# Patient Record
Sex: FEMALE | Race: WHITE | Employment: OTHER | URBAN - NONMETROPOLITAN AREA
[De-identification: names, ages, dates, MRNs, and addresses within clinical notes are randomized per-mention and may not be internally consistent; named-entity substitution may affect disease eponyms.]

---

## 2017-02-27 ENCOUNTER — HOSPITAL ENCOUNTER (OUTPATIENT)
Dept: MRI IMAGING | Age: 72
Discharge: HOME | End: 2017-02-27
Payer: COMMERCIAL

## 2017-02-27 ENCOUNTER — HOSPITAL ENCOUNTER (OUTPATIENT)
Dept: CT IMAGING | Age: 72
Discharge: HOME | End: 2017-02-27
Payer: COMMERCIAL

## 2017-02-27 DIAGNOSIS — Z87.891 FORMER SMOKER: ICD-10-CM

## 2017-02-27 DIAGNOSIS — M54.32 LEFT SIDED SCIATICA: ICD-10-CM

## 2017-02-27 PROCEDURE — 72148 MRI LUMBAR SPINE W/O DYE: CPT

## 2017-02-27 PROCEDURE — G0297 LDCT FOR LUNG CA SCREEN: HCPCS

## 2017-03-14 ENCOUNTER — HOSPITAL ENCOUNTER (OUTPATIENT)
Dept: ULTRASOUND IMAGING | Age: 72
Discharge: HOME | End: 2017-03-14
Payer: MEDICARE

## 2017-03-14 DIAGNOSIS — R59.0 ENLARGED LYMPH NODE IN NECK: ICD-10-CM

## 2017-03-14 DIAGNOSIS — N28.1 RENAL CYST: ICD-10-CM

## 2017-03-14 PROCEDURE — 76536 US EXAM OF HEAD AND NECK: CPT

## 2017-03-14 PROCEDURE — 76770 US EXAM ABDO BACK WALL COMP: CPT

## 2017-03-27 ENCOUNTER — HOSPITAL ENCOUNTER (OUTPATIENT)
Dept: NUCLEAR MEDICINE | Age: 72
Discharge: HOME | End: 2017-03-27
Payer: MEDICARE

## 2017-03-27 DIAGNOSIS — R91.1 SOLITARY PULMONARY NODULE: ICD-10-CM

## 2017-03-27 LAB
GLUCOSE BLD-MCNC: 113 MG/DL (ref 70–99)
PERFORMED ON: ABNORMAL

## 2017-03-27 PROCEDURE — 78815 PET IMAGE W/CT SKULL-THIGH: CPT

## 2017-03-27 PROCEDURE — A9552 F18 FDG: HCPCS | Performed by: INTERNAL MEDICINE

## 2017-03-27 PROCEDURE — 82948 REAGENT STRIP/BLOOD GLUCOSE: CPT

## 2017-03-27 PROCEDURE — 3430000000 HC RX DIAGNOSTIC RADIOPHARMACEUTICAL: Performed by: INTERNAL MEDICINE

## 2017-03-27 RX ORDER — FLUDEOXYGLUCOSE F 18 200 MCI/ML
10 INJECTION, SOLUTION INTRAVENOUS
Status: COMPLETED | OUTPATIENT
Start: 2017-03-27 | End: 2017-03-27

## 2017-03-27 RX ADMIN — FLUDEOXYGLUCOSE F 18 10 MILLICURIE: 200 INJECTION, SOLUTION INTRAVENOUS at 13:08

## 2017-04-12 ENCOUNTER — HOSPITAL ENCOUNTER (OUTPATIENT)
Dept: ULTRASOUND IMAGING | Age: 72
Discharge: HOME | End: 2017-04-12
Payer: MEDICARE

## 2017-04-12 DIAGNOSIS — N83.202 CYST OF LEFT OVARY: ICD-10-CM

## 2017-04-12 PROCEDURE — 76830 TRANSVAGINAL US NON-OB: CPT

## 2017-04-24 ENCOUNTER — PROCEDURE VISIT (OUTPATIENT)
Dept: OBSTETRICS AND GYNECOLOGY | Facility: CLINIC | Age: 72
End: 2017-04-24

## 2017-04-24 ENCOUNTER — OFFICE VISIT (OUTPATIENT)
Dept: OBSTETRICS AND GYNECOLOGY | Facility: CLINIC | Age: 72
End: 2017-04-24

## 2017-04-24 VITALS
HEIGHT: 67 IN | DIASTOLIC BLOOD PRESSURE: 84 MMHG | BODY MASS INDEX: 35.94 KG/M2 | SYSTOLIC BLOOD PRESSURE: 150 MMHG | WEIGHT: 229 LBS

## 2017-04-24 DIAGNOSIS — R93.89 ENDOMETRIAL THICKENING ON ULTRA SOUND: Primary | ICD-10-CM

## 2017-04-24 DIAGNOSIS — N85.00 ENDOMETRIAL HYPERPLASIA: Primary | ICD-10-CM

## 2017-04-24 PROCEDURE — 99203 OFFICE O/P NEW LOW 30 MIN: CPT | Performed by: OBSTETRICS & GYNECOLOGY

## 2017-04-24 PROCEDURE — 76830 TRANSVAGINAL US NON-OB: CPT | Performed by: OBSTETRICS & GYNECOLOGY

## 2017-04-24 RX ORDER — GLUCOSAMINE SULFATE 500 MG
1 CAPSULE ORAL DAILY
COMMUNITY
End: 2020-04-28

## 2017-04-24 RX ORDER — BUMETANIDE 1 MG/1
TABLET ORAL
COMMUNITY
Start: 2017-03-22 | End: 2017-10-30 | Stop reason: SDUPTHER

## 2017-04-24 RX ORDER — MELOXICAM 15 MG/1
15 TABLET ORAL DAILY PRN
COMMUNITY
Start: 2017-04-17 | End: 2018-08-09 | Stop reason: HOSPADM

## 2017-04-24 RX ORDER — CLONIDINE HYDROCHLORIDE 0.1 MG/1
TABLET ORAL
COMMUNITY
Start: 2017-03-22 | End: 2017-10-30 | Stop reason: SDUPTHER

## 2017-04-24 RX ORDER — FAMCICLOVIR 500 MG/1
TABLET ORAL
COMMUNITY
Start: 2015-01-29 | End: 2017-10-30 | Stop reason: SDUPTHER

## 2017-04-24 RX ORDER — POTASSIUM CHLORIDE 750 MG/1
10 TABLET, EXTENDED RELEASE ORAL 2 TIMES DAILY
COMMUNITY
Start: 2017-03-22

## 2017-04-24 RX ORDER — SIMVASTATIN 20 MG
TABLET ORAL
COMMUNITY
Start: 2014-10-14 | End: 2017-04-24 | Stop reason: SDUPTHER

## 2017-04-24 RX ORDER — FLUTICASONE PROPIONATE 50 MCG
SPRAY, SUSPENSION (ML) NASAL
COMMUNITY
Start: 2017-03-22 | End: 2017-10-30 | Stop reason: SDUPTHER

## 2017-04-24 RX ORDER — LABETALOL 300 MG/1
TABLET, FILM COATED ORAL
COMMUNITY
Start: 2017-03-22 | End: 2017-10-30 | Stop reason: SDUPTHER

## 2017-04-24 RX ORDER — SIMVASTATIN 20 MG
TABLET ORAL
COMMUNITY
Start: 2017-03-22 | End: 2017-10-30 | Stop reason: SDUPTHER

## 2017-04-24 RX ORDER — AMLODIPINE AND VALSARTAN 10; 320 MG/1; MG/1
TABLET ORAL
COMMUNITY
Start: 2017-03-22 | End: 2017-10-30 | Stop reason: SDUPTHER

## 2017-04-24 NOTE — PROGRESS NOTES
Farida Hayes is a 71 y.o. female is being seen for consultation today at the request of Ania Culver MD  Menopausal for 15 years now.  Took HRT initially for symptom control but stopped at that time due to DUB.  No more VB since.  Had workup that revealed thickened endometrial lining on U/S.  Today, u/s normal but lining measures 9 mm.    History of Present Illness    The following portions of the patient's history were reviewed and updated as appropriate: allergies, current medications, past family history, past medical history, past social history, past surgical history and problem list.    Review of Systems   Constitutional: Negative for fever and unexpected weight change.   HENT: Negative for nosebleeds, postnasal drip and rhinorrhea.    Eyes: Negative for photophobia and visual disturbance.   Respiratory: Negative for cough, choking and chest tightness.    Cardiovascular: Negative for chest pain and leg swelling.   Gastrointestinal: Negative for abdominal pain, diarrhea, nausea and vomiting.   Endocrine: Negative for cold intolerance and heat intolerance.   Genitourinary: Negative for dyspareunia, menstrual problem, pelvic pain, vaginal bleeding and vaginal discharge.   Musculoskeletal: Negative for back pain, gait problem and joint swelling.   Neurological: Negative for light-headedness and headaches.   Psychiatric/Behavioral: Negative for confusion, decreased concentration and dysphoric mood.       Objective   Physical Exam   Constitutional: She is oriented to person, place, and time. She appears well-developed and well-nourished.   HENT:   Head: Normocephalic and atraumatic.   Neck: Normal range of motion. Neck supple. No JVD present. No tracheal deviation present. No thyromegaly present.   Cardiovascular: Normal rate and regular rhythm.    Pulmonary/Chest: Effort normal and breath sounds normal. No stridor.   Abdominal: Soft. Bowel sounds are normal. She exhibits no distension. There is  no tenderness.   Musculoskeletal: Normal range of motion.   Lymphadenopathy:     She has no cervical adenopathy.   Neurological: She is alert and oriented to person, place, and time.   Skin: Skin is warm and dry.   Psychiatric: She has a normal mood and affect. Her behavior is normal. Judgment and thought content normal.   Nursing note and vitals reviewed.        Assessment/Plan   Anne-Marie was seen today for establish care.    Diagnoses and all orders for this visit:    Endometrial thickening on ultra sound  Comments:  Incidental finding without clinical significance due to absence of menopausal bleeding.    Repeat U/S in 6 months      Kenan Santos MD

## 2017-08-02 RX ORDER — MELOXICAM 15 MG/1
15 TABLET ORAL DAILY
COMMUNITY
End: 2018-07-27 | Stop reason: SDUPTHER

## 2017-08-02 RX ORDER — MELATONIN
2 DAILY
COMMUNITY
End: 2021-05-13 | Stop reason: ALTCHOICE

## 2017-08-03 LAB
ALBUMIN SERPL-MCNC: 4.5 G/DL (ref 3.5–5.2)
ALP BLD-CCNC: 100 U/L (ref 35–104)
ALT SERPL-CCNC: 45 U/L (ref 5–33)
ANION GAP SERPL CALCULATED.3IONS-SCNC: 15 MMOL/L (ref 7–19)
AST SERPL-CCNC: 27 U/L (ref 5–32)
BASOPHILS ABSOLUTE: 0.1 K/UL (ref 0–0.2)
BASOPHILS RELATIVE PERCENT: 0.8 % (ref 0–1)
BILIRUB SERPL-MCNC: 0.8 MG/DL (ref 0.2–1.2)
BUN BLDV-MCNC: 13 MG/DL (ref 8–23)
CALCIUM SERPL-MCNC: 10.9 MG/DL (ref 8.8–10.2)
CHLORIDE BLD-SCNC: 101 MMOL/L (ref 98–111)
CHOLESTEROL, TOTAL: 174 MG/DL (ref 160–199)
CO2: 26 MMOL/L (ref 22–29)
CREAT SERPL-MCNC: 0.7 MG/DL (ref 0.5–0.9)
EOSINOPHILS ABSOLUTE: 0.4 K/UL (ref 0–0.6)
EOSINOPHILS RELATIVE PERCENT: 5.7 % (ref 0–5)
GFR NON-AFRICAN AMERICAN: >60
GLUCOSE BLD-MCNC: 129 MG/DL (ref 74–109)
HBA1C MFR BLD: 5.8 %
HCT VFR BLD CALC: 42.9 % (ref 37–47)
HDLC SERPL-MCNC: 63 MG/DL (ref 65–121)
HEMOGLOBIN: 14 G/DL (ref 12–16)
LDL CHOLESTEROL CALCULATED: 76 MG/DL
LYMPHOCYTES ABSOLUTE: 1.5 K/UL (ref 1.1–4.5)
LYMPHOCYTES RELATIVE PERCENT: 21.9 % (ref 20–40)
MCH RBC QN AUTO: 31.5 PG (ref 27–31)
MCHC RBC AUTO-ENTMCNC: 32.6 G/DL (ref 33–37)
MCV RBC AUTO: 96.4 FL (ref 81–99)
MONOCYTES ABSOLUTE: 0.6 K/UL (ref 0–0.9)
MONOCYTES RELATIVE PERCENT: 8.8 % (ref 0–10)
NEUTROPHILS ABSOLUTE: 4.1 K/UL (ref 1.5–7.5)
NEUTROPHILS RELATIVE PERCENT: 62.3 % (ref 50–65)
PDW BLD-RTO: 12.2 % (ref 11.5–14.5)
PLATELET # BLD: 181 K/UL (ref 130–400)
PMV BLD AUTO: 10.9 FL (ref 9.4–12.3)
POTASSIUM SERPL-SCNC: 5 MMOL/L (ref 3.5–5)
RBC # BLD: 4.45 M/UL (ref 4.2–5.4)
SODIUM BLD-SCNC: 142 MMOL/L (ref 136–145)
TOTAL PROTEIN: 6.8 G/DL (ref 6.6–8.7)
TRIGL SERPL-MCNC: 174 MG/DL (ref 150–199)
TSH SERPL DL<=0.05 MIU/L-ACNC: 1.54 UIU/ML (ref 0.27–4.2)
WBC # BLD: 6.6 K/UL (ref 4.8–10.8)

## 2017-08-07 ENCOUNTER — OFFICE VISIT (OUTPATIENT)
Dept: INTERNAL MEDICINE | Age: 72
End: 2017-08-07
Payer: MEDICARE

## 2017-08-07 VITALS
BODY MASS INDEX: 36.1 KG/M2 | WEIGHT: 230 LBS | DIASTOLIC BLOOD PRESSURE: 72 MMHG | HEIGHT: 67 IN | SYSTOLIC BLOOD PRESSURE: 128 MMHG

## 2017-08-07 DIAGNOSIS — E83.52 HYPERCALCEMIA: ICD-10-CM

## 2017-08-07 DIAGNOSIS — M15.9 PRIMARY OSTEOARTHRITIS INVOLVING MULTIPLE JOINTS: ICD-10-CM

## 2017-08-07 DIAGNOSIS — Z12.31 SCREENING MAMMOGRAM, ENCOUNTER FOR: ICD-10-CM

## 2017-08-07 DIAGNOSIS — R91.1 LUNG NODULE, SOLITARY: ICD-10-CM

## 2017-08-07 DIAGNOSIS — E04.2 MULTINODULAR GOITER: ICD-10-CM

## 2017-08-07 DIAGNOSIS — E78.00 PURE HYPERCHOLESTEROLEMIA: ICD-10-CM

## 2017-08-07 DIAGNOSIS — R73.01 IMPAIRED FASTING GLUCOSE: ICD-10-CM

## 2017-08-07 DIAGNOSIS — I10 ESSENTIAL HYPERTENSION: Primary | ICD-10-CM

## 2017-08-07 PROBLEM — M15.0 PRIMARY OSTEOARTHRITIS INVOLVING MULTIPLE JOINTS: Status: ACTIVE | Noted: 2017-08-07

## 2017-08-07 PROCEDURE — 99214 OFFICE O/P EST MOD 30 MIN: CPT | Performed by: INTERNAL MEDICINE

## 2017-08-07 ASSESSMENT — PATIENT HEALTH QUESTIONNAIRE - PHQ9
SUM OF ALL RESPONSES TO PHQ9 QUESTIONS 1 & 2: 0
SUM OF ALL RESPONSES TO PHQ QUESTIONS 1-9: 0
2. FEELING DOWN, DEPRESSED OR HOPELESS: 0

## 2017-08-07 ASSESSMENT — ENCOUNTER SYMPTOMS
CONSTIPATION: 0
SHORTNESS OF BREATH: 0
SINUS PRESSURE: 0
VOMITING: 0
COUGH: 0
ABDOMINAL PAIN: 0
DIARRHEA: 0
EYE REDNESS: 0
NAUSEA: 0

## 2017-09-19 ENCOUNTER — HOSPITAL ENCOUNTER (OUTPATIENT)
Dept: CT IMAGING | Age: 72
Discharge: HOME | End: 2017-09-19
Payer: MEDICARE

## 2017-09-19 DIAGNOSIS — R91.1 SOLITARY PULMONARY NODULE: ICD-10-CM

## 2017-09-19 PROCEDURE — 71250 CT THORAX DX C-: CPT

## 2017-10-25 ENCOUNTER — TELEPHONE (OUTPATIENT)
Dept: SURGERY | Age: 72
End: 2017-10-25

## 2017-10-30 ENCOUNTER — OFFICE VISIT (OUTPATIENT)
Dept: OBSTETRICS AND GYNECOLOGY | Facility: CLINIC | Age: 72
End: 2017-10-30

## 2017-10-30 ENCOUNTER — PROCEDURE VISIT (OUTPATIENT)
Dept: OBSTETRICS AND GYNECOLOGY | Facility: CLINIC | Age: 72
End: 2017-10-30

## 2017-10-30 VITALS
WEIGHT: 236 LBS | BODY MASS INDEX: 37.04 KG/M2 | DIASTOLIC BLOOD PRESSURE: 76 MMHG | HEIGHT: 67 IN | SYSTOLIC BLOOD PRESSURE: 140 MMHG

## 2017-10-30 DIAGNOSIS — R93.89 ENDOMETRIAL THICKENING ON ULTRA SOUND: Primary | ICD-10-CM

## 2017-10-30 DIAGNOSIS — Z78.9 NON-SMOKER: ICD-10-CM

## 2017-10-30 PROCEDURE — 99213 OFFICE O/P EST LOW 20 MIN: CPT | Performed by: OBSTETRICS & GYNECOLOGY

## 2017-10-30 PROCEDURE — 76830 TRANSVAGINAL US NON-OB: CPT | Performed by: OBSTETRICS & GYNECOLOGY

## 2017-10-30 RX ORDER — LABETALOL 300 MG/1
TABLET, FILM COATED ORAL
Status: ON HOLD | COMMUNITY
Start: 2017-10-20 | End: 2018-08-07

## 2017-10-30 RX ORDER — CHLORAL HYDRATE 500 MG
1000 CAPSULE ORAL
COMMUNITY

## 2017-10-30 RX ORDER — SIMVASTATIN 20 MG
TABLET ORAL
Status: ON HOLD | COMMUNITY
Start: 2017-10-20 | End: 2018-08-07

## 2017-10-30 RX ORDER — FLUTICASONE PROPIONATE 50 MCG
SPRAY, SUSPENSION (ML) NASAL
Status: ON HOLD | COMMUNITY
Start: 2017-10-20 | End: 2018-08-07

## 2017-10-30 RX ORDER — ASPIRIN 81 MG/1
81 TABLET, CHEWABLE ORAL NIGHTLY
COMMUNITY
End: 2018-08-09 | Stop reason: HOSPADM

## 2017-10-30 RX ORDER — AMLODIPINE AND VALSARTAN 10; 320 MG/1; MG/1
TABLET ORAL
Status: ON HOLD | COMMUNITY
Start: 2017-10-20 | End: 2018-08-07 | Stop reason: ALTCHOICE

## 2017-10-30 RX ORDER — LOTEPREDNOL ETABONATE 5 MG/G
GEL OPHTHALMIC
COMMUNITY
Start: 2017-08-18 | End: 2017-10-30

## 2017-10-30 RX ORDER — BUMETANIDE 1 MG/1
TABLET ORAL
Status: ON HOLD | COMMUNITY
Start: 2017-10-20 | End: 2018-08-07

## 2017-10-30 RX ORDER — MELATONIN
1000 2 TIMES DAILY
COMMUNITY
End: 2020-05-18

## 2017-10-30 RX ORDER — POTASSIUM CHLORIDE 750 MG/1
TABLET, EXTENDED RELEASE ORAL
COMMUNITY
Start: 2017-10-20 | End: 2018-05-04 | Stop reason: SDUPTHER

## 2017-10-30 RX ORDER — CLONIDINE HYDROCHLORIDE 0.1 MG/1
TABLET ORAL
Status: ON HOLD | COMMUNITY
Start: 2017-10-20 | End: 2018-08-07

## 2017-10-30 RX ORDER — BROMFENAC SODIUM 0.7 MG/ML
SOLUTION/ DROPS OPHTHALMIC
COMMUNITY
Start: 2017-08-18 | End: 2017-10-30

## 2017-10-30 RX ORDER — FAMCICLOVIR 500 MG/1
TABLET ORAL
COMMUNITY
Start: 2017-10-20 | End: 2018-08-06

## 2017-10-30 NOTE — PROGRESS NOTES
Farida Hayes is a 72 y.o. female is here today for follow-up.  No VB noted.  No pelvic symptoms.  U/S reviewed and compared to one 6 months ago.  Essentially unchanged.    Other   This is a chronic problem. The current episode started more than 1 month ago. The problem occurs constantly. The problem has been unchanged. Pertinent negatives include no abdominal pain, anorexia, arthralgias, change in bowel habit, chest pain, chills, congestion, coughing, diaphoresis, fatigue, fever, headaches, joint swelling, myalgias, nausea, neck pain, numbness, rash, sore throat, swollen glands, urinary symptoms, vertigo, visual change, vomiting or weakness. Nothing aggravates the symptoms. She has tried nothing for the symptoms.       The following portions of the patient's history were reviewed and updated as appropriate: allergies, current medications, past family history, past medical history, past social history, past surgical history and problem list.    Review of Systems   Constitutional: Negative for chills, diaphoresis, fatigue and fever.   HENT: Negative for congestion and sore throat.    Respiratory: Negative for cough.    Cardiovascular: Negative for chest pain.   Gastrointestinal: Negative for abdominal pain, anorexia, change in bowel habit, nausea and vomiting.   Musculoskeletal: Negative for arthralgias, joint swelling, myalgias and neck pain.   Skin: Negative for rash.   Neurological: Negative for vertigo, weakness, numbness and headaches.   All other systems reviewed and are negative.      Objective   Physical Exam   Constitutional: She is oriented to person, place, and time. She appears well-developed and well-nourished.   HENT:   Head: Normocephalic and atraumatic.   Neurological: She is alert and oriented to person, place, and time.   Skin: Skin is warm and dry.   Psychiatric: She has a normal mood and affect. Her behavior is normal. Judgment and thought content normal.   Nursing note and  vitals reviewed.        Assessment/Plan   Anne-Marie was seen today for thickened endometrium.    Diagnoses and all orders for this visit:    Endometrial thickening on ultra sound  Comments:  Stable compared to 6 months ago.  No VB.    Non-smoker    Given lack of vaginal bleeding, measurement not clinically significant.  Encouraged patient to call with bleeding.    Kenan Santos MD

## 2017-11-14 ENCOUNTER — NURSE ONLY (OUTPATIENT)
Dept: INTERNAL MEDICINE | Age: 72
End: 2017-11-14
Payer: MEDICARE

## 2017-11-14 DIAGNOSIS — Z23 INFLUENZA VACCINE NEEDED: Primary | ICD-10-CM

## 2017-11-14 DIAGNOSIS — Z23 NEED FOR STREPTOCOCCUS PNEUMONIAE VACCINATION: ICD-10-CM

## 2017-11-14 PROCEDURE — G0008 ADMIN INFLUENZA VIRUS VAC: HCPCS | Performed by: INTERNAL MEDICINE

## 2017-11-14 PROCEDURE — 90662 IIV NO PRSV INCREASED AG IM: CPT | Performed by: INTERNAL MEDICINE

## 2017-11-14 PROCEDURE — 90670 PCV13 VACCINE IM: CPT | Performed by: INTERNAL MEDICINE

## 2017-11-14 PROCEDURE — G0009 ADMIN PNEUMOCOCCAL VACCINE: HCPCS | Performed by: INTERNAL MEDICINE

## 2017-11-27 ENCOUNTER — HOSPITAL ENCOUNTER (OUTPATIENT)
Dept: WOMENS IMAGING | Age: 72
Discharge: HOME | End: 2017-11-27
Payer: MEDICARE

## 2017-11-27 ENCOUNTER — OFFICE VISIT (OUTPATIENT)
Dept: SURGERY | Age: 72
End: 2017-11-27
Payer: MEDICARE

## 2017-11-27 VITALS — HEART RATE: 72 BPM | SYSTOLIC BLOOD PRESSURE: 138 MMHG | DIASTOLIC BLOOD PRESSURE: 80 MMHG

## 2017-11-27 DIAGNOSIS — Z12.31 VISIT FOR SCREENING MAMMOGRAM: ICD-10-CM

## 2017-11-27 DIAGNOSIS — Z12.39 SCREENING BREAST EXAMINATION: Primary | ICD-10-CM

## 2017-11-27 PROCEDURE — 3014F SCREEN MAMMO DOC REV: CPT | Performed by: PHYSICIAN ASSISTANT

## 2017-11-27 PROCEDURE — 99212 OFFICE O/P EST SF 10 MIN: CPT | Performed by: PHYSICIAN ASSISTANT

## 2017-11-27 PROCEDURE — 1090F PRES/ABSN URINE INCON ASSESS: CPT | Performed by: PHYSICIAN ASSISTANT

## 2017-11-27 PROCEDURE — G8417 CALC BMI ABV UP PARAM F/U: HCPCS | Performed by: PHYSICIAN ASSISTANT

## 2017-11-27 PROCEDURE — G0202 SCR MAMMO BI INCL CAD: HCPCS

## 2017-11-27 PROCEDURE — G8484 FLU IMMUNIZE NO ADMIN: HCPCS | Performed by: PHYSICIAN ASSISTANT

## 2017-11-27 PROCEDURE — G8400 PT W/DXA NO RESULTS DOC: HCPCS | Performed by: PHYSICIAN ASSISTANT

## 2017-11-27 PROCEDURE — 1036F TOBACCO NON-USER: CPT | Performed by: PHYSICIAN ASSISTANT

## 2017-11-27 PROCEDURE — 3017F COLORECTAL CA SCREEN DOC REV: CPT | Performed by: PHYSICIAN ASSISTANT

## 2017-11-27 PROCEDURE — 4040F PNEUMOC VAC/ADMIN/RCVD: CPT | Performed by: PHYSICIAN ASSISTANT

## 2017-11-27 PROCEDURE — 1123F ACP DISCUSS/DSCN MKR DOCD: CPT | Performed by: PHYSICIAN ASSISTANT

## 2017-11-27 PROCEDURE — G8427 DOCREV CUR MEDS BY ELIG CLIN: HCPCS | Performed by: PHYSICIAN ASSISTANT

## 2017-11-27 NOTE — PROGRESS NOTES
HPI:  Carlos Tucker is in for yearly follow-up breast check. She has not noticed any changes in her breasts. DIGITAL SCREEN BILATERAL    11/27/2017 4:36 PM   History: Asymptomatic 70-year-old woman for screening digital   mammography. Routine protocol full field digital screening mammography utilizing   two-dimensional, and three-dimensional, and tomographic imaging   sequences. Family history of breast carcinoma: None. Comparison: 11/22/2016 and 11/16/2015. The composition of the breast tissue is a category B - which means the   breasts are made up of scattered areas of fibroglandular tissue. The parenchymal tissue has an asymmetric though stable pattern of   distribution. .   Scattered benign vascular and parenchymal calcifications are again   seen. There are no suspicious microcalcifications. Evaluation of the tomographic sequences shows no areas of   architectural distortion.     The mammograms were evaluated using a computer aided detection   software program (CAD). The patient's information has been added to a reminder system with a   target due date for the next mammogram.       Impression   1. Benign mammograms. 2. BI-RADS category 2.   3. One year bilateral screening mammography follow-up is recommended. BREAST EXAM:  On examination, she has fibrocystic changes throughout both breasts, no dominant masses, no skin or nipple changes and no axillary adenopathy. I see nothing suspicious for breast cancer. ASSESSMENT:  Normal breast exam and mammogram    PLAN:  I will plan to see her back in 1 year for physical exam and bilateral mammograms. She will contact me if anything significant changes.

## 2017-12-20 ENCOUNTER — OFFICE VISIT (OUTPATIENT)
Dept: INTERNAL MEDICINE | Age: 72
End: 2017-12-20
Payer: MEDICARE

## 2017-12-20 VITALS
OXYGEN SATURATION: 95 % | SYSTOLIC BLOOD PRESSURE: 138 MMHG | BODY MASS INDEX: 37.2 KG/M2 | RESPIRATION RATE: 18 BRPM | WEIGHT: 237 LBS | HEART RATE: 78 BPM | DIASTOLIC BLOOD PRESSURE: 84 MMHG | HEIGHT: 67 IN

## 2017-12-20 DIAGNOSIS — L02.92 CARBUNCLE AND FURUNCLE: ICD-10-CM

## 2017-12-20 DIAGNOSIS — L02.92 CARBUNCLE AND FURUNCLE: Primary | ICD-10-CM

## 2017-12-20 DIAGNOSIS — L02.93 CARBUNCLE AND FURUNCLE: ICD-10-CM

## 2017-12-20 DIAGNOSIS — L02.93 CARBUNCLE AND FURUNCLE: Primary | ICD-10-CM

## 2017-12-20 PROCEDURE — 1123F ACP DISCUSS/DSCN MKR DOCD: CPT | Performed by: NURSE PRACTITIONER

## 2017-12-20 PROCEDURE — G8400 PT W/DXA NO RESULTS DOC: HCPCS | Performed by: NURSE PRACTITIONER

## 2017-12-20 PROCEDURE — 1036F TOBACCO NON-USER: CPT | Performed by: NURSE PRACTITIONER

## 2017-12-20 PROCEDURE — 3014F SCREEN MAMMO DOC REV: CPT | Performed by: NURSE PRACTITIONER

## 2017-12-20 PROCEDURE — G8417 CALC BMI ABV UP PARAM F/U: HCPCS | Performed by: NURSE PRACTITIONER

## 2017-12-20 PROCEDURE — 3017F COLORECTAL CA SCREEN DOC REV: CPT | Performed by: NURSE PRACTITIONER

## 2017-12-20 PROCEDURE — 1090F PRES/ABSN URINE INCON ASSESS: CPT | Performed by: NURSE PRACTITIONER

## 2017-12-20 PROCEDURE — 4040F PNEUMOC VAC/ADMIN/RCVD: CPT | Performed by: NURSE PRACTITIONER

## 2017-12-20 PROCEDURE — 10060 I&D ABSCESS SIMPLE/SINGLE: CPT | Performed by: NURSE PRACTITIONER

## 2017-12-20 PROCEDURE — 99213 OFFICE O/P EST LOW 20 MIN: CPT | Performed by: NURSE PRACTITIONER

## 2017-12-20 PROCEDURE — G8427 DOCREV CUR MEDS BY ELIG CLIN: HCPCS | Performed by: NURSE PRACTITIONER

## 2017-12-20 PROCEDURE — G8484 FLU IMMUNIZE NO ADMIN: HCPCS | Performed by: NURSE PRACTITIONER

## 2017-12-20 ASSESSMENT — ENCOUNTER SYMPTOMS
SHORTNESS OF BREATH: 0
VOMITING: 0
DIARRHEA: 0
TROUBLE SWALLOWING: 0
STRIDOR: 0
CONSTIPATION: 0
EYE DISCHARGE: 0
ABDOMINAL PAIN: 0
EYE ITCHING: 0
ABDOMINAL DISTENTION: 0
NAUSEA: 0
WHEEZING: 0
BLOOD IN STOOL: 0
CHOKING: 0
COLOR CHANGE: 0
COUGH: 0
SORE THROAT: 0

## 2017-12-20 NOTE — PROGRESS NOTES
tablet TAKE 1 TABLET EVERY 12 HOURS 180 tablet 3    fluticasone (FLONASE) 50 MCG/ACT nasal spray USE 2 SPRAYS IN EACH NOSTRIL ONCE DAILY 48 g 3    amLODIPine-valsartan (EXFORGE)  MG per tablet TAKE 1 TABLET DAILY 90 tablet 3    simvastatin (ZOCOR) 20 MG tablet TAKE 1 TABLET DAILY 90 tablet 3    potassium chloride (KLOR-CON M) 10 MEQ extended release tablet TAKE 1 TABLET TWICE A  tablet 3    famciclovir (FAMVIR) 500 MG tablet TAKE 3 TABLETS ONCE AS NEEDED FOR COLD SORE AS DIRECTED 270 tablet 0    cloNIDine (CATAPRES) 0.1 MG tablet TAKE 1 TABLET TWICE A  tablet 3    Cholecalciferol (VITAMIN D3) 2000 units CAPS Take by mouth Indications: daily      meloxicam (MOBIC) 15 MG tablet Take 15 mg by mouth daily       aspirin 81 MG tablet Take 81 mg by mouth daily.  Multiple Vitamins-Minerals (SENIOR MULTIVITAMIN PLUS PO) Take  by mouth daily.  potassium chloride (K-DUR) 10 MEQ tablet Take 10 mEq by mouth 2 times daily.  FISH OIL 1 capsule by Does not apply route daily.  Glucosamine 500 MG CAPS Take 1 capsule by mouth 2 times daily. No current facility-administered medications for this visit. No Known Allergies    Health Maintenance   Topic Date Due    Hepatitis C screen  1945    DTaP/Tdap/Td vaccine (1 - Tdap) 08/07/1964    Colon cancer screen colonoscopy  08/07/1995    Zostavax vaccine  08/07/2005    DEXA (modify frequency per FRAX score)  08/07/2010    Breast cancer screen  11/27/2019    Lipid screen  08/03/2022    Flu vaccine  Completed    Pneumococcal low/med risk  Completed       Subjective:      Review of Systems   Constitutional: Negative for fatigue, fever and unexpected weight change. HENT: Negative for ear discharge, ear pain, mouth sores, sore throat and trouble swallowing. Eyes: Negative for discharge, itching and visual disturbance. Respiratory: Negative for cough, choking, shortness of breath, wheezing and stridor. Cardiovascular: Negative for chest pain, palpitations and leg swelling. Gastrointestinal: Negative for abdominal distention, abdominal pain, blood in stool, constipation, diarrhea, nausea and vomiting. Endocrine: Negative for cold intolerance, polydipsia and polyuria. Genitourinary: Negative for difficulty urinating, dysuria, frequency and urgency. Musculoskeletal: Negative for arthralgias and gait problem. Skin: Negative for color change and rash. Cyst to right back    Allergic/Immunologic: Negative for food allergies and immunocompromised state. Neurological: Negative for dizziness, tremors, syncope, speech difficulty, weakness and headaches. Hematological: Negative for adenopathy. Does not bruise/bleed easily. Psychiatric/Behavioral: Negative for confusion and hallucinations. Objective:     Physical Exam   Constitutional: She is oriented to person, place, and time. She appears well-developed and well-nourished. No distress. HENT:   Head: Normocephalic and atraumatic. Eyes: Pupils are equal, round, and reactive to light. Right eye exhibits no discharge. Left eye exhibits no discharge. No scleral icterus. Neck: Normal range of motion. Neck supple. No JVD present. No thyromegaly present. Cardiovascular: Normal rate, regular rhythm and normal heart sounds. No murmur heard. Pulmonary/Chest: Effort normal and breath sounds normal. No respiratory distress. She has no wheezes. She has no rales. Abdominal: Soft. Bowel sounds are normal. She exhibits no distension and no mass. There is no tenderness. There is no rebound and no guarding. Musculoskeletal: Normal range of motion. She exhibits no edema or tenderness. Neurological: She is alert and oriented to person, place, and time. She has normal reflexes. No cranial nerve deficit. Coordination normal.   Skin: Skin is warm and dry. No rash noted. No erythema.    Carbuncle right back;  Swollen little red    Psychiatric: Her

## 2017-12-20 NOTE — PATIENT INSTRUCTIONS
.. Carbuncle;  S/p I& D with packing; Warm moist compresses several times a day   Ibuprofen 800 mg every 8 hours for the next 48 hours, then as needed; The packing will fall out on its own;    If there is anything in the culture that needs abx we will let you know on Friday     Keep fu with Dr Dalila Jane

## 2017-12-24 LAB
GRAM STAIN RESULT: ABNORMAL
ORGANISM: ABNORMAL
ORGANISM: ABNORMAL
WOUND/ABSCESS: ABNORMAL

## 2018-02-01 DIAGNOSIS — R73.01 IMPAIRED FASTING GLUCOSE: ICD-10-CM

## 2018-02-01 DIAGNOSIS — E04.2 MULTINODULAR GOITER: ICD-10-CM

## 2018-02-01 DIAGNOSIS — E83.52 HYPERCALCEMIA: ICD-10-CM

## 2018-02-01 DIAGNOSIS — E78.00 PURE HYPERCHOLESTEROLEMIA: ICD-10-CM

## 2018-02-01 LAB
ALBUMIN SERPL-MCNC: 4.4 G/DL (ref 3.5–5.2)
ALP BLD-CCNC: 102 U/L (ref 35–104)
ALT SERPL-CCNC: 57 U/L (ref 5–33)
ANION GAP SERPL CALCULATED.3IONS-SCNC: 16 MMOL/L (ref 7–19)
AST SERPL-CCNC: 36 U/L (ref 5–32)
BILIRUB SERPL-MCNC: 0.9 MG/DL (ref 0.2–1.2)
BUN BLDV-MCNC: 12 MG/DL (ref 8–23)
CALCIUM SERPL-MCNC: 10.6 MG/DL (ref 8.8–10.2)
CHLORIDE BLD-SCNC: 101 MMOL/L (ref 98–111)
CHOLESTEROL, TOTAL: 170 MG/DL (ref 160–199)
CO2: 24 MMOL/L (ref 22–29)
CREAT SERPL-MCNC: 0.7 MG/DL (ref 0.5–0.9)
GFR NON-AFRICAN AMERICAN: >60
GLUCOSE BLD-MCNC: 124 MG/DL (ref 74–109)
HBA1C MFR BLD: 5.6 %
HCT VFR BLD CALC: 44.1 % (ref 37–47)
HDLC SERPL-MCNC: 74 MG/DL (ref 65–121)
HEMOGLOBIN: 14.4 G/DL (ref 12–16)
LDL CHOLESTEROL CALCULATED: 73 MG/DL
MCH RBC QN AUTO: 30.8 PG (ref 27–31)
MCHC RBC AUTO-ENTMCNC: 32.7 G/DL (ref 33–37)
MCV RBC AUTO: 94.2 FL (ref 81–99)
PARATHYROID HORMONE INTACT: 164.3 PG/ML (ref 15–65)
PDW BLD-RTO: 12.2 % (ref 11.5–14.5)
PLATELET # BLD: 199 K/UL (ref 130–400)
PMV BLD AUTO: 11.1 FL (ref 9.4–12.3)
POTASSIUM SERPL-SCNC: 4.3 MMOL/L (ref 3.5–5)
RBC # BLD: 4.68 M/UL (ref 4.2–5.4)
SODIUM BLD-SCNC: 141 MMOL/L (ref 136–145)
TOTAL PROTEIN: 6.7 G/DL (ref 6.6–8.7)
TRIGL SERPL-MCNC: 114 MG/DL (ref 0–149)
TSH SERPL DL<=0.05 MIU/L-ACNC: 1.55 UIU/ML (ref 0.27–4.2)
WBC # BLD: 6.2 K/UL (ref 4.8–10.8)

## 2018-02-08 ENCOUNTER — OFFICE VISIT (OUTPATIENT)
Dept: INTERNAL MEDICINE | Age: 73
End: 2018-02-08
Payer: MEDICARE

## 2018-02-08 VITALS
BODY MASS INDEX: 36.88 KG/M2 | SYSTOLIC BLOOD PRESSURE: 128 MMHG | DIASTOLIC BLOOD PRESSURE: 80 MMHG | HEART RATE: 70 BPM | OXYGEN SATURATION: 98 % | HEIGHT: 67 IN | RESPIRATION RATE: 18 BRPM | WEIGHT: 235 LBS

## 2018-02-08 DIAGNOSIS — E04.2 MULTINODULAR GOITER: ICD-10-CM

## 2018-02-08 DIAGNOSIS — I10 ESSENTIAL HYPERTENSION: ICD-10-CM

## 2018-02-08 DIAGNOSIS — Z00.00 ENCOUNTER FOR MEDICARE ANNUAL WELLNESS EXAM: Primary | ICD-10-CM

## 2018-02-08 DIAGNOSIS — R73.01 IMPAIRED FASTING GLUCOSE: ICD-10-CM

## 2018-02-08 DIAGNOSIS — E21.3 HYPERPARATHYROIDISM (HCC): ICD-10-CM

## 2018-02-08 DIAGNOSIS — M48.061 SPINAL STENOSIS OF LUMBAR REGION, UNSPECIFIED WHETHER NEUROGENIC CLAUDICATION PRESENT: ICD-10-CM

## 2018-02-08 DIAGNOSIS — Z00.00 ROUTINE GENERAL MEDICAL EXAMINATION AT A HEALTH CARE FACILITY: ICD-10-CM

## 2018-02-08 DIAGNOSIS — Z78.0 POSTMENOPAUSAL: ICD-10-CM

## 2018-02-08 DIAGNOSIS — J30.9 ALLERGIC RHINITIS, UNSPECIFIED CHRONICITY, UNSPECIFIED SEASONALITY, UNSPECIFIED TRIGGER: ICD-10-CM

## 2018-02-08 DIAGNOSIS — Z86.19 H/O COLD SORES: ICD-10-CM

## 2018-02-08 DIAGNOSIS — E78.00 PURE HYPERCHOLESTEROLEMIA: ICD-10-CM

## 2018-02-08 PROCEDURE — 1123F ACP DISCUSS/DSCN MKR DOCD: CPT | Performed by: INTERNAL MEDICINE

## 2018-02-08 PROCEDURE — G8417 CALC BMI ABV UP PARAM F/U: HCPCS | Performed by: INTERNAL MEDICINE

## 2018-02-08 PROCEDURE — G0439 PPPS, SUBSEQ VISIT: HCPCS | Performed by: INTERNAL MEDICINE

## 2018-02-08 PROCEDURE — 3017F COLORECTAL CA SCREEN DOC REV: CPT | Performed by: INTERNAL MEDICINE

## 2018-02-08 PROCEDURE — 3014F SCREEN MAMMO DOC REV: CPT | Performed by: INTERNAL MEDICINE

## 2018-02-08 PROCEDURE — 1036F TOBACCO NON-USER: CPT | Performed by: INTERNAL MEDICINE

## 2018-02-08 PROCEDURE — 4040F PNEUMOC VAC/ADMIN/RCVD: CPT | Performed by: INTERNAL MEDICINE

## 2018-02-08 PROCEDURE — 1090F PRES/ABSN URINE INCON ASSESS: CPT | Performed by: INTERNAL MEDICINE

## 2018-02-08 PROCEDURE — G8484 FLU IMMUNIZE NO ADMIN: HCPCS | Performed by: INTERNAL MEDICINE

## 2018-02-08 PROCEDURE — G8400 PT W/DXA NO RESULTS DOC: HCPCS | Performed by: INTERNAL MEDICINE

## 2018-02-08 PROCEDURE — 99213 OFFICE O/P EST LOW 20 MIN: CPT | Performed by: INTERNAL MEDICINE

## 2018-02-08 PROCEDURE — G8427 DOCREV CUR MEDS BY ELIG CLIN: HCPCS | Performed by: INTERNAL MEDICINE

## 2018-02-08 RX ORDER — AMLODIPINE BESYLATE 10 MG/1
10 TABLET ORAL DAILY
Qty: 90 TABLET | Refills: 3 | Status: SHIPPED | OUTPATIENT
Start: 2018-02-08 | End: 2018-08-17 | Stop reason: CLARIF

## 2018-02-08 RX ORDER — FAMCICLOVIR 500 MG/1
TABLET, FILM COATED ORAL
Qty: 270 TABLET | Refills: 3 | Status: SHIPPED | OUTPATIENT
Start: 2018-02-08 | End: 2018-08-17 | Stop reason: CLARIF

## 2018-02-08 RX ORDER — FLUTICASONE PROPIONATE 50 MCG
SPRAY, SUSPENSION (ML) NASAL
Qty: 3 BOTTLE | Refills: 3 | Status: SHIPPED
Start: 2018-02-08 | End: 2019-07-15

## 2018-02-08 RX ORDER — VALSARTAN 320 MG/1
320 TABLET ORAL DAILY
Qty: 90 TABLET | Refills: 3 | Status: SHIPPED | OUTPATIENT
Start: 2018-02-08 | End: 2018-07-27

## 2018-02-08 ASSESSMENT — LIFESTYLE VARIABLES: HOW OFTEN DO YOU HAVE A DRINK CONTAINING ALCOHOL: 0

## 2018-02-08 ASSESSMENT — PATIENT HEALTH QUESTIONNAIRE - PHQ9: SUM OF ALL RESPONSES TO PHQ QUESTIONS 1-9: 0

## 2018-02-08 NOTE — PROGRESS NOTES
Chief Complaint   Patient presents with   Sherwin Sullivan     sees Dr. Brandi Arnett for mammograms and breast exams    Hyperlipidemia    Hypertension       HPI: Patient is here today for annual Medicare wellness visit and to follow-up hypertension hyperlipidemia multiple medical problems including impaired fasting glucose lumbar spinal stenosis arthritis of multiple joints. Multinodular goiter. Multiple other medical issues. She denies recent chest pain dyspnea abdominal pain back and arthritis are relatively stable. Energy levels okay. Mood is improved. Past Medical History:   Diagnosis Date    Arthritis     Heart palpitations     Hypercalcemia 8/7/2017    Hyperlipidemia     Hypertension     Impaired fasting glucose 8/7/2017    Lumbar spinal stenosis     Lung nodule, solitary 8/7/2017    Right upper lobe    Multinodular goiter 8/7/2017    Primary osteoarthritis involving multiple joints 8/7/2017    Wears glasses        Past Surgical History:   Procedure Laterality Date    BREAST BIOPSY  12/3/2007    stereotactic left, fibrocystic changes    BREAST BIOPSY  12/17/2007    stereotactic left, fibrocystic changes    CARDIAC CATHETERIZATION      negative    CATARACT REMOVAL Bilateral 2017    OTHER SURGICAL HISTORY  2013    skin cancer spot on her nose removed    TUBAL LIGATION         Family History   Problem Relation Age of Onset    Hypertension Father        Social History     Social History    Marital status: Single     Spouse name: N/A    Number of children: N/A    Years of education: N/A     Occupational History    Not on file. Social History Main Topics    Smoking status: Former Smoker    Smokeless tobacco: Never Used      Comment: 2.5 ppd for 17 years. Stopped 1983.     Alcohol use Yes    Drug use: No    Sexual activity: Not on file     Other Topics Concern    Not on file     Social History Narrative    No narrative on file       No Known Allergies    Current Outpatient Prescriptions - 16.0 g/dL    Hematocrit 44.1 37.0 - 47.0 %    MCV 94.2 81.0 - 99.0 fL    MCH 30.8 27.0 - 31.0 pg    MCHC 32.7 (L) 33.0 - 37.0 g/dL    RDW 12.2 11.5 - 14.5 %    Platelets 251 242 - 411 K/uL    MPV 11.1 9.4 - 12.3 fL   Comprehensive Metabolic Panel   Result Value Ref Range    Sodium 141 136 - 145 mmol/L    Potassium 4.3 3.5 - 5.0 mmol/L    Chloride 101 98 - 111 mmol/L    CO2 24 22 - 29 mmol/L    Anion Gap 16 7 - 19 mmol/L    Glucose 124 (H) 74 - 109 mg/dL    BUN 12 8 - 23 mg/dL    CREATININE 0.7 0.5 - 0.9 mg/dL    GFR Non-African American >60 >60    Calcium 10.6 (H) 8.8 - 10.2 mg/dL    Total Protein 6.7 6.6 - 8.7 g/dL    Alb 4.4 3.5 - 5.2 g/dL    Total Bilirubin 0.9 0.2 - 1.2 mg/dL    Alkaline Phosphatase 102 35 - 104 U/L    ALT 57 (H) 5 - 33 U/L    AST 36 (H) 5 - 32 U/L   Lipid Panel   Result Value Ref Range    Cholesterol, Total 170 160 - 199 mg/dL    Triglycerides 114 0 - 149 mg/dL    HDL 74 65 - 121 mg/dL    LDL Calculated 73 <100 mg/dL   Hemoglobin A1C   Result Value Ref Range    Hemoglobin A1C 5.6 See comment %   TSH without Reflex   Result Value Ref Range    TSH 1.550 0.270 - 4.200 uIU/mL   PTH, Intact   Result Value Ref Range    .3 (H) 15.0 - 65.0 pg/mL       ASSESSMENT/ PLAN:  1. Encounter for Medicare annual wellness exam  Chart medications and labs reviewed routine care and vaccines reviewed keep up-to-date with routine medical care and follow-up. 2. Spinal stenosis of lumbar region, unspecified whether neurogenic claudication present  External Referral To Physical Therapy    [facility] Katie Zavala MD   3. Postmenopausal  DEXA BONE DENSITY 2 SITES   4. Essential hypertension  amLODIPine (NORVASC) 10 MG tablet    valsartan (DIOVAN) 320 MG tablet    CBC    Comprehensive Metabolic Panel   5. Allergic rhinitis, unspecified chronicity, unspecified seasonality, unspecified trigger  fluticasone (FLONASE) 50 MCG/ACT nasal spray   6. H/O cold sores  famciclovir (FAMVIR) 500 MG tablet   7. Impaired fasting glucose  Hemoglobin A1C   8. Hyperparathyroidism ([address]  PTH, Intact   9. Pure hypercholesterolemia  Lipid Panel   10. Multinodular goiter  TSH without Reflex                     Medicare Annual Wellness Visit  Name: Skye Wilson Date: 2018   MRN: [id number] Sex: Female   Age: 67 y.o. Ethnicity: Non-/Non    : 1945 Race: Ambrose Jean-Baptiste is here for Medicare AWV (sees Dr. Doug Hicks for mammograms and breast exams); Hyperlipidemia; and Hypertension    Screenings for behavioral, psychosocial and functional/safety risks, and cognitive dysfunction are all negative except as indicated below. These results, as well as other patient data from the [address] form, are documented in Flowsheets linked to this Encounter. No Known Allergies  Prior to Visit Medications    Medication Sig Taking?  Authorizing Provider   famciclovir (FAMVIR) 500 MG tablet TAKE 3 TABLETS ONCE AS NEEDED FOR COLD SORE AS DIRECTED Yes Gerri Coleman MD   fluticasone (FLONASE) 50 MCG/ACT nasal spray USE 2 SPRAYS IN EACH NOSTRIL ONCE DAILY Yes Gerri Coleman MD   amLODIPine (NORVASC) 10 MG tablet Take 1 tablet by mouth daily Yes Gerri Coleman MD   valsartan (DIOVAN) 320 MG tablet Take 1 tablet by mouth daily Yes Gerri Coleman MD   bumetanide (BUMEX) 1 MG tablet TAKE 1 TABLET DAILY  Gerri Coleman MD   labetalol (NORMODYNE) 300 MG tablet TAKE 1 TABLET EVERY 12 HOURS  Gerri Coleman MD   amLODIPine-valsartan (EXFORGE)  MG per tablet TAKE 1 Gil Rios MD   simvastatin (ZOCOR) 20 MG tablet TAKE 1 TABLET DAILY  Gerri Coleman MD   potassium chloride (KLOR-CON M) 10 MEQ extended release tablet TAKE 1 Alysha Murry MD   cloNIDine (CATAPRES) 0.1 MG tablet TAKE 1 TABLET TWICE A DAY  Gerri Coleman MD   Cholecalciferol (VITAMIN D3) 2000 units CAPS Take by mouth Indications: daily  Historical Provider, MD   meloxicam (MOBIC) 15 MG tablet Take 15 mg by

## 2018-02-18 PROBLEM — Z00.00 ENCOUNTER FOR MEDICARE ANNUAL WELLNESS EXAM: Status: ACTIVE | Noted: 2018-02-18

## 2018-02-18 ASSESSMENT — ENCOUNTER SYMPTOMS
EYE REDNESS: 0
BACK PAIN: 1
ABDOMINAL DISTENTION: 0
COUGH: 0
SINUS PRESSURE: 0
ABDOMINAL PAIN: 0
EYE DISCHARGE: 0
SHORTNESS OF BREATH: 0

## 2018-02-18 NOTE — PATIENT INSTRUCTIONS
Personalized Preventive Plan for Edrie Boast - 2/8/2018  Medicare offers a range of preventive health benefits. Some of the tests and screenings are paid in full while other may be subject to a deductible, co-insurance, and/or copay. Some of these benefits include a comprehensive review of your medical history including lifestyle, illnesses that may run in your family, and various assessments and screenings as appropriate. After reviewing your medical record and screening and assessments performed today your provider may have ordered immunizations, labs, imaging, and/or referrals for you. A list of these orders (if applicable) as well as your Preventive Care list are included within your After Visit Summary for your review. Other Preventive Recommendations:    · A preventive eye exam performed by an eye specialist is recommended every 1-2 years to screen for glaucoma; cataracts, macular degeneration, and other eye disorders. · A preventive dental visit is recommended every 6 months. · Try to get at least 150 minutes of exercise per week or 10,000 steps per day on a pedometer . · Order or download the FREE \"Exercise & Physical Activity: Your Everyday Guide\" from [employer]. Call 8-460.958.2725 or search [employer] online. · You need 7451-1184 mg of calcium and 3125-8462 IU of vitamin D per day. It is possible to meet your calcium requirement with diet alone, but a vitamin D supplement is usually necessary to meet this goal.  · When exposed to the sun, use a sunscreen that protects against both UVA and UVB radiation with an SPF of 30 or greater. Reapply every 2 to 3 hours or after sweating, drying off with a towel, or swimming. · Always wear a seat belt when traveling in a car. Always wear a helmet when riding a bicycle or motorcycle.

## 2018-03-05 ENCOUNTER — HOSPITAL ENCOUNTER (OUTPATIENT)
Dept: WOMENS IMAGING | Age: 73
Discharge: HOME | End: 2018-03-05
Payer: MEDICARE

## 2018-03-05 DIAGNOSIS — Z78.0 POSTMENOPAUSAL: ICD-10-CM

## 2018-03-05 PROCEDURE — 77080 DXA BONE DENSITY AXIAL: CPT

## 2018-04-11 PROBLEM — Z00.00 ENCOUNTER FOR MEDICARE ANNUAL WELLNESS EXAM: Status: RESOLVED | Noted: 2018-02-18 | Resolved: 2018-04-11

## 2018-05-04 ENCOUNTER — PROCEDURE VISIT (OUTPATIENT)
Dept: OBSTETRICS AND GYNECOLOGY | Facility: CLINIC | Age: 73
End: 2018-05-04

## 2018-05-04 ENCOUNTER — OFFICE VISIT (OUTPATIENT)
Dept: OBSTETRICS AND GYNECOLOGY | Facility: CLINIC | Age: 73
End: 2018-05-04

## 2018-05-04 VITALS
DIASTOLIC BLOOD PRESSURE: 80 MMHG | HEIGHT: 67 IN | WEIGHT: 228 LBS | SYSTOLIC BLOOD PRESSURE: 120 MMHG | BODY MASS INDEX: 35.79 KG/M2

## 2018-05-04 DIAGNOSIS — R93.89 ENDOMETRIAL THICKENING ON ULTRA SOUND: Primary | ICD-10-CM

## 2018-05-04 DIAGNOSIS — R93.89 THICKENED ENDOMETRIUM: Primary | ICD-10-CM

## 2018-05-04 DIAGNOSIS — Z78.9 NON-SMOKER: ICD-10-CM

## 2018-05-04 PROCEDURE — 99213 OFFICE O/P EST LOW 20 MIN: CPT | Performed by: OBSTETRICS & GYNECOLOGY

## 2018-05-04 PROCEDURE — 76830 TRANSVAGINAL US NON-OB: CPT | Performed by: OBSTETRICS & GYNECOLOGY

## 2018-05-04 NOTE — PROGRESS NOTES
Subjective   Anne-Marie Hayes is a 72 y.o. female is here today for follow-up.    Patient presents today for follow-up and repeat ultrasound.  1 year ago she had an ultrasound showing a thickened endometrial lining at 9 mm.  She was not having any vaginal bleeding at that time.  Repeat ultrasound 6 months later showed a stable appearance.  She continued to have no vaginal bleeding.  Today, she continues to have no vaginal bleeding and her endometrial lining measures 7 mm.  The remainder of her ultrasound is normal as well.    Thickened endometrial lining on ultrasound      Other   This is a chronic problem. The current episode started more than 1 year ago. The problem occurs constantly. The problem has been gradually improving. Nothing aggravates the symptoms. She has tried nothing for the symptoms. The treatment provided mild relief.       The following portions of the patient's history were reviewed and updated as appropriate: allergies, current medications, past family history, past medical history, past social history, past surgical history and problem list.    Review of Systems   All other systems reviewed and are negative.      Objective   Physical Exam   Constitutional: She is oriented to person, place, and time. She appears well-developed and well-nourished.   HENT:   Head: Normocephalic and atraumatic.   Neurological: She is alert and oriented to person, place, and time.   Skin: Skin is warm and dry.   Psychiatric: She has a normal mood and affect. Her behavior is normal. Judgment and thought content normal.   Nursing note and vitals reviewed.        Assessment/Plan   Anne-Marie was seen today for menopause.    Diagnoses and all orders for this visit:    Thickened endometrium    Non-smoker      Given stability of the lining as well as the absence of vaginal bleeding, no further workup is needed.  Breast exams are followed by Dr. Eng.  All primary care and routine health maintenance followed by   [facility].  Given her age and lack of significant abnormal Pap smears, Pap smears are no longer indicated.  For the reasons above, no need for follow-up with gynecology unless she has symptoms of pelvic pain, vaginal discharge, vaginal bleeding or other complaints.    Kenan Santos MD

## 2018-07-27 ENCOUNTER — TELEPHONE (OUTPATIENT)
Dept: INTERNAL MEDICINE | Age: 73
End: 2018-07-27

## 2018-07-27 DIAGNOSIS — I10 ESSENTIAL HYPERTENSION: Primary | ICD-10-CM

## 2018-07-27 DIAGNOSIS — M15.9 PRIMARY OSTEOARTHRITIS INVOLVING MULTIPLE JOINTS: ICD-10-CM

## 2018-07-27 RX ORDER — MELOXICAM 15 MG/1
15 TABLET ORAL DAILY
Qty: 90 TABLET | Refills: 3 | Status: SHIPPED | OUTPATIENT
Start: 2018-07-27 | End: 2018-08-17 | Stop reason: CLARIF

## 2018-07-27 RX ORDER — IRBESARTAN 300 MG/1
300 TABLET ORAL NIGHTLY
Qty: 90 TABLET | Refills: 3 | Status: SHIPPED | OUTPATIENT
Start: 2018-07-27 | End: 2018-08-17 | Stop reason: CLARIF

## 2018-07-27 NOTE — TELEPHONE ENCOUNTER
Valsartan no longer being made,  Needs something to replace it sent to [facility] and also needs refill on Mobic sent in also to [facility]

## 2018-08-02 DIAGNOSIS — E04.2 MULTINODULAR GOITER: ICD-10-CM

## 2018-08-02 DIAGNOSIS — R73.01 IMPAIRED FASTING GLUCOSE: ICD-10-CM

## 2018-08-02 DIAGNOSIS — E78.00 PURE HYPERCHOLESTEROLEMIA: ICD-10-CM

## 2018-08-02 DIAGNOSIS — I10 ESSENTIAL HYPERTENSION: ICD-10-CM

## 2018-08-02 DIAGNOSIS — E21.3 HYPERPARATHYROIDISM (HCC): ICD-10-CM

## 2018-08-02 LAB
ALBUMIN SERPL-MCNC: 4.6 G/DL (ref 3.5–5.2)
ALP BLD-CCNC: 97 U/L (ref 35–104)
ALT SERPL-CCNC: 32 U/L (ref 5–33)
ANION GAP SERPL CALCULATED.3IONS-SCNC: 20 MMOL/L (ref 7–19)
AST SERPL-CCNC: 24 U/L (ref 5–32)
BILIRUB SERPL-MCNC: 0.8 MG/DL (ref 0.2–1.2)
BUN BLDV-MCNC: 11 MG/DL (ref 8–23)
CALCIUM SERPL-MCNC: 10.3 MG/DL (ref 8.8–10.2)
CHLORIDE BLD-SCNC: 104 MMOL/L (ref 98–111)
CHOLESTEROL, TOTAL: 173 MG/DL (ref 160–199)
CO2: 22 MMOL/L (ref 22–29)
CREAT SERPL-MCNC: 0.6 MG/DL (ref 0.5–0.9)
GFR NON-AFRICAN AMERICAN: >60
GLUCOSE BLD-MCNC: 102 MG/DL (ref 74–109)
HBA1C MFR BLD: 5.3 % (ref 4–6)
HCT VFR BLD CALC: 43.7 % (ref 37–47)
HDLC SERPL-MCNC: 66 MG/DL (ref 65–121)
HEMOGLOBIN: 14 G/DL (ref 12–16)
LDL CHOLESTEROL CALCULATED: 82 MG/DL
MCH RBC QN AUTO: 30.2 PG (ref 27–31)
MCHC RBC AUTO-ENTMCNC: 32 G/DL (ref 33–37)
MCV RBC AUTO: 94.4 FL (ref 81–99)
PARATHYROID HORMONE INTACT: 205.9 PG/ML (ref 15–65)
PDW BLD-RTO: 12.3 % (ref 11.5–14.5)
PLATELET # BLD: 197 K/UL (ref 130–400)
PMV BLD AUTO: 11.4 FL (ref 9.4–12.3)
POTASSIUM SERPL-SCNC: 4.3 MMOL/L (ref 3.5–5)
RBC # BLD: 4.63 M/UL (ref 4.2–5.4)
SODIUM BLD-SCNC: 146 MMOL/L (ref 136–145)
TOTAL PROTEIN: 7.1 G/DL (ref 6.6–8.7)
TRIGL SERPL-MCNC: 127 MG/DL (ref 0–149)
TSH SERPL DL<=0.05 MIU/L-ACNC: 1.07 UIU/ML (ref 0.27–4.2)
WBC # BLD: 7.1 K/UL (ref 4.8–10.8)

## 2018-08-06 ENCOUNTER — APPOINTMENT (OUTPATIENT)
Dept: GENERAL RADIOLOGY | Facility: HOSPITAL | Age: 73
End: 2018-08-06

## 2018-08-06 ENCOUNTER — HOSPITAL ENCOUNTER (INPATIENT)
Facility: HOSPITAL | Age: 73
LOS: 3 days | Discharge: HOME | End: 2018-08-09
Attending: EMERGENCY MEDICINE | Admitting: INTERNAL MEDICINE

## 2018-08-06 DIAGNOSIS — R53.83 FATIGUE, UNSPECIFIED TYPE: ICD-10-CM

## 2018-08-06 DIAGNOSIS — Z72.0 TOBACCO ABUSE: ICD-10-CM

## 2018-08-06 DIAGNOSIS — I48.91 ATRIAL FIBRILLATION, NEW ONSET (HCC): Primary | ICD-10-CM

## 2018-08-06 DIAGNOSIS — R91.1 PULMONARY NODULE: ICD-10-CM

## 2018-08-06 DIAGNOSIS — R06.00 DYSPNEA, UNSPECIFIED TYPE: ICD-10-CM

## 2018-08-06 DIAGNOSIS — G47.19 OTHER HYPERSOMNIA: ICD-10-CM

## 2018-08-06 LAB
ALBUMIN SERPL-MCNC: 4.6 G/DL (ref 3.5–5)
ALBUMIN/GLOB SERPL: 1.8 G/DL (ref 1.1–2.5)
ALP SERPL-CCNC: 102 U/L (ref 24–120)
ALT SERPL W P-5'-P-CCNC: 40 U/L (ref 0–54)
ANION GAP SERPL CALCULATED.3IONS-SCNC: 11 MMOL/L (ref 4–13)
APTT PPP: 26.9 SECONDS (ref 24.1–34.8)
AST SERPL-CCNC: 29 U/L (ref 7–45)
BACTERIA UR QL AUTO: ABNORMAL /HPF
BASOPHILS # BLD AUTO: 0.06 10*3/MM3 (ref 0–0.2)
BASOPHILS NFR BLD AUTO: 0.7 % (ref 0–2)
BILIRUB SERPL-MCNC: 0.7 MG/DL (ref 0.1–1)
BILIRUB UR QL STRIP: NEGATIVE
BUN BLD-MCNC: 14 MG/DL (ref 5–21)
BUN/CREAT SERPL: 14.7 (ref 7–25)
CALCIUM SPEC-SCNC: 10.8 MG/DL (ref 8.4–10.4)
CHLORIDE SERPL-SCNC: 103 MMOL/L (ref 98–110)
CLARITY UR: CLEAR
CO2 SERPL-SCNC: 25 MMOL/L (ref 24–31)
COLOR UR: YELLOW
CREAT BLD-MCNC: 0.95 MG/DL (ref 0.5–1.4)
DEPRECATED RDW RBC AUTO: 40.4 FL (ref 40–54)
EOSINOPHIL # BLD AUTO: 0.37 10*3/MM3 (ref 0–0.7)
EOSINOPHIL NFR BLD AUTO: 4.1 % (ref 0–4)
ERYTHROCYTE [DISTWIDTH] IN BLOOD BY AUTOMATED COUNT: 12.3 % (ref 12–15)
GFR SERPL CREATININE-BSD FRML MDRD: 58 ML/MIN/1.73
GLOBULIN UR ELPH-MCNC: 2.5 GM/DL
GLUCOSE BLD-MCNC: 113 MG/DL (ref 70–100)
GLUCOSE UR STRIP-MCNC: NEGATIVE MG/DL
HCT VFR BLD AUTO: 43.7 % (ref 37–47)
HGB BLD-MCNC: 14.6 G/DL (ref 12–16)
HGB UR QL STRIP.AUTO: NEGATIVE
HOLD SPECIMEN: NORMAL
HOLD SPECIMEN: NORMAL
HYALINE CASTS UR QL AUTO: ABNORMAL /LPF
IMM GRANULOCYTES # BLD: 0.05 10*3/MM3 (ref 0–0.03)
IMM GRANULOCYTES NFR BLD: 0.6 % (ref 0–5)
INR PPP: 0.89 (ref 0.91–1.09)
KETONES UR QL STRIP: NEGATIVE
LEUKOCYTE ESTERASE UR QL STRIP.AUTO: ABNORMAL
LYMPHOCYTES # BLD AUTO: 2.09 10*3/MM3 (ref 0.72–4.86)
LYMPHOCYTES NFR BLD AUTO: 23.2 % (ref 15–45)
MAGNESIUM SERPL-MCNC: 2.2 MG/DL (ref 1.4–2.2)
MCH RBC QN AUTO: 30 PG (ref 28–32)
MCHC RBC AUTO-ENTMCNC: 33.4 G/DL (ref 33–36)
MCV RBC AUTO: 89.9 FL (ref 82–98)
MONOCYTES # BLD AUTO: 0.94 10*3/MM3 (ref 0.19–1.3)
MONOCYTES NFR BLD AUTO: 10.4 % (ref 4–12)
NEUTROPHILS # BLD AUTO: 5.49 10*3/MM3 (ref 1.87–8.4)
NEUTROPHILS NFR BLD AUTO: 61 % (ref 39–78)
NITRITE UR QL STRIP: NEGATIVE
NRBC BLD MANUAL-RTO: 0 /100 WBC (ref 0–0)
NT-PROBNP SERPL-MCNC: 1250 PG/ML (ref 0–900)
PH UR STRIP.AUTO: 6.5 [PH] (ref 5–8)
PLATELET # BLD AUTO: 226 10*3/MM3 (ref 130–400)
PMV BLD AUTO: 11.4 FL (ref 6–12)
POTASSIUM BLD-SCNC: 4 MMOL/L (ref 3.5–5.3)
PROT SERPL-MCNC: 7.1 G/DL (ref 6.3–8.7)
PROT UR QL STRIP: NEGATIVE
PROTHROMBIN TIME: 12.3 SECONDS (ref 11.9–14.6)
RBC # BLD AUTO: 4.86 10*6/MM3 (ref 4.2–5.4)
RBC # UR: ABNORMAL /HPF
REF LAB TEST METHOD: ABNORMAL
SODIUM BLD-SCNC: 139 MMOL/L (ref 135–145)
SP GR UR STRIP: <=1.005 (ref 1–1.03)
SQUAMOUS #/AREA URNS HPF: ABNORMAL /HPF
TROPONIN I SERPL-MCNC: <0.012 NG/ML (ref 0–0.03)
UROBILINOGEN UR QL STRIP: ABNORMAL
WBC NRBC COR # BLD: 9 10*3/MM3 (ref 4.8–10.8)
WBC UR QL AUTO: ABNORMAL /HPF
WHOLE BLOOD HOLD SPECIMEN: NORMAL
WHOLE BLOOD HOLD SPECIMEN: NORMAL

## 2018-08-06 PROCEDURE — 99284 EMERGENCY DEPT VISIT MOD MDM: CPT

## 2018-08-06 PROCEDURE — 85610 PROTHROMBIN TIME: CPT | Performed by: EMERGENCY MEDICINE

## 2018-08-06 PROCEDURE — 93005 ELECTROCARDIOGRAM TRACING: CPT

## 2018-08-06 PROCEDURE — 84484 ASSAY OF TROPONIN QUANT: CPT | Performed by: NURSE PRACTITIONER

## 2018-08-06 PROCEDURE — 94799 UNLISTED PULMONARY SVC/PX: CPT

## 2018-08-06 PROCEDURE — 83735 ASSAY OF MAGNESIUM: CPT | Performed by: EMERGENCY MEDICINE

## 2018-08-06 PROCEDURE — 85730 THROMBOPLASTIN TIME PARTIAL: CPT | Performed by: EMERGENCY MEDICINE

## 2018-08-06 PROCEDURE — 83880 ASSAY OF NATRIURETIC PEPTIDE: CPT | Performed by: EMERGENCY MEDICINE

## 2018-08-06 PROCEDURE — 80053 COMPREHEN METABOLIC PANEL: CPT | Performed by: EMERGENCY MEDICINE

## 2018-08-06 PROCEDURE — 71046 X-RAY EXAM CHEST 2 VIEWS: CPT

## 2018-08-06 PROCEDURE — 93005 ELECTROCARDIOGRAM TRACING: CPT | Performed by: EMERGENCY MEDICINE

## 2018-08-06 PROCEDURE — 87086 URINE CULTURE/COLONY COUNT: CPT | Performed by: EMERGENCY MEDICINE

## 2018-08-06 PROCEDURE — 84484 ASSAY OF TROPONIN QUANT: CPT | Performed by: EMERGENCY MEDICINE

## 2018-08-06 PROCEDURE — 93010 ELECTROCARDIOGRAM REPORT: CPT | Performed by: INTERNAL MEDICINE

## 2018-08-06 PROCEDURE — 25010000002 ENOXAPARIN PER 10 MG: Performed by: EMERGENCY MEDICINE

## 2018-08-06 PROCEDURE — 81001 URINALYSIS AUTO W/SCOPE: CPT | Performed by: EMERGENCY MEDICINE

## 2018-08-06 PROCEDURE — 85025 COMPLETE CBC W/AUTO DIFF WBC: CPT | Performed by: EMERGENCY MEDICINE

## 2018-08-06 RX ORDER — LABETALOL 300 MG/1
300 TABLET, FILM COATED ORAL EVERY 12 HOURS
Status: DISCONTINUED | OUTPATIENT
Start: 2018-08-06 | End: 2018-08-09 | Stop reason: HOSPADM

## 2018-08-06 RX ORDER — ATORVASTATIN CALCIUM 10 MG/1
10 TABLET, FILM COATED ORAL DAILY
Status: DISCONTINUED | OUTPATIENT
Start: 2018-08-07 | End: 2018-08-09 | Stop reason: HOSPADM

## 2018-08-06 RX ORDER — DILTIAZEM HYDROCHLORIDE 120 MG/1
120 CAPSULE, COATED, EXTENDED RELEASE ORAL ONCE
Status: DISCONTINUED | OUTPATIENT
Start: 2018-08-06 | End: 2018-08-06

## 2018-08-06 RX ORDER — ONDANSETRON 4 MG/1
4 TABLET, ORALLY DISINTEGRATING ORAL EVERY 6 HOURS PRN
Status: DISCONTINUED | OUTPATIENT
Start: 2018-08-06 | End: 2018-08-09 | Stop reason: HOSPADM

## 2018-08-06 RX ORDER — ASPIRIN 81 MG/1
81 TABLET, CHEWABLE ORAL DAILY
Status: DISCONTINUED | OUTPATIENT
Start: 2018-08-07 | End: 2018-08-07

## 2018-08-06 RX ORDER — ONDANSETRON 4 MG/1
4 TABLET, FILM COATED ORAL EVERY 6 HOURS PRN
Status: DISCONTINUED | OUTPATIENT
Start: 2018-08-06 | End: 2018-08-09 | Stop reason: HOSPADM

## 2018-08-06 RX ORDER — CLONIDINE HYDROCHLORIDE 0.1 MG/1
0.1 TABLET ORAL 2 TIMES DAILY
Status: DISCONTINUED | OUTPATIENT
Start: 2018-08-06 | End: 2018-08-09 | Stop reason: HOSPADM

## 2018-08-06 RX ORDER — DILTIAZEM HYDROCHLORIDE 5 MG/ML
25 INJECTION INTRAVENOUS ONCE
Status: COMPLETED | OUTPATIENT
Start: 2018-08-06 | End: 2018-08-06

## 2018-08-06 RX ORDER — SODIUM CHLORIDE 0.9 % (FLUSH) 0.9 %
1-10 SYRINGE (ML) INJECTION AS NEEDED
Status: DISCONTINUED | OUTPATIENT
Start: 2018-08-06 | End: 2018-08-09 | Stop reason: HOSPADM

## 2018-08-06 RX ORDER — POTASSIUM CHLORIDE 750 MG/1
10 CAPSULE, EXTENDED RELEASE ORAL DAILY
Status: DISCONTINUED | OUTPATIENT
Start: 2018-08-07 | End: 2018-08-09 | Stop reason: HOSPADM

## 2018-08-06 RX ORDER — ACETAMINOPHEN 325 MG/1
650 TABLET ORAL EVERY 4 HOURS PRN
Status: DISCONTINUED | OUTPATIENT
Start: 2018-08-06 | End: 2018-08-09 | Stop reason: HOSPADM

## 2018-08-06 RX ORDER — ONDANSETRON 2 MG/ML
4 INJECTION INTRAMUSCULAR; INTRAVENOUS EVERY 6 HOURS PRN
Status: DISCONTINUED | OUTPATIENT
Start: 2018-08-06 | End: 2018-08-09 | Stop reason: HOSPADM

## 2018-08-06 RX ORDER — BUMETANIDE 1 MG/1
1 TABLET ORAL DAILY
Status: DISCONTINUED | OUTPATIENT
Start: 2018-08-07 | End: 2018-08-09 | Stop reason: HOSPADM

## 2018-08-06 RX ORDER — EPINEPHRINE 1 MG/ML
1 INJECTION, SOLUTION, CONCENTRATE INTRAVENOUS ONCE
Status: DISCONTINUED | OUTPATIENT
Start: 2018-08-06 | End: 2018-08-06

## 2018-08-06 RX ADMIN — DILTIAZEM HYDROCHLORIDE 25 MG: 5 INJECTION INTRAVENOUS at 19:45

## 2018-08-06 RX ADMIN — DILTIAZEM HYDROCHLORIDE 10 MG/HR: 5 INJECTION INTRAVENOUS at 21:03

## 2018-08-06 RX ADMIN — SODIUM CHLORIDE 1000 ML: 9 INJECTION, SOLUTION INTRAVENOUS at 18:33

## 2018-08-06 RX ADMIN — ENOXAPARIN SODIUM 100 MG: 100 INJECTION SUBCUTANEOUS at 19:46

## 2018-08-06 RX ADMIN — DILTIAZEM HYDROCHLORIDE 5 MG/HR: 5 INJECTION INTRAVENOUS at 20:11

## 2018-08-06 RX ADMIN — DILTIAZEM HYDROCHLORIDE 25 MG: 5 INJECTION INTRAVENOUS at 18:35

## 2018-08-07 ENCOUNTER — APPOINTMENT (OUTPATIENT)
Dept: CARDIOLOGY | Facility: HOSPITAL | Age: 73
End: 2018-08-07

## 2018-08-07 LAB
ANION GAP SERPL CALCULATED.3IONS-SCNC: 10 MMOL/L (ref 4–13)
ARTICHOKE IGE QN: 71 MG/DL (ref 0–99)
BH CV ECHO MEAS - AO MAX PG (FULL): 3.7 MMHG
BH CV ECHO MEAS - AO MAX PG: 10 MMHG
BH CV ECHO MEAS - AO MEAN PG (FULL): 0 MMHG
BH CV ECHO MEAS - AO MEAN PG: 4 MMHG
BH CV ECHO MEAS - AO ROOT AREA (BSA CORRECTED): 1.4
BH CV ECHO MEAS - AO ROOT AREA: 6.6 CM^2
BH CV ECHO MEAS - AO ROOT DIAM: 2.9 CM
BH CV ECHO MEAS - AO V2 MAX: 158 CM/SEC
BH CV ECHO MEAS - AO V2 MEAN: 94.7 CM/SEC
BH CV ECHO MEAS - AO V2 VTI: 25.9 CM
BH CV ECHO MEAS - AVA(I,A): 3.4 CM^2
BH CV ECHO MEAS - AVA(I,D): 3.4 CM^2
BH CV ECHO MEAS - AVA(V,A): 2.8 CM^2
BH CV ECHO MEAS - AVA(V,D): 2.8 CM^2
BH CV ECHO MEAS - BSA(HAYCOCK): 2.2 M^2
BH CV ECHO MEAS - BSA: 2.1 M^2
BH CV ECHO MEAS - BZI_BMI: 35.4 KILOGRAMS/M^2
BH CV ECHO MEAS - BZI_METRIC_HEIGHT: 170.2 CM
BH CV ECHO MEAS - BZI_METRIC_WEIGHT: 102.5 KG
BH CV ECHO MEAS - CONTRAST EF 4CH: 62.1 ML/M^2
BH CV ECHO MEAS - EDV(CUBED): 74.6 ML
BH CV ECHO MEAS - EDV(MOD-SP4): 68.1 ML
BH CV ECHO MEAS - EDV(TEICH): 79 ML
BH CV ECHO MEAS - EF(CUBED): 64.9 %
BH CV ECHO MEAS - EF(MOD-SP4): 62.1 %
BH CV ECHO MEAS - EF(TEICH): 56.8 %
BH CV ECHO MEAS - ESV(CUBED): 26.2 ML
BH CV ECHO MEAS - ESV(MOD-SP4): 25.8 ML
BH CV ECHO MEAS - ESV(TEICH): 34.2 ML
BH CV ECHO MEAS - FS: 29.5 %
BH CV ECHO MEAS - IVS/LVPW: 1.2
BH CV ECHO MEAS - IVSD: 1.2 CM
BH CV ECHO MEAS - LA DIMENSION: 4.2 CM
BH CV ECHO MEAS - LA/AO: 1.4
BH CV ECHO MEAS - LV DIASTOLIC VOL/BSA (35-75): 32 ML/M^2
BH CV ECHO MEAS - LV MASS(C)D: 152.6 GRAMS
BH CV ECHO MEAS - LV MASS(C)DI: 71.6 GRAMS/M^2
BH CV ECHO MEAS - LV MAX PG: 6.3 MMHG
BH CV ECHO MEAS - LV MEAN PG: 4 MMHG
BH CV ECHO MEAS - LV SYSTOLIC VOL/BSA (12-30): 12.1 ML/M^2
BH CV ECHO MEAS - LV V1 MAX: 125.5 CM/SEC
BH CV ECHO MEAS - LV V1 MEAN: 89.4 CM/SEC
BH CV ECHO MEAS - LV V1 VTI: 25.4 CM
BH CV ECHO MEAS - LVIDD: 4.2 CM
BH CV ECHO MEAS - LVIDS: 3 CM
BH CV ECHO MEAS - LVLD AP4: 7.2 CM
BH CV ECHO MEAS - LVLS AP4: 6.1 CM
BH CV ECHO MEAS - LVOT AREA (M): 3.5 CM^2
BH CV ECHO MEAS - LVOT AREA: 3.5 CM^2
BH CV ECHO MEAS - LVOT DIAM: 2.1 CM
BH CV ECHO MEAS - LVPWD: 1 CM
BH CV ECHO MEAS - MV DEC TIME: 0.24 SEC
BH CV ECHO MEAS - MV E MAX VEL: 95.1 CM/SEC
BH CV ECHO MEAS - RAP SYSTOLE: 5 MMHG
BH CV ECHO MEAS - RVSP: 23.1 MMHG
BH CV ECHO MEAS - SI(AO): 80.3 ML/M^2
BH CV ECHO MEAS - SI(CUBED): 22.7 ML/M^2
BH CV ECHO MEAS - SI(LVOT): 41.3 ML/M^2
BH CV ECHO MEAS - SI(MOD-SP4): 19.9 ML/M^2
BH CV ECHO MEAS - SI(TEICH): 21.1 ML/M^2
BH CV ECHO MEAS - SV(AO): 171.1 ML
BH CV ECHO MEAS - SV(CUBED): 48.4 ML
BH CV ECHO MEAS - SV(LVOT): 88 ML
BH CV ECHO MEAS - SV(MOD-SP4): 42.3 ML
BH CV ECHO MEAS - SV(TEICH): 44.9 ML
BH CV ECHO MEAS - TR MAX VEL: 213 CM/SEC
BUN BLD-MCNC: 11 MG/DL (ref 5–21)
BUN/CREAT SERPL: 15.1 (ref 7–25)
CALCIUM SPEC-SCNC: 10.3 MG/DL (ref 8.4–10.4)
CHLORIDE SERPL-SCNC: 108 MMOL/L (ref 98–110)
CHOLEST SERPL-MCNC: 150 MG/DL (ref 130–200)
CO2 SERPL-SCNC: 25 MMOL/L (ref 24–31)
CREAT BLD-MCNC: 0.73 MG/DL (ref 0.5–1.4)
DEPRECATED RDW RBC AUTO: 40.2 FL (ref 40–54)
ERYTHROCYTE [DISTWIDTH] IN BLOOD BY AUTOMATED COUNT: 12.3 % (ref 12–15)
GFR SERPL CREATININE-BSD FRML MDRD: 78 ML/MIN/1.73
GLUCOSE BLD-MCNC: 116 MG/DL (ref 70–100)
HBA1C MFR BLD: 5.4 %
HCT VFR BLD AUTO: 42.6 % (ref 37–47)
HDLC SERPL-MCNC: 51 MG/DL
HGB BLD-MCNC: 14.3 G/DL (ref 12–16)
LDLC/HDLC SERPL: 1.34 {RATIO}
LEFT ATRIUM VOLUME INDEX: 28.9 ML/M2
LEFT ATRIUM VOLUME: 61.5 CM3
MCH RBC QN AUTO: 30.3 PG (ref 28–32)
MCHC RBC AUTO-ENTMCNC: 33.6 G/DL (ref 33–36)
MCV RBC AUTO: 90.3 FL (ref 82–98)
PLATELET # BLD AUTO: 181 10*3/MM3 (ref 130–400)
PMV BLD AUTO: 11.1 FL (ref 6–12)
POTASSIUM BLD-SCNC: 3.6 MMOL/L (ref 3.5–5.3)
RBC # BLD AUTO: 4.72 10*6/MM3 (ref 4.2–5.4)
SODIUM BLD-SCNC: 143 MMOL/L (ref 135–145)
TRIGL SERPL-MCNC: 153 MG/DL (ref 0–149)
TROPONIN I SERPL-MCNC: <0.012 NG/ML (ref 0–0.03)
TROPONIN I SERPL-MCNC: <0.012 NG/ML (ref 0–0.03)
TSH SERPL DL<=0.05 MIU/L-ACNC: 1.54 MIU/ML (ref 0.47–4.68)
WBC NRBC COR # BLD: 6.01 10*3/MM3 (ref 4.8–10.8)

## 2018-08-07 PROCEDURE — 83036 HEMOGLOBIN GLYCOSYLATED A1C: CPT | Performed by: NURSE PRACTITIONER

## 2018-08-07 PROCEDURE — 85027 COMPLETE CBC AUTOMATED: CPT | Performed by: NURSE PRACTITIONER

## 2018-08-07 PROCEDURE — 80061 LIPID PANEL: CPT | Performed by: NURSE PRACTITIONER

## 2018-08-07 PROCEDURE — 80048 BASIC METABOLIC PNL TOTAL CA: CPT | Performed by: NURSE PRACTITIONER

## 2018-08-07 PROCEDURE — 94799 UNLISTED PULMONARY SVC/PX: CPT

## 2018-08-07 PROCEDURE — 93005 ELECTROCARDIOGRAM TRACING: CPT | Performed by: NURSE PRACTITIONER

## 2018-08-07 PROCEDURE — 93306 TTE W/DOPPLER COMPLETE: CPT | Performed by: INTERNAL MEDICINE

## 2018-08-07 PROCEDURE — 93010 ELECTROCARDIOGRAM REPORT: CPT | Performed by: INTERNAL MEDICINE

## 2018-08-07 PROCEDURE — 94760 N-INVAS EAR/PLS OXIMETRY 1: CPT

## 2018-08-07 PROCEDURE — 84484 ASSAY OF TROPONIN QUANT: CPT | Performed by: NURSE PRACTITIONER

## 2018-08-07 PROCEDURE — 25010000002 PERFLUTREN 6.52 MG/ML SUSPENSION: Performed by: INTERNAL MEDICINE

## 2018-08-07 PROCEDURE — 99222 1ST HOSP IP/OBS MODERATE 55: CPT | Performed by: INTERNAL MEDICINE

## 2018-08-07 PROCEDURE — 25010000002 ENOXAPARIN PER 10 MG: Performed by: INTERNAL MEDICINE

## 2018-08-07 PROCEDURE — 93306 TTE W/DOPPLER COMPLETE: CPT

## 2018-08-07 PROCEDURE — 84443 ASSAY THYROID STIM HORMONE: CPT | Performed by: NURSE PRACTITIONER

## 2018-08-07 RX ORDER — BUMETANIDE 1 MG/1
1 TABLET ORAL DAILY
COMMUNITY
End: 2022-05-20

## 2018-08-07 RX ORDER — SIMVASTATIN 20 MG
20 TABLET ORAL NIGHTLY
COMMUNITY
End: 2019-11-11

## 2018-08-07 RX ORDER — AMLODIPINE BESYLATE 10 MG/1
10 TABLET ORAL DAILY
COMMUNITY
End: 2018-08-09 | Stop reason: HOSPADM

## 2018-08-07 RX ORDER — FLUTICASONE PROPIONATE 50 MCG
2 SPRAY, SUSPENSION (ML) NASAL DAILY PRN
COMMUNITY

## 2018-08-07 RX ORDER — VALSARTAN 80 MG/1
320 TABLET ORAL
Status: DISCONTINUED | OUTPATIENT
Start: 2018-08-08 | End: 2018-08-07

## 2018-08-07 RX ORDER — LABETALOL 300 MG/1
300 TABLET, FILM COATED ORAL EVERY 12 HOURS SCHEDULED
COMMUNITY
End: 2022-06-01 | Stop reason: HOSPADM

## 2018-08-07 RX ORDER — IRBESARTAN 300 MG/1
300 TABLET ORAL NIGHTLY
COMMUNITY
End: 2018-08-09 | Stop reason: HOSPADM

## 2018-08-07 RX ORDER — DILTIAZEM HCL 90 MG
90 TABLET ORAL EVERY 6 HOURS SCHEDULED
Status: DISCONTINUED | OUTPATIENT
Start: 2018-08-07 | End: 2018-08-08

## 2018-08-07 RX ORDER — CLONIDINE HYDROCHLORIDE 0.1 MG/1
0.1 TABLET ORAL 2 TIMES DAILY
COMMUNITY

## 2018-08-07 RX ORDER — LOSARTAN POTASSIUM 50 MG/1
100 TABLET ORAL
Status: DISCONTINUED | OUTPATIENT
Start: 2018-08-07 | End: 2018-08-08

## 2018-08-07 RX ADMIN — DILTIAZEM HYDROCHLORIDE 90 MG: 90 TABLET, FILM COATED ORAL at 11:57

## 2018-08-07 RX ADMIN — POTASSIUM CHLORIDE 10 MEQ: 750 CAPSULE, EXTENDED RELEASE ORAL at 08:58

## 2018-08-07 RX ADMIN — ASPIRIN 81 MG: 81 TABLET, CHEWABLE ORAL at 08:58

## 2018-08-07 RX ADMIN — DILTIAZEM HYDROCHLORIDE 90 MG: 90 TABLET, FILM COATED ORAL at 23:23

## 2018-08-07 RX ADMIN — LABETALOL HYDROCHLORIDE 300 MG: 300 TABLET, FILM COATED ORAL at 08:58

## 2018-08-07 RX ADMIN — ENOXAPARIN SODIUM 100 MG: 100 INJECTION SUBCUTANEOUS at 17:03

## 2018-08-07 RX ADMIN — BUMETANIDE 1 MG: 1 TABLET ORAL at 08:58

## 2018-08-07 RX ADMIN — CLONIDINE HYDROCHLORIDE 0.1 MG: 0.1 TABLET ORAL at 20:26

## 2018-08-07 RX ADMIN — CLONIDINE HYDROCHLORIDE 0.1 MG: 0.1 TABLET ORAL at 08:58

## 2018-08-07 RX ADMIN — DILTIAZEM HYDROCHLORIDE 15 MG/HR: 5 INJECTION INTRAVENOUS at 04:19

## 2018-08-07 RX ADMIN — PERFLUTREN: 6.52 INJECTION, SUSPENSION INTRAVENOUS at 15:59

## 2018-08-07 RX ADMIN — LABETALOL HYDROCHLORIDE 300 MG: 300 TABLET, FILM COATED ORAL at 20:25

## 2018-08-07 RX ADMIN — DILTIAZEM HYDROCHLORIDE 90 MG: 90 TABLET, FILM COATED ORAL at 17:03

## 2018-08-07 RX ADMIN — ENOXAPARIN SODIUM 100 MG: 100 INJECTION SUBCUTANEOUS at 05:58

## 2018-08-07 RX ADMIN — ATORVASTATIN CALCIUM 10 MG: 10 TABLET, FILM COATED ORAL at 08:58

## 2018-08-07 NOTE — ED PROVIDER NOTES
Subjective   72-year-old female presenting to the ED with fatigue and heart palpitations.  Patient states that she was in her usual state of health when she woke up this morning at 6 AM felt fatigued and generally unwell she denied any fevers chills or chest pain but she felt that she was having a fluttering sensation in her heart.  She feels like she is probably had this once before many years ago however never sought medical treatment as it subsided on its own.  Patient states that throughout the day she continued to not feel well she contacted her friend who is a nurse that lives next to her who noted her heart to have a irregular rhythm.  Patient states that her only other medical problems are high blood pressure high cholesterol and she had a heart catheter in 1987 which was clean at that time.  Patient states that she also takes a baby aspirin at the recommendation of her doctor.  Patient denies any other symptoms            Review of Systems   Constitutional: Positive for fatigue. Negative for activity change, chills and fever.   HENT: Negative.    Eyes: Negative.    Respiratory: Negative.    Cardiovascular:        Palpitations    Gastrointestinal: Negative.    Endocrine: Negative.    Genitourinary: Negative.    Musculoskeletal: Negative.    Skin: Negative.    Neurological: Negative.    Psychiatric/Behavioral: Negative.        Past Medical History:   Diagnosis Date   • Arthritis    • Hyperlipidemia    • Hypertension        No Known Allergies    Past Surgical History:   Procedure Laterality Date   • TUBAL ABDOMINAL LIGATION         Family History   Problem Relation Age of Onset   • Breast cancer Neg Hx    • Ovarian cancer Neg Hx    • Uterine cancer Neg Hx    • Colon cancer Neg Hx    • Melanoma Neg Hx        Social History     Social History   • Marital status:      Social History Main Topics   • Smoking status: Former Smoker   • Smokeless tobacco: Never Used   • Alcohol use 4.2 oz/week     7 Glasses of  wine per week   • Drug use: No   • Sexual activity: Defer     Other Topics Concern   • Not on file           Objective   Physical Exam   Constitutional: She is oriented to person, place, and time. She appears well-developed.   HENT:   Head: Normocephalic and atraumatic.   Cardiovascular: Normal heart sounds.  An irregularly irregular rhythm present.   Pulses:       Radial pulses are 3+ on the right side.        Femoral pulses are 3+ on the right side.  Pulmonary/Chest: Breath sounds normal.   Abdominal: Soft. Bowel sounds are normal.   Musculoskeletal: Normal range of motion.   Neurological: She is alert and oriented to person, place, and time. No cranial nerve deficit.   Skin: Skin is warm and dry.   Vitals reviewed.      ECG 12 Lead    Date/Time: 8/6/2018 7:19 PM  Performed by: ROCKY ARANA  Authorized by: ROCKY ARANA   Comments: EKG ventricular rate of 155 irregularly irregular QTC approximately 443 no ST or T-wave abnormalities that can be appreciated likely atrial fibrillation with RVR                 ED Course  ED Course as of Aug 06 1948   Mon Aug 06, 2018   1916 Calcium: (!) 10.8 [AP]   1946 Patient remained stable in the emergency department systolic blood pressure above 120 mentating wellPlease of chest pain shortness of breath abdominal pain.  Patient started on IV infusion of diltiazem continuous secondary to failure of 2 chances with IV bolus.  Patient will be admitted to the hospitalist service  [AP]      ED Course User Index  [AP] Rocky Arana MD                  MDM  Number of Diagnoses or Management Options  Atrial fibrillation, new onset (CMS/HCC):      Amount and/or Complexity of Data Reviewed  Clinical lab tests: reviewed  Tests in the radiology section of CPT®: reviewed  Tests in the medicine section of CPT®: reviewed  Discussion of test results with the performing providers: yes  Discuss the patient with other providers: yes  Independent visualization of images, tracings, or specimens:  yes    Risk of Complications, Morbidity, and/or Mortality  Presenting problems: moderate  Diagnostic procedures: low  Management options: moderate    Patient Progress  Patient progress: stable        Final diagnoses:   None            Rocky Hassan MD  08/06/18 1949

## 2018-08-07 NOTE — PROGRESS NOTES
"Pharmacy Dosing Service  Anticoagulant  Enoxaparin    Assessment/Action/Plan:  \"Pharmacy to Dose\" Enoxaparin for the treatment of A.Fib.  Initiated 1 mg/kg (100 mg) every 12 hours.     Subjective:  Anne-Marie Hayes is a 72 y.o. female on Enoxaparin 100 mg SQ every 12 hours for indication of atrial fibrillation.  Objective:  [Ht: 170.2 cm (67.01\"); Wt: 103 kg (226 lb 2 oz); BMI: Body mass index is 35.41 kg/m².]  Estimated Creatinine Clearance: 66.1 mL/min (by C-G formula based on SCr of 0.95 mg/dL).   Lab Results   Component Value Date    INR 0.89 (L) 08/06/2018    PROTIME 12.3 08/06/2018      Lab Results   Component Value Date    HGB 14.6 08/06/2018      Lab Results   Component Value Date     08/06/2018       Kate Euceda, PharmD  08/06/18 9:04 PM     "

## 2018-08-07 NOTE — CONSULTS
Patient Care Team:  Ania Culver MD as PCP - General  Ania Culver MD as PCP - Family Medicine  Renny Blackwell MD  REASON FOR REFERRAL: afib, rvr   Chief complaint : palpitations     Subjective     Patient is a 73 y.o. female presents with palpitations. She states she has had intermittent palpitations for many years. She reports she has only ever had 2 severe episodes - one 3 years ago and one yesterday morning. Yesterday her palpitations woke her up at 6 am. She felt as though her heart was quivering. This lasted for 1-2 hours, then improved, but did not go away completely. She had an associated increase in urination. She denies chest pain or shortness of breath, but admits it is hard to get a deep breath at times, with her palpitations. She denies syncope, edema, or other signs of fluid retention. She takes a diuretic at home and appears to be euvolemic.  She reports her nurse friend advised her to come to the ER later in the day due to continued irregular rhythm.     Workup revealed afib, rvr. She had slight anterior ST depression on EKG. Troponins are negative. BNP is slightly elevated. She reports she had a normal cardiac cath in 1987. She denies any other cardiac history. Her heart rates have improved on IV cardizem, however she remains somewhat tachycardia at times - 80s-120s on 15 mg/hr of cardizem. Echo is pending.      Review of Systems   Review of Systems   Constitutional: Negative for diaphoresis, fatigue, fever and unexpected weight change.   HENT: Negative for nosebleeds.    Respiratory: Negative for apnea, cough, chest tightness, shortness of breath and wheezing.    Cardiovascular: Positive for palpitations. Negative for chest pain and leg swelling.   Gastrointestinal: Negative for abdominal distention, nausea and vomiting.   Genitourinary: Negative for hematuria.   Musculoskeletal: Negative for gait problem.   Skin: Negative for color change.   Neurological: Negative for dizziness, syncope,  weakness and light-headedness.       History  Past Medical History:   Diagnosis Date   • Arthritis    • Hyperlipidemia    • Hypertension      Past Surgical History:   Procedure Laterality Date   • CARDIAC CATHETERIZATION  1987   • TUBAL ABDOMINAL LIGATION       Family History   Problem Relation Age of Onset   • Atrial fibrillation Mother    • Heart disease Father    • Breast cancer Neg Hx    • Ovarian cancer Neg Hx    • Uterine cancer Neg Hx    • Colon cancer Neg Hx    • Melanoma Neg Hx      Social History   Substance Use Topics   • Smoking status: Former Smoker   • Smokeless tobacco: Never Used   • Alcohol use 4.2 oz/week     7 Glasses of wine per week     Prescriptions Prior to Admission   Medication Sig Dispense Refill Last Dose   • amLODIPine-valsartan (EXFORGE)  MG per tablet TAKE 1 TABLET DAILY   Taking   • aspirin 81 MG chewable tablet Chew 81 mg Daily.   Taking   • bumetanide (BUMEX) 1 MG tablet TAKE 1 TABLET DAILY   Taking   • cholecalciferol (VITAMIN D3) 1000 units tablet Take 1,000 Units by mouth 2 (Two) Times a Day.   Taking   • CloNIDine (CATAPRES) 0.1 MG tablet TAKE 1 TABLET TWICE A DAY   Taking   • fluticasone (FLONASE) 50 MCG/ACT nasal spray USE 2 SPRAYS IN EACH NOSTRIL ONCE DAILY as needed   Taking   • Glucosamine 500 MG capsule Take  by mouth.   Taking   • KLOR-CON 10 MEQ CR tablet    Taking   • labetalol (NORMODYNE) 300 MG tablet TAKE 1 TABLET EVERY 12 HOURS   Taking   • meloxicam (MOBIC) 15 MG tablet Take 15 mg by mouth Daily As Needed.   Taking   • Omega-3 Fatty Acids (FISH OIL) 1000 MG capsule capsule Take  by mouth Daily With Breakfast.   Taking   • simvastatin (ZOCOR) 20 MG tablet TAKE 1 TABLET DAILY   Taking       Current Facility-Administered Medications:   •  acetaminophen (TYLENOL) tablet 650 mg, 650 mg, Oral, Q4H PRN, Pamela Lopez, APRN  •  amLODIPine (NORVASC) 10 mg, valsartan (DIOVAN) 320 mg for EXFORGE , , Oral, Daily, Pamela Lopez, APRN  •  aspirin chewable  tablet 81 mg, 81 mg, Oral, Daily, Pamela Lopez APRN, 81 mg at 08/07/18 0858  •  atorvastatin (LIPITOR) tablet 10 mg, 10 mg, Oral, Daily, Pamela Lopez, APRN, 10 mg at 08/07/18 0858  •  bumetanide (BUMEX) tablet 1 mg, 1 mg, Oral, Daily, Pamela Lopez, APRN, 1 mg at 08/07/18 0858  •  CloNIDine (CATAPRES) tablet 0.1 mg, 0.1 mg, Oral, BID, Pamela Lopez, APRN, 0.1 mg at 08/07/18 0858  •  diltiaZEM (CARDIZEM) 125 mg in sodium chloride 0.9 % 125 mL (1 mg/mL) infusion, 5-15 mg/hr, Intravenous, Titrated, Rocky Hassan MD, Last Rate: 15 mL/hr at 08/07/18 0419, 15 mg/hr at 08/07/18 0419  •  diltiaZEM (CARDIZEM) tablet 90 mg, 90 mg, Oral, Q6H, Yumiko Dykes APRN  •  enoxaparin (LOVENOX) syringe 100 mg, 1 mg/kg, Subcutaneous, Q12H, Renny Blackwell MD, 100 mg at 08/07/18 0558  •  labetalol (NORMODYNE) tablet 300 mg, 300 mg, Oral, Q12H, Pamela Lopez APRN, 300 mg at 08/07/18 0858  •  ondansetron (ZOFRAN) tablet 4 mg, 4 mg, Oral, Q6H PRN **OR** ondansetron ODT (ZOFRAN-ODT) disintegrating tablet 4 mg, 4 mg, Oral, Q6H PRN **OR** ondansetron (ZOFRAN) injection 4 mg, 4 mg, Intravenous, Q6H PRN, Pamela Lopez APRN  •  Pharmacy to Dose enoxaparin (LOVENOX), , Does not apply, Continuous PRN, Pamela Lopez, APRN  •  potassium chloride (MICRO-K) CR capsule 10 mEq, 10 mEq, Oral, Daily, Pamela Lopez APRN, 10 mEq at 08/07/18 0858  •  sodium chloride 0.9 % flush 1-10 mL, 1-10 mL, Intravenous, PRN, Pamela Lopez, APRN  Allergies:  Patient has no known allergies.    Objective     Vital Signs  Temp:  [96.9 °F (36.1 °C)-98.6 °F (37 °C)] 97.1 °F (36.2 °C)  Heart Rate:  [] 73  Resp:  [14-18] 18  BP: (122-178)/(63-90) 127/78    Physical Exam:   Physical Exam   Constitutional: She is oriented to person, place, and time. She appears well-developed and well-nourished. No distress.   HENT:   Head: Normocephalic and atraumatic.   Eyes: Pupils are equal, round, and reactive to light.   Neck: Normal range  of motion. Neck supple. No JVD present. No thyromegaly present.   Cardiovascular: Normal heart sounds and intact distal pulses.  An irregularly irregular rhythm present. Tachycardia present.  Exam reveals no gallop and no friction rub.    No murmur heard.  Pulmonary/Chest: Effort normal and breath sounds normal. No respiratory distress. She has no wheezes. She has no rales. She exhibits no tenderness.   Abdominal: Soft. Bowel sounds are normal. She exhibits no distension. There is no tenderness.   Musculoskeletal: Normal range of motion. She exhibits no edema.   Neurological: She is alert and oriented to person, place, and time. No cranial nerve deficit.   Skin: Skin is warm and dry. She is not diaphoretic.   Psychiatric: She has a normal mood and affect. Her behavior is normal.     Results Review:     Lab Results (last 72 hours)     Procedure Component Value Units Date/Time    TSH [[id number]]  (Normal) Collected:  08/07/18 0658    Specimen:  Blood Updated:  08/07/18 0812     TSH 1.540 mIU/mL     Hemoglobin A1c [[id number]] Collected:  08/07/18 0658    Specimen:  Blood Updated:  08/07/18 0758     Hemoglobin A1C 5.4 %     Narrative:       Less than 6.0           Non-Diabetic Range  6.0-7.0                 ADA Therapeutic Target  Greater than 7.0        Action Suggested    Troponin [[id number]]  (Normal) Collected:  08/07/18 0658    Specimen:  Blood Updated:  08/07/18 0754     Troponin I <0.012 ng/mL     Lipid Panel [[id number]]  (Abnormal) Collected:  08/07/18 0658    Specimen:  Blood Updated:  08/07/18 0753     Total Cholesterol 150 mg/dL      Triglycerides 153 (H) mg/dL      HDL Cholesterol 51 mg/dL      LDL Cholesterol  71 mg/dL      LDL/HDL Ratio 1.34    Basic Metabolic Panel [[id number]]  (Abnormal) Collected:  08/07/18 0658    Specimen:  Blood Updated:  08/07/18 0743     Glucose 116 (H) mg/dL      BUN 11 mg/dL      Creatinine 0.73 mg/dL      Sodium 143 mmol/L      Potassium 3.6 mmol/L      Chloride 108 mmol/L       CO2 25.0 mmol/L      Calcium 10.3 mg/dL      eGFR Non African Amer 78 mL/min/1.73      BUN/Creatinine Ratio 15.1     Anion Gap 10.0 mmol/L     Narrative:       The MDRD GFR formula is only valid for adults with stable renal function between ages 18 and 70.    CBC (No Diff) [[id number]]  (Normal) Collected:  08/07/18 0658    Specimen:  Blood Updated:  08/07/18 0728     WBC 6.01 10*3/mm3      RBC 4.72 10*6/mm3      Hemoglobin 14.3 g/dL      Hematocrit 42.6 %      MCV 90.3 fL      MCH 30.3 pg      MCHC 33.6 g/dL      RDW 12.3 %      RDW-SD 40.2 fl      MPV 11.1 fL      Platelets 181 10*3/mm3     Urine Culture - Urine, [[id number]]  (Normal) Collected:  08/06/18 1951    Specimen:  Urine from Urine, Clean Catch Updated:  08/07/18 0634     Urine Culture No growth at 24 hours    Troponin [[id number]]  (Normal) Collected:  08/06/18 2357    Specimen:  Blood Updated:  08/07/18 0033     Troponin I <0.012 ng/mL     Urinalysis, Microscopic Only - Urine, Clean Catch [[id number]]  (Abnormal) Collected:  08/06/18 1951    Specimen:  Urine from Urine, Clean Catch Updated:  08/06/18 2020     RBC, UA 0-2 (A) /HPF      WBC, UA 6-12 (A) /HPF      Bacteria, UA Trace (A) /HPF      Squamous Epithelial Cells, UA 3-6 (A) /HPF      Hyaline Casts, UA None Seen /LPF      Methodology Automated Microscopy    Urinalysis With Culture If Indicated - Urine, Clean Catch [[id number]]  (Abnormal) Collected:  08/06/18 1951    Specimen:  Urine from Urine, Clean Catch Updated:  08/06/18 2020     Color, UA Yellow     Appearance, UA Clear     pH, UA 6.5     Specific Gravity, UA <=1.005     Glucose, UA Negative     Ketones, UA Negative     Bilirubin, UA Negative     Blood, UA Negative     Protein, UA Negative     Leuk Esterase, UA Small (1+) (A)     Nitrite, UA Negative     Urobilinogen, UA 0.2 E.U./dL    [place] Draw [[id number]] Collected:  08/06/18 1827    Specimen:  Blood Updated:  08/06/18 1930    Narrative:       The following orders were created for panel  order [place] Draw.  Procedure                               Abnormality         Status                     ---------                               -----------         ------                     Light Blue Top[[id number]]                                   Final result               Green Top (Gel)[[id number]]                                  Final result               Lavender Top[[id number]]                                     Final result               Red Top[[id number]]                                          Final result                 Please view results for these tests on the individual orders.    Light Blue Top [[id number]] Collected:  08/06/18 1827    Specimen:  Blood Updated:  08/06/18 1930     Extra Tube hold for add-on     Comment: Auto resulted       Green Top (Gel) [[id number]] Collected:  08/06/18 1827    Specimen:  Blood Updated:  08/06/18 1930     Extra Tube Hold for add-ons.     Comment: Auto resulted.       Lavender Top [[id number]] Collected:  08/06/18 1827    Specimen:  Blood Updated:  08/06/18 1930     Extra Tube hold for add-on     Comment: Auto resulted       Red Top [[id number]] Collected:  08/06/18 1827    Specimen:  Blood Updated:  08/06/18 1930     Extra Tube Hold for add-ons.     Comment: Auto resulted.       BNP [[id number]]  (Abnormal) Collected:  08/06/18 1827    Specimen:  Blood Updated:  08/06/18 1924     proBNP 1,250.0 (H) pg/mL     Comprehensive Metabolic Panel [[id number]]  (Abnormal) Collected:  08/06/18 1827    Specimen:  Blood Updated:  08/06/18 1916     Glucose 113 (H) mg/dL      BUN 14 mg/dL      Creatinine 0.95 mg/dL      Sodium 139 mmol/L      Potassium 4.0 mmol/L      Chloride 103 mmol/L      CO2 25.0 mmol/L      Calcium 10.8 (H) mg/dL      Total Protein 7.1 g/dL      Albumin 4.60 g/dL      ALT (SGPT) 40 U/L      AST (SGOT) 29 U/L      Alkaline Phosphatase 102 U/L      Total Bilirubin 0.7 mg/dL      eGFR Non African Amer 58 (L) mL/min/1.73      Globulin 2.5 gm/dL      A/G Ratio  1.8 g/dL      BUN/Creatinine Ratio 14.7     Anion Gap 11.0 mmol/L     Narrative:       The MDRD GFR formula is only valid for adults with stable renal function between ages 18 and 70.    Magnesium [[id number]]  (Normal) Collected:  08/06/18 1827    Specimen:  Blood Updated:  08/06/18 1916     Magnesium 2.2 mg/dL     aPTT [[id number]]  (Normal) Collected:  08/06/18 1827    Specimen:  Blood Updated:  08/06/18 1912     PTT 26.9 seconds     Protime-INR [[id number]]  (Abnormal) Collected:  08/06/18 1827    Specimen:  Blood Updated:  08/06/18 1912     Protime 12.3 Seconds      INR 0.89 (L)    CBC & Differential [[id number]] Collected:  08/06/18 1827    Specimen:  Blood Updated:  08/06/18 1906    Narrative:       The following orders were created for panel order CBC & Differential.  Procedure                               Abnormality         Status                     ---------                               -----------         ------                     CBC Auto Differential[[id number]]        Abnormal            Final result                 Please view results for these tests on the individual orders.    CBC Auto Differential [[id number]]  (Abnormal) Collected:  08/06/18 1827    Specimen:  Blood Updated:  08/06/18 1906     WBC 9.00 10*3/mm3      RBC 4.86 10*6/mm3      Hemoglobin 14.6 g/dL      Hematocrit 43.7 %      MCV 89.9 fL      MCH 30.0 pg      MCHC 33.4 g/dL      RDW 12.3 %      RDW-SD 40.4 fl      MPV 11.4 fL      Platelets 226 10*3/mm3      Neutrophil % 61.0 %      Lymphocyte % 23.2 %      Monocyte % 10.4 %      Eosinophil % 4.1 (H) %      Basophil % 0.7 %      Immature Grans % 0.6 %      Neutrophils, Absolute 5.49 10*3/mm3      Lymphocytes, Absolute 2.09 10*3/mm3      Monocytes, Absolute 0.94 10*3/mm3      Eosinophils, Absolute 0.37 10*3/mm3      Basophils, Absolute 0.06 10*3/mm3      Immature Grans, Absolute 0.05 (H) 10*3/mm3      nRBC 0.0 /100 WBC     Troponin [[id number]]  (Normal) Collected:  08/06/18 1827     Specimen:  Blood Updated:  08/06/18 2937     Troponin I <0.012 ng/mL           Assessment/Plan     -Atrial fibrillation, rapid ventricular response, newly diagnosed   -Essential hypertension   -Hyperlipidemia  -Obesity  -Arthritis     Plan-  Start short acting PO cardizem and wean drip off for HR sustained less than 120  Continue labetalol - up titrate as needed for HR control  Monitor telemetry   She is currently anticoagulated with therapeutic lovenox - will transition to DOAC piror to discharge; CHADSVASC - 3   2d echo   Discontinue aspirin in setting of anticoagulation (she denies CAD, PAD or stroke history)  TSH, electrolytes WNL   Consider cardioversion after 3-4 weeks of uninterrupted anticoagulation, if she does not convert spontaneously   Further recommendations per Dr. Lovelace    I discussed the patient's findings and my recommendations with patient and nursing staff.     Yumiko Dykes, MANDO  08/07/18  9:41 AM

## 2018-08-07 NOTE — PAYOR COMM NOTE
"8/7/18 [facility] [phone]  [phone]    PATIENT ER ADMIT TO INPATIENT ON 8/6/18    PENDING INPT AUTH  [id number]    MEDICAL RECORD NUMBER [id number]        Anne-Marie Alvarez (73 y.o. Female)     Date of Birth Social Security Number Address Home Phone MRN    1945  [facility] [address]  [facility] [place] [address] [phone] [id number]    Anabaptism Marital Status          Other        Admission Date Admission Type Admitting Provider Attending Provider Department, Room/Bed    8/6/18 Emergency Srinivasa Lopez MD Thompson, Benjamin H, MD [facility] 4B, 406/1    Discharge Date Discharge Disposition Discharge Destination                       Attending Provider:  Srinivasa Lopez MD    Allergies:  No Known Allergies    Isolation:  None   Infection:  None   Code Status:  CPR    Ht:  170.2 cm (67.01\")   Wt:  103 kg (226 lb 2 oz)    Admission Cmt:  None   Principal Problem:  None                Active Insurance as of 8/6/2018     Primary Coverage     Payor Plan Insurance Group Employer/Plan Group    [facility] MEDICARE REPLACEMENT [facility] [id number]     Payor Plan Address Payor Plan Phone Number Effective From Effective To    [address] [address]  1/1/2017     [facility] [id number]       Subscriber Name Subscriber Birth Date Member ID       ANNE-MARIE ALVAREZ 1945 [id number]                 Emergency Contacts      (Rel.) Home Phone Work Phone Mobile Phone    Koko Abad (Son) [phone] -- --    Fannei Ortega (Friend) [phone] -- --        ED Provider Notes  Date of Service: 8/6/2018 7:08 PM  Rocky Hassan MD   Emergency Medicine   Procedure Orders:   1. ECG 12 Lead [[id number]] ordered by Rocky Hassan MD at 08/06/18 1812   Expand All Collapse All    []Manual[]Template  []Copied     Subjective      72-year-old female presenting to the ED with fatigue and heart palpitations.  Patient states that she was in her usual " state of health when she woke up this morning at 6 AM felt fatigued and generally unwell she denied any fevers chills or chest pain but she felt that she was having a fluttering sensation in her heart.  She feels like she is probably had this once before many years ago however never sought medical treatment as it subsided on its own.  Patient states that throughout the day she continued to not feel well she contacted her friend who is a nurse that lives next to her who noted her heart to have a irregular rhythm.  Patient states that her only other medical problems are high blood pressure high cholesterol and she had a heart catheter in 1987 which was clean at that time.  Patient states that she also takes a baby aspirin at the recommendation of her doctor.  Patient denies any other symptoms           Review of Systems   Constitutional: Positive for fatigue. Negative for activity change, chills and fever.   HENT: Negative.    Eyes: Negative.    Respiratory: Negative.    Cardiovascular:        Palpitations    Gastrointestinal: Negative.    Endocrine: Negative.    Genitourinary: Negative.    Musculoskeletal: Negative.    Skin: Negative.    Neurological: Negative.    Psychiatric/Behavioral: Negative.          Medical History        Past Medical History:   Diagnosis Date   • Arthritis     • Hyperlipidemia     • Hypertension              No Known Allergies     Surgical History         Past Surgical History:   Procedure Laterality Date   • TUBAL ABDOMINAL LIGATION                      Family History   Problem Relation Age of Onset   • Breast cancer Neg Hx     • Ovarian cancer Neg Hx     • Uterine cancer Neg Hx     • Colon cancer Neg Hx     • Melanoma Neg Hx           Social History   Social History           Social History   • Marital status:             Social History Main Topics   • Smoking status: Former Smoker   • Smokeless tobacco: Never Used   • Alcohol use 4.2 oz/week       7 Glasses of wine per week   •  Drug use: No   • Sexual activity: Defer           Other Topics Concern   • Not on file                     Objective      Physical Exam   Constitutional: She is oriented to person, place, and time. She appears well-developed.   HENT:   Head: Normocephalic and atraumatic.   Cardiovascular: Normal heart sounds.  An irregularly irregular rhythm present.   Pulses:       Radial pulses are 3+ on the right side.        Femoral pulses are 3+ on the right side.  Pulmonary/Chest: Breath sounds normal.   Abdominal: Soft. Bowel sounds are normal.   Musculoskeletal: Normal range of motion.   Neurological: She is alert and oriented to person, place, and time. No cranial nerve deficit.   Skin: Skin is warm and dry.   Vitals reviewed.        ECG 12 Lead     Date/Time: 8/6/2018 7:19 PM  Performed by: RCOKY ARANA  Authorized by: ROCKY ARANA   Comments: EKG ventricular rate of 155 irregularly irregular QTC approximately 443 no ST or T-wave abnormalities that can be appreciated likely atrial fibrillation with RVR                  ED Course      ED Course as of Aug 06 1948   Mon Aug 06, 2018   1916 Calcium: (!) 10.8 [AP]   1946 Patient remained stable in the emergency department systolic blood pressure above 120 mentating wellPlease of chest pain shortness of breath abdominal pain.  Patient started on IV infusion of diltiazem continuous secondary to failure of 2 chances with IV bolus.  Patient will be admitted to the hospitalist service  [AP]       ED Course User Index  [AP] Rocky Arana MD                      MDM  Number of Diagnoses or Management Options  Atrial fibrillation, new onset (CMS/HCC):      Amount and/or Complexity of Data Reviewed  Clinical lab tests: reviewed  Tests in the radiology section of CPT®: reviewed  Tests in the medicine section of CPT®: reviewed  Discussion of test results with the performing providers: yes  Discuss the patient with other providers: yes  Independent visualization of images, tracings, or  "specimens: yes  Risk of Complications, Morbidity, and/or Mortality  Presenting problems: moderate  Diagnostic procedures: low  Management options: moderate   Patient Progress  Patient progress: stable           Final diagnoses:   None               Rocky Hassan MD  08/06/18 1949         H&P  Cosign Needed   Date of Service: 8/6/2018 7:47 PM  Pamela Lopez APRN   Medicine   Expand All Collapse All    []Manual[]Template  []Copied       [facility] Medicine Services  HISTORY AND PHYSICAL     Date of Admission: 8/6/2018  Primary Care Physician: Ania Culver MD     Subjective      Chief Complaint: Palpitations     History of Present Illness  Pictorial Abigail is a very pleasant 72-year-old  female with a past medical history of arthritis, hyperlipidemia and hypertension.  Patient states she has a history of arrhythmia in the remote past and underwent a heart catheterization by Dr. Euceda in 1987 which was negative for positive findings.  At that time it was felt her arrhythmias were secondary to stress.  She reports a particularly,\" bad spell\" approximately 3 years ago in October after the death of a grandchild.  Symptoms were similar to today's with heart fluttering and pounding and increased urination.  She has had no chest pain with either episode.  Since that time she will have episodes of fluttering 1-2 times per week that subside quickly.  She states this morning heart pounding awakened her about 6 AM and due to continued symptoms she felt it necessary to seek evaluation at [facility] emergency Department.  Patient was noted to be in atrial fibrillation with RVR heart rate in the 150s.  She has been given Cardizem IV push doses and drip is being started.  She again has no complaints of shortness of breathing or chest pain.  She is admitted for further evaluation and treatment.     Review of Systems   Constitutional: Negative for activity change, appetite " change, fatigue and fever.   HENT: Negative for congestion, mouth sores, rhinorrhea, sinus pressure and trouble swallowing.    Respiratory: Negative for cough, chest tightness, shortness of breath and wheezing.    Cardiovascular: Positive for palpitations. Negative for chest pain and leg swelling.   Gastrointestinal: Negative for abdominal distention, abdominal pain, constipation, diarrhea, nausea and vomiting.   Genitourinary: Positive for frequency. Negative for difficulty urinating and dysuria.   Musculoskeletal: Negative for arthralgias and myalgias.   Neurological: Negative for dizziness, weakness and light-headedness.   Psychiatric/Behavioral: Negative for agitation and sleep disturbance. The patient is not nervous/anxious.          Past Medical History:   Medical History        Past Medical History:   Diagnosis Date   • Arthritis     • Hyperlipidemia     • Hypertension              Past Surgical History:   Surgical History         Past Surgical History:   Procedure Laterality Date   • CARDIAC CATHETERIZATION   1987   • TUBAL ABDOMINAL LIGATION                Family History: family history includes Atrial fibrillation in her mother; Heart disease in her father.     Social History:  reports that she has quit smoking. She has never used smokeless tobacco. She reports that she drinks about 4.2 oz of alcohol per week . She reports that she does not use drugs.     Code Status: full, if unable to speak for herself her son Koko Abad or friend Dilip Ortega will speak for her        Allergies:  No Known Allergies     Medications:          Prior to Admission medications    Medication Sig Start Date End Date Taking? Authorizing Provider   amLODIPine-valsartan (EXFORGE)  MG per tablet TAKE 1 TABLET DAILY 10/20/17   Yes ProviderJudy MD   aspirin 81 MG chewable tablet Chew 81 mg Daily.     Yes ProviderJudy MD   bumetanide (BUMEX) 1 MG tablet TAKE 1 TABLET DAILY 10/20/17   Yes Provider  "MD Judy   cholecalciferol (VITAMIN D3) 1000 units tablet Take 1,000 Units by mouth 2 (Two) Times a Day.     Yes ProviderJudy MD   CloNIDine (CATAPRES) 0.1 MG tablet TAKE 1 TABLET TWICE A DAY 10/20/17   Yes Judy Patton MD   fluticasone (FLONASE) 50 MCG/ACT nasal spray USE 2 SPRAYS IN EACH NOSTRIL ONCE DAILY as needed 10/20/17   Yes Judy Patton MD   Glucosamine 500 MG capsule Take  by mouth.     Yes Judy Patton MD   KLOR-CON 10 MEQ CR tablet   3/22/17   Yes Judy Patton MD   labetalol (NORMODYNE) 300 MG tablet TAKE 1 TABLET EVERY 12 HOURS 10/20/17   Yes Judy Patton MD   meloxicam (MOBIC) 15 MG tablet Daily as needed 4/17/17   Yes Judy Patton MD   Omega-3 Fatty Acids (FISH OIL) 1000 MG capsule capsule Take  by mouth Daily With Breakfast.     Yes ProviderJudy MD   simvastatin (ZOCOR) 20 MG tablet TAKE 1 TABLET DAILY 10/20/17   Yes Provider, MD Judy         Objective      /83 (BP Location: Right arm, Patient Position: Sitting)   Pulse 77   Temp 96.9 °F (36.1 °C) (Temporal Artery )   Resp 14   Ht 170.2 cm (67.01\")   Wt 103 kg (226 lb 2 oz)   SpO2 98%   BMI 35.41 kg/m²       Physical Exam   Constitutional: She is oriented to person, place, and time. She appears well-developed and well-nourished. No distress.   HENT:   Head: Normocephalic and atraumatic.   Eyes: Pupils are equal, round, and reactive to light. Conjunctivae and EOM are normal. No scleral icterus.   Neck: Normal range of motion. Neck supple. No JVD present. No tracheal deviation present.   Cardiovascular: Normal heart sounds and intact distal pulses.  Exam reveals no gallop.    No murmur heard.  Afib 80's- 130's   Pulmonary/Chest: Effort normal and breath sounds normal. No respiratory distress. She has no wheezes. She has no rales.   Abdominal: Soft. Bowel sounds are normal. She exhibits no distension. There is no tenderness. There is no guarding. "   Musculoskeletal: Normal range of motion. She exhibits no edema.   Neurological: She is alert and oriented to person, place, and time.   Skin: Skin is warm and dry. No rash noted. She is not diaphoretic. No erythema. No pallor.   Psychiatric: She has a normal mood and affect. Her behavior is normal.   Vitals reviewed.        Pertinent Data:           Lab Results (last 72 hours)      Procedure Component Value Units Date/Time     Urinalysis, Microscopic Only - Urine, Clean Catch [[id number]]  (Abnormal) Collected:  08/06/18 1951     Specimen:  Urine from Urine, Clean Catch Updated:  08/06/18 2020       RBC, UA 0-2 (A) /HPF         WBC, UA 6-12 (A) /HPF         Bacteria, UA Trace (A) /HPF         Squamous Epithelial Cells, UA 3-6 (A) /HPF         Hyaline Casts, UA None Seen /LPF         Methodology Automated Microscopy     Urinalysis With Culture If Indicated - Urine, Clean Catch [[id number]]  (Abnormal) Collected:  08/06/18 1951     Specimen:  Urine from Urine, Clean Catch Updated:  08/06/18 2020       Color, UA Yellow       Appearance, UA Clear       pH, UA 6.5       Specific Gravity, UA <=1.005       Glucose, UA Negative       Ketones, UA Negative       Bilirubin, UA Negative       Blood, UA Negative       Protein, UA Negative       Leuk Esterase, UA Small (1+) (A)       Nitrite, UA Negative       Urobilinogen, UA 0.2 E.U./dL     Urine Culture - Urine, [[id number]] Collected:  08/06/18 1951     Specimen:  Urine from Urine, Clean Catch Updated:  08/06/18 2020     BNP [[id number]]  (Abnormal) Collected:  08/06/18 1827     Specimen:  Blood Updated:  08/06/18 1924       proBNP 1,250.0 (H) pg/mL       Comprehensive Metabolic Panel [[id number]]  (Abnormal) Collected:  08/06/18 1827     Specimen:  Blood Updated:  08/06/18 1916       Glucose 113 (H) mg/dL         BUN 14 mg/dL         Creatinine 0.95 mg/dL         Sodium 139 mmol/L         Potassium 4.0 mmol/L         Chloride 103 mmol/L         CO2 25.0 mmol/L         Calcium  10.8 (H) mg/dL         Total Protein 7.1 g/dL         Albumin 4.60 g/dL         ALT (SGPT) 40 U/L         AST (SGOT) 29 U/L         Alkaline Phosphatase 102 U/L         Total Bilirubin 0.7 mg/dL         eGFR Non African Amer 58 (L) mL/min/1.73         Globulin 2.5 gm/dL         A/G Ratio 1.8 g/dL         BUN/Creatinine Ratio 14.7       Anion Gap 11.0 mmol/L       Narrative:        The MDRD GFR formula is only valid for adults with stable renal function between ages 18 and 70.     Magnesium [[id number]]  (Normal) Collected:  08/06/18 1827     Specimen:  Blood Updated:  08/06/18 1916       Magnesium 2.2 mg/dL       aPTT [[id number]]  (Normal) Collected:  08/06/18 1827     Specimen:  Blood Updated:  08/06/18 1912       PTT 26.9 seconds       Protime-INR [[id number]]  (Abnormal) Collected:  08/06/18 1827     Specimen:  Blood Updated:  08/06/18 1912       Protime 12.3 Seconds         INR 0.89 (L)     CBC Auto Differential [[id number]]  (Abnormal) Collected:  08/06/18 1827     Specimen:  Blood Updated:  08/06/18 1906       WBC 9.00 10*3/mm3         RBC 4.86 10*6/mm3         Hemoglobin 14.6 g/dL         Hematocrit 43.7 %         MCV 89.9 fL         MCH 30.0 pg         MCHC 33.4 g/dL         RDW 12.3 %         RDW-SD 40.4 fl         MPV 11.4 fL         Platelets 226 10*3/mm3         Neutrophil % 61.0 %         Lymphocyte % 23.2 %         Monocyte % 10.4 %         Eosinophil % 4.1 (H) %         Basophil % 0.7 %         Immature Grans % 0.6 %         Neutrophils, Absolute 5.49 10*3/mm3         Lymphocytes, Absolute 2.09 10*3/mm3         Monocytes, Absolute 0.94 10*3/mm3         Eosinophils, Absolute 0.37 10*3/mm3         Basophils, Absolute 0.06 10*3/mm3         Immature Grans, Absolute 0.05 (H) 10*3/mm3         nRBC 0.0 /100 WBC       Troponin [[id number]]  (Normal) Collected:  08/06/18 1827     Specimen:  Blood Updated:  08/06/18 1857       Troponin I <0.012 ng/mL               I have personally reviewed ECG obtained at time of  admission.      Assessment / Plan      Assessment:   New-onset atrial fibrillation with RVR  Hypertension  Hyperlipidemia  Arthritis  Pyuria     Plan:   1.  Admit as inpatient  2.  Continue Cardizem drip  3.  Consult cardiology in a.m.  4.  Home medications reviewed and restarted as appropriate  5.  Therapeutic Lovenox 1 mg/kilogram every 12 hours  6.  Labs in a.m. CBC, BMP, A1c, lipid panel, TSH  7.  Trend troponins, echocardiogram in a.m.  8.  Hold antibiotics for now, await urine culture results.     I discussed the patient's findings and my recommendations with: Renny Blackwell M.D.  Time spent 40 minutes        MANDO Gardner  08/06/18   8:49 PM          Progress Notes  Unsigned   Date of Service: 8/7/2018 8:14 AM  Arleen Gomez APRN   Medicine      []Manual[]Template  []Copied       [facility] Medicine Services  INPATIENT PROGRESS NOTE     Length of Stay: 1  Date of Admission: 8/6/2018  Primary Care Physician: Ania Culver MD     Subjective   Chief Complaint: palpitations  HPI   Awoke at 6 AM yesterday with complaints of heart pounding and fluttering.  She was mildly short of breath.  She denied nausea, vomiting.  She denied chest pain.  She presented to the emergency room was found to be atrial fibrillation with .  She received Cardizem bolus and started on Cardizem drip.  She had a history of heart fluttering and pounding in the past brief episode after death grandchild.  She reports she wore Holter monitor for 2 weeks several years ago.  She had heart cath by Dr. Euceda 1987.     Lying in bed.  Friend in room.  Remains atrial fibrillation  on telemetry.  Heart rate up to 180 during the night.  Remains on Cardizem drip at 15 mg.  She denies chest pain or shortness of breath.  She denies nausea, vomiting or abdominal pain..  On multiple medications for blood pressure management.  Denies dysuria.  Reports frequency.     Review of Systems    Constitutional: Negative for activity change and fatigue.   HENT: Negative for congestion and trouble swallowing.    Eyes: Negative for photophobia and visual disturbance.   Respiratory: Negative for cough, shortness of breath and wheezing.    Cardiovascular: Positive for palpitations. Negative for chest pain and leg swelling.   Gastrointestinal: Negative for abdominal distention, abdominal pain, diarrhea, nausea and vomiting.   Endocrine: Negative for cold intolerance, heat intolerance and polyuria.   Genitourinary: Positive for frequency. Negative for dysuria.   Musculoskeletal: Negative for myalgias.   Skin: Negative for wound.   Allergic/Immunologic: Negative for immunocompromised state.   Neurological: Negative for weakness.   Hematological: Negative for adenopathy. Does not bruise/bleed easily.   Psychiatric/Behavioral: Negative for agitation, behavioral problems and confusion.      All pertinent negatives and positives are as above. All other systems have been reviewed and are negative unless otherwise stated.      Objective    Temp:  [96.9 °F (36.1 °C)-98.6 °F (37 °C)] 98.5 °F (36.9 °C)  Heart Rate:  [] 87  Resp:  [14-16] 16  BP: (122-178)/(63-90) 122/66  Physical Exam   Constitutional: She is oriented to person, place, and time. She appears well-developed and well-nourished.   HENT:   Head: Normocephalic and atraumatic.   Eyes: Pupils are equal, round, and reactive to light. EOM are normal.   Neck: Normal range of motion. Neck supple.   Cardiovascular: Normal heart sounds and intact distal pulses.  Exam reveals no gallop and no friction rub.    No murmur heard.  Irregular rhythm.  Atrial fibrillation  on telemetry.  Heart rate up to 182 during the night   Pulmonary/Chest: Effort normal and breath sounds normal. No respiratory distress. She has no wheezes. She has no rales.   Abdominal: Bowel sounds are normal. She exhibits no distension. There is no tenderness.   Genitourinary:   Genitourinary  Comments: Voiding   Musculoskeletal: She exhibits no edema or deformity.   SCDs bilateral lower extremities   Neurological: She is alert and oriented to person, place, and time.   Skin: Skin is warm and dry.   Psychiatric: She has a normal mood and affect. Her behavior is normal. Judgment and thought content normal.      Results Review:  I have reviewed the labs, radiology results, and diagnostic studies.     Laboratory Data:      Results from last 7 days  Lab Units 08/07/18  0658 08/06/18  1827   WBC 10*3/mm3 6.01 9.00   HEMOGLOBIN g/dL 14.3 14.6   HEMATOCRIT % 42.6 43.7   PLATELETS 10*3/mm3 181 226            Results from last 7 days  Lab Units 08/07/18  0658 08/06/18  1827   SODIUM mmol/L 143 139   POTASSIUM mmol/L 3.6 4.0   CHLORIDE mmol/L 108 103   CO2 mmol/L 25.0 25.0   BUN mg/dL 11 14   CREATININE mg/dL 0.73 0.95   CALCIUM mg/dL 10.3 10.8*   BILIRUBIN mg/dL  --  0.7   ALK PHOS U/L  --  102   ALT (SGPT) U/L  --  40   AST (SGOT) U/L  --  29   GLUCOSE mg/dL 116* 113*            Urine Culture   Date Value Ref Range Status   08/06/2018 No growth at 24 hours   Preliminary      Imaging Results (all)      Procedure Component Value Units Date/Time     XR Chest 2 View [[id number]] Collected:  08/1945       Updated:  08/06/18 1948     Narrative:        XR CHEST 2 VW- 8/6/2018 7:11 PM CDT     HISTORY: Atrial fibrillation and history of hypertension.       COMPARISON: None.     FINDINGS:   Upright frontal and lateral radiographs of the chest were obtained.     Granulomatous calcifications are seen. The lungs are otherwise clear.  The cardiomediastinal silhouette and pulmonary vascularity are within  normal limits. The osseous structures and surrounding soft tissues  demonstrate no acute abnormality.        Impression:        1. No radiographic evidence of acute cardiopulmonary process.        This report was finalized on 08/06/2018 19:45 by Dr. Dakotah Patel MD.          Intake/Output     Intake/Output Summary (Last  24 hours) at 08/07/18 0814  Last data filed at 08/07/18 0100    Gross per 24 hour   Intake             1000 ml   Output              550 ml   Net              450 ml         Scheduled Meds     amLODIPine-valsartan (EXFORGE)  mg combo dose   Oral Daily   aspirin 81 mg Oral Daily   atorvastatin 10 mg Oral Daily   bumetanide 1 mg Oral Daily   CloNIDine 0.1 mg Oral BID   enoxaparin 1 mg/kg Subcutaneous Q12H   labetalol 300 mg Oral Q12H   potassium chloride 10 mEq Oral Daily         I have reviewed the patient current medications.      Assessment/Plan          Hospital Problem List      Atrial fibrillation, new onset (CMS/HCC)          Assessment:  1.  New onset atrial fibrillation with rapid ventricular response  2.  Essential hypertension  3.  Hyperlipidemia  4.  Arthritis  5.  Pyuria without symptoms  6.  Elevated proBNP secondary to atrial fibrillation with rapid ventricular response.  No clinical signs to suggest heart failure     Plan:  1.  Telemetry.  Remains atrial fibrillation rate .  Heart rate up to 182 during the night.  2.  Cardizem drip at 15 mg  3.  Lovenox 1 mg/kg every 12 hours  4.  Echocardiogram today  5.  Cardiology consult  6.  Troponins negative  7.  Elevated proBNP secondary to atrial fibrillation, no complaints of shortness of breath.  Lungs clear, no peripheral edema.  8.  Home medications reviewed and appropriate medications resumed.  9.  SCDs bilateral lower extremities  10.  No antibiotics.  Urine culture no growth at 24 hours     Her son Koko Altman will act as her surrogate decision maker.     The above documentation resulted from a face-to-face encounter by me Arleen GILMORE, [facility].           Discharge Planning: I expect patient to be discharged to home in 2 days.     MANDO Farrell   08/07/18   8:14 AM                              Hospital Medications (active)       Dose Frequency Start End    acetaminophen (TYLENOL) tablet 650 mg 650 mg Every 4 Hours PRN  8/6/2018     Sig - Route: Take 2 tablets by mouth Every 4 (Four) Hours As Needed for Mild Pain . - Oral    amLODIPine (NORVASC) 10 mg, valsartan (DIOVAN) 320 mg for EXFORGE   Daily 8/7/2018     Sig - Route: Take  by mouth Daily. - Oral    atorvastatin (LIPITOR) tablet 10 mg 10 mg Daily 8/7/2018     Sig - Route: Take 1 tablet by mouth Daily. - Oral    bumetanide (BUMEX) tablet 1 mg 1 mg Daily 8/7/2018     Sig - Route: Take 1 tablet by mouth Daily. - Oral    CloNIDine (CATAPRES) tablet 0.1 mg 0.1 mg 2 Times Daily 8/6/2018     Sig - Route: Take 1 tablet by mouth 2 (Two) Times a Day. - Oral    diltiaZEM (CARDIZEM) 125 mg in sodium chloride 0.9 % 125 mL (1 mg/mL) infusion 5-15 mg/hr Titrated 8/6/2018     Sig - Route: Infuse 5-15 mg/hr into a venous catheter Dose Adjusted By Provider As Needed. - Intravenous    diltiaZEM (CARDIZEM) injection 25 mg 25 mg Once 8/6/2018 8/6/2018    Sig - Route: Infuse 5 mL into a venous catheter 1 (One) Time. - Intravenous    Cosign for Ordering: Accepted by Rocky Hassan MD on 8/7/2018  8:12 AM    diltiaZEM (CARDIZEM) injection 25 mg 25 mg Once 8/6/2018 8/6/2018    Sig - Route: Infuse 5 mL into a venous catheter 1 (One) Time. - Intravenous    diltiaZEM (CARDIZEM) tablet 90 mg 90 mg Every 6 Hours Scheduled 8/7/2018     Sig - Route: Take 1 tablet by mouth Every 6 (Six) Hours. - Oral    enoxaparin (LOVENOX) syringe 100 mg 1 mg/kg × 103 kg Once 8/6/2018 8/6/2018    Sig - Route: Inject 1 mL under the skin into the appropriate area as directed 1 (One) Time. - Subcutaneous    enoxaparin (LOVENOX) syringe 100 mg 1 mg/kg × 103 kg Every 12 Hours 8/7/2018     Sig - Route: Inject 1 mL under the skin into the appropriate area as directed Every 12 (Twelve) Hours. - Subcutaneous    labetalol (NORMODYNE) tablet 300 mg 300 mg Every 12 Hours 8/6/2018     Sig - Route: Take 1 tablet by mouth Every 12 (Twelve) Hours. - Oral    ondansetron (ZOFRAN) injection 4 mg 4 mg Every 6 Hours PRN 8/6/2018     Sig -  "Route: Infuse 2 mL into a venous catheter Every 6 (Six) Hours As Needed for Nausea or Vomiting. - Intravenous    Linked Group 1:  \"Or\" Linked Group Details        ondansetron (ZOFRAN) tablet 4 mg 4 mg Every 6 Hours PRN 8/6/2018     Sig - Route: Take 1 tablet by mouth Every 6 (Six) Hours As Needed for Nausea or Vomiting. - Oral    Linked Group 1:  \"Or\" Linked Group Details        ondansetron ODT (ZOFRAN-ODT) disintegrating tablet 4 mg 4 mg Every 6 Hours PRN 8/6/2018     Sig - Route: Take 1 tablet by mouth Every 6 (Six) Hours As Needed for Nausea or Vomiting. - Oral    Linked Group 1:  \"Or\" Linked Group Details        Pharmacy to Dose enoxaparin (LOVENOX)  Continuous PRN 8/6/2018     Sig - Route: Continuous As Needed for Consult. - Does not apply    potassium chloride (MICRO-K) CR capsule 10 mEq 10 mEq Daily 8/7/2018     Sig - Route: Take 1 capsule by mouth Daily. - Oral    sodium chloride 0.9 % bolus 1,000 mL 1,000 mL Once 8/6/2018 8/6/2018    Sig - Route: Infuse 1,000 mL into a venous catheter 1 (One) Time. - Intravenous    Cosign for Ordering: Accepted by Rocky Hassan MD on 8/7/2018  8:12 AM    sodium chloride 0.9 % flush 1-10 mL 1-10 mL As Needed 8/6/2018     Sig - Route: Infuse 1-10 mL into a venous catheter As Needed for Line Care. - Intravenous    aspirin chewable tablet 81 mg (Discontinued) 81 mg Daily 8/7/2018 8/7/2018    Sig - Route: Chew 1 tablet Daily. - Oral    diltiaZEM (CARDIZEM) 125 mg in sodium chloride 0.9 % 125 mL (1 mg/mL) infusion (Discontinued) 5-15 mg/hr Titrated 8/6/2018 8/6/2018    Sig - Route: Infuse 5-15 mg/hr into a venous catheter Dose Adjusted By Provider As Needed. - Intravenous    Reason for Discontinue: Duplicate order    Cosign for Ordering: Accepted by Rocky Hassan MD on 8/7/2018  8:12 AM    diltiaZEM CD (CARDIZEM CD) 24 hr capsule 120 mg (Discontinued) 120 mg Once 8/6/2018 8/6/2018    Sig - Route: Take 1 capsule by mouth 1 (One) Time. - Oral    EPINEPHrine PF (ADRENALIN) " injection 1 mg (Discontinued) 1 mg Once 8/6/2018 8/6/2018    Sig - Route: Infuse 1 mL into a venous catheter 1 (One) Time. - Intravenous

## 2018-08-07 NOTE — H&P
"    [facility] Medicine Services  HISTORY AND PHYSICAL    Date of Admission: 8/6/2018  Primary Care Physician: Ania Culver MD    Subjective     Chief Complaint: Palpitations    History of Present Illness  Pictorial Abigail is a very pleasant 72-year-old  female with a past medical history of arthritis, hyperlipidemia and hypertension.  Patient states she has a history of arrhythmia in the remote past and underwent a heart catheterization by Dr. Euceda in 1987 which was negative for positive findings.  At that time it was felt her arrhythmias were secondary to stress.  She reports a particularly,\" bad spell\" approximately 3 years ago in October after the death of a grandchild.  Symptoms were similar to today's with heart fluttering and pounding and increased urination.  She has had no chest pain with either episode.  Since that time she will have episodes of fluttering 1-2 times per week that subside quickly.  She states this morning heart pounding awakened her about 6 AM and due to continued symptoms she felt it necessary to seek evaluation at [facility] emergency Department.  Patient was noted to be in atrial fibrillation with RVR heart rate in the 150s.  She has been given Cardizem IV push doses and drip is being started.  She again has no complaints of shortness of breathing or chest pain.  She is admitted for further evaluation and treatment.    Review of Systems   Constitutional: Negative for activity change, appetite change, fatigue and fever.   HENT: Negative for congestion, mouth sores, rhinorrhea, sinus pressure and trouble swallowing.    Respiratory: Negative for cough, chest tightness, shortness of breath and wheezing.    Cardiovascular: Positive for palpitations. Negative for chest pain and leg swelling.   Gastrointestinal: Negative for abdominal distention, abdominal pain, constipation, diarrhea, nausea and vomiting.   Genitourinary: Positive for " frequency. Negative for difficulty urinating and dysuria.   Musculoskeletal: Negative for arthralgias and myalgias.   Neurological: Negative for dizziness, weakness and light-headedness.   Psychiatric/Behavioral: Negative for agitation and sleep disturbance. The patient is not nervous/anxious.         Past Medical History:   Past Medical History:   Diagnosis Date   • Arthritis    • Hyperlipidemia    • Hypertension        Past Surgical History:   Past Surgical History:   Procedure Laterality Date   • CARDIAC CATHETERIZATION  1987   • TUBAL ABDOMINAL LIGATION         Family History: family history includes Atrial fibrillation in her mother; Heart disease in her father.    Social History:  reports that she has quit smoking. She has never used smokeless tobacco. She reports that she drinks about 4.2 oz of alcohol per week . She reports that she does not use drugs.    Code Status: full, if unable to speak for herself her son Koko Abad or friend Dilip Ortega will speak for her      Allergies:  No Known Allergies    Medications:  Prior to Admission medications    Medication Sig Start Date End Date Taking? Authorizing Provider   amLODIPine-valsartan (EXFORGE)  MG per tablet TAKE 1 TABLET DAILY 10/20/17  Yes Judy Patton MD   aspirin 81 MG chewable tablet Chew 81 mg Daily.   Yes Judy Patton MD   bumetanide (BUMEX) 1 MG tablet TAKE 1 TABLET DAILY 10/20/17  Yes Judy Patton MD   cholecalciferol (VITAMIN D3) 1000 units tablet Take 1,000 Units by mouth 2 (Two) Times a Day.   Yes Judy Patton MD   CloNIDine (CATAPRES) 0.1 MG tablet TAKE 1 TABLET TWICE A DAY 10/20/17  Yes Judy Patton MD   fluticasone (FLONASE) 50 MCG/ACT nasal spray USE 2 SPRAYS IN EACH NOSTRIL ONCE DAILY as needed 10/20/17  Yes Judy Patton MD   Glucosamine 500 MG capsule Take  by mouth.   Yes Judy Patton MD   KLOR-CON 10 MEQ CR tablet  3/22/17  Yes Judy Patton MD  "  labetalol (NORMODYNE) 300 MG tablet TAKE 1 TABLET EVERY 12 HOURS 10/20/17  Yes ProviderJudy MD   meloxicam (MOBIC) 15 MG tablet Daily as needed 4/17/17  Yes Provider, MD Judy   Omega-3 Fatty Acids (FISH OIL) 1000 MG capsule capsule Take  by mouth Daily With Breakfast.   Yes Provider, MD Judy   simvastatin (ZOCOR) 20 MG tablet TAKE 1 TABLET DAILY 10/20/17  Yes Provider, MD Judy       Objective     /83 (BP Location: Right arm, Patient Position: Sitting)   Pulse 77   Temp 96.9 °F (36.1 °C) (Temporal Artery )   Resp 14   Ht 170.2 cm (67.01\")   Wt 103 kg (226 lb 2 oz)   SpO2 98%   BMI 35.41 kg/m²      Physical Exam   Constitutional: She is oriented to person, place, and time. She appears well-developed and well-nourished. No distress.   HENT:   Head: Normocephalic and atraumatic.   Eyes: Pupils are equal, round, and reactive to light. Conjunctivae and EOM are normal. No scleral icterus.   Neck: Normal range of motion. Neck supple. No JVD present. No tracheal deviation present.   Cardiovascular: Normal heart sounds and intact distal pulses.  Exam reveals no gallop.    No murmur heard.  Afib 80's- 130's   Pulmonary/Chest: Effort normal and breath sounds normal. No respiratory distress. She has no wheezes. She has no rales.   Abdominal: Soft. Bowel sounds are normal. She exhibits no distension. There is no tenderness. There is no guarding.   Musculoskeletal: Normal range of motion. She exhibits no edema.   Neurological: She is alert and oriented to person, place, and time.   Skin: Skin is warm and dry. No rash noted. She is not diaphoretic. No erythema. No pallor.   Psychiatric: She has a normal mood and affect. Her behavior is normal.   Vitals reviewed.      Pertinent Data:   Lab Results (last 72 hours)     Procedure Component Value Units Date/Time    Urinalysis, Microscopic Only - Urine, Clean Catch [[id number]]  (Abnormal) Collected:  08/06/18 1951    Specimen:  Urine from " Urine, Clean Catch Updated:  08/06/18 2020     RBC, UA 0-2 (A) /HPF      WBC, UA 6-12 (A) /HPF      Bacteria, UA Trace (A) /HPF      Squamous Epithelial Cells, UA 3-6 (A) /HPF      Hyaline Casts, UA None Seen /LPF      Methodology Automated Microscopy    Urinalysis With Culture If Indicated - Urine, Clean Catch [[id number]]  (Abnormal) Collected:  08/06/18 1951    Specimen:  Urine from Urine, Clean Catch Updated:  08/06/18 2020     Color, UA Yellow     Appearance, UA Clear     pH, UA 6.5     Specific Gravity, UA <=1.005     Glucose, UA Negative     Ketones, UA Negative     Bilirubin, UA Negative     Blood, UA Negative     Protein, UA Negative     Leuk Esterase, UA Small (1+) (A)     Nitrite, UA Negative     Urobilinogen, UA 0.2 E.U./dL    Urine Culture - Urine, [[id number]] Collected:  08/06/18 1951    Specimen:  Urine from Urine, Clean Catch Updated:  08/06/18 2020    BNP [[id number]]  (Abnormal) Collected:  08/06/18 1827    Specimen:  Blood Updated:  08/06/18 1924     proBNP 1,250.0 (H) pg/mL     Comprehensive Metabolic Panel [[id number]]  (Abnormal) Collected:  08/06/18 1827    Specimen:  Blood Updated:  08/06/18 1916     Glucose 113 (H) mg/dL      BUN 14 mg/dL      Creatinine 0.95 mg/dL      Sodium 139 mmol/L      Potassium 4.0 mmol/L      Chloride 103 mmol/L      CO2 25.0 mmol/L      Calcium 10.8 (H) mg/dL      Total Protein 7.1 g/dL      Albumin 4.60 g/dL      ALT (SGPT) 40 U/L      AST (SGOT) 29 U/L      Alkaline Phosphatase 102 U/L      Total Bilirubin 0.7 mg/dL      eGFR Non African Amer 58 (L) mL/min/1.73      Globulin 2.5 gm/dL      A/G Ratio 1.8 g/dL      BUN/Creatinine Ratio 14.7     Anion Gap 11.0 mmol/L     Narrative:       The MDRD GFR formula is only valid for adults with stable renal function between ages 18 and 70.    Magnesium [[id number]]  (Normal) Collected:  08/06/18 1827    Specimen:  Blood Updated:  08/06/18 1916     Magnesium 2.2 mg/dL     aPTT [[id number]]  (Normal) Collected:  08/06/18 1821     Specimen:  Blood Updated:  08/06/18 1912     PTT 26.9 seconds     Protime-INR [[id number]]  (Abnormal) Collected:  08/06/18 1827    Specimen:  Blood Updated:  08/06/18 1912     Protime 12.3 Seconds      INR 0.89 (L)    CBC Auto Differential [[id number]]  (Abnormal) Collected:  08/06/18 1827    Specimen:  Blood Updated:  08/06/18 1906     WBC 9.00 10*3/mm3      RBC 4.86 10*6/mm3      Hemoglobin 14.6 g/dL      Hematocrit 43.7 %      MCV 89.9 fL      MCH 30.0 pg      MCHC 33.4 g/dL      RDW 12.3 %      RDW-SD 40.4 fl      MPV 11.4 fL      Platelets 226 10*3/mm3      Neutrophil % 61.0 %      Lymphocyte % 23.2 %      Monocyte % 10.4 %      Eosinophil % 4.1 (H) %      Basophil % 0.7 %      Immature Grans % 0.6 %      Neutrophils, Absolute 5.49 10*3/mm3      Lymphocytes, Absolute 2.09 10*3/mm3      Monocytes, Absolute 0.94 10*3/mm3      Eosinophils, Absolute 0.37 10*3/mm3      Basophils, Absolute 0.06 10*3/mm3      Immature Grans, Absolute 0.05 (H) 10*3/mm3      nRBC 0.0 /100 WBC     Troponin [[id number]]  (Normal) Collected:  08/06/18 1827    Specimen:  Blood Updated:  08/06/18 1857     Troponin I <0.012 ng/mL           I have personally reviewed ECG obtained at time of admission.     Assessment / Plan     Assessment:   New-onset atrial fibrillation with RVR  Hypertension  Hyperlipidemia  Arthritis  Pyuria    Plan:   1.  Admit as inpatient  2.  Continue Cardizem drip  3.  Consult cardiology in a.m.  4.  Home medications reviewed and restarted as appropriate  5.  Therapeutic Lovenox 1 mg/kilogram every 12 hours  6.  Labs in a.m. CBC, BMP, A1c, lipid panel, TSH  7.  Trend troponins, echocardiogram in a.m.  8.  Hold antibiotics for now, await urine culture results.    I discussed the patient's findings and my recommendations with: Renny Blackwell M.D.  Time spent 40 minutes      MANDO Gardner  08/06/18   8:49 PM     I personally evaluated and examined the patient in conjunction with MANDO Gardner and catherine  with the assessment, treatment plan, and disposition of the patient as recorded by her. My history, exam, and further recommendations are:     Rate control and anticoagulate.  Monitor trend of troponins.    Renny Blackwell MD  08/07/18  7:11 PM

## 2018-08-07 NOTE — PROGRESS NOTES
[facility] Medicine Services  INPATIENT PROGRESS NOTE    Length of Stay: 1  Date of Admission: 8/6/2018  Primary Care Physician: Ania Culver MD    Subjective   Chief Complaint: palpitations  HPI   Awoke at 6 AM yesterday with complaints of heart pounding and fluttering.  She was mildly short of breath.  She denied nausea, vomiting.  She denied chest pain.  She presented to the emergency room was found to be atrial fibrillation with .  She received Cardizem bolus and started on Cardizem drip.  She had a history of heart fluttering and pounding in the past brief episode after death grandchild.  She reports she wore Holter monitor for 2 weeks several years ago.  She had heart cath by Dr. Euceda 1987.    Lying in bed.  Friend in room.  Remains atrial fibrillation  on telemetry.  Heart rate up to 180 during the night.  Remains on Cardizem drip at 15 mg.  She denies chest pain or shortness of breath.  She denies nausea, vomiting or abdominal pain..  On multiple medications for blood pressure management.  Denies dysuria.  Reports frequency.    Review of Systems   Constitutional: Negative for activity change and fatigue.   HENT: Negative for congestion and trouble swallowing.    Eyes: Negative for photophobia and visual disturbance.   Respiratory: Negative for cough, shortness of breath and wheezing.    Cardiovascular: Positive for palpitations. Negative for chest pain and leg swelling.   Gastrointestinal: Negative for abdominal distention, abdominal pain, diarrhea, nausea and vomiting.   Endocrine: Negative for cold intolerance, heat intolerance and polyuria.   Genitourinary: Positive for frequency. Negative for dysuria.   Musculoskeletal: Negative for myalgias.   Skin: Negative for wound.   Allergic/Immunologic: Negative for immunocompromised state.   Neurological: Negative for weakness.   Hematological: Negative for adenopathy. Does not bruise/bleed easily.    Psychiatric/Behavioral: Negative for agitation, behavioral problems and confusion.      All pertinent negatives and positives are as above. All other systems have been reviewed and are negative unless otherwise stated.     Objective    Temp:  [96.9 °F (36.1 °C)-98.6 °F (37 °C)] 98.5 °F (36.9 °C)  Heart Rate:  [] 87  Resp:  [14-16] 16  BP: (122-178)/(63-90) 122/66  Physical Exam   Constitutional: She is oriented to person, place, and time. She appears well-developed and well-nourished.   HENT:   Head: Normocephalic and atraumatic.   Eyes: Pupils are equal, round, and reactive to light. EOM are normal.   Neck: Normal range of motion. Neck supple.   Cardiovascular: Normal heart sounds and intact distal pulses.  Exam reveals no gallop and no friction rub.    No murmur heard.  Irregular rhythm.  Atrial fibrillation  on telemetry.  Heart rate up to 182 during the night   Pulmonary/Chest: Effort normal and breath sounds normal. No respiratory distress. She has no wheezes. She has no rales.   Abdominal: Bowel sounds are normal. She exhibits no distension. There is no tenderness.   Genitourinary:   Genitourinary Comments: Voiding   Musculoskeletal: She exhibits no edema or deformity.   SCDs bilateral lower extremities   Neurological: She is alert and oriented to person, place, and time.   Skin: Skin is warm and dry.   Psychiatric: She has a normal mood and affect. Her behavior is normal. Judgment and thought content normal.     Results Review:  I have reviewed the labs, radiology results, and diagnostic studies.    Laboratory Data:     Results from last 7 days  Lab Units 08/07/18  0658 08/06/18  1827   WBC 10*3/mm3 6.01 9.00   HEMOGLOBIN g/dL 14.3 14.6   HEMATOCRIT % 42.6 43.7   PLATELETS 10*3/mm3 181 226          Results from last 7 days  Lab Units 08/07/18  0658 08/06/18  1827   SODIUM mmol/L 143 139   POTASSIUM mmol/L 3.6 4.0   CHLORIDE mmol/L 108 103   CO2 mmol/L 25.0 25.0   BUN mg/dL 11 14   CREATININE  mg/dL 0.73 0.95   CALCIUM mg/dL 10.3 10.8*   BILIRUBIN mg/dL  --  0.7   ALK PHOS U/L  --  102   ALT (SGPT) U/L  --  40   AST (SGOT) U/L  --  29   GLUCOSE mg/dL 116* 113*     Urine Culture   Date Value Ref Range Status   08/06/2018 No growth at 24 hours  Preliminary     Imaging Results (all)     Procedure Component Value Units Date/Time    XR Chest 2 View [[id number]] Collected:  08/1945     Updated:  08/06/18 1948    Narrative:       XR CHEST 2 VW- 8/6/2018 7:11 PM CDT     HISTORY: Atrial fibrillation and history of hypertension.       COMPARISON: None.     FINDINGS:   Upright frontal and lateral radiographs of the chest were obtained.     Granulomatous calcifications are seen. The lungs are otherwise clear.  The cardiomediastinal silhouette and pulmonary vascularity are within  normal limits. The osseous structures and surrounding soft tissues  demonstrate no acute abnormality.       Impression:       1. No radiographic evidence of acute cardiopulmonary process.        This report was finalized on 08/06/2018 19:45 by Dr. Dakotah Patel MD.        Intake/Output    Intake/Output Summary (Last 24 hours) at 08/07/18 0814  Last data filed at 08/07/18 0100   Gross per 24 hour   Intake             1000 ml   Output              550 ml   Net              450 ml       Scheduled Meds    amLODIPine-valsartan (EXFORGE)  mg combo dose  Oral Daily   aspirin 81 mg Oral Daily   atorvastatin 10 mg Oral Daily   bumetanide 1 mg Oral Daily   CloNIDine 0.1 mg Oral BID   enoxaparin 1 mg/kg Subcutaneous Q12H   labetalol 300 mg Oral Q12H   potassium chloride 10 mEq Oral Daily       I have reviewed the patient current medications.     Assessment/Plan     Hospital Problem List     Atrial fibrillation, new onset (CMS/HCC)        Assessment:  1.  New onset atrial fibrillation with rapid ventricular response  2.  Essential hypertension  3.  Hyperlipidemia  4.  Arthritis  5.  Pyuria without symptoms  6.  Elevated proBNP secondary to  atrial fibrillation with rapid ventricular response.  No clinical signs to suggest heart failure    Plan:  1.  Telemetry.  Remains atrial fibrillation rate .  Heart rate up to 182 during the night.  2.  Cardizem drip at 15 mg  3.  Lovenox 1 mg/kg every 12 hours  4.  Echocardiogram today  5.  Cardiology consult  6.  Troponins negative  7.  Elevated proBNP secondary to atrial fibrillation, no complaints of shortness of breath.  Lungs clear, no peripheral edema.  8.  Home medications reviewed and appropriate medications resumed.  9.  SCDs bilateral lower extremities  10.  No antibiotics.  Urine culture no growth at 24 hours  11. CHADSVASc 3                                                                                                                                                                                                                                                                                                                                                    Her son Koko Altman will act as her surrogate decision maker.    The above documentation resulted from a face-to-face encounter by me Arleen GILMORE, [facility].        Discharge Planning: I expect patient to be discharged to home in 2 days.    MANDO Farrell   08/07/18   8:14 AM    I personally evaluated and examined the patient in conjunction with MANDO Bryant and agree with the assessment, treatment plan, and disposition of the patient as recorded by her. My history, exam, and further recommendations are: Patient still appears to be in A. fib, but her heart rate is currently in the low 80s.  She is on a Cardizem drip, with plans to wean from a Cardizem drip and transition to oral Cardizem currently dosed at 90 mg by mouth every 6 hours.  Plans for echocardiogram today.  Currently on anticoagulation with Lovenox.  Appreciate cardiology following along with us.  Patient reports that her symptoms are much better compared  "controlled today as compared to yesterday.  I suspect she has a history of paroxysmal atrial fibrillation as she reports having an episode in the past where she had similar symptoms of palpitations and heart pounding which lasted for approximately 3-4 hours and then resolved on its own.  She went to see her PCP who performed an EKG which was \"fine.\" She reports that her symptoms yesterday preceding her hospitalization were very similar to those symptoms she experienced in the past.        Srinivasa Lopez MD  08/07/18  2:09 PM  "

## 2018-08-07 NOTE — PROGRESS NOTES
Discharge Planning Assessment  [facility]     Patient Name: Anne-Marie Hayes  MRN: [id number]  Today's Date: 8/7/2018    Admit Date: 8/6/2018          Discharge Needs Assessment     Row Name 08/07/18 1535       Living Environment    Current Living Arrangements home/apartment/condo    Primary Care Provided by self    Provides Primary Care For no one    Family Caregiver if Needed child(ольга), adult    Quality of Family Relationships involved    Able to Return to Prior Arrangements yes       Resource/Environmental Concerns    Resource/Environmental Concerns none    Transportation Concerns car, none       Transition Planning    Patient/Family Anticipates Transition to home    Patient/Family Anticipated Services at Transition none    Transportation Anticipated family or friend will provide       Discharge Needs Assessment    Readmission Within the Last 30 Days no previous admission in last 30 days    Concerns to be Addressed no discharge needs identified;denies needs/concerns at this time    Equipment Currently Used at Home none    Anticipated Changes Related to Illness none    Equipment Needed After Discharge none            Discharge Plan     Row Name 08/07/18 1536       Plan    Patient/Family in Agreement with Plan yes    Plan Comments PT resides at home and is independent. PT has family nearby who can assist if needed. PT plans to dc home and denies any needs including HH. Will follow.     Final Discharge Disposition Code 01 - home or self-care        Destination     No service coordination in this encounter.      Durable Medical Equipment     No service coordination in this encounter.      Dialysis/Infusion     No service coordination in this encounter.      Home Medical Care     No service coordination in this encounter.      Social Care     No service coordination in this encounter.                Demographic Summary    No documentation.           Functional Status    No documentation.           Psychosocial     No documentation.           Abuse/Neglect    No documentation.           Legal    No documentation.           Substance Abuse    No documentation.           Patient Forms    No documentation.         Breanna Ramirez MSW

## 2018-08-07 NOTE — PLAN OF CARE
Problem: Patient Care Overview  Goal: Plan of Care Review  Outcome: Ongoing (interventions implemented as appropriate)   08/07/18 0433   Coping/Psychosocial   Plan of Care Reviewed With patient   Plan of Care Review   Progress improving   OTHER   Outcome Summary No c/o chest pain pt on monitor 90-74 up bathroom prn with asssist , cont on cardizen drip at 15 ,        Problem: Arrhythmia/Dysrhythmia (Symptomatic) (Adult)  Goal: Signs and Symptoms of Listed Potential Problems Will be Absent, Minimized or Managed (Arrhythmia/Dysrhythmia)  Outcome: Ongoing (interventions implemented as appropriate)

## 2018-08-08 LAB — BACTERIA SPEC AEROBE CULT: NORMAL

## 2018-08-08 PROCEDURE — 99232 SBSQ HOSP IP/OBS MODERATE 35: CPT | Performed by: INTERNAL MEDICINE

## 2018-08-08 PROCEDURE — 25010000002 ENOXAPARIN PER 10 MG: Performed by: INTERNAL MEDICINE

## 2018-08-08 RX ORDER — LOSARTAN POTASSIUM 50 MG/1
50 TABLET ORAL
Status: DISCONTINUED | OUTPATIENT
Start: 2018-08-09 | End: 2018-08-09 | Stop reason: HOSPADM

## 2018-08-08 RX ORDER — DILTIAZEM HYDROCHLORIDE 120 MG/1
120 TABLET, FILM COATED ORAL EVERY 6 HOURS SCHEDULED
Status: DISCONTINUED | OUTPATIENT
Start: 2018-08-08 | End: 2018-08-09

## 2018-08-08 RX ADMIN — CLONIDINE HYDROCHLORIDE 0.1 MG: 0.1 TABLET ORAL at 21:09

## 2018-08-08 RX ADMIN — DILTIAZEM HYDROCHLORIDE 120 MG: 120 TABLET, FILM COATED ORAL at 17:25

## 2018-08-08 RX ADMIN — CLONIDINE HYDROCHLORIDE 0.1 MG: 0.1 TABLET ORAL at 08:17

## 2018-08-08 RX ADMIN — DILTIAZEM HYDROCHLORIDE 90 MG: 90 TABLET, FILM COATED ORAL at 05:23

## 2018-08-08 RX ADMIN — POTASSIUM CHLORIDE 10 MEQ: 750 CAPSULE, EXTENDED RELEASE ORAL at 08:15

## 2018-08-08 RX ADMIN — LABETALOL HYDROCHLORIDE 300 MG: 300 TABLET, FILM COATED ORAL at 08:16

## 2018-08-08 RX ADMIN — ENOXAPARIN SODIUM 100 MG: 100 INJECTION SUBCUTANEOUS at 05:23

## 2018-08-08 RX ADMIN — DILTIAZEM HYDROCHLORIDE 120 MG: 120 TABLET, FILM COATED ORAL at 12:21

## 2018-08-08 RX ADMIN — DILTIAZEM HYDROCHLORIDE 120 MG: 120 TABLET, FILM COATED ORAL at 23:32

## 2018-08-08 RX ADMIN — ATORVASTATIN CALCIUM 10 MG: 10 TABLET, FILM COATED ORAL at 09:40

## 2018-08-08 RX ADMIN — BUMETANIDE 1 MG: 1 TABLET ORAL at 08:17

## 2018-08-08 RX ADMIN — LABETALOL HYDROCHLORIDE 300 MG: 300 TABLET, FILM COATED ORAL at 21:09

## 2018-08-08 RX ADMIN — LOSARTAN POTASSIUM 100 MG: 50 TABLET ORAL at 08:17

## 2018-08-08 RX ADMIN — APIXABAN 5 MG: 5 TABLET, FILM COATED ORAL at 21:09

## 2018-08-08 NOTE — PROGRESS NOTES
[facility]  INPATIENT PROGRESS NOTE    Length of Stay: 2  Date of Admission: 8/6/2018  Primary Care Physician: Ania Culver MD    Subjective   Chief Complaint: palpitations worse with walking  HPI   Sitting up in bed.  No family present.  She remains atrial fibrillation  with brief heart rate up to 170.  She reports feeling palpitations when up to the bathroom.  Dr. Laird has seen today and has increased her Cardizem to 120 mg 4 times daily.  First dose will be due at noon today.  I have encouraged her to ambulate in the hallway so that we may monitor heart rate with activity.  She has been placed on eliquis for anticoagulation.  She denies chest pain.  She denies shortness of breath.  She denies nausea, vomiting or abdominal pain.    Review of Systems   Constitutional: Negative for activity change and fatigue.   HENT: Negative for congestion and trouble swallowing.    Eyes: Negative for photophobia and visual disturbance.   Respiratory: Negative for cough, shortness of breath and wheezing.    Cardiovascular: Positive for palpitations. Negative for chest pain and leg swelling.   Gastrointestinal: Negative for abdominal distention, abdominal pain, diarrhea, nausea and vomiting.   Endocrine: Negative for cold intolerance, heat intolerance and polyuria.   Genitourinary: Positive for frequency. Negative for dysuria.   Musculoskeletal: Negative for myalgias.   Skin: Negative for wound.   Allergic/Immunologic: Negative for immunocompromised state.   Neurological: Negative for weakness.   Hematological: Negative for adenopathy. Does not bruise/bleed easily.   Psychiatric/Behavioral: Negative for agitation, behavioral problems and confusion.   All pertinent negatives and positives are as above. All other systems have been reviewed and are negative unless otherwise stated.     Objective    Temp:  [96.9 °F (36.1 °C)-97.8 °F (36.6 °C)] 97.8 °F (36.6 °C)  Heart Rate:   [70-96] 84  Resp:  [18-20] 18  BP: (103-131)/(60-75) 131/68  Physical Exam  Constitutional: She is oriented to person, place, and time. She appears well-developed and well-nourished.   HENT:   Head: Normocephalic and atraumatic.   Eyes: Pupils are equal, round, and reactive to light. EOM are normal.   Neck: Normal range of motion. Neck supple.   Cardiovascular: Normal heart sounds and intact distal pulses.  Exam reveals no gallop and no friction rub.    No murmur heard.  Irregular rhythm.  Atrial fibrillation  on telemetry.  Heart rate up to 173 when up to bathroom.  Pulmonary/Chest: Effort normal and breath sounds normal. No respiratory distress. She has no wheezes. She has no rales.   Abdominal: Bowel sounds are normal. She exhibits no distension. There is no tenderness.   Genitourinary:   Genitourinary Comments: Voiding   Musculoskeletal: She exhibits no edema or deformity.   SCDs bilateral lower extremities   Neurological: She is alert and oriented to person, place, and time.   Skin: Skin is warm and dry.   Psychiatric: She has a normal mood and affect. Her behavior is normal. Judgment and thought content normal.     Results Review:  I have reviewed the labs, radiology results, and diagnostic studies.    Laboratory Data:     Results from last 7 days  Lab Units 08/07/18  0658 08/06/18  1827   WBC 10*3/mm3 6.01 9.00   HEMOGLOBIN g/dL 14.3 14.6   HEMATOCRIT % 42.6 43.7   PLATELETS 10*3/mm3 181 226          Results from last 7 days  Lab Units 08/07/18  0658 08/06/18  1827   SODIUM mmol/L 143 139   POTASSIUM mmol/L 3.6 4.0   CHLORIDE mmol/L 108 103   CO2 mmol/L 25.0 25.0   BUN mg/dL 11 14   CREATININE mg/dL 0.73 0.95   CALCIUM mg/dL 10.3 10.8*   BILIRUBIN mg/dL  --  0.7   ALK PHOS U/L  --  102   ALT (SGPT) U/L  --  40   AST (SGOT) U/L  --  29   GLUCOSE mg/dL 116* 113*     Urine Culture   Date Value Ref Range Status   08/06/2018 No growth at 2 days  Final     Imaging Results (all)     Procedure Component Value  Units Date/Time    XR Chest 2 View [[id number]] Collected:  08/1945     Updated:  08/06/18 1948    Narrative:       XR CHEST 2 VW- 8/6/2018 7:11 PM CDT     HISTORY: Atrial fibrillation and history of hypertension.       COMPARISON: None.     FINDINGS:   Upright frontal and lateral radiographs of the chest were obtained.     Granulomatous calcifications are seen. The lungs are otherwise clear.  The cardiomediastinal silhouette and pulmonary vascularity are within  normal limits. The osseous structures and surrounding soft tissues  demonstrate no acute abnormality.       Impression:       1. No radiographic evidence of acute cardiopulmonary process.        This report was finalized on 08/06/2018 19:45 by Dr. Dakotah Patel MD.        Results for orders placed during the hospital encounter of 08/06/18   Adult Transthoracic Echo Complete W/ Cont if Necessary Per Protocol    Narrative · Left ventricular systolic function is normal. Estimated ejection   fraction is 61-65%.  · Left atrial cavity size is mildly dilated.  · Normal right ventricular cavity size and systolic function noted.  · Mild tricuspid valve regurgitation is present.          Intake/Output    Intake/Output Summary (Last 24 hours) at 08/08/18 1200  Last data filed at 08/08/18 0517   Gross per 24 hour   Intake              240 ml   Output             1300 ml   Net            -1060 ml       Scheduled Meds    apixaban 5 mg Oral Q12H   atorvastatin 10 mg Oral Daily   bumetanide 1 mg Oral Daily   CloNIDine 0.1 mg Oral BID   diltiaZEM 120 mg Oral Q6H   labetalol 300 mg Oral Q12H   losartan 100 mg Oral Q24H   potassium chloride 10 mEq Oral Daily       I have reviewed the patient current medications.     Assessment/Plan     Hospital Problem List     Atrial fibrillation, new onset (CMS/HCC)        Assessment:  1.  New onset atrial fibrillation with rapid ventricular response  2.  Essential hypertension  3.  Hyperlipidemia  4.  Arthritis  5.  Pyuria without  symptoms  6.  Elevated proBNP secondary to atrial fibrillation with rapid ventricular response.  No clinical signs to suggest heart failure     Plan:  1.  Telemetry.  Remains atrial fibrillation rate .  Heart rate up to 173 when up to bathroom.  2.  Cardizem drip discontinued.  3.  Cardiology following.  Dr. Lovelace has uptitrated oral Cardizem to 120 mg every 6 hours this morning.  She is to receive first dose at noon today.  Heart rate has remained up to 120 and increased to 170s when up in the bathroom.  She has not yet walked in the hallway.  4.  Lovenox discontinued today and started on eliquis 5 mg orally twice daily started.  5.  CHADSVASc 3  6.  Nurses to assist patient with ambulating in the hallway so that we might monitor heart rate with activity.  7.  Echocardiogram ejection fraction 61-65%  8.  Troponins negative  9.  No antibiotics.  Urine culture no growth ×2 days  10.  Elevated proBNP secondary to atrial fibrillation, no complaints of shortness of breath.  Lungs clear, no peripheral edema.  No signs of heart failure.    The above documentation resulted from a face-to-face encounter by me Arleen GILMORE, [facility].      Discharge Planning: I expect patient to be discharged to home in 1 day if heart rate under better control.    MANDO Farrell   08/08/18   12:00 PM    I personally evaluated and examined the patient in conjunction with MANDO Bryant and agree with the assessment, treatment plan, and disposition of the patient as recorded by her. My history, exam, and further recommendations are: Heart rate much better controlled this afternoon.  Patient also feels much better.  Continue current dose of oral Cardizem with hopes that we can transition over to Cardizem CD tomorrow.  Patient has been anticoagulated with Eliquis, and we discussed anticoagulation at the bedside this afternoon.  I am going to decrease her dose of losartan from 100 mg down to 50 mg, especially with the  addition of Cardizem and the effects it will have on blood pressure.  Patient is also on scheduled clonidine, labetalol, in addition to Bumex, so we will need to monitor blood pressures closely.  If she continues to do this well anticipate she will be eligible for discharge home tomorrow.  Echocardiogram with no acute findings.  Appreciate cardiology assistance.  Patient reports that she did call Dr. Ania Oseguera's office today and already has a follow-up appointment for one week.        Srinivasa Lopez MD  08/08/18  5:20 PM

## 2018-08-08 NOTE — PLAN OF CARE
Problem: Patient Care Overview  Goal: Plan of Care Review  Outcome: Ongoing (interventions implemented as appropriate)   08/08/18 5503   Coping/Psychosocial   Plan of Care Reviewed With patient   Plan of Care Review   Progress improving   OTHER   Outcome Summary No C/O pain today. HR controlled. Patient walked in halls with stand by assist. Cardizem PO remains. Continue to monitor overall condition and notify Md of any changes.

## 2018-08-08 NOTE — PLAN OF CARE
Problem: Patient Care Overview  Goal: Plan of Care Review   08/08/18 0328   Coping/Psychosocial   Plan of Care Reviewed With patient   Plan of Care Review   Progress improving   OTHER   Outcome Summary No c/o pain hr 70-90 up aad niranjan gait steady , cont on cardizen po .       Problem: Arrhythmia/Dysrhythmia (Symptomatic) (Adult)  Goal: Signs and Symptoms of Listed Potential Problems Will be Absent, Minimized or Managed (Arrhythmia/Dysrhythmia)  Outcome: Ongoing (interventions implemented as appropriate)      Problem: Skin Injury Risk (Adult)  Goal: Identify Related Risk Factors and Signs and Symptoms  Outcome: Ongoing (interventions implemented as appropriate)    Goal: Skin Health and Integrity  Outcome: Ongoing (interventions implemented as appropriate)

## 2018-08-08 NOTE — ACP (ADVANCE CARE PLANNING)
Date of First Steps ACP interview: 8/8/2018  Location of interview: Pt's room  First Steps ACP Facilitator: Cecily Gonzáles RN  Referral Source: nurse  Present for facilitation: Patient    SUMMARY OF ADVANCE CARE PLANNING DISCUSSION:  Anne-Marie visited for First Steps facilitation. We reviewed purpose and goals for advance care planning.    I reviewed with Anne-Marie qualities to consider when choosing a healthcare agent. Anne-Marie is considering her healthcare agent. I encouraged Anne-Marie to discuss her preferences for future care with healthcare agents and others close to her.    Anne-Marie describes past experiences dealing with friends or family who have been seriously ill: None From these experiences Anne-Marie learned  She states she knows it needs to be done.        Goals of medical care for severe, permanent brain injury were explored: Yes     Education materials were provided on: Advance Care Planning and Healthcare Agent Selection    Each section of the advance care/living will document was reviewed and discussed.    Advance care/living will document finished during this facilitation? no    Time spent on preparation, facilitation and documentation was 31-60 minutes.    RECOMMENDATIONS/FOLLOW-UP:  Pt will review information and discuss with family.    CONSULT/NOTE ROUTED  yes    Cecily Gonzáles, RN

## 2018-08-08 NOTE — PROGRESS NOTES
"    RE:  Anne-Marie Hayes  :  1945         Subjective: Patient has no new complaints or issues today.  Remains in atrial fibrillation.  Tachycardic this morning but reasonably well-controlled overnight.  Patient felt some fluttering this morning while laying in bed.    ROS: Pertinent positives and negatives listed above.  All other systems reviewed and negative.    Objective:   /60 (BP Location: Left arm, Patient Position: Lying)   Pulse 88   Temp 97 °F (36.1 °C) (Temporal Artery )   Resp 18   Ht 170.2 cm (67.01\")   Wt 102 kg (225 lb 3 oz)   SpO2 98%   BMI 35.26 kg/m²   Temp:  [96.9 °F (36.1 °C)-97.5 °F (36.4 °C)] 97 °F (36.1 °C)  Heart Rate:  [70-96] 88  Resp:  [18-20] 18  BP: (103-131)/(60-78) 116/60    Intake/Output Summary (Last 24 hours) at 18 0725  Last data filed at 18 0517   Gross per 24 hour   Intake              240 ml   Output             1850 ml   Net            -1610 ml       Current Facility-Administered Medications:   •  acetaminophen (TYLENOL) tablet 650 mg, 650 mg, Oral, Q4H PRN, Pamela Lopez, APRN  •  atorvastatin (LIPITOR) tablet 10 mg, 10 mg, Oral, Daily, Pamela Lopez, APRN, 10 mg at 18  •  bumetanide (BUMEX) tablet 1 mg, 1 mg, Oral, Daily, Pamela Lopez APRN, 1 mg at 18  •  CloNIDine (CATAPRES) tablet 0.1 mg, 0.1 mg, Oral, BID, Pamela Lopez APRN, 0.1 mg at 18  •  diltiaZEM (CARDIZEM) 125 mg in sodium chloride 0.9 % 125 mL (1 mg/mL) infusion, 5-15 mg/hr, Intravenous, Titrated, Rocky Hassan MD, Last Rate: 10 mL/hr at 18 1201, 10 mg/hr at 18 1201  •  diltiaZEM (CARDIZEM) tablet 120 mg, 120 mg, Oral, Q6H, Jovani Lovelace MD  •  enoxaparin (LOVENOX) syringe 100 mg, 1 mg/kg, Subcutaneous, Q12H, Renny Blackwell MD, 100 mg at 18 05  •  labetalol (NORMODYNE) tablet 300 mg, 300 mg, Oral, Q12H, Pamela Lopez APRN, 300 mg at 18  •  losartan (COZAAR) tablet 100 mg, 100 mg, Oral, " Q24H, Jovani Lovelace MD  •  ondansetron (ZOFRAN) tablet 4 mg, 4 mg, Oral, Q6H PRN **OR** ondansetron ODT (ZOFRAN-ODT) disintegrating tablet 4 mg, 4 mg, Oral, Q6H PRN **OR** ondansetron (ZOFRAN) injection 4 mg, 4 mg, Intravenous, Q6H PRN, Pamela Lopez, APRJENELLE  •  Pharmacy to Dose enoxaparin (LOVENOX), , Does not apply, Continuous PRN, Pamela Lopez, APRN  •  potassium chloride (MICRO-K) CR capsule 10 mEq, 10 mEq, Oral, Daily, Pamela Lopez APRN, 10 mEq at 08/07/18 0858  •  sodium chloride 0.9 % flush 1-10 mL, 1-10 mL, Intravenous, PRN, Pamela Lopez, APRN    Physical Exam:   Gen: Alert and oriented x3, no acute distress  Heart: Irregularly irregular, tachycardic, no murmurs, rubs, or gallops  Chest: Lungs clear to auscultation bilaterally, normal respiratory effort  Abd: Soft, non tender, bowel sounds are positive  Ext: No clubbing, cyanosis, or edema      Lab Results (last 24 hours)     Procedure Component Value Units Date/Time    Urine Culture - Urine, [[id number]]  (Normal) Collected:  08/06/18 1951    Specimen:  Urine from Urine, Clean Catch Updated:  08/08/18 0636     Urine Culture No growth at 2 days    TSH [[id number]]  (Normal) Collected:  08/07/18 0658    Specimen:  Blood Updated:  08/07/18 0812     TSH 1.540 mIU/mL     Hemoglobin A1c [[id number]] Collected:  08/07/18 0658    Specimen:  Blood Updated:  08/07/18 0758     Hemoglobin A1C 5.4 %     Narrative:       Less than 6.0           Non-Diabetic Range  6.0-7.0                 ADA Therapeutic Target  Greater than 7.0        Action Suggested    Troponin [[id number]]  (Normal) Collected:  08/07/18 0658    Specimen:  Blood Updated:  08/07/18 0754     Troponin I <0.012 ng/mL     Lipid Panel [[id number]]  (Abnormal) Collected:  08/07/18 0658    Specimen:  Blood Updated:  08/07/18 0753     Total Cholesterol 150 mg/dL      Triglycerides 153 (H) mg/dL      HDL Cholesterol 51 mg/dL      LDL Cholesterol  71 mg/dL      LDL/HDL Ratio 1.34    Basic  Metabolic Panel [[id number]]  (Abnormal) Collected:  08/07/18 0658    Specimen:  Blood Updated:  08/07/18 0743     Glucose 116 (H) mg/dL      BUN 11 mg/dL      Creatinine 0.73 mg/dL      Sodium 143 mmol/L      Potassium 3.6 mmol/L      Chloride 108 mmol/L      CO2 25.0 mmol/L      Calcium 10.3 mg/dL      eGFR Non African Amer 78 mL/min/1.73      BUN/Creatinine Ratio 15.1     Anion Gap 10.0 mmol/L     Narrative:       The MDRD GFR formula is only valid for adults with stable renal function between ages 18 and 70.    CBC (No Diff) [[id number]]  (Normal) Collected:  08/07/18 0658    Specimen:  Blood Updated:  08/07/18 0728     WBC 6.01 10*3/mm3      RBC 4.72 10*6/mm3      Hemoglobin 14.3 g/dL      Hematocrit 42.6 %      MCV 90.3 fL      MCH 30.3 pg      MCHC 33.6 g/dL      RDW 12.3 %      RDW-SD 40.2 fl      MPV 11.1 fL      Platelets 181 10*3/mm3         Imaging Results (last 24 hours)     ** No results found for the last 24 hours. **            Assessment:   1.  Atrial fibrillation with rapid ventricular response  2.  Essential hypertension  3.  Hyperlipidemia  4.  Obesity: BMI 35.      Plan:   - Titrate up diltiazem for rate control  - Changed to oral anticoagulation with Eliquis  - Continue other medical therapy  - Possibly discharge home later today or tomorrow with heart rate controlled

## 2018-08-09 VITALS
OXYGEN SATURATION: 96 % | WEIGHT: 227 LBS | HEART RATE: 110 BPM | BODY MASS INDEX: 35.63 KG/M2 | SYSTOLIC BLOOD PRESSURE: 110 MMHG | TEMPERATURE: 97.1 F | RESPIRATION RATE: 18 BRPM | HEIGHT: 67 IN | DIASTOLIC BLOOD PRESSURE: 63 MMHG

## 2018-08-09 PROCEDURE — 99232 SBSQ HOSP IP/OBS MODERATE 35: CPT | Performed by: INTERNAL MEDICINE

## 2018-08-09 RX ORDER — DILTIAZEM HYDROCHLORIDE 240 MG/1
480 CAPSULE, COATED, EXTENDED RELEASE ORAL DAILY
Qty: 30 CAPSULE | Refills: 0 | Status: ON HOLD | OUTPATIENT
Start: 2018-08-09 | End: 2018-08-13

## 2018-08-09 RX ORDER — LOSARTAN POTASSIUM 50 MG/1
50 TABLET ORAL
Qty: 30 TABLET | Refills: 0 | Status: SHIPPED | OUTPATIENT
Start: 2018-08-10 | End: 2020-04-17 | Stop reason: SDUPTHER

## 2018-08-09 RX ORDER — DILTIAZEM HYDROCHLORIDE 240 MG/1
480 CAPSULE, COATED, EXTENDED RELEASE ORAL
Status: DISCONTINUED | OUTPATIENT
Start: 2018-08-09 | End: 2018-08-09 | Stop reason: HOSPADM

## 2018-08-09 RX ADMIN — POTASSIUM CHLORIDE 10 MEQ: 750 CAPSULE, EXTENDED RELEASE ORAL at 08:42

## 2018-08-09 RX ADMIN — DILTIAZEM HYDROCHLORIDE 120 MG: 120 TABLET, FILM COATED ORAL at 06:10

## 2018-08-09 RX ADMIN — BUMETANIDE 1 MG: 1 TABLET ORAL at 08:42

## 2018-08-09 RX ADMIN — ATORVASTATIN CALCIUM 10 MG: 10 TABLET, FILM COATED ORAL at 08:42

## 2018-08-09 RX ADMIN — LABETALOL HYDROCHLORIDE 300 MG: 300 TABLET, FILM COATED ORAL at 08:41

## 2018-08-09 RX ADMIN — DILTIAZEM HYDROCHLORIDE 480 MG: 240 CAPSULE, EXTENDED RELEASE ORAL at 08:42

## 2018-08-09 RX ADMIN — CLONIDINE HYDROCHLORIDE 0.1 MG: 0.1 TABLET ORAL at 08:42

## 2018-08-09 RX ADMIN — APIXABAN 5 MG: 5 TABLET, FILM COATED ORAL at 08:42

## 2018-08-09 RX ADMIN — LOSARTAN POTASSIUM 50 MG: 50 TABLET ORAL at 08:42

## 2018-08-09 NOTE — DISCHARGE SUMMARY
[facility]  DISCHARGE SUMMARY       Date of Admission: 8/6/2018  Date of Discharge:  8/9/2018  Primary Care Physician: Ania Culver MD    Presenting Problem/History of Present Illness:  Atrial fibrillation, new onset (CMS/[facility]) [I48.91]     Final Discharge Diagnoses:  1.  New onset atrial fibrillation with rapid ventricular response  2.  Essential hypertension  3.  Hyperlipidemia  4.  Arthritis  5.  Pyuria without symptoms  6.  Elevated proBNP secondary to atrial fibrillation with rapid ventricular response.  No clinical signs to suggest heart failure    Consults:   1.  Dr. Lovelace, Cardiology     Procedures Performed: None    Pertinent Test Results:   Lab Results (last 48 hours)     Procedure Component Value Units Date/Time    Urine Culture - Urine, [[id number]]  (Normal) Collected:  08/06/18 1951    Specimen:  Urine from Urine, Clean Catch Updated:  08/08/18 0636     Urine Culture No growth at 2 days          Imaging Results (last 7 days)     Procedure Component Value Units Date/Time    XR Chest 2 View [[id number]] Collected:  08/1945     Updated:  08/06/18 1948    Narrative:       XR CHEST 2 VW- 8/6/2018 7:11 PM CDT     HISTORY: Atrial fibrillation and history of hypertension.       COMPARISON: None.     FINDINGS:   Upright frontal and lateral radiographs of the chest were obtained.     Granulomatous calcifications are seen. The lungs are otherwise clear.  The cardiomediastinal silhouette and pulmonary vascularity are within  normal limits. The osseous structures and surrounding soft tissues  demonstrate no acute abnormality.       Impression:       1. No radiographic evidence of acute cardiopulmonary process.        This report was finalized on 08/06/2018 19:45 by Dr. Dakotah Patel MD.        Lab Results (last 72 hours)     Procedure Component Value Units Date/Time    Urine Culture - Urine, [[id number]]  (Normal) Collected:  08/06/18 1951    Specimen:   Urine from Urine, Clean Catch Updated:  08/08/18 0636     Urine Culture No growth at 2 days    TSH [[id number]]  (Normal) Collected:  08/07/18 0658    Specimen:  Blood Updated:  08/07/18 0812     TSH 1.540 mIU/mL     Hemoglobin A1c [[id number]] Collected:  08/07/18 0658    Specimen:  Blood Updated:  08/07/18 0758     Hemoglobin A1C 5.4 %     Narrative:       Less than 6.0           Non-Diabetic Range  6.0-7.0                 ADA Therapeutic Target  Greater than 7.0        Action Suggested    Troponin [[id number]]  (Normal) Collected:  08/07/18 0658    Specimen:  Blood Updated:  08/07/18 0754     Troponin I <0.012 ng/mL     Lipid Panel [[id number]]  (Abnormal) Collected:  08/07/18 0658    Specimen:  Blood Updated:  08/07/18 0753     Total Cholesterol 150 mg/dL      Triglycerides 153 (H) mg/dL      HDL Cholesterol 51 mg/dL      LDL Cholesterol  71 mg/dL      LDL/HDL Ratio 1.34    Basic Metabolic Panel [[id number]]  (Abnormal) Collected:  08/07/18 0658    Specimen:  Blood Updated:  08/07/18 0743     Glucose 116 (H) mg/dL      BUN 11 mg/dL      Creatinine 0.73 mg/dL      Sodium 143 mmol/L      Potassium 3.6 mmol/L      Chloride 108 mmol/L      CO2 25.0 mmol/L      Calcium 10.3 mg/dL      eGFR Non African Amer 78 mL/min/1.73      BUN/Creatinine Ratio 15.1     Anion Gap 10.0 mmol/L     Narrative:       The MDRD GFR formula is only valid for adults with stable renal function between ages 18 and 70.    CBC (No Diff) [[id number]]  (Normal) Collected:  08/07/18 0658    Specimen:  Blood Updated:  08/07/18 0728     WBC 6.01 10*3/mm3      RBC 4.72 10*6/mm3      Hemoglobin 14.3 g/dL      Hematocrit 42.6 %      MCV 90.3 fL      MCH 30.3 pg      MCHC 33.6 g/dL      RDW 12.3 %      RDW-SD 40.2 fl      MPV 11.1 fL      Platelets 181 10*3/mm3     Troponin [[id number]]  (Normal) Collected:  08/06/18 0247    Specimen:  Blood Updated:  08/07/18 0033     Troponin I <0.012 ng/mL     Urinalysis, Microscopic Only - Urine, Clean Catch [[id number]]   (Abnormal) Collected:  08/06/18 1951    Specimen:  Urine from Urine, Clean Catch Updated:  08/06/18 2020     RBC, UA 0-2 (A) /HPF      WBC, UA 6-12 (A) /HPF      Bacteria, UA Trace (A) /HPF      Squamous Epithelial Cells, UA 3-6 (A) /HPF      Hyaline Casts, UA None Seen /LPF      Methodology Automated Microscopy    Urinalysis With Culture If Indicated - Urine, Clean Catch [[id number]]  (Abnormal) Collected:  08/06/18 1951    Specimen:  Urine from Urine, Clean Catch Updated:  08/06/18 2020     Color, UA Yellow     Appearance, UA Clear     pH, UA 6.5     Specific Gravity, UA <=1.005     Glucose, UA Negative     Ketones, UA Negative     Bilirubin, UA Negative     Blood, UA Negative     Protein, UA Negative     Leuk Esterase, UA Small (1+) (A)     Nitrite, UA Negative     Urobilinogen, UA 0.2 E.U./dL    [place] Draw [[id number]] Collected:  08/06/18 1827    Specimen:  Blood Updated:  08/06/18 1930    Narrative:       The following orders were created for panel order [place] Draw.  Procedure                               Abnormality         Status                     ---------                               -----------         ------                     Light Blue Top[[id number]]                                   Final result               Green Top (Gel)[[id number]]                                  Final result               Lavender Top[[id number]]                                     Final result               Red Top[[id number]]                                          Final result                 Please view results for these tests on the individual orders.    Light Blue Top [[id number]] Collected:  08/06/18 1827    Specimen:  Blood Updated:  08/06/18 1930     Extra Tube hold for add-on     Comment: Auto resulted       Green Top (Gel) [[id number]] Collected:  08/06/18 1827    Specimen:  Blood Updated:  08/06/18 1930     Extra Tube Hold for add-ons.     Comment: Auto resulted.       Lavender Top [[id number]] Collected:   08/06/18 1827    Specimen:  Blood Updated:  08/06/18 1930     Extra Tube hold for add-on     Comment: Auto resulted       Red Top [[id number]] Collected:  08/06/18 1827    Specimen:  Blood Updated:  08/06/18 1930     Extra Tube Hold for add-ons.     Comment: Auto resulted.       BNP [[id number]]  (Abnormal) Collected:  08/06/18 1827    Specimen:  Blood Updated:  08/06/18 1924     proBNP 1,250.0 (H) pg/mL     Comprehensive Metabolic Panel [[id number]]  (Abnormal) Collected:  08/06/18 1827    Specimen:  Blood Updated:  08/06/18 1916     Glucose 113 (H) mg/dL      BUN 14 mg/dL      Creatinine 0.95 mg/dL      Sodium 139 mmol/L      Potassium 4.0 mmol/L      Chloride 103 mmol/L      CO2 25.0 mmol/L      Calcium 10.8 (H) mg/dL      Total Protein 7.1 g/dL      Albumin 4.60 g/dL      ALT (SGPT) 40 U/L      AST (SGOT) 29 U/L      Alkaline Phosphatase 102 U/L      Total Bilirubin 0.7 mg/dL      eGFR Non African Amer 58 (L) mL/min/1.73      Globulin 2.5 gm/dL      A/G Ratio 1.8 g/dL      BUN/Creatinine Ratio 14.7     Anion Gap 11.0 mmol/L     Narrative:       The MDRD GFR formula is only valid for adults with stable renal function between ages 18 and 70.    Magnesium [[id number]]  (Normal) Collected:  08/06/18 1827    Specimen:  Blood Updated:  08/06/18 1916     Magnesium 2.2 mg/dL     aPTT [[id number]]  (Normal) Collected:  08/06/18 1827    Specimen:  Blood Updated:  08/06/18 1912     PTT 26.9 seconds     Protime-INR [[id number]]  (Abnormal) Collected:  08/06/18 1827    Specimen:  Blood Updated:  08/06/18 1912     Protime 12.3 Seconds      INR 0.89 (L)    CBC & Differential [[id number]] Collected:  08/06/18 1827    Specimen:  Blood Updated:  08/06/18 1906    Narrative:       The following orders were created for panel order CBC & Differential.  Procedure                               Abnormality         Status                     ---------                               -----------         ------                     CBC Auto  Differential[[id number]]        Abnormal            Final result                 Please view results for these tests on the individual orders.    CBC Auto Differential [[id number]]  (Abnormal) Collected:  08/06/18 1827    Specimen:  Blood Updated:  08/06/18 1906     WBC 9.00 10*3/mm3      RBC 4.86 10*6/mm3      Hemoglobin 14.6 g/dL      Hematocrit 43.7 %      MCV 89.9 fL      MCH 30.0 pg      MCHC 33.4 g/dL      RDW 12.3 %      RDW-SD 40.4 fl      MPV 11.4 fL      Platelets 226 10*3/mm3      Neutrophil % 61.0 %      Lymphocyte % 23.2 %      Monocyte % 10.4 %      Eosinophil % 4.1 (H) %      Basophil % 0.7 %      Immature Grans % 0.6 %      Neutrophils, Absolute 5.49 10*3/mm3      Lymphocytes, Absolute 2.09 10*3/mm3      Monocytes, Absolute 0.94 10*3/mm3      Eosinophils, Absolute 0.37 10*3/mm3      Basophils, Absolute 0.06 10*3/mm3      Immature Grans, Absolute 0.05 (H) 10*3/mm3      nRBC 0.0 /100 WBC     Troponin [[id number]]  (Normal) Collected:  08/06/18 1827    Specimen:  Blood Updated:  08/06/18 1857     Troponin I <0.012 ng/mL         Hospital Course:  The patient is a 73 y.o. female who presented to [facility] with palpitations.  The patient has a past medical history that is significant for arthritis, hyperlipidemia and hypertension.  She stated on admission that she had a remote history of arhythmia but nothing was ever found or done.  She had a negative heart catheterization with Dr. Euceda in 1987.  She denied any chest pain with her palpitations.  She has been having fluttering and heart pounding episodes for the last week or two.  Finally it woke her up at 6 AM and she decided to seek evaluation.  She was evaluated in the emergency department and found to be in atrial fibrillation with rapid ventricular response with a heart rate in the 150's.  She was given a bolus of Cardizem and started on a Cardizem drip.  Cardiology was consulted on admission.  She was initially placed on therapeutic  "Lovenox for anticoagulation.  During her hospitalization she was started on oral Cardizem and weaned off the drip.  Her blood pressure medications were changed due to the addition of Cardizem.  Norvasc and Valsartan were stopped.  Losartan was started in place of the above.  She remained on Clonidine and Labetalol.  2D echocardiogram was performed and showed a normal ejection fraction and no acute abnormalities.  She was transitioned to oral anticoagulation with Eliquis and the Lovenox was discontinued.  She is still in atrial fibrillation at a controlled rate.  She will be discharged home today in stable condition.  She will follow up with Dr. Ania Culver in one week and the Cardiology group in one month.  She will also be set-up with outpatient PFT's and a home sleep study.         Condition on Discharge:  Stable     Physical Exam on Discharge:  /63 (BP Location: Left arm, Patient Position: Sitting)   Pulse 110   Temp 97.1 °F (36.2 °C) (Temporal Artery )   Resp 18   Ht 170.2 cm (67.01\")   Wt 103 kg (227 lb)   SpO2 96%   BMI 35.54 kg/m²      Physical Exam   Constitutional: She is oriented to person, place, and time. She appears well-developed and well-nourished.   HENT:   Head: Normocephalic and atraumatic.   Eyes: Pupils are equal, round, and reactive to light. Conjunctivae and EOM are normal.   Neck: Neck supple. No JVD present. No thyromegaly present.   Cardiovascular: Normal heart sounds and intact distal pulses.  An irregular rhythm present. Tachycardia present.  Exam reveals no gallop and no friction rub.    No murmur heard.  Afib:    Pulmonary/Chest: Effort normal and breath sounds normal. No respiratory distress. She has no wheezes. She has no rales. She exhibits no tenderness.   Abdominal: Soft. Bowel sounds are normal. She exhibits no distension. There is no tenderness. There is no rebound and no guarding.   Musculoskeletal: Normal range of motion. She exhibits no edema, tenderness or " deformity.   Lymphadenopathy:     She has no cervical adenopathy.   Neurological: She is alert and oriented to person, place, and time. She displays normal reflexes. No cranial nerve deficit. She exhibits normal muscle tone.   Skin: Skin is warm and dry. No rash noted.   Psychiatric: She has a normal mood and affect. Her behavior is normal. Judgment and thought content normal.   Nursing note and vitals reviewed.    Discharge Disposition:  Home or Self Care    Discharge Medications:     Discharge Medications      New Medications      Instructions Start Date   apixaban 5 MG tablet tablet  Commonly known as:  ELIQUIS   5 mg, Oral, Every 12 Hours Scheduled      diltiaZEM  MG 24 hr capsule  Commonly known as:  CARDIZEM CD   480 mg, Oral, Daily      losartan 50 MG tablet  Commonly known as:  COZAAR   50 mg, Oral, Every 24 Hours Scheduled         Continue These Medications      Instructions Start Date   bumetanide 1 MG tablet  Commonly known as:  BUMEX   1 mg, Oral, Daily      cholecalciferol 1000 units tablet  Commonly known as:  VITAMIN D3   1,000 Units, Oral, 2 Times Daily      CloNIDine 0.1 MG tablet  Commonly known as:  CATAPRES   0.1 mg, Oral, 2 Times Daily      fish oil 1000 MG capsule capsule   1,000 mg, Oral, Daily With Breakfast      fluticasone 50 MCG/ACT nasal spray  Commonly known as:  FLONASE   2 sprays, Nasal, Daily PRN      Glucosamine 500 MG capsule   1 capsule, Oral, Daily      KLOR-CON 10 MEQ CR tablet  Generic drug:  potassium chloride   10 mEq, Oral, 2 Times Daily      labetalol 300 MG tablet  Commonly known as:  NORMODYNE   300 mg, Oral, Every 12 Hours Scheduled      simvastatin 20 MG tablet  Commonly known as:  ZOCOR   20 mg, Oral, Nightly         Stop These Medications    amLODIPine 10 MG tablet  Commonly known as:  NORVASC     aspirin 81 MG chewable tablet     irbesartan 300 MG tablet  Commonly known as:  AVAPRO     meloxicam 15 MG tablet  Commonly known as:  MOBIC          Discharge Diet:    Diet Instructions     Diet: Regular, Cardiac; Thin       Discharge Diet:   Regular  Cardiac       Fluid Consistency:  Thin        Activity at Discharge:   Activity Instructions     Activity as Tolerated           Follow-up Appointments:   1.  Follow up with the Heart group in one month.  2.  Follow up with Dr. Ania Culver in one week.   No future appointments.    Test Results Pending at Discharge: None    MANDO Henderson  08/09/18  9:57 AM    Patient seen and examined. Up in the bathroom getting ready. States that she is ready to go home. Questions were answered. OP follow up, has an appointment with Dr. Ania Culver next week. Agree with DC plan.     Time: 32 minutes

## 2018-08-09 NOTE — PLAN OF CARE
Problem: Patient Care Overview  Goal: Plan of Care Review  Outcome: Ongoing (interventions implemented as appropriate)   08/09/18 0517   Coping/Psychosocial   Plan of Care Reviewed With patient   Plan of Care Review   Progress improving   OTHER   Outcome Summary No c/o voiced. Sleeping between care. HR controlled AF  6 pvc        Problem: Arrhythmia/Dysrhythmia (Symptomatic) (Adult)  Goal: Signs and Symptoms of Listed Potential Problems Will be Absent, Minimized or Managed (Arrhythmia/Dysrhythmia)  Outcome: Ongoing (interventions implemented as appropriate)   08/09/18 0517   Goal/Outcome Evaluation   Problems Assessed (Arrhythmia/Dysrhythmia) all   Problems Present (Dysrhythmia) none

## 2018-08-09 NOTE — PROGRESS NOTES
"    RE:  Anne-Marie Hayes  :  1945         Subjective: Patient feeling well this morning.  Heart rate is reasonably well controlled on telemetry.  Primarily 70s to 90s.    ROS: Pertinent positives and negatives listed above.  All other systems reviewed and negative.    Objective:   /63 (BP Location: Left arm, Patient Position: Sitting)   Pulse 110   Temp 97.1 °F (36.2 °C) (Temporal Artery )   Resp 18   Ht 170.2 cm (67.01\")   Wt 103 kg (227 lb)   SpO2 96%   BMI 35.54 kg/m²   Temp:  [97 °F (36.1 °C)-98.5 °F (36.9 °C)] 97.1 °F (36.2 °C)  Heart Rate:  [] 110  Resp:  [18-20] 18  BP: ()/(56-75) 110/63    Intake/Output Summary (Last 24 hours) at 18 0759  Last data filed at 18 0449   Gross per 24 hour   Intake              720 ml   Output             2700 ml   Net            -1980 ml       Current Facility-Administered Medications:   •  acetaminophen (TYLENOL) tablet 650 mg, 650 mg, Oral, Q4H PRN, Pamela Lopez, APRN  •  apixaban (ELIQUIS) tablet 5 mg, 5 mg, Oral, Q12H, Jovani Lovelace MD, 5 mg at 18  •  atorvastatin (LIPITOR) tablet 10 mg, 10 mg, Oral, Daily, Pamela Lopez, APRN, 10 mg at 18 0940  •  bumetanide (BUMEX) tablet 1 mg, 1 mg, Oral, Daily, Pamela Lopez, APRN, 1 mg at 18 0817  •  CloNIDine (CATAPRES) tablet 0.1 mg, 0.1 mg, Oral, BID, Pamela Lopez, APRN, 0.1 mg at 18  •  diltiaZEM (CARDIZEM) 125 mg in sodium chloride 0.9 % 125 mL (1 mg/mL) infusion, 5-15 mg/hr, Intravenous, Titrated, Rocky Hassan MD, Last Rate: 10 mL/hr at 18 1201, 10 mg/hr at 18 1201  •  diltiaZEM (CARDIZEM) tablet 120 mg, 120 mg, Oral, Q6H, Jovani Lovelace MD, 120 mg at 18 0610  •  labetalol (NORMODYNE) tablet 300 mg, 300 mg, Oral, Q12H, Pamela Lopez APRN, 300 mg at 18 210  •  losartan (COZAAR) tablet 50 mg, 50 mg, Oral, Q24H, Srinivasa Lopez MD  •  ondansetron (ZOFRAN) tablet 4 mg, 4 mg, Oral, Q6H PRN **OR** " ondansetron ODT (ZOFRAN-ODT) disintegrating tablet 4 mg, 4 mg, Oral, Q6H PRN **OR** ondansetron (ZOFRAN) injection 4 mg, 4 mg, Intravenous, Q6H PRN, Pamela Lopez APRN  •  potassium chloride (MICRO-K) CR capsule 10 mEq, 10 mEq, Oral, Daily, Pamela Lopez APRN, 10 mEq at 08/08/18 0815  •  sodium chloride 0.9 % flush 1-10 mL, 1-10 mL, Intravenous, PRN, Pamela Lopez, APRN    Physical Exam:   Gen: Alert and oriented x3, no acute distress  Heart: Irregularly irregular, no murmurs, rubs, or gallops  Chest: Lungs clear to auscultation bilaterally, normal respiratory effort  Abd: Soft, non tender, bowel sounds are positive  Ext: No clubbing, cyanosis, or edema      Lab Results (last 24 hours)     ** No results found for the last 24 hours. **        Imaging Results (last 24 hours)     ** No results found for the last 24 hours. **            Assessment:   1.  New onset atrial fibrillation: Heart rates reasonably well controlled  2.  Essential hypertension  3.  Hyperlipidemia  4.  Obesity: BMI 35.         Plan:   - Change to long acting diltiazem  - Continue Eliquis  - Continue other medical therapy  - Okay to discharge home  - We will discuss possible cardioversion as an outpatient  - Follow-up in cardiology clinic in one month

## 2018-08-09 NOTE — PAYOR COMM NOTE
"[facility]  DAHLIA  [phone]  OR  FAX  [phone]    REF: [id number]    Anne-Marie Alvarez (73 y.o. Female)     Date of Birth Social Security Number Address Home Phone MRN    1945  [address] [place], [place] [address] [address] [phone] [id number]    Mosque Marital Status          Other        Admission Date Admission Type Admitting Provider Attending Provider Department, Room/Bed    8/6/18 Emergency Clare Cervantes DO  [facility] 4B, 406/1    Discharge Date Discharge Disposition Discharge Destination        8/9/2018 Home or Self Care              Attending Provider:  (none)   Allergies:  No Known Allergies    Isolation:  None   Infection:  None   Code Status:  CPR    Ht:  170.2 cm (67.01\")   Wt:  103 kg (227 lb)    Admission Cmt:  None   Principal Problem:  None                Active Insurance as of 8/6/2018     Primary Coverage     Payor Plan Insurance Group Employer/Plan Group    [facility] MEDICARE REPLACEMENT [facility] [id number]     Payor Plan Address Payor Plan Phone Number Effective From Effective To    [address] [address]  1/1/2017     [facility] [id number]       Subscriber Name Subscriber Birth Date Member ID       ANNE-MARIE ALVAREZ 1945 [id number]                 Emergency Contacts      (Rel.) Home Phone Work Phone Mobile Phone    Koko Abad (Son) [phone] -- --    Fannie Ortega (Friend) [phone] -- --               Discharge Summary      Clare Cervantes DO at 8/9/2018  9:57 AM              [facility] Medicine Services  DISCHARGE SUMMARY       Date of Admission: 8/6/2018  Date of Discharge:  8/9/2018  Primary Care Physician: Ania Culver MD    Presenting Problem/History of Present Illness:  Atrial fibrillation, new onset (CMS/HCC) [I48.91]     Final Discharge Diagnoses:  1.  New onset atrial fibrillation with rapid ventricular response  2.  Essential hypertension  3. "  Hyperlipidemia  4.  Arthritis  5.  Pyuria without symptoms  6.  Elevated proBNP secondary to atrial fibrillation with rapid ventricular response.  No clinical signs to suggest heart failure    Consults:   1.  Dr. Loevlace, Cardiology     Procedures Performed: None    Pertinent Test Results:   Lab Results (last 48 hours)     Procedure Component Value Units Date/Time    Urine Culture - Urine, [[id number]]  (Normal) Collected:  08/06/18 1951    Specimen:  Urine from Urine, Clean Catch Updated:  08/08/18 0636     Urine Culture No growth at 2 days          Imaging Results (last 7 days)     Procedure Component Value Units Date/Time    XR Chest 2 View [[id number]] Collected:  08/1945     Updated:  08/06/18 1948    Narrative:       XR CHEST 2 VW- 8/6/2018 7:11 PM CDT     HISTORY: Atrial fibrillation and history of hypertension.       COMPARISON: None.     FINDINGS:   Upright frontal and lateral radiographs of the chest were obtained.     Granulomatous calcifications are seen. The lungs are otherwise clear.  The cardiomediastinal silhouette and pulmonary vascularity are within  normal limits. The osseous structures and surrounding soft tissues  demonstrate no acute abnormality.       Impression:       1. No radiographic evidence of acute cardiopulmonary process.        This report was finalized on 08/06/2018 19:45 by Dr. Dakotah Patel MD.        Lab Results (last 72 hours)     Procedure Component Value Units Date/Time    Urine Culture - Urine, [[id number]]  (Normal) Collected:  08/06/18 1951    Specimen:  Urine from Urine, Clean Catch Updated:  08/08/18 0636     Urine Culture No growth at 2 days    TSH [[id number]]  (Normal) Collected:  08/07/18 0658    Specimen:  Blood Updated:  08/07/18 0812     TSH 1.540 mIU/mL     Hemoglobin A1c [[id number]] Collected:  08/07/18 0658    Specimen:  Blood Updated:  08/07/18 0758     Hemoglobin A1C 5.4 %     Narrative:       Less than 6.0           Non-Diabetic Range  6.0-7.0                  ADA Therapeutic Target  Greater than 7.0        Action Suggested    Troponin [[id number]]  (Normal) Collected:  08/07/18 0658    Specimen:  Blood Updated:  08/07/18 0754     Troponin I <0.012 ng/mL     Lipid Panel [[id number]]  (Abnormal) Collected:  08/07/18 0658    Specimen:  Blood Updated:  08/07/18 0753     Total Cholesterol 150 mg/dL      Triglycerides 153 (H) mg/dL      HDL Cholesterol 51 mg/dL      LDL Cholesterol  71 mg/dL      LDL/HDL Ratio 1.34    Basic Metabolic Panel [[id number]]  (Abnormal) Collected:  08/07/18 0658    Specimen:  Blood Updated:  08/07/18 0743     Glucose 116 (H) mg/dL      BUN 11 mg/dL      Creatinine 0.73 mg/dL      Sodium 143 mmol/L      Potassium 3.6 mmol/L      Chloride 108 mmol/L      CO2 25.0 mmol/L      Calcium 10.3 mg/dL      eGFR Non African Amer 78 mL/min/1.73      BUN/Creatinine Ratio 15.1     Anion Gap 10.0 mmol/L     Narrative:       The MDRD GFR formula is only valid for adults with stable renal function between ages 18 and 70.    CBC (No Diff) [[id number]]  (Normal) Collected:  08/07/18 0658    Specimen:  Blood Updated:  08/07/18 0728     WBC 6.01 10*3/mm3      RBC 4.72 10*6/mm3      Hemoglobin 14.3 g/dL      Hematocrit 42.6 %      MCV 90.3 fL      MCH 30.3 pg      MCHC 33.6 g/dL      RDW 12.3 %      RDW-SD 40.2 fl      MPV 11.1 fL      Platelets 181 10*3/mm3     Troponin [[id number]]  (Normal) Collected:  08/06/18 2357    Specimen:  Blood Updated:  08/07/18 0033     Troponin I <0.012 ng/mL     Urinalysis, Microscopic Only - Urine, Clean Catch [[id number]]  (Abnormal) Collected:  08/06/18 1951    Specimen:  Urine from Urine, Clean Catch Updated:  08/06/18 2020     RBC, UA 0-2 (A) /HPF      WBC, UA 6-12 (A) /HPF      Bacteria, UA Trace (A) /HPF      Squamous Epithelial Cells, UA 3-6 (A) /HPF      Hyaline Casts, UA None Seen /LPF      Methodology Automated Microscopy    Urinalysis With Culture If Indicated - Urine, Clean Catch [[id number]]  (Abnormal) Collected:  08/06/18 1951     Specimen:  Urine from Urine, Clean Catch Updated:  08/06/18 2020     Color, UA Yellow     Appearance, UA Clear     pH, UA 6.5     Specific Gravity, UA <=1.005     Glucose, UA Negative     Ketones, UA Negative     Bilirubin, UA Negative     Blood, UA Negative     Protein, UA Negative     Leuk Esterase, UA Small (1+) (A)     Nitrite, UA Negative     Urobilinogen, UA 0.2 E.U./dL    [place] Draw [[id number]] Collected:  08/06/18 1827    Specimen:  Blood Updated:  08/06/18 1930    Narrative:       The following orders were created for panel order [place] Draw.  Procedure                               Abnormality         Status                     ---------                               -----------         ------                     Light Blue Top[[id number]]                                   Final result               Green Top (Gel)[[id number]]                                  Final result               Lavender Top[[id number]]                                     Final result               Red Top[[id number]]                                          Final result                 Please view results for these tests on the individual orders.    Light Blue Top [[id number]] Collected:  08/06/18 1827    Specimen:  Blood Updated:  08/06/18 1930     Extra Tube hold for add-on     Comment: Auto resulted       Green Top (Gel) [[id number]] Collected:  08/06/18 1827    Specimen:  Blood Updated:  08/06/18 1930     Extra Tube Hold for add-ons.     Comment: Auto resulted.       Lavender Top [[id number]] Collected:  08/06/18 1827    Specimen:  Blood Updated:  08/06/18 1930     Extra Tube hold for add-on     Comment: Auto resulted       Red Top [[id number]] Collected:  08/06/18 1827    Specimen:  Blood Updated:  08/06/18 1930     Extra Tube Hold for add-ons.     Comment: Auto resulted.       BNP [[id number]]  (Abnormal) Collected:  08/06/18 1827    Specimen:  Blood Updated:  08/06/18 1924     proBNP 1,250.0 (H) pg/mL     Comprehensive Metabolic  Panel [[id number]]  (Abnormal) Collected:  08/06/18 1827    Specimen:  Blood Updated:  08/06/18 1916     Glucose 113 (H) mg/dL      BUN 14 mg/dL      Creatinine 0.95 mg/dL      Sodium 139 mmol/L      Potassium 4.0 mmol/L      Chloride 103 mmol/L      CO2 25.0 mmol/L      Calcium 10.8 (H) mg/dL      Total Protein 7.1 g/dL      Albumin 4.60 g/dL      ALT (SGPT) 40 U/L      AST (SGOT) 29 U/L      Alkaline Phosphatase 102 U/L      Total Bilirubin 0.7 mg/dL      eGFR Non African Amer 58 (L) mL/min/1.73      Globulin 2.5 gm/dL      A/G Ratio 1.8 g/dL      BUN/Creatinine Ratio 14.7     Anion Gap 11.0 mmol/L     Narrative:       The MDRD GFR formula is only valid for adults with stable renal function between ages 18 and 70.    Magnesium [[id number]]  (Normal) Collected:  08/06/18 1827    Specimen:  Blood Updated:  08/06/18 1916     Magnesium 2.2 mg/dL     aPTT [[id number]]  (Normal) Collected:  08/06/18 1827    Specimen:  Blood Updated:  08/06/18 1912     PTT 26.9 seconds     Protime-INR [[id number]]  (Abnormal) Collected:  08/06/18 1827    Specimen:  Blood Updated:  08/06/18 1912     Protime 12.3 Seconds      INR 0.89 (L)    CBC & Differential [[id number]] Collected:  08/06/18 1827    Specimen:  Blood Updated:  08/06/18 1906    Narrative:       The following orders were created for panel order CBC & Differential.  Procedure                               Abnormality         Status                     ---------                               -----------         ------                     CBC Auto Differential[[id number]]        Abnormal            Final result                 Please view results for these tests on the individual orders.    CBC Auto Differential [[id number]]  (Abnormal) Collected:  08/06/18 1827    Specimen:  Blood Updated:  08/06/18 1906     WBC 9.00 10*3/mm3      RBC 4.86 10*6/mm3      Hemoglobin 14.6 g/dL      Hematocrit 43.7 %      MCV 89.9 fL      MCH 30.0 pg      MCHC 33.4 g/dL      RDW 12.3 %      RDW-SD 40.4  fl      MPV 11.4 fL      Platelets 226 10*3/mm3      Neutrophil % 61.0 %      Lymphocyte % 23.2 %      Monocyte % 10.4 %      Eosinophil % 4.1 (H) %      Basophil % 0.7 %      Immature Grans % 0.6 %      Neutrophils, Absolute 5.49 10*3/mm3      Lymphocytes, Absolute 2.09 10*3/mm3      Monocytes, Absolute 0.94 10*3/mm3      Eosinophils, Absolute 0.37 10*3/mm3      Basophils, Absolute 0.06 10*3/mm3      Immature Grans, Absolute 0.05 (H) 10*3/mm3      nRBC 0.0 /100 WBC     Troponin [[id number]]  (Normal) Collected:  08/06/18 1827    Specimen:  Blood Updated:  08/06/18 1857     Troponin I <0.012 ng/mL         Hospital Course:  The patient is a 73 y.o. female who presented to [facility] with palpitations.  The patient has a past medical history that is significant for arthritis, hyperlipidemia and hypertension.  She stated on admission that she had a remote history of arhythmia but nothing was ever found or done.  She had a negative heart catheterization with Dr. Euceda in 1987.  She denied any chest pain with her palpitations.  She has been having fluttering and heart pounding episodes for the last week or two.  Finally it woke her up at 6 AM and she decided to seek evaluation.  She was evaluated in the emergency department and found to be in atrial fibrillation with rapid ventricular response with a heart rate in the 150's.  She was given a bolus of Cardizem and started on a Cardizem drip.  Cardiology was consulted on admission.  She was initially placed on therapeutic Lovenox for anticoagulation.  During her hospitalization she was started on oral Cardizem and weaned off the drip.  Her blood pressure medications were changed due to the addition of Cardizem.  Norvasc and Valsartan were stopped.  Losartan was started in place of the above.  She remained on Clonidine and Labetalol.  2D echocardiogram was performed and showed a normal ejection fraction and no acute abnormalities.  She was transitioned to oral  "anticoagulation with Eliquis and the Lovenox was discontinued.  She is still in atrial fibrillation at a controlled rate.  She will be discharged home today in stable condition.  She will follow up with Dr. Ania Culver in one week and the Cardiology group in one month.  She will also be set-up with outpatient PFT's and a home sleep study.         Condition on Discharge:  Stable     Physical Exam on Discharge:  /63 (BP Location: Left arm, Patient Position: Sitting)   Pulse 110   Temp 97.1 °F (36.2 °C) (Temporal Artery )   Resp 18   Ht 170.2 cm (67.01\")   Wt 103 kg (227 lb)   SpO2 96%   BMI 35.54 kg/m²       Physical Exam   Constitutional: She is oriented to person, place, and time. She appears well-developed and well-nourished.   HENT:   Head: Normocephalic and atraumatic.   Eyes: Pupils are equal, round, and reactive to light. Conjunctivae and EOM are normal.   Neck: Neck supple. No JVD present. No thyromegaly present.   Cardiovascular: Normal heart sounds and intact distal pulses.  An irregular rhythm present. Tachycardia present.  Exam reveals no gallop and no friction rub.    No murmur heard.  Afib:    Pulmonary/Chest: Effort normal and breath sounds normal. No respiratory distress. She has no wheezes. She has no rales. She exhibits no tenderness.   Abdominal: Soft. Bowel sounds are normal. She exhibits no distension. There is no tenderness. There is no rebound and no guarding.   Musculoskeletal: Normal range of motion. She exhibits no edema, tenderness or deformity.   Lymphadenopathy:     She has no cervical adenopathy.   Neurological: She is alert and oriented to person, place, and time. She displays normal reflexes. No cranial nerve deficit. She exhibits normal muscle tone.   Skin: Skin is warm and dry. No rash noted.   Psychiatric: She has a normal mood and affect. Her behavior is normal. Judgment and thought content normal.   Nursing note and vitals reviewed.    Discharge " Disposition:  Home or Self Care    Discharge Medications:     Discharge Medications      New Medications      Instructions Start Date   apixaban 5 MG tablet tablet  Commonly known as:  ELIQUIS   5 mg, Oral, Every 12 Hours Scheduled      diltiaZEM  MG 24 hr capsule  Commonly known as:  CARDIZEM CD   480 mg, Oral, Daily      losartan 50 MG tablet  Commonly known as:  COZAAR   50 mg, Oral, Every 24 Hours Scheduled         Continue These Medications      Instructions Start Date   bumetanide 1 MG tablet  Commonly known as:  BUMEX   1 mg, Oral, Daily      cholecalciferol 1000 units tablet  Commonly known as:  VITAMIN D3   1,000 Units, Oral, 2 Times Daily      CloNIDine 0.1 MG tablet  Commonly known as:  CATAPRES   0.1 mg, Oral, 2 Times Daily      fish oil 1000 MG capsule capsule   1,000 mg, Oral, Daily With Breakfast      fluticasone 50 MCG/ACT nasal spray  Commonly known as:  FLONASE   2 sprays, Nasal, Daily PRN      Glucosamine 500 MG capsule   1 capsule, Oral, Daily      KLOR-CON 10 MEQ CR tablet  Generic drug:  potassium chloride   10 mEq, Oral, 2 Times Daily      labetalol 300 MG tablet  Commonly known as:  NORMODYNE   300 mg, Oral, Every 12 Hours Scheduled      simvastatin 20 MG tablet  Commonly known as:  ZOCOR   20 mg, Oral, Nightly         Stop These Medications    amLODIPine 10 MG tablet  Commonly known as:  NORVASC     aspirin 81 MG chewable tablet     irbesartan 300 MG tablet  Commonly known as:  AVAPRO     meloxicam 15 MG tablet  Commonly known as:  MOBIC          Discharge Diet:   Diet Instructions     Diet: Regular, Cardiac; Thin       Discharge Diet:   Regular  Cardiac       Fluid Consistency:  Thin        Activity at Discharge:   Activity Instructions     Activity as Tolerated           Follow-up Appointments:   1.  Follow up with the Heart group in one month.  2.  Follow up with Dr. Ania Culver in one week.   No future appointments.    Test Results Pending at Discharge: None    Mat Spencer,  APRN  08/09/18  9:57 AM    Patient seen and examined. Up in the bathroom getting ready. States that she is ready to go home. Questions were answered. OP follow up, has an appointment with Dr. Ania Culver next week. Agree with DC plan.     Time: 32 minutes           Electronically signed by Clare Cervantes DO at 8/9/2018 11:52 AM

## 2018-08-12 ENCOUNTER — HOSPITAL ENCOUNTER (OUTPATIENT)
Facility: HOSPITAL | Age: 73
Setting detail: OBSERVATION
Discharge: HOME | End: 2018-08-13
Attending: EMERGENCY MEDICINE | Admitting: EMERGENCY MEDICINE

## 2018-08-12 ENCOUNTER — APPOINTMENT (OUTPATIENT)
Dept: GENERAL RADIOLOGY | Facility: HOSPITAL | Age: 73
End: 2018-08-12

## 2018-08-12 DIAGNOSIS — I48.92 ATRIAL FLUTTER WITH RAPID VENTRICULAR RESPONSE (HCC): Primary | ICD-10-CM

## 2018-08-12 LAB
ALBUMIN SERPL-MCNC: 4.4 G/DL (ref 3.5–5)
ALBUMIN/GLOB SERPL: 1.9 G/DL (ref 1.1–2.5)
ALP SERPL-CCNC: 101 U/L (ref 24–120)
ALT SERPL W P-5'-P-CCNC: 57 U/L (ref 0–54)
ANION GAP SERPL CALCULATED.3IONS-SCNC: 10 MMOL/L (ref 4–13)
ANION GAP SERPL CALCULATED.3IONS-SCNC: 12 MMOL/L (ref 4–13)
AST SERPL-CCNC: 26 U/L (ref 7–45)
BASOPHILS # BLD AUTO: 0.03 10*3/MM3 (ref 0–0.2)
BASOPHILS NFR BLD AUTO: 0.4 % (ref 0–2)
BILIRUB SERPL-MCNC: 0.9 MG/DL (ref 0.1–1)
BUN BLD-MCNC: 14 MG/DL (ref 5–21)
BUN BLD-MCNC: 17 MG/DL (ref 5–21)
BUN/CREAT SERPL: 17.9 (ref 7–25)
BUN/CREAT SERPL: 21.5 (ref 7–25)
CALCIUM SPEC-SCNC: 10.5 MG/DL (ref 8.4–10.4)
CALCIUM SPEC-SCNC: 9.9 MG/DL (ref 8.4–10.4)
CHLORIDE SERPL-SCNC: 107 MMOL/L (ref 98–110)
CHLORIDE SERPL-SCNC: 110 MMOL/L (ref 98–110)
CO2 SERPL-SCNC: 22 MMOL/L (ref 24–31)
CO2 SERPL-SCNC: 23 MMOL/L (ref 24–31)
CREAT BLD-MCNC: 0.78 MG/DL (ref 0.5–1.4)
CREAT BLD-MCNC: 0.79 MG/DL (ref 0.5–1.4)
DEPRECATED RDW RBC AUTO: 40 FL (ref 40–54)
EOSINOPHIL # BLD AUTO: 0.27 10*3/MM3 (ref 0–0.7)
EOSINOPHIL NFR BLD AUTO: 3.4 % (ref 0–4)
ERYTHROCYTE [DISTWIDTH] IN BLOOD BY AUTOMATED COUNT: 12.3 % (ref 12–15)
GFR SERPL CREATININE-BSD FRML MDRD: 71 ML/MIN/1.73
GFR SERPL CREATININE-BSD FRML MDRD: 72 ML/MIN/1.73
GLOBULIN UR ELPH-MCNC: 2.3 GM/DL
GLUCOSE BLD-MCNC: 125 MG/DL (ref 70–100)
GLUCOSE BLD-MCNC: 143 MG/DL (ref 70–100)
HCT VFR BLD AUTO: 41.3 % (ref 37–47)
HGB BLD-MCNC: 14.3 G/DL (ref 12–16)
HOLD SPECIMEN: NORMAL
HOLD SPECIMEN: NORMAL
IMM GRANULOCYTES # BLD: 0.04 10*3/MM3 (ref 0–0.03)
IMM GRANULOCYTES NFR BLD: 0.5 % (ref 0–5)
INR PPP: 1.08 (ref 0.91–1.09)
LYMPHOCYTES # BLD AUTO: 1.59 10*3/MM3 (ref 0.72–4.86)
LYMPHOCYTES NFR BLD AUTO: 20.3 % (ref 15–45)
MCH RBC QN AUTO: 31 PG (ref 28–32)
MCHC RBC AUTO-ENTMCNC: 34.6 G/DL (ref 33–36)
MCV RBC AUTO: 89.4 FL (ref 82–98)
MONOCYTES # BLD AUTO: 0.74 10*3/MM3 (ref 0.19–1.3)
MONOCYTES NFR BLD AUTO: 9.5 % (ref 4–12)
NEUTROPHILS # BLD AUTO: 5.16 10*3/MM3 (ref 1.87–8.4)
NEUTROPHILS NFR BLD AUTO: 65.9 % (ref 39–78)
NRBC BLD MANUAL-RTO: 0 /100 WBC (ref 0–0)
PLATELET # BLD AUTO: 200 10*3/MM3 (ref 130–400)
PMV BLD AUTO: 11 FL (ref 6–12)
POTASSIUM BLD-SCNC: 4.1 MMOL/L (ref 3.5–5.3)
POTASSIUM BLD-SCNC: 4.1 MMOL/L (ref 3.5–5.3)
PROT SERPL-MCNC: 6.7 G/DL (ref 6.3–8.7)
PROTHROMBIN TIME: 14.3 SECONDS (ref 11.9–14.6)
RBC # BLD AUTO: 4.62 10*6/MM3 (ref 4.2–5.4)
SODIUM BLD-SCNC: 141 MMOL/L (ref 135–145)
SODIUM BLD-SCNC: 143 MMOL/L (ref 135–145)
TROPONIN I SERPL-MCNC: <0.012 NG/ML (ref 0–0.03)
TROPONIN I SERPL-MCNC: <0.012 NG/ML (ref 0–0.03)
WBC NRBC COR # BLD: 7.83 10*3/MM3 (ref 4.8–10.8)
WHOLE BLOOD HOLD SPECIMEN: NORMAL
WHOLE BLOOD HOLD SPECIMEN: NORMAL

## 2018-08-12 PROCEDURE — 36415 COLL VENOUS BLD VENIPUNCTURE: CPT | Performed by: EMERGENCY MEDICINE

## 2018-08-12 PROCEDURE — 96376 TX/PRO/DX INJ SAME DRUG ADON: CPT

## 2018-08-12 PROCEDURE — G0378 HOSPITAL OBSERVATION PER HR: HCPCS

## 2018-08-12 PROCEDURE — 99220 PR INITIAL OBSERVATION CARE/DAY 70 MINUTES: CPT | Performed by: INTERNAL MEDICINE

## 2018-08-12 PROCEDURE — 85610 PROTHROMBIN TIME: CPT | Performed by: PHYSICIAN ASSISTANT

## 2018-08-12 PROCEDURE — 93005 ELECTROCARDIOGRAM TRACING: CPT | Performed by: EMERGENCY MEDICINE

## 2018-08-12 PROCEDURE — 96366 THER/PROPH/DIAG IV INF ADDON: CPT

## 2018-08-12 PROCEDURE — 36415 COLL VENOUS BLD VENIPUNCTURE: CPT

## 2018-08-12 PROCEDURE — 84484 ASSAY OF TROPONIN QUANT: CPT | Performed by: EMERGENCY MEDICINE

## 2018-08-12 PROCEDURE — 93010 ELECTROCARDIOGRAM REPORT: CPT | Performed by: INTERNAL MEDICINE

## 2018-08-12 PROCEDURE — 71045 X-RAY EXAM CHEST 1 VIEW: CPT

## 2018-08-12 PROCEDURE — 96375 TX/PRO/DX INJ NEW DRUG ADDON: CPT

## 2018-08-12 PROCEDURE — 93005 ELECTROCARDIOGRAM TRACING: CPT

## 2018-08-12 PROCEDURE — 85025 COMPLETE CBC W/AUTO DIFF WBC: CPT | Performed by: EMERGENCY MEDICINE

## 2018-08-12 PROCEDURE — 80053 COMPREHEN METABOLIC PANEL: CPT | Performed by: EMERGENCY MEDICINE

## 2018-08-12 PROCEDURE — 96374 THER/PROPH/DIAG INJ IV PUSH: CPT

## 2018-08-12 PROCEDURE — 99285 EMERGENCY DEPT VISIT HI MDM: CPT

## 2018-08-12 PROCEDURE — 96365 THER/PROPH/DIAG IV INF INIT: CPT

## 2018-08-12 PROCEDURE — 25010000002 ADENOSINE PER 6 MG: Performed by: EMERGENCY MEDICINE

## 2018-08-12 RX ORDER — METOPROLOL TARTRATE 5 MG/5ML
5 INJECTION INTRAVENOUS ONCE
Status: COMPLETED | OUTPATIENT
Start: 2018-08-12 | End: 2018-08-12

## 2018-08-12 RX ORDER — DILTIAZEM HYDROCHLORIDE 5 MG/ML
15 INJECTION INTRAVENOUS ONCE
Status: COMPLETED | OUTPATIENT
Start: 2018-08-12 | End: 2018-08-12

## 2018-08-12 RX ORDER — CLONIDINE HYDROCHLORIDE 0.1 MG/1
0.1 TABLET ORAL EVERY 12 HOURS SCHEDULED
Status: DISCONTINUED | OUTPATIENT
Start: 2018-08-12 | End: 2018-08-13 | Stop reason: HOSPADM

## 2018-08-12 RX ORDER — LOSARTAN POTASSIUM 50 MG/1
50 TABLET ORAL
Status: DISCONTINUED | OUTPATIENT
Start: 2018-08-12 | End: 2018-08-13 | Stop reason: HOSPADM

## 2018-08-12 RX ORDER — METOPROLOL TARTRATE 50 MG/1
50 TABLET, FILM COATED ORAL ONCE
Status: COMPLETED | OUTPATIENT
Start: 2018-08-12 | End: 2018-08-12

## 2018-08-12 RX ORDER — SODIUM CHLORIDE 9 MG/ML
125 INJECTION, SOLUTION INTRAVENOUS CONTINUOUS
Status: DISCONTINUED | OUTPATIENT
Start: 2018-08-12 | End: 2018-08-13 | Stop reason: HOSPADM

## 2018-08-12 RX ORDER — LABETALOL 300 MG/1
300 TABLET, FILM COATED ORAL EVERY 12 HOURS SCHEDULED
Status: DISCONTINUED | OUTPATIENT
Start: 2018-08-12 | End: 2018-08-13 | Stop reason: HOSPADM

## 2018-08-12 RX ORDER — BUMETANIDE 1 MG/1
1 TABLET ORAL DAILY
Status: DISCONTINUED | OUTPATIENT
Start: 2018-08-12 | End: 2018-08-13 | Stop reason: HOSPADM

## 2018-08-12 RX ORDER — POTASSIUM CHLORIDE 750 MG/1
10 CAPSULE, EXTENDED RELEASE ORAL 2 TIMES DAILY
Status: DISCONTINUED | OUTPATIENT
Start: 2018-08-12 | End: 2018-08-13 | Stop reason: HOSPADM

## 2018-08-12 RX ORDER — DILTIAZEM HYDROCHLORIDE 5 MG/ML
0.25 INJECTION INTRAVENOUS ONCE
Status: COMPLETED | OUTPATIENT
Start: 2018-08-12 | End: 2018-08-12

## 2018-08-12 RX ORDER — ADENOSINE 3 MG/ML
12 INJECTION, SOLUTION INTRAVENOUS ONCE
Status: COMPLETED | OUTPATIENT
Start: 2018-08-12 | End: 2018-08-12

## 2018-08-12 RX ORDER — DILTIAZEM HYDROCHLORIDE 5 MG/ML
0.35 INJECTION INTRAVENOUS ONCE
Status: COMPLETED | OUTPATIENT
Start: 2018-08-12 | End: 2018-08-12

## 2018-08-12 RX ORDER — FLUTICASONE PROPIONATE 50 MCG
2 SPRAY, SUSPENSION (ML) NASAL DAILY PRN
Status: DISCONTINUED | OUTPATIENT
Start: 2018-08-12 | End: 2018-08-13 | Stop reason: HOSPADM

## 2018-08-12 RX ORDER — MELATONIN
1000 DAILY
Status: DISCONTINUED | OUTPATIENT
Start: 2018-08-12 | End: 2018-08-13 | Stop reason: HOSPADM

## 2018-08-12 RX ORDER — DILTIAZEM HYDROCHLORIDE 240 MG/1
480 CAPSULE, COATED, EXTENDED RELEASE ORAL DAILY
Status: DISCONTINUED | OUTPATIENT
Start: 2018-08-12 | End: 2018-08-13

## 2018-08-12 RX ORDER — ATORVASTATIN CALCIUM 10 MG/1
10 TABLET, FILM COATED ORAL DAILY
Status: DISCONTINUED | OUTPATIENT
Start: 2018-08-12 | End: 2018-08-13 | Stop reason: HOSPADM

## 2018-08-12 RX ORDER — METOPROLOL TARTRATE 50 MG/1
50 TABLET, FILM COATED ORAL EVERY 12 HOURS SCHEDULED
Status: DISCONTINUED | OUTPATIENT
Start: 2018-08-12 | End: 2018-08-13 | Stop reason: HOSPADM

## 2018-08-12 RX ORDER — SODIUM CHLORIDE 0.9 % (FLUSH) 0.9 %
10 SYRINGE (ML) INJECTION AS NEEDED
Status: DISCONTINUED | OUTPATIENT
Start: 2018-08-12 | End: 2018-08-13 | Stop reason: HOSPADM

## 2018-08-12 RX ADMIN — DILTIAZEM HYDROCHLORIDE 15 MG/HR: 5 INJECTION INTRAVENOUS at 17:33

## 2018-08-12 RX ADMIN — APIXABAN 5 MG: 5 TABLET, FILM COATED ORAL at 12:30

## 2018-08-12 RX ADMIN — LOSARTAN POTASSIUM 50 MG: 50 TABLET, FILM COATED ORAL at 12:30

## 2018-08-12 RX ADMIN — METOROPROLOL TARTRATE 5 MG: 5 INJECTION, SOLUTION INTRAVENOUS at 06:03

## 2018-08-12 RX ADMIN — POTASSIUM CHLORIDE 10 MEQ: 750 CAPSULE, EXTENDED RELEASE ORAL at 12:30

## 2018-08-12 RX ADMIN — BUMETANIDE 1 MG: 1 TABLET ORAL at 12:30

## 2018-08-12 RX ADMIN — LABETALOL HYDROCHLORIDE 300 MG: 300 TABLET, FILM COATED ORAL at 12:30

## 2018-08-12 RX ADMIN — ATORVASTATIN CALCIUM 10 MG: 10 TABLET, FILM COATED ORAL at 12:30

## 2018-08-12 RX ADMIN — POTASSIUM CHLORIDE 10 MEQ: 750 CAPSULE, EXTENDED RELEASE ORAL at 20:49

## 2018-08-12 RX ADMIN — SODIUM CHLORIDE 1000 ML: 9 INJECTION, SOLUTION INTRAVENOUS at 05:29

## 2018-08-12 RX ADMIN — DILTIAZEM HYDROCHLORIDE 480 MG: 240 CAPSULE, EXTENDED RELEASE ORAL at 12:30

## 2018-08-12 RX ADMIN — CLONIDINE HYDROCHLORIDE 0.1 MG: 0.1 TABLET ORAL at 20:49

## 2018-08-12 RX ADMIN — METOPROLOL TARTRATE 5 MG: 5 INJECTION, SOLUTION INTRAVENOUS at 06:07

## 2018-08-12 RX ADMIN — METOPROLOL TARTRATE 50 MG: 50 TABLET ORAL at 20:49

## 2018-08-12 RX ADMIN — LABETALOL HYDROCHLORIDE 300 MG: 300 TABLET, FILM COATED ORAL at 20:48

## 2018-08-12 RX ADMIN — CLONIDINE HYDROCHLORIDE 0.1 MG: 0.1 TABLET ORAL at 12:30

## 2018-08-12 RX ADMIN — DILTIAZEM HYDROCHLORIDE 15 MG: 5 INJECTION INTRAVENOUS at 16:59

## 2018-08-12 RX ADMIN — METOROPROLOL TARTRATE 5 MG: 5 INJECTION, SOLUTION INTRAVENOUS at 05:57

## 2018-08-12 RX ADMIN — ADENOSINE 12 MG: 3 INJECTION, SOLUTION INTRAVENOUS at 05:36

## 2018-08-12 RX ADMIN — METOPROLOL TARTRATE 50 MG: 50 TABLET ORAL at 09:48

## 2018-08-12 RX ADMIN — METOPROLOL TARTRATE 50 MG: 50 TABLET, FILM COATED ORAL at 06:13

## 2018-08-12 RX ADMIN — DILTIAZEM HYDROCHLORIDE 25.75 MG: 5 INJECTION INTRAVENOUS at 05:42

## 2018-08-12 RX ADMIN — CHOLECALCIFEROL (VITAMIN D3) 25 MCG (1,000 UNIT) TABLET 1000 UNITS: TABLET at 12:30

## 2018-08-12 RX ADMIN — APIXABAN 5 MG: 5 TABLET, FILM COATED ORAL at 20:48

## 2018-08-12 RX ADMIN — DILTIAZEM HYDROCHLORIDE 36.05 MG: 5 INJECTION INTRAVENOUS at 05:49

## 2018-08-12 RX ADMIN — ADENOSINE 12 MG: 3 INJECTION, SOLUTION INTRAVENOUS at 05:33

## 2018-08-12 NOTE — PLAN OF CARE
Problem: Patient Care Overview  Goal: Plan of Care Review  Outcome: Ongoing (interventions implemented as appropriate)   08/12/18 8382   Coping/Psychosocial   Plan of Care Reviewed With patient   Plan of Care Review   Progress no change   OTHER   Outcome Summary Pt admit from ER with a flutter RVR. HR in 140. ROBEL and cardioveresion in am. cont to monitor.      Goal: Individualization and Mutuality  Outcome: Ongoing (interventions implemented as appropriate)    Goal: Discharge Needs Assessment  Outcome: Ongoing (interventions implemented as appropriate)    Goal: Interprofessional Rounds/Family Conf  Outcome: Ongoing (interventions implemented as appropriate)      Problem: Arrhythmia/Dysrhythmia (Symptomatic) (Adult)  Goal: Signs and Symptoms of Listed Potential Problems Will be Absent, Minimized or Managed (Arrhythmia/Dysrhythmia)  Outcome: Ongoing (interventions implemented as appropriate)

## 2018-08-12 NOTE — ED PROVIDER NOTES
Subjective   History of Present Illness     73-year-old female complaining about palpitations since early in the morning.    The patient states that she was at home when she began to feel a feeling that her heart was beating abnormally.  Patient denies chest pain, shortness of breath, nausea, vomiting.    Last week the patient was diagnosed with new-onset atrial fibrillatoin.  The patient was admitted to the hospital and had a cardiology consultation by Dr. Laird.    The patient takes Cardizem at home.  And is also on anticoagulation.      Review of Systems   Constitutional: Negative.  Negative for diaphoresis, fatigue and fever.   Eyes: Negative for visual disturbance.   Respiratory: Negative for apnea, cough, choking, chest tightness, shortness of breath, wheezing and stridor.    Cardiovascular: Positive for palpitations. Negative for chest pain and leg swelling.   Gastrointestinal: Negative for abdominal distention, abdominal pain, constipation, diarrhea, nausea and vomiting.   Genitourinary: Negative for flank pain.   Musculoskeletal: Negative for arthralgias, back pain, gait problem, joint swelling, myalgias, neck pain and neck stiffness.   Skin: Negative for rash.   Neurological: Negative.  Negative for dizziness, tremors, seizures, syncope, facial asymmetry, speech difficulty, weakness, light-headedness, numbness and headaches.   Psychiatric/Behavioral: Negative for confusion.       Past Medical History:   Diagnosis Date   • Arthritis    • Hyperlipidemia    • Hypertension        No Known Allergies    Past Surgical History:   Procedure Laterality Date   • CARDIAC CATHETERIZATION  1987   • TUBAL ABDOMINAL LIGATION         Family History   Problem Relation Age of Onset   • Atrial fibrillation Mother    • Heart disease Father    • Breast cancer Neg Hx    • Ovarian cancer Neg Hx    • Uterine cancer Neg Hx    • Colon cancer Neg Hx    • Melanoma Neg Hx        Social History     Social History   • Marital status:       Social History Main Topics   • Smoking status: Former Smoker   • Smokeless tobacco: Never Used   • Alcohol use 4.2 oz/week     7 Glasses of wine per week   • Drug use: No   • Sexual activity: Defer     Other Topics Concern   • Not on file           Objective   Physical Exam   Constitutional: She is oriented to person, place, and time. She appears well-developed and well-nourished.   Eyes: Pupils are equal, round, and reactive to light. EOM are normal.   Neck: Normal range of motion. No JVD present. No tracheal deviation present.   Cardiovascular: S1 normal and S2 normal.  PMI is not displaced.  Exam reveals no gallop, no distant heart sounds and no friction rub.    No murmur heard.   No systolic murmur is present    No diastolic murmur is present   Pulses:       Carotid pulses are 2+ on the right side, and 2+ on the left side.       Radial pulses are 2+ on the right side, and 2+ on the left side.        Femoral pulses are 2+ on the right side, and 2+ on the left side.       Popliteal pulses are 2+ on the right side, and 2+ on the left side.        Dorsalis pedis pulses are 2+ on the right side, and 2+ on the left side.        Posterior tibial pulses are 2+ on the right side, and 2+ on the left side.   Pulmonary/Chest: Effort normal and breath sounds normal. No respiratory distress. She has no wheezes. She has no rales.   Abdominal: Soft. She exhibits no distension and no mass. There is no tenderness. There is no guarding.   Musculoskeletal: Normal range of motion. She exhibits no edema.   Neurological: She is alert and oriented to person, place, and time. She displays normal reflexes. No cranial nerve deficit or sensory deficit. She exhibits normal muscle tone. Coordination normal.   Skin: No rash noted. She is not diaphoretic.   Psychiatric: She has a normal mood and affect. Her behavior is normal. Thought content normal.   Nursing note and vitals reviewed.      ECG 12 Lead    Date/Time: 8/12/2018 6:52  AM  Performed by: RICH CROWLEY  Authorized by: RICH CROWLEY   Comments: Atrial flutter at a circular rate of 151, with 3-1 conduction, no evidence of injury    ECG 12 Lead    Date/Time: 8/12/2018 6:54 AM  Performed by: RICH CROWLEY  Authorized by: RICH CROWLEY   Comments: Atrial flutter, ventricular rate 149, with possible 3-1 conduction no evidence of acute injury    ECG 12 Lead    Date/Time: 8/12/2018 6:55 AM  Performed by: RICH CROWLEY  Authorized by: RICH CROWLEY   Comments: Atrial flutter, ventricular rate 146, no evidence of acute injury    ECG 12 Lead    Date/Time: 8/12/2018 6:55 AM  Performed by: RICH CROWLEY  Authorized by: RICH CROWLEY   Comments: No flutter, sugar rate 122, normal axis, no hypertrophy, variable AV block present no evidence of acute injury    ECG 12 Lead    Date/Time: 8/12/2018 6:58 AM  Performed by: RICH CROWLEY  Authorized by: RICH CROWLEY   Comments: Atrial flutter, rate 119, normal axis, no hypertrophy, variable AV block present no evidence of acute injury    ECG 12 Lead    Date/Time: 8/12/2018 6:59 AM  Performed by: RICH CROWLEY  Authorized by: RICH CROWLEY   Comments: Atrial flutter with variable AV block, ventricular rate 88, normal axis, no hypertrophy, no evidence of acute injury                 ED Course      Labs Reviewed   COMPREHENSIVE METABOLIC PANEL - Abnormal; Notable for the following:        Result Value    Glucose 143 (*)     CO2 22.0 (*)     Calcium 10.5 (*)     ALT (SGPT) 57 (*)     All other components within normal limits    Narrative:     The MDRD GFR formula is only valid for adults with stable renal function between ages 18 and 70.   CBC WITH AUTO DIFFERENTIAL - Abnormal; Notable for the following:     Immature Grans, Absolute 0.04 (*)     All other components within normal limits   TROPONIN (IN-HOUSE) - Normal   RAINBOW DRAW    Narrative:      The following orders were created for panel order [place] Draw.  Procedure                               Abnormality         Status                     ---------                               -----------         ------                     Light Blue Top[[id number]]                                   Final result               Green Top (Gel)[[id number]]                                  Final result               Lavender Top[[id number]]                                     Final result               Red Top[[id number]]                                          Final result                 Please view results for these tests on the individual orders.   TROPONIN (IN-HOUSE)   CBC AND DIFFERENTIAL    Narrative:     The following orders were created for panel order CBC & Differential.  Procedure                               Abnormality         Status                     ---------                               -----------         ------                     CBC Auto Differential[[id number]]        Abnormal            Final result                 Please view results for these tests on the individual orders.   LIGHT BLUE TOP   GREEN TOP   LAVENDER TOP   RED TOP     In the ER, the patient was given adenosine ×2, 2 Cardizem boluses, and Lopressor 5 mg IV push ×3.  The various points the patient's rate began to decline however each time it increased again.    I discussed case with Dr. nowak of cardiology service who recommends admission.  He recommends against giving any further medication at this point.  The patient is stable, denies chest pain, denies shortness of breath.  She has a stable blood pressure.            [facility]      Final diagnoses:   Atrial flutter with rapid ventricular response (CMS/HCC)            Rishi Read MD  08/12/18 0705

## 2018-08-12 NOTE — H&P
[facility], [facility] History and Physical      Patient Care Team:  Ania Culver MD as PCP - General  Ania Culver MD as PCP - Family Medicine    Chief complaint rapid heart rate    Subjective     Patient is a 73 y.o. female who was recently inpatient with new onset AF.  Anticoagulation was initiated.  Potential plans for CV were made.  She was discharged on 8/9.  She presents again to ER after feeling her heart racing.  She has some SOB and just overall doesn't feel good.  She denies CP or syncope.    Review of Systems  A 10-point review of systems is obtained and negative except for otherwise mentioned above.    History  Past Medical History:   Diagnosis Date   • Arthritis    • Hyperlipidemia    • Hypertension      Past Surgical History:   Procedure Laterality Date   • CARDIAC CATHETERIZATION  1987   • TUBAL ABDOMINAL LIGATION       Family History   Problem Relation Age of Onset   • Atrial fibrillation Mother    • Heart disease Father    • Breast cancer Neg Hx    • Ovarian cancer Neg Hx    • Uterine cancer Neg Hx    • Colon cancer Neg Hx    • Melanoma Neg Hx      Social History   Substance Use Topics   • Smoking status: Former Smoker   • Smokeless tobacco: Never Used   • Alcohol use 4.2 oz/week     7 Glasses of wine per week     Prescriptions Prior to Admission   Medication Sig Dispense Refill Last Dose   • apixaban (ELIQUIS) 5 MG tablet tablet Take 1 tablet by mouth Every 12 (Twelve) Hours. 60 tablet 0    • bumetanide (BUMEX) 1 MG tablet Take 1 mg by mouth Daily.   8/6/2018 at 0830   • cholecalciferol (VITAMIN D3) 1000 units tablet Take 1,000 Units by mouth 2 (Two) Times a Day.   8/5/2018 at Unknown time   • CloNIDine (CATAPRES) 0.1 MG tablet Take 0.1 mg by mouth 2 (Two) Times a Day.   8/6/2018 at 0830   • diltiaZEM CD (CARDIZEM CD) 240 MG 24 hr capsule Take 2 capsules by mouth Daily. 30 capsule 0    • fluticasone (FLONASE) 50 MCG/ACT nasal spray 2 sprays into the  nostril(s) as directed by provider Daily As Needed for Rhinitis or Allergies.   Past Week at Unknown time   • Glucosamine 500 MG capsule Take 1 capsule by mouth Daily.   8/5/2018 at Unknown time   • KLOR-CON 10 MEQ CR tablet Take 10 mEq by mouth 2 (Two) Times a Day.   8/6/2018 at 0830   • labetalol (NORMODYNE) 300 MG tablet Take 300 mg by mouth Every 12 (Twelve) Hours.   8/6/2018 at 0830   • losartan (COZAAR) 50 MG tablet Take 1 tablet by mouth Daily. 30 tablet 0    • Omega-3 Fatty Acids (FISH OIL) 1000 MG capsule capsule Take 1,000 mg by mouth Daily With Breakfast.   8/6/2018 at 0830   • simvastatin (ZOCOR) 20 MG tablet Take 20 mg by mouth Every Night.   8/5/2018 at Unknown time     Allergies:  Patient has no known allergies.    Objective     Vital Signs  Temp:  [96.6 °F (35.9 °C)-98.2 °F (36.8 °C)] 96.6 °F (35.9 °C)  Heart Rate:  [] 150  Resp:  [16-18] 16  BP: (104-147)/(71-97) 118/86    Physical Exam:     General Appearance:    Alert, cooperative, in no acute distress   Head:    Normocephalic, without obvious abnormality, atraumatic   Eyes:            Lids and lashes normal, conjunctivae and sclerae normal, no   icterus, PERRLA, EOMI   Throat:   Oral mucosa pink and moist   Neck:   No adenopathy, supple, trachea midline, no thyromegaly, no   carotid bruit, no JVD   Lungs:     Clear to auscultation bilaterally,respirations regular, even     and unlabored    Heart:    Iregular rhythm and normal rate, normal S1 and S2, no            murmur, no gallop, no rub, no click   Chest Wall:    No abnormalities observed   Abdomen:     Normal bowel sounds present in all four quadrants, no       masses, no organomegaly, soft non-tender, non-distended    Extremities:   No edema, no cyanosis, no clubbing   Pulses:   Pulses palpable and equal bilaterally   Skin:   No bleeding, bruising or rash   Psychiatric:   Displays appropriate mood and affect     Results Review:    I reviewed the patient's new clinical  results.      Results from last 7 days  Lab Units 08/12/18  0524   WBC 10*3/mm3 7.83   HEMOGLOBIN g/dL 14.3   HEMATOCRIT % 41.3   PLATELETS 10*3/mm3 200       Results from last 7 days  Lab Units 08/12/18  0524   SODIUM mmol/L 141   POTASSIUM mmol/L 4.1   CHLORIDE mmol/L 107   CO2 mmol/L 22.0*   BUN mg/dL 17   CREATININE mg/dL 0.79   GLUCOSE mg/dL 143*   CALCIUM mg/dL 10.5*       Results from last 7 days  Lab Units 08/12/18  0524   SODIUM mmol/L 141   POTASSIUM mmol/L 4.1   CHLORIDE mmol/L 107   CO2 mmol/L 22.0*   BUN mg/dL 17   CREATININE mg/dL 0.79   CALCIUM mg/dL 10.5*   BILIRUBIN mg/dL 0.9   ALK PHOS U/L 101   ALT (SGPT) U/L 57*   AST (SGOT) U/L 26   GLUCOSE mg/dL 143*       Results from last 7 days  Lab Units 08/12/18  0915 08/12/18  0524 08/07/18  0658   TROPONIN I ng/mL <0.012 <0.012 <0.012       Results from last 7 days  Lab Units 08/07/18  0658   TSH mIU/mL 1.540       Results from last 7 days  Lab Units 08/07/18  0658   CHOLESTEROL mg/dL 150   TRIGLYCERIDES mg/dL 153*   HDL CHOL mg/dL 51   LDL CHOL mg/dL 71       Assessment/Plan     Active Problems:    Atrial flutter with rapid ventricular response (CMS/HCC)  HTN  HL    Continue eliquis.  Plan for ROBEL guided cardioversion tomorrow morning.  Hold NPO after midnight.  Continue home meds.    I discussed the patients findings and my recommendations with patient, family and primary care team.     Barbie Keene PA-C  08/12/18  10:17 AM

## 2018-08-13 ENCOUNTER — APPOINTMENT (OUTPATIENT)
Dept: CARDIOLOGY | Facility: HOSPITAL | Age: 73
End: 2018-08-13

## 2018-08-13 ENCOUNTER — TELEPHONE (OUTPATIENT)
Dept: INTERNAL MEDICINE | Age: 73
End: 2018-08-13

## 2018-08-13 ENCOUNTER — APPOINTMENT (OUTPATIENT)
Dept: PULMONOLOGY | Facility: HOSPITAL | Age: 73
End: 2018-08-13

## 2018-08-13 VITALS
BODY MASS INDEX: 35.69 KG/M2 | TEMPERATURE: 97.9 F | HEIGHT: 66 IN | WEIGHT: 222.1 LBS | RESPIRATION RATE: 15 BRPM | HEART RATE: 70 BPM | OXYGEN SATURATION: 98 % | DIASTOLIC BLOOD PRESSURE: 71 MMHG | SYSTOLIC BLOOD PRESSURE: 104 MMHG

## 2018-08-13 LAB
BH CV ECHO MEAS - BSA(HAYCOCK): 2.2 M^2
BH CV ECHO MEAS - BSA: 2 M^2
BH CV ECHO MEAS - BZI_BMI: 38.1 KILOGRAMS/M^2
BH CV ECHO MEAS - BZI_METRIC_HEIGHT: 162.6 CM
BH CV ECHO MEAS - BZI_METRIC_WEIGHT: 100.7 KG

## 2018-08-13 PROCEDURE — 93005 ELECTROCARDIOGRAM TRACING: CPT | Performed by: INTERNAL MEDICINE

## 2018-08-13 PROCEDURE — 93325 DOPPLER ECHO COLOR FLOW MAPG: CPT | Performed by: INTERNAL MEDICINE

## 2018-08-13 PROCEDURE — 93010 ELECTROCARDIOGRAM REPORT: CPT | Performed by: INTERNAL MEDICINE

## 2018-08-13 PROCEDURE — 93321 DOPPLER ECHO F-UP/LMTD STD: CPT

## 2018-08-13 PROCEDURE — 99152 MOD SED SAME PHYS/QHP 5/>YRS: CPT

## 2018-08-13 PROCEDURE — G0378 HOSPITAL OBSERVATION PER HR: HCPCS

## 2018-08-13 PROCEDURE — 93005 ELECTROCARDIOGRAM TRACING: CPT | Performed by: PHYSICIAN ASSISTANT

## 2018-08-13 PROCEDURE — 93314 ECHO TRANSESOPHAGEAL: CPT | Performed by: INTERNAL MEDICINE

## 2018-08-13 PROCEDURE — 93321 DOPPLER ECHO F-UP/LMTD STD: CPT | Performed by: INTERNAL MEDICINE

## 2018-08-13 PROCEDURE — 93325 DOPPLER ECHO COLOR FLOW MAPG: CPT

## 2018-08-13 PROCEDURE — 25010000002 FENTANYL CITRATE (PF) 100 MCG/2ML SOLUTION: Performed by: INTERNAL MEDICINE

## 2018-08-13 PROCEDURE — 25010000002 MIDAZOLAM PER 1 MG: Performed by: INTERNAL MEDICINE

## 2018-08-13 PROCEDURE — 92960 CARDIOVERSION ELECTRIC EXT: CPT

## 2018-08-13 PROCEDURE — 93312 ECHO TRANSESOPHAGEAL: CPT

## 2018-08-13 PROCEDURE — 92960 CARDIOVERSION ELECTRIC EXT: CPT | Performed by: INTERNAL MEDICINE

## 2018-08-13 RX ORDER — MIDAZOLAM HYDROCHLORIDE 1 MG/ML
INJECTION INTRAMUSCULAR; INTRAVENOUS
Status: COMPLETED | OUTPATIENT
Start: 2018-08-13 | End: 2018-08-13

## 2018-08-13 RX ORDER — DILTIAZEM HYDROCHLORIDE 360 MG/1
360 CAPSULE, EXTENDED RELEASE ORAL DAILY
Qty: 30 CAPSULE | Refills: 11 | Status: SHIPPED | OUTPATIENT
Start: 2018-08-13 | End: 2020-07-20 | Stop reason: SDUPTHER

## 2018-08-13 RX ORDER — FENTANYL CITRATE 50 UG/ML
INJECTION, SOLUTION INTRAMUSCULAR; INTRAVENOUS
Status: COMPLETED | OUTPATIENT
Start: 2018-08-13 | End: 2018-08-13

## 2018-08-13 RX ORDER — DILTIAZEM HYDROCHLORIDE 180 MG/1
360 CAPSULE, COATED, EXTENDED RELEASE ORAL DAILY
Status: DISCONTINUED | OUTPATIENT
Start: 2018-08-14 | End: 2018-08-13 | Stop reason: HOSPADM

## 2018-08-13 RX ORDER — FENTANYL CITRATE 50 UG/ML
INJECTION, SOLUTION INTRAMUSCULAR; INTRAVENOUS
Status: DISCONTINUED
Start: 2018-08-13 | End: 2018-08-13 | Stop reason: HOSPADM

## 2018-08-13 RX ORDER — MIDAZOLAM HYDROCHLORIDE 1 MG/ML
INJECTION INTRAMUSCULAR; INTRAVENOUS
Status: DISCONTINUED
Start: 2018-08-13 | End: 2018-08-13 | Stop reason: HOSPADM

## 2018-08-13 RX ADMIN — BENZOCAINE, BUTAMBEN, AND TETRACAINE HYDROCHLORIDE 1 SPRAY: .028; .004; .004 AEROSOL, SPRAY TOPICAL at 11:39

## 2018-08-13 RX ADMIN — ATORVASTATIN CALCIUM 10 MG: 10 TABLET, FILM COATED ORAL at 08:27

## 2018-08-13 RX ADMIN — LABETALOL HYDROCHLORIDE 300 MG: 300 TABLET, FILM COATED ORAL at 08:26

## 2018-08-13 RX ADMIN — MIDAZOLAM 1 MG: 1 INJECTION INTRAMUSCULAR; INTRAVENOUS at 11:46

## 2018-08-13 RX ADMIN — FENTANYL CITRATE 25 MCG: 50 INJECTION, SOLUTION INTRAMUSCULAR; INTRAVENOUS at 11:46

## 2018-08-13 RX ADMIN — FENTANYL CITRATE 25 MCG: 50 INJECTION, SOLUTION INTRAMUSCULAR; INTRAVENOUS at 12:02

## 2018-08-13 RX ADMIN — POTASSIUM CHLORIDE 10 MEQ: 750 CAPSULE, EXTENDED RELEASE ORAL at 08:26

## 2018-08-13 RX ADMIN — MIDAZOLAM 1 MG: 1 INJECTION INTRAMUSCULAR; INTRAVENOUS at 11:55

## 2018-08-13 RX ADMIN — DILTIAZEM HYDROCHLORIDE 480 MG: 240 CAPSULE, EXTENDED RELEASE ORAL at 08:26

## 2018-08-13 RX ADMIN — FENTANYL CITRATE 50 MCG: 50 INJECTION, SOLUTION INTRAMUSCULAR; INTRAVENOUS at 11:44

## 2018-08-13 RX ADMIN — MIDAZOLAM 2 MG: 1 INJECTION INTRAMUSCULAR; INTRAVENOUS at 12:01

## 2018-08-13 RX ADMIN — CHOLECALCIFEROL (VITAMIN D3) 25 MCG (1,000 UNIT) TABLET 1000 UNITS: TABLET at 08:26

## 2018-08-13 RX ADMIN — CLONIDINE HYDROCHLORIDE 0.1 MG: 0.1 TABLET ORAL at 08:27

## 2018-08-13 RX ADMIN — FENTANYL CITRATE 25 MCG: 50 INJECTION, SOLUTION INTRAMUSCULAR; INTRAVENOUS at 11:55

## 2018-08-13 RX ADMIN — APIXABAN 5 MG: 5 TABLET, FILM COATED ORAL at 08:26

## 2018-08-13 RX ADMIN — METOPROLOL TARTRATE 50 MG: 50 TABLET ORAL at 08:26

## 2018-08-13 RX ADMIN — MIDAZOLAM 2 MG: 1 INJECTION INTRAMUSCULAR; INTRAVENOUS at 11:43

## 2018-08-13 RX ADMIN — LOSARTAN POTASSIUM 50 MG: 50 TABLET, FILM COATED ORAL at 08:26

## 2018-08-13 RX ADMIN — BUMETANIDE 1 MG: 1 TABLET ORAL at 08:26

## 2018-08-13 NOTE — PLAN OF CARE
Problem: Patient Care Overview  Goal: Plan of Care Review  Outcome: Ongoing (interventions implemented as appropriate)   08/13/18 0459   Coping/Psychosocial   Plan of Care Reviewed With patient   Plan of Care Review   Progress improving   OTHER   Outcome Summary Pt weaned off Cardizem at 2300. BP low. Frequent pauses from 2.06-4.64. Remains asymptomatic. No c/o pain throughout shift. NPO for cardioversion and ROBEL today. A flutter  on tele. Up to 160 bpm, down to 48 bpm. Pt has been resting throughout the night. Will cont to monitor.      Goal: Individualization and Mutuality  Outcome: Ongoing (interventions implemented as appropriate)    Goal: Discharge Needs Assessment  Outcome: Ongoing (interventions implemented as appropriate)    Goal: Interprofessional Rounds/Family Conf  Outcome: Ongoing (interventions implemented as appropriate)      Problem: Arrhythmia/Dysrhythmia (Symptomatic) (Adult)  Goal: Signs and Symptoms of Listed Potential Problems Will be Absent, Minimized or Managed (Arrhythmia/Dysrhythmia)  Outcome: Ongoing (interventions implemented as appropriate)

## 2018-08-13 NOTE — TELEPHONE ENCOUNTER
[facility] Transitions Initial Follow Up Call    Outreach made within 2 business days of discharge: Yes    Patient: Pita Police Patient : 1945   MRN: [id number]   Reason for Admission:   New onset A-fib    Admission Date: 18  Discharge Date: 18       Spoke with: unable to reach patient    Discharge department/facility: Sonal Hyatt to reach patient, second attempt. I left a voicemail for patient to return my call.   Pt has hfu on 18    Scheduled appointment with PCP within 7-14 days    Follow Up  Future Appointments  Date Time Provider Gabriel Marrufo   2018 1:15 PM [facility]   2018 8:00 AM [facility] CT RM 1 [facility] Rad        Baltazar Melara LPN

## 2018-08-13 NOTE — DISCHARGE SUMMARY
Date of admission: 8/12/2018    Date of discharge: 8/13/2018    Clinical service: Cardiology    Attending physician: Jovani Lovelace    Final discharge diagnosis:  1. Atrial fibrillation/flutter with rapid ventricular response  2. Essential hypertension  3. Hyperlipidemia  4. Obesity    Past medical history:  1. Atrial fibrillation  2. Essential hypertension  3. Hyperlipidemia    Procedures:   - ROBEL (8/13/2018) Normal left ventricular systolic function, mild MR, mild to moderate TR, no evidence of left atrial appendage thrombus.  - Cardioversion (8/13/2018) Successful cardioversion from atrial fibrillation to sinus rhythm    Reason for admission:  Patent presented back the emergency room on 8/12/2018 with atrial flutter with rapid ventricular response after recent discharge with atrial fibrillation on 8/9/2018.  She was admitted to the cardiology service for further evaluation and management.     Hospital course:  Patient was initially in atrial flutter with 2:1 AV conduction with heart rates approximately 150 bpm.  She was given IV metoprolol and started on a metoprolol gtt to attempt to control her heart rates.  She went back into atrial fibrillation but was still symptomatic.  The decision was made to pursue ROBEL with cardioversion.  She underwent ROBEL on 8/12/2018 which did not demonstrate any evidence of thrombus in the left atrium or left atrial appendage.  She then underwent successful cardioversion back to sinus rhythm.  She tolerate the procedure without difficulty.  She was felt appropriate for discharge home after post-sedation monitoring.       Discharge instructions:  1.  Activity: Gradually resume regular activity.     2.  Diet: Cardiac    3.  Follow-up: Dr Lovelace as scheduled on 10/1/2018    4.  Discharge medications:     Discharge Medications      Changes to Medications      Instructions Start Date   diltiaZEM  MG 24 hr capsule  Commonly known as:  CARDIZEM CD  What changed:  · medication strength  · how  much to take   360 mg, Oral, Daily         Continue These Medications      Instructions Start Date   apixaban 5 MG tablet tablet  Commonly known as:  ELIQUIS   5 mg, Oral, Every 12 Hours Scheduled      bumetanide 1 MG tablet  Commonly known as:  BUMEX   1 mg, Oral, Daily      cholecalciferol 1000 units tablet  Commonly known as:  VITAMIN D3   1,000 Units, Oral, 2 Times Daily      CloNIDine 0.1 MG tablet  Commonly known as:  CATAPRES   0.1 mg, Oral, 2 Times Daily      fish oil 1000 MG capsule capsule   1,000 mg, Oral, Daily With Breakfast      fluticasone 50 MCG/ACT nasal spray  Commonly known as:  FLONASE   2 sprays, Nasal, Daily PRN      Glucosamine 500 MG capsule   1 capsule, Oral, Daily      KLOR-CON 10 MEQ CR tablet  Generic drug:  potassium chloride   10 mEq, Oral, 2 Times Daily      labetalol 300 MG tablet  Commonly known as:  NORMODYNE   300 mg, Oral, Every 12 Hours Scheduled      losartan 50 MG tablet  Commonly known as:  COZAAR   50 mg, Oral, Every 24 Hours Scheduled      simvastatin 20 MG tablet  Commonly known as:  ZOCOR   20 mg, Oral, Nightly

## 2018-08-14 ENCOUNTER — TELEPHONE (OUTPATIENT)
Dept: INTERNAL MEDICINE | Age: 73
End: 2018-08-14

## 2018-08-14 DIAGNOSIS — I48.91 ATRIAL FIBRILLATION, UNSPECIFIED TYPE (HCC): Primary | ICD-10-CM

## 2018-08-16 ENCOUNTER — HOSPITAL ENCOUNTER (OUTPATIENT)
Dept: PULMONOLOGY | Facility: HOSPITAL | Age: 73
Discharge: HOME | End: 2018-08-16
Admitting: NURSE PRACTITIONER

## 2018-08-16 DIAGNOSIS — R06.00 DYSPNEA, UNSPECIFIED TYPE: ICD-10-CM

## 2018-08-16 DIAGNOSIS — Z72.0 TOBACCO ABUSE: ICD-10-CM

## 2018-08-16 PROCEDURE — 94060 EVALUATION OF WHEEZING: CPT

## 2018-08-16 PROCEDURE — 94729 DIFFUSING CAPACITY: CPT

## 2018-08-16 PROCEDURE — 94726 PLETHYSMOGRAPHY LUNG VOLUMES: CPT

## 2018-08-16 RX ORDER — ALBUTEROL SULFATE 2.5 MG/3ML
2.5 SOLUTION RESPIRATORY (INHALATION) ONCE
Status: COMPLETED | OUTPATIENT
Start: 2018-08-16 | End: 2018-08-16

## 2018-08-16 RX ADMIN — ALBUTEROL SULFATE 2.5 MG: 2.5 SOLUTION RESPIRATORY (INHALATION) at 11:00

## 2018-08-17 ENCOUNTER — HOSPITAL ENCOUNTER (OUTPATIENT)
Dept: SLEEP MEDICINE | Facility: HOSPITAL | Age: 73
Discharge: HOME | End: 2018-08-17
Attending: INTERNAL MEDICINE | Admitting: INTERNAL MEDICINE

## 2018-08-17 ENCOUNTER — OFFICE VISIT (OUTPATIENT)
Dept: INTERNAL MEDICINE | Age: 73
End: 2018-08-17
Payer: MEDICARE

## 2018-08-17 VITALS
SYSTOLIC BLOOD PRESSURE: 139 MMHG | BODY MASS INDEX: 34.69 KG/M2 | HEART RATE: 66 BPM | RESPIRATION RATE: 14 BRPM | DIASTOLIC BLOOD PRESSURE: 72 MMHG | WEIGHT: 221 LBS | OXYGEN SATURATION: 97 % | HEIGHT: 67 IN

## 2018-08-17 VITALS
DIASTOLIC BLOOD PRESSURE: 76 MMHG | HEIGHT: 67 IN | BODY MASS INDEX: 34.81 KG/M2 | OXYGEN SATURATION: 98 % | SYSTOLIC BLOOD PRESSURE: 126 MMHG | WEIGHT: 221.8 LBS | HEART RATE: 64 BPM

## 2018-08-17 DIAGNOSIS — R29.818 SUSPECTED SLEEP APNEA: Primary | ICD-10-CM

## 2018-08-17 DIAGNOSIS — Z23 NEED FOR PROPHYLACTIC VACCINATION AND INOCULATION AGAINST VARICELLA: ICD-10-CM

## 2018-08-17 DIAGNOSIS — R73.01 IMPAIRED FASTING GLUCOSE: ICD-10-CM

## 2018-08-17 DIAGNOSIS — I48.0 PAROXYSMAL ATRIAL FIBRILLATION (HCC): Primary | ICD-10-CM

## 2018-08-17 DIAGNOSIS — I10 ESSENTIAL HYPERTENSION: ICD-10-CM

## 2018-08-17 PROCEDURE — 1111F DSCHRG MED/CURRENT MED MERGE: CPT | Performed by: INTERNAL MEDICINE

## 2018-08-17 PROCEDURE — 95811 POLYSOM 6/>YRS CPAP 4/> PARM: CPT

## 2018-08-17 PROCEDURE — 99496 TRANSJ CARE MGMT HIGH F2F 7D: CPT | Performed by: INTERNAL MEDICINE

## 2018-08-17 PROCEDURE — 95811 POLYSOM 6/>YRS CPAP 4/> PARM: CPT | Performed by: PSYCHIATRY & NEUROLOGY

## 2018-08-17 RX ORDER — DILTIAZEM HYDROCHLORIDE 360 MG/1
360 CAPSULE, EXTENDED RELEASE ORAL DAILY
COMMUNITY
End: 2018-08-17 | Stop reason: SDUPTHER

## 2018-08-17 RX ORDER — LOSARTAN POTASSIUM 50 MG/1
50 TABLET ORAL DAILY
COMMUNITY
End: 2018-08-17 | Stop reason: SDUPTHER

## 2018-08-17 RX ORDER — LOSARTAN POTASSIUM 50 MG/1
50 TABLET ORAL DAILY
Qty: 90 TABLET | Refills: 3 | Status: SHIPPED | OUTPATIENT
Start: 2018-08-17 | End: 2019-07-09 | Stop reason: SDUPTHER

## 2018-08-17 RX ORDER — DILTIAZEM HYDROCHLORIDE 360 MG/1
360 CAPSULE, EXTENDED RELEASE ORAL DAILY
Qty: 90 CAPSULE | Refills: 3 | Status: SHIPPED | OUTPATIENT
Start: 2018-08-17 | End: 2019-07-09 | Stop reason: SDUPTHER

## 2018-08-17 NOTE — PROGRESS NOTES
Post-Discharge Transitional Care Management Services or Hospital Follow Up      Finn Hartley   YOB: 1945    Date of Office Visit:  8/17/2018  Date of Hospital Admission: 8/12/2018  Date of Hospital Discharge: 8/13/2018    Care management risk score Rising risk (score 2-5) and Complex Care (Scores >=6): 1     Non face to face  following discharge, date last encounter closed (first attempt may have been earlier): 8/14/2018  3:07 PM     Call initiated 2 business days of discharge: Yes    Patient Active Problem List   Diagnosis    Diffuse cystic mastopathy    Hyperlipidemia    Hypertension    Primary osteoarthritis involving multiple joints    Impaired fasting glucose    Hypercalcemia    Multinodular goiter    Lung nodule, solitary    Lumbar spinal stenosis    Postmenopausal    Allergic rhinitis    Hyperparathyroidism ([facility] [address]    Paroxysmal atrial fibrillation (HCC)       No Known Allergies    Medications listed as ordered at the time of discharge from hospital  Medications reconciled and reviewed    Medications marked \"taking\" at this time  Outpatient Prescriptions Marked as Taking for the 8/17/18 encounter (Office Visit) with Juliann Pickering MD   Medication Sig Dispense Refill    apixaban (ELIQUIS) 5 MG TABS tablet Take 1 tablet by mouth every 12 hours 180 tablet 3    diltiazem (CARDIZEM CD) 360 MG extended release capsule Take 1 capsule by mouth daily 90 capsule 3    losartan (COZAAR) 50 MG tablet Take 1 tablet by mouth daily 90 tablet 3    zoster recombinant adjuvanted vaccine (SHINGRIX) 50 MCG SUSR injection 50 MCG IM then repeat 2-6 months.  0.5 mL 1    fluticasone (FLONASE) 50 MCG/ACT nasal spray USE 2 SPRAYS IN EACH NOSTRIL ONCE DAILY 3 Bottle 3    bumetanide (BUMEX) 1 MG tablet TAKE 1 TABLET DAILY 90 tablet 3    labetalol (NORMODYNE) 300 MG tablet TAKE 1 TABLET EVERY 12 HOURS 180 tablet 3    simvastatin (ZOCOR) 20 MG tablet TAKE 1 TABLET DAILY 90 tablet 3    potassium S1-S2 lungs are clear extremities without edema heart sounds are regular. Mood is worried. Review of Systems   Constitutional: Positive for fatigue. Negative for chills and fever. HENT: Negative for congestion and sinus pressure. Eyes: Negative for discharge and redness. Respiratory: Positive for shortness of breath. Negative for cough. Cardiovascular: Positive for leg swelling. Negative for chest pain and palpitations. Gastrointestinal: Negative for abdominal distention and abdominal pain. Genitourinary: Negative for frequency and urgency. Musculoskeletal: Positive for arthralgias. Skin: Negative for rash and wound. Neurological: Negative for dizziness, light-headedness and headaches. Psychiatric/Behavioral: Positive for dysphoric mood and sleep disturbance. The patient is nervous/anxious. Assessment/Plan:  1. Paroxysmal atrial fibrillation (HCC)  Sleep study has been scheduled by the hospital continue her meds for atrial fib healthy diet exercise monitor  - apixaban (ELIQUIS) 5 MG TABS tablet; Take 1 tablet by mouth every 12 hours  Dispense: 180 tablet; Refill: 3  - diltiazem (CARDIZEM CD) 360 MG extended release capsule; Take 1 capsule by mouth daily  Dispense: 90 capsule; Refill: 3  - MO DISCHARGE MEDS RECONCILED W/ CURRENT OUTPATIENT MED LIST    2. Essential hypertension  We renewed her meds and monitor  - diltiazem (CARDIZEM CD) 360 MG extended release capsule; Take 1 capsule by mouth daily  Dispense: 90 capsule; Refill: 3  - losartan (COZAAR) 50 MG tablet; Take 1 tablet by mouth daily  Dispense: 90 tablet; Refill: 3    3. Impaired fasting glucose  Follow healthy diet follow-up labs    4. Need for prophylactic vaccination and inoculation against varicella    - zoster recombinant adjuvanted vaccine (SHINGRIX) 50 MCG SUSR injection; 50 MCG IM then repeat 2-6 months.   Dispense: 0.5 mL; Refill: 1        Medical Decision Making:high

## 2018-08-23 ENCOUNTER — HOSPITAL ENCOUNTER (OUTPATIENT)
Dept: SLEEP MEDICINE | Facility: HOSPITAL | Age: 73
End: 2018-08-23

## 2018-08-26 ASSESSMENT — ENCOUNTER SYMPTOMS
ABDOMINAL PAIN: 0
ABDOMINAL DISTENTION: 0
SHORTNESS OF BREATH: 1
EYE REDNESS: 0
EYE DISCHARGE: 0
SINUS PRESSURE: 0
COUGH: 0

## 2018-08-27 ENCOUNTER — APPOINTMENT (OUTPATIENT)
Dept: SLEEP MEDICINE | Facility: HOSPITAL | Age: 73
End: 2018-08-27

## 2018-09-14 ENCOUNTER — OFFICE VISIT (OUTPATIENT)
Dept: CARDIOLOGY | Facility: CLINIC | Age: 73
End: 2018-09-14

## 2018-09-14 VITALS
RESPIRATION RATE: 18 BRPM | BODY MASS INDEX: 34.84 KG/M2 | WEIGHT: 222 LBS | HEIGHT: 67 IN | DIASTOLIC BLOOD PRESSURE: 80 MMHG | SYSTOLIC BLOOD PRESSURE: 138 MMHG | HEART RATE: 64 BPM

## 2018-09-14 DIAGNOSIS — E78.2 MIXED HYPERLIPIDEMIA: ICD-10-CM

## 2018-09-14 DIAGNOSIS — I10 ESSENTIAL HYPERTENSION: ICD-10-CM

## 2018-09-14 DIAGNOSIS — G47.33 OBSTRUCTIVE SLEEP APNEA SYNDROME: ICD-10-CM

## 2018-09-14 DIAGNOSIS — I48.0 PAF (PAROXYSMAL ATRIAL FIBRILLATION) (HCC): Primary | ICD-10-CM

## 2018-09-14 DIAGNOSIS — E66.09 CLASS 1 OBESITY DUE TO EXCESS CALORIES WITHOUT SERIOUS COMORBIDITY WITH BODY MASS INDEX (BMI) OF 34.0 TO 34.9 IN ADULT: ICD-10-CM

## 2018-09-14 PROBLEM — E66.811 CLASS 1 OBESITY DUE TO EXCESS CALORIES WITHOUT SERIOUS COMORBIDITY IN ADULT: Status: ACTIVE | Noted: 2018-09-14

## 2018-09-14 PROCEDURE — 99214 OFFICE O/P EST MOD 30 MIN: CPT | Performed by: NURSE PRACTITIONER

## 2018-09-14 PROCEDURE — 93000 ELECTROCARDIOGRAM COMPLETE: CPT | Performed by: NURSE PRACTITIONER

## 2018-09-14 NOTE — PATIENT INSTRUCTIONS
"DASH Eating Plan  DASH stands for \"Dietary Approaches to Stop Hypertension.\" The DASH eating plan is a healthy eating plan that has been shown to reduce high blood pressure (hypertension). It may also reduce your risk for type 2 diabetes, heart disease, and stroke. The DASH eating plan may also help with weight loss.  What are tips for following this plan?  General guidelines  · Avoid eating more than 2,300 mg (milligrams) of salt (sodium) a day. If you have hypertension, you may need to reduce your sodium intake to 1,500 mg a day.  · Limit alcohol intake to no more than 1 drink a day for nonpregnant women and 2 drinks a day for men. One drink equals 12 oz of beer, 5 oz of wine, or 1½ oz of hard liquor.  · Work with your health care provider to maintain a healthy body weight or to lose weight. Ask what an ideal weight is for you.  · Get at least 30 minutes of exercise that causes your heart to beat faster (aerobic exercise) most days of the week. Activities may include walking, swimming, or biking.  · Work with your health care provider or diet and nutrition specialist (dietitian) to adjust your eating plan to your individual calorie needs.  Reading food labels  · Check food labels for the amount of sodium per serving. Choose foods with less than 5 percent of the Daily Value of sodium. Generally, foods with less than 300 mg of sodium per serving fit into this eating plan.  · To find whole grains, look for the word \"whole\" as the first word in the ingredient list.  Shopping  · Buy products labeled as \"low-sodium\" or \"no salt added.\"  · Buy fresh foods. Avoid canned foods and premade or frozen meals.  Cooking  · Avoid adding salt when cooking. Use salt-free seasonings or herbs instead of table salt or sea salt. Check with your health care provider or pharmacist before using salt substitutes.  · Do not regalado foods. Cook foods using healthy methods such as baking, boiling, grilling, and broiling instead.  · Cook with " heart-healthy oils, such as olive, canola, soybean, or sunflower oil.  Meal planning    · Eat a balanced diet that includes:  ? 5 or more servings of fruits and vegetables each day. At each meal, try to fill half of your plate with fruits and vegetables.  ? Up to 6-8 servings of whole grains each day.  ? Less than 6 oz of lean meat, poultry, or fish each day. A 3-oz serving of meat is about the same size as a deck of cards. One egg equals 1 oz.  ? 2 servings of low-fat dairy each day.  ? A serving of nuts, seeds, or beans 5 times each week.  ? Heart-healthy fats. Healthy fats called Omega-3 fatty acids are found in foods such as flaxseeds and coldwater fish, like sardines, salmon, and mackerel.  · Limit how much you eat of the following:  ? Canned or prepackaged foods.  ? Food that is high in trans fat, such as fried foods.  ? Food that is high in saturated fat, such as fatty meat.  ? Sweets, desserts, sugary drinks, and other foods with added sugar.  ? Full-fat dairy products.  · Do not salt foods before eating.  · Try to eat at least 2 vegetarian meals each week.  · Eat more home-cooked food and less restaurant, buffet, and fast food.  · When eating at a restaurant, ask that your food be prepared with less salt or no salt, if possible.  What foods are recommended?  The items listed may not be a complete list. Talk with your dietitian about what dietary choices are best for you.  Grains  Whole-grain or whole-wheat bread. Whole-grain or whole-wheat pasta. Brown rice. Oatmeal. Quinoa. Bulgur. Whole-grain and low-sodium cereals. Aliyah bread. Low-fat, low-sodium crackers. Whole-wheat flour tortillas.  Vegetables  Fresh or frozen vegetables (raw, steamed, roasted, or grilled). Low-sodium or reduced-sodium tomato and vegetable juice. Low-sodium or reduced-sodium tomato sauce and tomato paste. Low-sodium or reduced-sodium canned vegetables.  Fruits  All fresh, dried, or frozen fruit. Canned fruit in natural juice (without  added sugar).  Meat and other protein foods  Skinless chicken or turkey. Ground chicken or turkey. Pork with fat trimmed off. Fish and seafood. Egg whites. Dried beans, peas, or lentils. Unsalted nuts, nut butters, and seeds. Unsalted canned beans. Lean cuts of beef with fat trimmed off. Low-sodium, lean deli meat.  Dairy  Low-fat (1%) or fat-free (skim) milk. Fat-free, low-fat, or reduced-fat cheeses. Nonfat, low-sodium ricotta or cottage cheese. Low-fat or nonfat yogurt. Low-fat, low-sodium cheese.  Fats and oils  Soft margarine without trans fats. Vegetable oil. Low-fat, reduced-fat, or light mayonnaise and salad dressings (reduced-sodium). Canola, safflower, olive, soybean, and sunflower oils. Avocado.  Seasoning and other foods  Herbs. Spices. Seasoning mixes without salt. Unsalted popcorn and pretzels. Fat-free sweets.  What foods are not recommended?  The items listed may not be a complete list. Talk with your dietitian about what dietary choices are best for you.  Grains  Baked goods made with fat, such as croissants, muffins, or some breads. Dry pasta or rice meal packs.  Vegetables  Creamed or fried vegetables. Vegetables in a cheese sauce. Regular canned vegetables (not low-sodium or reduced-sodium). Regular canned tomato sauce and paste (not low-sodium or reduced-sodium). Regular tomato and vegetable juice (not low-sodium or reduced-sodium). Pickles. Olives.  Fruits  Canned fruit in a light or heavy syrup. Fried fruit. Fruit in cream or butter sauce.  Meat and other protein foods  Fatty cuts of meat. Ribs. Fried meat. Wood. Sausage. Bologna and other processed lunch meats. Salami. Fatback. Hotdogs. Bratwurst. Salted nuts and seeds. Canned beans with added salt. Canned or smoked fish. Whole eggs or egg yolks. Chicken or turkey with skin.  Dairy  Whole or 2% milk, cream, and half-and-half. Whole or full-fat cream cheese. Whole-fat or sweetened yogurt. Full-fat cheese. Nondairy creamers. Whipped toppings.  Processed cheese and cheese spreads.  Fats and oils  Butter. Stick margarine. Lard. Shortening. Ghee. Wood fat. Tropical oils, such as coconut, palm kernel, or palm oil.  Seasoning and other foods  Salted popcorn and pretzels. Onion salt, garlic salt, seasoned salt, table salt, and sea salt. Worcestershire sauce. Tartar sauce. Barbecue sauce. Teriyaki sauce. Soy sauce, including reduced-sodium. Steak sauce. Canned and packaged gravies. Fish sauce. Oyster sauce. Cocktail sauce. Horseradish that you find on the shelf. Ketchup. Mustard. Meat flavorings and tenderizers. Bouillon cubes. Hot sauce and Tabasco sauce. Premade or packaged marinades. Premade or packaged taco seasonings. Relishes. Regular salad dressings.  Where to find more information:  · National Heart, Lung, and Blood [place]: www.nhlbi.nih.gov  · American Heart Association: www.heart.org  Summary  · The DASH eating plan is a healthy eating plan that has been shown to reduce high blood pressure (hypertension). It may also reduce your risk for type 2 diabetes, heart disease, and stroke.  · With the DASH eating plan, you should limit salt (sodium) intake to 2,300 mg a day. If you have hypertension, you may need to reduce your sodium intake to 1,500 mg a day.  · When on the DASH eating plan, aim to eat more fresh fruits and vegetables, whole grains, lean proteins, low-fat dairy, and heart-healthy fats.  · Work with your health care provider or diet and nutrition specialist (dietitian) to adjust your eating plan to your individual calorie needs.  This information is not intended to replace advice given to you by your health care provider. Make sure you discuss any questions you have with your health care provider.  Document Released: 12/06/2012 Document Revised: 12/11/2017 Document Reviewed: 12/11/2017  [employer] Interactive Patient Education © 2018 [employer]

## 2018-09-14 NOTE — PROGRESS NOTES
"    Subjective:     Encounter Date:09/14/2018      Patient ID: Anne-Marie Hayes is a 73 y.o. female.    Chief Complaint:  The patient reports she is feeling well overall. She reports she was recently diagnosed with sleep apnea and has been wearing her CPAP and is feeling much better and having less fatigue. She reports she was drinking wine regularly piror to her hospital admission and has quit drinking. She denies chest pain, shortness of breath, edema, orthopnea, PND, syncope, or pre syncope. She admits a rare palpitation or \"flip flop.\" she is compliant with her medications. She reports good blood pressure control at home.         The following portions of the patient's history were reviewed and updated as appropriate: allergies, current medications, past family history, past medical history, past social history, past surgical history and problem list.  /80 (BP Location: Right arm, Patient Position: Sitting, Cuff Size: Adult)   Pulse 64   Resp 18   Ht 170.2 cm (67\")   Wt 101 kg (222 lb)   Breastfeeding? No   BMI 34.77 kg/m²   No Known Allergies    Current Outpatient Prescriptions:   •  apixaban (ELIQUIS) 5 MG tablet tablet, Take 1 tablet by mouth Every 12 (Twelve) Hours., Disp: 60 tablet, Rfl: 0  •  bumetanide (BUMEX) 1 MG tablet, Take 1 mg by mouth Daily., Disp: , Rfl:   •  cholecalciferol (VITAMIN D3) 1000 units tablet, Take 1,000 Units by mouth 2 (Two) Times a Day., Disp: , Rfl:   •  CloNIDine (CATAPRES) 0.1 MG tablet, Take 0.1 mg by mouth 2 (Two) Times a Day., Disp: , Rfl:   •  diltiaZEM CD (CARDIZEM CD) 360 MG 24 hr capsule, Take 1 capsule by mouth Daily., Disp: 30 capsule, Rfl: 11  •  fluticasone (FLONASE) 50 MCG/ACT nasal spray, 2 sprays into the nostril(s) as directed by provider Daily As Needed for Rhinitis or Allergies., Disp: , Rfl:   •  Glucosamine 500 MG capsule, Take 1 capsule by mouth Daily., Disp: , Rfl:   •  KLOR-CON 10 MEQ CR tablet, Take 10 mEq by mouth 2 (Two) Times a Day., Disp: " , Rfl:   •  labetalol (NORMODYNE) 300 MG tablet, Take 300 mg by mouth Every 12 (Twelve) Hours., Disp: , Rfl:   •  losartan (COZAAR) 50 MG tablet, Take 1 tablet by mouth Daily., Disp: 30 tablet, Rfl: 0  •  Omega-3 Fatty Acids (FISH OIL) 1000 MG capsule capsule, Take 1,000 mg by mouth Daily With Breakfast., Disp: , Rfl:   •  simvastatin (ZOCOR) 20 MG tablet, Take 20 mg by mouth Every Night., Disp: , Rfl:   Past Medical History:   Diagnosis Date   • Arthritis    • Hyperlipidemia    • Hypertension      Past Surgical History:   Procedure Laterality Date   • CARDIAC CATHETERIZATION  1987   • TUBAL ABDOMINAL LIGATION       Social History     Social History   • Marital status:      Spouse name: N/A   • Number of children: N/A   • Years of education: N/A     Occupational History   • Not on file.     Social History Main Topics   • Smoking status: Former Smoker   • Smokeless tobacco: Never Used   • Alcohol use 4.2 oz/week     7 Glasses of wine per week   • Drug use: No   • Sexual activity: Defer     Other Topics Concern   • Not on file     Social History Narrative   • No narrative on file     Family History   Problem Relation Age of Onset   • Atrial fibrillation Mother    • Heart disease Father    • Breast cancer Neg Hx    • Ovarian cancer Neg Hx    • Uterine cancer Neg Hx    • Colon cancer Neg Hx    • Melanoma Neg Hx        Review of Systems   Constitution: Negative for chills, diaphoresis, fever and weakness.   HENT: Negative for nosebleeds.    Eyes: Negative for visual disturbance.   Cardiovascular: Positive for palpitations (rare). Negative for chest pain, claudication, cyanosis, dyspnea on exertion, irregular heartbeat, leg swelling, near-syncope, orthopnea, paroxysmal nocturnal dyspnea and syncope.   Respiratory: Negative for cough, hemoptysis, shortness of breath, sputum production and wheezing.    Hematologic/Lymphatic: Negative for bleeding problem.   Skin: Negative for color change and flushing.    Musculoskeletal: Negative for falls.   Gastrointestinal: Negative for bloating, abdominal pain, hematemesis, hematochezia, melena, nausea and vomiting.   Genitourinary: Negative for hematuria.   Neurological: Negative for dizziness and light-headedness.   Psychiatric/Behavioral: Negative for altered mental status.         ECG 12 Lead  Date/Time: 9/14/2018 8:35 AM  Performed by: ANAMIKA RILEY  Authorized by: ANAMIKA RILEY   Comparison: compared with previous ECG from 8/13/2018  Similar to previous ECG  Rhythm: sinus rhythm  Ectopy: atrial premature contractions               Objective:     Physical Exam   Constitutional: She is oriented to person, place, and time. She appears well-developed and well-nourished. No distress.   HENT:   Head: Normocephalic and atraumatic.   Eyes: Pupils are equal, round, and reactive to light.   Neck: Normal range of motion. Neck supple. No JVD present. No thyromegaly present.   Cardiovascular: Normal rate, regular rhythm, normal heart sounds and intact distal pulses.  Exam reveals no gallop and no friction rub.    No murmur heard.  Pulmonary/Chest: Effort normal and breath sounds normal. No respiratory distress. She has no wheezes. She has no rales. She exhibits no tenderness.   Abdominal: Soft. Bowel sounds are normal. She exhibits no distension. There is no tenderness.   Musculoskeletal: Normal range of motion. She exhibits no edema.   Neurological: She is alert and oriented to person, place, and time. No cranial nerve deficit.   Skin: Skin is warm and dry. She is not diaphoretic.   Psychiatric: She has a normal mood and affect. Her behavior is normal.       Lab Review:       Assessment:          Diagnosis Plan   1. PAF (paroxysmal atrial fibrillation) (CMS/[facility])  Maintaining NSR after CV 8/13  Anticoagulated with Eliquis  On labetalol and diltiazem      2. Obstructive sleep apnea syndrome  Compliant with CPAP     3. Essential hypertension  Controlled     4. Mixed hyperlipidemia   Continue statin, followed by pcp     5. Class 1 obesity due to excess calories without serious comorbidity with body mass index (BMI) of 34.0 to 34.9 in adult  Patient's Body mass index is 34.77 kg/m². BMI is above normal parameters. Recommendations include: exercise counseling and nutrition counseling.     Echos reviewed - normal LVEF, mild MR, mild to moderate TR      Plan:       As noted above  Continue current medical therapy listed above  Follow up 6 months, sooner if needed for new or worsening symptoms or concerns

## 2018-09-19 ENCOUNTER — HOSPITAL ENCOUNTER (OUTPATIENT)
Dept: CT IMAGING | Age: 73
Discharge: HOME | End: 2018-09-19
Payer: MEDICARE

## 2018-09-19 DIAGNOSIS — R91.1 SOLITARY LUNG NODULE: ICD-10-CM

## 2018-09-19 PROCEDURE — 71250 CT THORAX DX C-: CPT

## 2018-09-26 ENCOUNTER — NURSE ONLY (OUTPATIENT)
Dept: INTERNAL MEDICINE | Age: 73
End: 2018-09-26
Payer: MEDICARE

## 2018-09-26 DIAGNOSIS — Z23 FLU VACCINE NEED: Primary | ICD-10-CM

## 2018-09-26 PROCEDURE — G0008 ADMIN INFLUENZA VIRUS VAC: HCPCS | Performed by: INTERNAL MEDICINE

## 2018-09-26 PROCEDURE — 90662 IIV NO PRSV INCREASED AG IM: CPT | Performed by: INTERNAL MEDICINE

## 2018-10-02 ENCOUNTER — OFFICE VISIT (OUTPATIENT)
Dept: INTERNAL MEDICINE | Age: 73
End: 2018-10-02
Payer: MEDICARE

## 2018-10-02 VITALS
HEIGHT: 67 IN | OXYGEN SATURATION: 97 % | HEART RATE: 60 BPM | BODY MASS INDEX: 34.37 KG/M2 | DIASTOLIC BLOOD PRESSURE: 84 MMHG | WEIGHT: 219 LBS | SYSTOLIC BLOOD PRESSURE: 120 MMHG

## 2018-10-02 DIAGNOSIS — G47.33 OBSTRUCTIVE SLEEP APNEA: ICD-10-CM

## 2018-10-02 DIAGNOSIS — I10 ESSENTIAL HYPERTENSION: ICD-10-CM

## 2018-10-02 DIAGNOSIS — E78.00 PURE HYPERCHOLESTEROLEMIA: ICD-10-CM

## 2018-10-02 DIAGNOSIS — I48.0 PAROXYSMAL ATRIAL FIBRILLATION (HCC): ICD-10-CM

## 2018-10-02 DIAGNOSIS — R73.01 IMPAIRED FASTING GLUCOSE: ICD-10-CM

## 2018-10-02 DIAGNOSIS — M48.061 SPINAL STENOSIS OF LUMBAR REGION, UNSPECIFIED WHETHER NEUROGENIC CLAUDICATION PRESENT: Primary | ICD-10-CM

## 2018-10-02 PROCEDURE — G8427 DOCREV CUR MEDS BY ELIG CLIN: HCPCS | Performed by: INTERNAL MEDICINE

## 2018-10-02 PROCEDURE — 1101F PT FALLS ASSESS-DOCD LE1/YR: CPT | Performed by: INTERNAL MEDICINE

## 2018-10-02 PROCEDURE — 4040F PNEUMOC VAC/ADMIN/RCVD: CPT | Performed by: INTERNAL MEDICINE

## 2018-10-02 PROCEDURE — G8417 CALC BMI ABV UP PARAM F/U: HCPCS | Performed by: INTERNAL MEDICINE

## 2018-10-02 PROCEDURE — G8482 FLU IMMUNIZE ORDER/ADMIN: HCPCS | Performed by: INTERNAL MEDICINE

## 2018-10-02 PROCEDURE — 3017F COLORECTAL CA SCREEN DOC REV: CPT | Performed by: INTERNAL MEDICINE

## 2018-10-02 PROCEDURE — 99214 OFFICE O/P EST MOD 30 MIN: CPT | Performed by: INTERNAL MEDICINE

## 2018-10-02 PROCEDURE — 1090F PRES/ABSN URINE INCON ASSESS: CPT | Performed by: INTERNAL MEDICINE

## 2018-10-02 PROCEDURE — 1036F TOBACCO NON-USER: CPT | Performed by: INTERNAL MEDICINE

## 2018-10-02 PROCEDURE — G8399 PT W/DXA RESULTS DOCUMENT: HCPCS | Performed by: INTERNAL MEDICINE

## 2018-10-02 PROCEDURE — 1123F ACP DISCUSS/DSCN MKR DOCD: CPT | Performed by: INTERNAL MEDICINE

## 2018-10-02 RX ORDER — LABETALOL 300 MG/1
300 TABLET, FILM COATED ORAL 2 TIMES DAILY
Qty: 180 TABLET | Refills: 3 | Status: SHIPPED | OUTPATIENT
Start: 2018-10-02 | End: 2019-10-29 | Stop reason: SDUPTHER

## 2018-10-02 RX ORDER — BUMETANIDE 1 MG/1
1 TABLET ORAL DAILY
Qty: 90 TABLET | Refills: 3 | Status: SHIPPED | OUTPATIENT
Start: 2018-10-02 | End: 2019-01-17 | Stop reason: SDUPTHER

## 2018-10-02 RX ORDER — POTASSIUM CHLORIDE 750 MG/1
10 TABLET, EXTENDED RELEASE ORAL 2 TIMES DAILY
Qty: 180 TABLET | Refills: 3 | Status: SHIPPED | OUTPATIENT
Start: 2018-10-02 | End: 2019-10-29 | Stop reason: SDUPTHER

## 2018-10-02 RX ORDER — ROSUVASTATIN CALCIUM 10 MG/1
10 TABLET, COATED ORAL DAILY
Qty: 90 TABLET | Refills: 3 | Status: SHIPPED | OUTPATIENT
Start: 2018-10-02 | End: 2019-10-29 | Stop reason: SDUPTHER

## 2018-10-02 RX ORDER — CLONIDINE HYDROCHLORIDE 0.1 MG/1
0.1 TABLET ORAL 2 TIMES DAILY
Qty: 180 TABLET | Refills: 3 | Status: SHIPPED | OUTPATIENT
Start: 2018-10-02 | End: 2019-10-29 | Stop reason: SDUPTHER

## 2018-10-02 NOTE — PROGRESS NOTES
Chief Complaint   Patient presents with    Atrial Fibrillation     6 week follow up    Hypertension       HPI: Patient is here today for 6 weeks follow-up of several medical problems recent diagnosis of atrial fibrillation recent diagnosis of sleep apnea she feels much better with her CPAP machine compliant with the CPAP more energy sleeping better feels more rested. Her main complaint is her back pain limiting her walking and leg pain arthritis. She denies any recent prolonged palpitations no chest pressure or chest pain or dyspnea. No abdominal pain. Past Medical History:   Diagnosis Date    Arthritis     Heart palpitations     Hypercalcemia 8/7/2017    Hyperlipidemia     Hypertension     Impaired fasting glucose 8/7/2017    Lumbar spinal stenosis     Lung nodule, solitary 8/7/2017    Right upper lobe    Multinodular goiter 8/7/2017    Paroxysmal atrial fibrillation ([address] 8/17/2018    Primary osteoarthritis involving multiple joints 8/7/2017    Wears glasses        Past Surgical History:   Procedure Laterality Date    BREAST BIOPSY  12/3/2007    stereotactic left, fibrocystic changes    BREAST BIOPSY  12/17/2007    stereotactic left, fibrocystic changes    CARDIAC CATHETERIZATION      negative    CATARACT REMOVAL Bilateral 2017    OTHER SURGICAL HISTORY  2013    skin cancer spot on her nose removed    TUBAL LIGATION         Family History   Problem Relation Age of Onset    Hypertension Father        Social History     Social History    Marital status:      Spouse name: N/A    Number of children: N/A    Years of education: N/A     Occupational History    Not on file. Social History Main Topics    Smoking status: Former Smoker     Packs/day: 0.50     Years: 20.00     Quit date: 2/2/1996    Smokeless tobacco: Never Used      Comment: 2.5 ppd for 17 years. Stopped 1983.     Alcohol use Yes    Drug use: No    Sexual activity: Not on file     Other Topics Concern    Not on present    - External Referral To Orthopedic Surgery    2. Pure hypercholesterolemia    - CBC; Future  - Comprehensive Metabolic Panel; Future  - TSH without Reflex; Future  - Lipid Panel; Future    3. Paroxysmal atrial fibrillation (HCC)  Continue current care and follow  - apixaban (ELIQUIS) 5 MG TABS tablet; Take 1 tablet by mouth every 12 hours  Dispense: 180 tablet; Refill: 3    4. Essential hypertension  Stable and follow  - cloNIDine (CATAPRES) 0.1 MG tablet; Take 1 tablet by mouth 2 times daily  Dispense: 180 tablet; Refill: 3  - potassium chloride (KLOR-CON M) 10 MEQ extended release tablet; Take 1 tablet by mouth 2 times daily  Dispense: 180 tablet; Refill: 3  - labetalol (NORMODYNE) 300 MG tablet; Take 1 tablet by mouth 2 times daily  Dispense: 180 tablet; Refill: 3  - bumetanide (BUMEX) 1 MG tablet; Take 1 tablet by mouth daily  Dispense: 90 tablet; Refill: 3    5.  Impaired fasting glucose    - Hemoglobin A1C; Future    6/ christie- cpap - feels better

## 2018-10-11 PROBLEM — G47.33 OBSTRUCTIVE SLEEP APNEA: Status: ACTIVE | Noted: 2018-10-11

## 2018-10-11 ASSESSMENT — ENCOUNTER SYMPTOMS
BACK PAIN: 1
SINUS PRESSURE: 0
SHORTNESS OF BREATH: 1
EYE REDNESS: 0
COUGH: 0
EYE DISCHARGE: 0
ABDOMINAL DISTENTION: 0
ABDOMINAL PAIN: 0

## 2018-10-30 ENCOUNTER — OFFICE VISIT (OUTPATIENT)
Dept: NEUROLOGY | Facility: CLINIC | Age: 73
End: 2018-10-30

## 2018-10-30 VITALS
DIASTOLIC BLOOD PRESSURE: 80 MMHG | RESPIRATION RATE: 18 BRPM | SYSTOLIC BLOOD PRESSURE: 130 MMHG | BODY MASS INDEX: 34.84 KG/M2 | WEIGHT: 222 LBS | HEART RATE: 68 BPM | HEIGHT: 67 IN

## 2018-10-30 DIAGNOSIS — G47.33 OSA ON CPAP: Primary | ICD-10-CM

## 2018-10-30 DIAGNOSIS — E78.2 MIXED HYPERLIPIDEMIA: ICD-10-CM

## 2018-10-30 DIAGNOSIS — I48.0 PAF (PAROXYSMAL ATRIAL FIBRILLATION) (HCC): ICD-10-CM

## 2018-10-30 DIAGNOSIS — Z99.89 OSA ON CPAP: Primary | ICD-10-CM

## 2018-10-30 DIAGNOSIS — E66.09 CLASS 1 OBESITY DUE TO EXCESS CALORIES WITHOUT SERIOUS COMORBIDITY WITH BODY MASS INDEX (BMI) OF 34.0 TO 34.9 IN ADULT: ICD-10-CM

## 2018-10-30 DIAGNOSIS — I10 ESSENTIAL HYPERTENSION: ICD-10-CM

## 2018-10-30 PROCEDURE — 99213 OFFICE O/P EST LOW 20 MIN: CPT | Performed by: NURSE PRACTITIONER

## 2018-10-30 NOTE — PATIENT INSTRUCTIONS

## 2018-10-30 NOTE — PROGRESS NOTES
Subjective     Chief Complaint   Patient presents with   • Sleep Apnea       Anne-Marie Hayes is a 73 y.o. female right handed retiree. Prior to senior living she was a high school .  She is here today for FU PRINCE. She wears a full face mask [facility] [employer] [facility].  She underwent a Split-Night study 8/18/2018 at which point she was diagnosed with PRINCE and placed on CPAP 8 cmH20. Her AHI prior to CPAP was 40.5 and after CPAP 0.9.   Prior to sleep study she would wake herself up snoring with witnessed apnea. She would also wake up gasping for breath. She had extreme daytime fatigue. She was recently diagnosed with AFIB and underwent a cardioversion in August.  She follows Dr. Lovelace currently. PMH includes AFIB, dyslipidemia, HTN. She is doing quite well on CPAP and has had a marked decrease in her symptoms, she feels much better and is very pleased with CPAP.    She denies any tobacco or illicit drug use. She does have a very occasional glass of wine. She denies any sleep paralysis or cataplectic events. She has chronic knee and low back pain.  Her PCP is Dr. Ania Culver. She has plans to initiate exercise and does count her steps daily.     Compliance report reviewed in the office today.  She has used her machine >= 4 hours 100% of the time.                                                      [place] Sleepiness Scale    Situation Chance of Dozing or Sleeping   • Sitting and reading 1 - slight chance of dosing or sleeping   • Watching TV 1 - slight chance of dosing or sleeping   • Sitting inactive in a public place 0 - would never dose or sleep   • Being a passenger in a motor vehicle for an hour or more 1 - slight chance of dosing or sleeping   • Lying down in the afternoon 1 - slight chance of dosing or sleeping   • Sitting and talking to someone 0 - would never dose or sleep   • Sitting quietly after lunch (no alcohol) 1 - slight chance of dosing or sleeping   • Stopped for a few minutes in traffic  "while driving 0 - would never dose or sleep   Total score (add the scores up) 5           Does the patient SNORE? No    Does the patient feel TIRED, fatigued or sleepy during the day? No    Has anyone OBSERVED the stop breathing or cough/gasp during sleep? No    Does the patient have high blood PRESSURE? Yes    Is the patient's BMI greater than 35? No    Is the patient’s AGE over 50 years old? Yes    Is the patient's NECK size greater than 17 in for a male and 16 in for a female? No    Is the patient’s GENDER male? No      0-2 \"Yes\" Responses = Low Risk of PRINCE  3-4 \"Yes\" Responses = Intermediate Risk of PRINCE  5-8 \"Yes\" Responses = High Risk PRINCE    Adapted from STOP-BANG Questionnaire  Mohr F et al. Anesthesiology 2008;108:812-21.             Neurologic Problem   Primary symptoms comment: PRINCE. This is a chronic problem. The current episode started more than 1 year ago. The neurological problem developed gradually. The problem has been rapidly improving since onset. Associated symptoms include back pain and fatigue. (Daytime fatigue, snoring, witnessed apnea) Treatments tried: CPAP therapy. The treatment provided moderate relief. (AFIB, dyslipidemia, HTN)        Current Outpatient Prescriptions   Medication Sig Dispense Refill   • apixaban (ELIQUIS) 5 MG tablet tablet Take 1 tablet by mouth Every 12 (Twelve) Hours. 60 tablet 0   • bumetanide (BUMEX) 1 MG tablet Take 1 mg by mouth Daily.     • cholecalciferol (VITAMIN D3) 1000 units tablet Take 1,000 Units by mouth 2 (Two) Times a Day.     • CloNIDine (CATAPRES) 0.1 MG tablet Take 0.1 mg by mouth 2 (Two) Times a Day.     • diltiaZEM CD (CARDIZEM CD) 360 MG 24 hr capsule Take 1 capsule by mouth Daily. 30 capsule 11   • fluticasone (FLONASE) 50 MCG/ACT nasal spray 2 sprays into the nostril(s) as directed by provider Daily As Needed for Rhinitis or Allergies.     • Glucosamine 500 MG capsule Take 1 capsule by mouth Daily.     • KLOR-CON 10 MEQ CR tablet Take 10 mEq by " "mouth 2 (Two) Times a Day.     • labetalol (NORMODYNE) 300 MG tablet Take 300 mg by mouth Every 12 (Twelve) Hours.     • losartan (COZAAR) 50 MG tablet Take 1 tablet by mouth Daily. 30 tablet 0   • Omega-3 Fatty Acids (FISH OIL) 1000 MG capsule capsule Take 1,000 mg by mouth Daily With Breakfast.     • simvastatin (ZOCOR) 20 MG tablet Take 20 mg by mouth Every Night.       No current facility-administered medications for this visit.        Past Medical History:   Diagnosis Date   • Arthritis    • Atrial fibrillation (CMS/HCC)    • Hyperlipidemia    • Hypertension        Past Surgical History:   Procedure Laterality Date   • CARDIAC CATHETERIZATION  1987   • TUBAL ABDOMINAL LIGATION         family history includes Atrial fibrillation in her mother; Heart disease in her father.    Social History   Substance Use Topics   • Smoking status: Former Smoker   • Smokeless tobacco: Never Used   • Alcohol use 4.2 oz/week     7 Glasses of wine per week       Review of Systems   Constitutional: Positive for fatigue.   HENT: Negative.    Eyes: Negative.    Respiratory: Positive for apnea.    Cardiovascular: Negative.    Gastrointestinal: Negative.    Endocrine: Negative.    Genitourinary: Negative.    Musculoskeletal: Positive for arthralgias and back pain.   Skin: Negative.    Allergic/Immunologic: Negative.    Neurological: Negative.    Hematological: Negative.    Psychiatric/Behavioral: Positive for sleep disturbance.   All other systems reviewed and are negative.      Objective     /80 (BP Location: Left arm, Patient Position: Sitting)   Pulse 68   Resp 18   Ht 170.2 cm (67\")   Wt 101 kg (222 lb)   Breastfeeding? No   BMI 34.77 kg/m² , Body mass index is 34.77 kg/m².    Physical Exam   Constitutional: She is oriented to person, place, and time. She appears well-developed and well-nourished.   HENT:   Head: Normocephalic and atraumatic.   Nose: Nose normal.   Mouth/Throat: Uvula is midline, oropharynx is clear and " moist and mucous membranes are normal. Tonsils are 0 on the right. Tonsils are 0 on the left. No tonsillar exudate.   Eyes: Pupils are equal, round, and reactive to light. EOM are normal.   Neck: Normal range of motion. Neck supple.   Cardiovascular: Normal rate, normal heart sounds and intact distal pulses.    Pulmonary/Chest: Effort normal and breath sounds normal.   Abdominal: Soft.   Musculoskeletal: Normal range of motion.   Neurological: She is alert and oriented to person, place, and time. She has normal strength and normal reflexes.   Skin: Skin is warm and dry. Capillary refill takes less than 2 seconds.   Psychiatric: She has a normal mood and affect. Her speech is normal and behavior is normal. Cognition and memory are normal.   Nursing note and vitals reviewed.        Results for orders placed or performed during the hospital encounter of 08/12/18   Comprehensive Metabolic Panel   Result Value Ref Range    Glucose 143 (H) 70 - 100 mg/dL    BUN 17 5 - 21 mg/dL    Creatinine 0.79 0.50 - 1.40 mg/dL    Sodium 141 135 - 145 mmol/L    Potassium 4.1 3.5 - 5.3 mmol/L    Chloride 107 98 - 110 mmol/L    CO2 22.0 (L) 24.0 - 31.0 mmol/L    Calcium 10.5 (H) 8.4 - 10.4 mg/dL    Total Protein 6.7 6.3 - 8.7 g/dL    Albumin 4.40 3.50 - 5.00 g/dL    ALT (SGPT) 57 (H) 0 - 54 U/L    AST (SGOT) 26 7 - 45 U/L    Alkaline Phosphatase 101 24 - 120 U/L    Total Bilirubin 0.9 0.1 - 1.0 mg/dL    eGFR Non African Amer 71 >60 mL/min/1.73    Globulin 2.3 gm/dL    A/G Ratio 1.9 1.1 - 2.5 g/dL    BUN/Creatinine Ratio 21.5 7.0 - 25.0    Anion Gap 12.0 4.0 - 13.0 mmol/L   Troponin   Result Value Ref Range    Troponin I <0.012 0.000 - 0.034 ng/mL   Troponin   Result Value Ref Range    Troponin I <0.012 0.000 - 0.034 ng/mL   CBC Auto Differential   Result Value Ref Range    WBC 7.83 4.80 - 10.80 10*3/mm3    RBC 4.62 4.20 - 5.40 10*6/mm3    Hemoglobin 14.3 12.0 - 16.0 g/dL    Hematocrit 41.3 37.0 - 47.0 %    MCV 89.4 82.0 - 98.0 fL    MCH  31.0 28.0 - 32.0 pg    MCHC 34.6 33.0 - 36.0 g/dL    RDW 12.3 12.0 - 15.0 %    RDW-SD 40.0 40.0 - 54.0 fl    MPV 11.0 6.0 - 12.0 fL    Platelets 200 130 - 400 10*3/mm3    Neutrophil % 65.9 39.0 - 78.0 %    Lymphocyte % 20.3 15.0 - 45.0 %    Monocyte % 9.5 4.0 - 12.0 %    Eosinophil % 3.4 0.0 - 4.0 %    Basophil % 0.4 0.0 - 2.0 %    Immature Grans % 0.5 0.0 - 5.0 %    Neutrophils, Absolute 5.16 1.87 - 8.40 10*3/mm3    Lymphocytes, Absolute 1.59 0.72 - 4.86 10*3/mm3    Monocytes, Absolute 0.74 0.19 - 1.30 10*3/mm3    Eosinophils, Absolute 0.27 0.00 - 0.70 10*3/mm3    Basophils, Absolute 0.03 0.00 - 0.20 10*3/mm3    Immature Grans, Absolute 0.04 (H) 0.00 - 0.03 10*3/mm3    nRBC 0.0 0.0 - 0.0 /100 WBC   Basic Metabolic Panel   Result Value Ref Range    Glucose 125 (H) 70 - 100 mg/dL    BUN 14 5 - 21 mg/dL    Creatinine 0.78 0.50 - 1.40 mg/dL    Sodium 143 135 - 145 mmol/L    Potassium 4.1 3.5 - 5.3 mmol/L    Chloride 110 98 - 110 mmol/L    CO2 23.0 (L) 24.0 - 31.0 mmol/L    Calcium 9.9 8.4 - 10.4 mg/dL    eGFR Non African Amer 72 >60 mL/min/1.73    BUN/Creatinine Ratio 17.9 7.0 - 25.0    Anion Gap 10.0 4.0 - 13.0 mmol/L   Protime-INR   Result Value Ref Range    Protime 14.3 11.9 - 14.6 Seconds    INR 1.08 0.91 - 1.09   Adult Transesophageal Echo (ROBEL) W/ Cont if Necessary Per Protocol (Cardiology Department)   Result Value Ref Range    BSA 2.0 m^2     CV ECHO VENKATA - BZI_BMI 38.1 kilograms/m^2    BH CV ECHO VENKATA - BSA(HAYCOCK) 2.2 m^2    BH CV ECHO VENKATA - BZI_METRIC_WEIGHT 100.7 kg    BH CV ECHO VENKATA - BZI_METRIC_HEIGHT 162.6 cm   Light Blue Top   Result Value Ref Range    Extra Tube hold for add-on    Green Top (Gel)   Result Value Ref Range    Extra Tube Hold for add-ons.    Lavender Top   Result Value Ref Range    Extra Tube hold for add-on    Red Top   Result Value Ref Range    Extra Tube Hold for add-ons.       SLEEP STUDY REPORT     REFERRING PHYSICIAN:  Jovani Lovelace MD     HISTORY OF PRESENT ILLNESS:  Patient  is 73 years old.  Patient has height of 67 inches.  Patient is weight of 221 pounds.  Patient has BMI of 34.6.  Patient has [place] sleepiness scale 16.  Patient has neck circumference of 16 inches.  Patient has history of hypertension, atrial fibrillation, hyperlipidemia.  Patient's questionnaire is not available.  Patient is on diltiazem CD, Eliquis, bumetanide, cholecalciferol, clonidine, fish oil, fluticasone, glucosamine, potassium, labetalol.  FINDINGS ON STUDY:  This is a split-night study with the diagnostic portion of test time in bed 191.8 minutes with total sleep time 130.5 minutes.  Latency to sleep is 21 minutes.  Latency to  minutes.  REM is 22.4%.  Stage I 8.4%.  Stage II 51%.  Stage III 18.4%.  Patient had AHI of 40.5 mostly obstructive events.  Sleep efficiency 68%.  Patient was supine 46% of the portion of the test.  Average pulse rate 55.6 bpm with highest pulse rate 75 bpm and lowest pulse rate 42 bpm.  Patient had low SpO2 Of 73%.  PLM index 6.4.  Patient spent 18.8 minutes in the 80-89% oxygen saturation range with 0.4 minutes in 70s 79% oxygen saturation range and 10.8 minutes less than 89% oxygen saturation.  Patient was then titrated to CPAP 8 cm water pressure with heated humidification.  During this portion of test AHI 0.9.  Time in bed to 69.8 minutes and total sleep time 203.1 minutes and sleep efficiency 75%.  States his sleep 58.7 minutes with latency to REM 3.2 minutes.  REM is 38.2%.  Stage I 2.7%.  Stage II 34.8%.  Stage III 24.4%.  Patient was supine the entire therapeutic portion of the test.  Low SpO2 has 84%.  Patient spent 2.2 minutes in the 80-89% oxygen saturation range with 1.7 minutes less than 89% oxygen saturations.  PLM index 2.4.  Cardiac irregularities noted     IMPRESSION:    Axis A 1: Obstructive sleep apnea G 47.33  Axis A 2: Periodic leg movements G 47.61  Axis  B 1: CPAP at 8 cm water pressure with heated humidification.  Axis B 2: Further evaluation and  treatment of patient's periodic leg was needs to be followed symptomatically  Axis C: Underlying medical problems and medication effects may be contributory.  Close follow-up with patient's family physician and cardiologist indicated with emphasis on safety.  ASSESSMENT/PLAN    Diagnoses and all orders for this visit:    PRINCE on CPAP  -     Overnight Sleep Oximetry Study; Future    Essential hypertension  -     Overnight Sleep Oximetry Study; Future    PAF (paroxysmal atrial fibrillation) (CMS/[facility])    Mixed hyperlipidemia    Class 1 obesity due to excess calories without serious comorbidity with body mass index (BMI) of 34.0 to 34.9 in adult        Allergies and all known medications/prescriptions have been reviewed using resources available on this encounter.    Patient's Body mass index is 34.77 kg/m². BMI is above normal parameters. Recommendations include: educational material.    Return in about 1 year (around 10/30/2019).    MEDICAL DECISION MAKIN. Obtain overnight pulse oximetry study on CPAP and room air to evaluate pressure setting effectiveness.  2. Continue compliance with CPAP.  3. Counseled on multimodal approach to PRINCE including but not limited to diet, exercise, and sleep hygiene.  4. BP managed per PCP.  5. Continue to follow cardiology for AFIB.  6. Dyslipidemia managed per PCP.    Overall, she is doing quite well and has a great reduction in her daytime fatigue. I will see her back in one year. Patient was advised to call out office before then if any questions or problems should occur.     MANDO Johnson

## 2018-11-07 DIAGNOSIS — I10 ESSENTIAL HYPERTENSION: ICD-10-CM

## 2018-11-07 DIAGNOSIS — G47.33 OSA ON CPAP: ICD-10-CM

## 2018-11-07 DIAGNOSIS — Z99.89 OSA ON CPAP: ICD-10-CM

## 2018-11-28 ENCOUNTER — HOSPITAL ENCOUNTER (OUTPATIENT)
Dept: WOMENS IMAGING | Age: 73
Discharge: HOME | End: 2018-11-28
Payer: MEDICARE

## 2018-11-28 DIAGNOSIS — Z12.39 SCREENING BREAST EXAMINATION: ICD-10-CM

## 2018-11-28 PROCEDURE — 77063 BREAST TOMOSYNTHESIS BI: CPT

## 2019-01-17 DIAGNOSIS — I10 ESSENTIAL HYPERTENSION: ICD-10-CM

## 2019-01-17 RX ORDER — BUMETANIDE 1 MG/1
TABLET ORAL
Qty: 90 TABLET | Refills: 3 | Status: SHIPPED
Start: 2019-01-17 | End: 2019-07-15

## 2019-02-04 ENCOUNTER — TRANSCRIBE ORDERS (OUTPATIENT)
Dept: PULMONOLOGY | Facility: CLINIC | Age: 74
End: 2019-02-04

## 2019-02-04 DIAGNOSIS — R91.1 SOLITARY PULMONARY NODULE: Primary | ICD-10-CM

## 2019-03-11 ENCOUNTER — OFFICE VISIT (OUTPATIENT)
Dept: CARDIOLOGY | Facility: CLINIC | Age: 74
End: 2019-03-11

## 2019-03-11 VITALS
SYSTOLIC BLOOD PRESSURE: 124 MMHG | HEIGHT: 67 IN | DIASTOLIC BLOOD PRESSURE: 68 MMHG | WEIGHT: 228 LBS | OXYGEN SATURATION: 97 % | HEART RATE: 63 BPM | BODY MASS INDEX: 35.79 KG/M2

## 2019-03-11 DIAGNOSIS — I10 ESSENTIAL HYPERTENSION: ICD-10-CM

## 2019-03-11 DIAGNOSIS — E66.09 CLASS 2 OBESITY DUE TO EXCESS CALORIES WITHOUT SERIOUS COMORBIDITY WITH BODY MASS INDEX (BMI) OF 35.0 TO 35.9 IN ADULT: ICD-10-CM

## 2019-03-11 DIAGNOSIS — G47.33 OSA ON CPAP: ICD-10-CM

## 2019-03-11 DIAGNOSIS — I48.0 PAF (PAROXYSMAL ATRIAL FIBRILLATION) (HCC): Primary | ICD-10-CM

## 2019-03-11 DIAGNOSIS — E78.2 MIXED HYPERLIPIDEMIA: ICD-10-CM

## 2019-03-11 DIAGNOSIS — Z99.89 OSA ON CPAP: ICD-10-CM

## 2019-03-11 PROBLEM — E66.812 CLASS 2 OBESITY DUE TO EXCESS CALORIES WITHOUT SERIOUS COMORBIDITY WITH BODY MASS INDEX (BMI) OF 35.0 TO 35.9 IN ADULT: Status: ACTIVE | Noted: 2018-09-14

## 2019-03-11 PROCEDURE — 93000 ELECTROCARDIOGRAM COMPLETE: CPT | Performed by: INTERNAL MEDICINE

## 2019-03-11 PROCEDURE — 99214 OFFICE O/P EST MOD 30 MIN: CPT | Performed by: INTERNAL MEDICINE

## 2019-03-11 NOTE — PROGRESS NOTES
Reason for Visit: cardiovascular follow up.    HPI:  Anne-Marie Hayes is a 73 y.o. female is here today for follow-up.  She was admitted back in August with a atrial fibrillation with rapid ventricular response.  She underwent ROBEL and cardioversion.  She was then seen in follow-up in September by MANDO Caputo, and was doing well at that time.  She has had a couple of episodes of palpitations, but these have been relatively short and self limited.  When she had these palpitations her heart rate became elevated but was less than 90.  She had steroid shot for her knees and this seemed to cause palpitations.      Previous Cardiac Testing and Procedures:  - ROBEL (8/13/2018) Normal left ventricular systolic function, mild MR, mild to moderate TR, no evidence of left atrial appendage thrombus.  - Cardioversion (8/13/2018) Successful cardioversion from atrial fibrillation to sinus rhythm    Patient Active Problem List   Diagnosis   • PAF (paroxysmal atrial fibrillation) (CMS/HCC)   • Atrial flutter with rapid ventricular response (CMS/HCC)   • PRINCE on CPAP   • Essential hypertension   • Mixed hyperlipidemia   • Class 2 obesity due to excess calories without serious comorbidity with body mass index (BMI) of 35.0 to 35.9 in adult       Social History     Tobacco Use   • Smoking status: Former Smoker   • Smokeless tobacco: Never Used   Substance Use Topics   • Alcohol use: Yes     Alcohol/week: 4.2 oz     Types: 7 Glasses of wine per week   • Drug use: No       Family History   Problem Relation Age of Onset   • Atrial fibrillation Mother    • Heart disease Father    • Breast cancer Neg Hx    • Ovarian cancer Neg Hx    • Uterine cancer Neg Hx    • Colon cancer Neg Hx    • Melanoma Neg Hx        The following portions of the patient's history were reviewed and updated as appropriate: allergies, current medications, past family history, past medical history, past social history, past surgical history and problem  "list.      Current Outpatient Medications:   •  apixaban (ELIQUIS) 5 MG tablet tablet, Take 1 tablet by mouth Every 12 (Twelve) Hours., Disp: 60 tablet, Rfl: 0  •  bumetanide (BUMEX) 1 MG tablet, Take 1 mg by mouth Daily., Disp: , Rfl:   •  cholecalciferol (VITAMIN D3) 1000 units tablet, Take 1,000 Units by mouth 2 (Two) Times a Day., Disp: , Rfl:   •  CloNIDine (CATAPRES) 0.1 MG tablet, Take 0.1 mg by mouth 2 (Two) Times a Day., Disp: , Rfl:   •  diltiaZEM CD (CARDIZEM CD) 360 MG 24 hr capsule, Take 1 capsule by mouth Daily., Disp: 30 capsule, Rfl: 11  •  fluticasone (FLONASE) 50 MCG/ACT nasal spray, 2 sprays into the nostril(s) as directed by provider Daily As Needed for Rhinitis or Allergies., Disp: , Rfl:   •  Glucosamine 500 MG capsule, Take 1 capsule by mouth Daily., Disp: , Rfl:   •  KLOR-CON 10 MEQ CR tablet, Take 10 mEq by mouth 2 (Two) Times a Day., Disp: , Rfl:   •  labetalol (NORMODYNE) 300 MG tablet, Take 300 mg by mouth Every 12 (Twelve) Hours., Disp: , Rfl:   •  losartan (COZAAR) 50 MG tablet, Take 1 tablet by mouth Daily., Disp: 30 tablet, Rfl: 0  •  Omega-3 Fatty Acids (FISH OIL) 1000 MG capsule capsule, Take 1,000 mg by mouth Daily With Breakfast., Disp: , Rfl:   •  simvastatin (ZOCOR) 20 MG tablet, Take 20 mg by mouth Every Night., Disp: , Rfl:     Review of Systems   Constitution: Negative for chills and fever.   Cardiovascular: Negative for chest pain and paroxysmal nocturnal dyspnea.   Respiratory: Negative for cough and shortness of breath.    Skin: Negative for rash.   Gastrointestinal: Negative for abdominal pain and heartburn.   Neurological: Negative for dizziness and numbness.       Objective   /68 (BP Location: Left arm, Patient Position: Sitting, Cuff Size: Adult)   Pulse 63   Ht 170.2 cm (67.01\")   Wt 103 kg (228 lb)   SpO2 97%   BMI 35.70 kg/m²   Physical Exam   Constitutional: She is oriented to person, place, and time. She appears well-developed and well-nourished. "   HENT:   Head: Normocephalic and atraumatic.   Cardiovascular: Normal rate, regular rhythm and normal heart sounds.   No murmur heard.  Pulmonary/Chest: Effort normal and breath sounds normal.   Musculoskeletal: She exhibits no edema.   Neurological: She is alert and oriented to person, place, and time.   Skin: Skin is warm and dry.   Psychiatric: She has a normal mood and affect.       ECG 12 Lead  Date/Time: 3/11/2019 7:52 PM  Performed by: Jovani Lovelace MD  Authorized by: Jovani Lovelace MD   Comparison: compared with previous ECG from 9/14/2018  Comparison to previous ECG: Premature atrial contractions are no longer present  Rhythm: sinus rhythm  Rate: normal    Clinical impression: normal ECG              ICD-10-CM ICD-9-CM   1. PAF (paroxysmal atrial fibrillation) (CMS/[facility]) I48.0 427.31   2. PRINCE on CPAP G47.33 327.23    Z99.89 V46.8   3. Essential hypertension I10 401.9   4. Mixed hyperlipidemia E78.2 272.2   5. Class 2 obesity due to excess calories without serious comorbidity with body mass index (BMI) of 35.0 to 35.9 in adult E66.09 278.00    Z68.35 V85.35         Assessment/Plan:  1.  Paroxysmal atrial fibrillation: Status post cardioversion in August.  Only rare palpitations.  In sinus rhythm on EKG today.  Continue diltiazem and anticoagulation with Eliquis.    2.  Obstructive sleep apnea: Compliant with CPAP.      3.  Essential hypertension: Blood pressure remains well controlled on current therapy.    4.  Hyperlipidemia: Managed on simvastatin.  Lipid panel followed by PCP.    5.  Obesity: Patient's Body mass index is 35.7 kg/m². BMI is above normal parameters. Recommendations include: exercise counseling and nutrition counseling.

## 2019-03-18 ENCOUNTER — HOSPITAL ENCOUNTER (OUTPATIENT)
Dept: CT IMAGING | Age: 74
Discharge: HOME | End: 2019-03-18
Payer: MEDICARE

## 2019-03-18 DIAGNOSIS — R91.1 SOLITARY PULMONARY NODULE: ICD-10-CM

## 2019-03-18 DIAGNOSIS — R91.1 SOLITARY LUNG NODULE: ICD-10-CM

## 2019-03-18 PROCEDURE — 71250 CT THORAX DX C-: CPT

## 2019-03-27 ENCOUNTER — OFFICE VISIT (OUTPATIENT)
Dept: PULMONOLOGY | Facility: CLINIC | Age: 74
End: 2019-03-27

## 2019-03-27 VITALS
SYSTOLIC BLOOD PRESSURE: 130 MMHG | OXYGEN SATURATION: 98 % | WEIGHT: 218 LBS | HEIGHT: 67 IN | DIASTOLIC BLOOD PRESSURE: 84 MMHG | HEART RATE: 70 BPM | BODY MASS INDEX: 34.21 KG/M2

## 2019-03-27 DIAGNOSIS — Z99.89 OSA ON CPAP: ICD-10-CM

## 2019-03-27 DIAGNOSIS — R91.1 NODULE OF UPPER LOBE OF RIGHT LUNG: Primary | ICD-10-CM

## 2019-03-27 DIAGNOSIS — G47.33 OSA ON CPAP: ICD-10-CM

## 2019-03-27 DIAGNOSIS — R06.00 DYSPNEA, UNSPECIFIED TYPE: ICD-10-CM

## 2019-03-27 DIAGNOSIS — Z87.891 PERSONAL HISTORY OF NICOTINE DEPENDENCE: ICD-10-CM

## 2019-03-27 PROCEDURE — 99213 OFFICE O/P EST LOW 20 MIN: CPT | Performed by: INTERNAL MEDICINE

## 2019-03-27 NOTE — PROGRESS NOTES
Subjective   Anne-Marie Hayes is a 73 y.o. female.     Background: Pt with RUL lung nodule, thyroid dz and ovarian lesion, atrial fibrillation.  mild midflow reduction o/w nl pft 8/2018    Chief Complaint   Patient presents with   • Lung Nodule        History of Present Illness   She had a spirometry in the hospital with atrial fibrillation.  She sleeps all night every night with the cpap and loves it.  No asthmatic symptoms.  He follows up regarding lung nodule with scan as below.  The nodules asymptomatic in her chest for an undetermined period of time but at least for 2 years with no aggravating or alleviating factors or associated symptoms    Medical/Family/Social History   has a past medical history of Arthritis, Atrial fibrillation (CMS/HCC), Hyperlipidemia, and Hypertension.   has a past surgical history that includes Tubal ligation and Cardiac catheterization (1987).  family history includes Atrial fibrillation in her mother; Heart disease in her father.   reports that she has quit smoking. She has never used smokeless tobacco. She reports that she drinks about 4.2 oz of alcohol per week. She reports that she does not use drugs.  No Known Allergies  Medications    Current Outpatient Medications:   •  apixaban (ELIQUIS) 5 MG tablet tablet, Take 1 tablet by mouth Every 12 (Twelve) Hours., Disp: 60 tablet, Rfl: 0  •  bumetanide (BUMEX) 1 MG tablet, Take 1 mg by mouth Daily., Disp: , Rfl:   •  cholecalciferol (VITAMIN D3) 1000 units tablet, Take 1,000 Units by mouth 2 (Two) Times a Day., Disp: , Rfl:   •  CloNIDine (CATAPRES) 0.1 MG tablet, Take 0.1 mg by mouth 2 (Two) Times a Day., Disp: , Rfl:   •  diltiaZEM CD (CARDIZEM CD) 360 MG 24 hr capsule, Take 1 capsule by mouth Daily., Disp: 30 capsule, Rfl: 11  •  fluticasone (FLONASE) 50 MCG/ACT nasal spray, 2 sprays into the nostril(s) as directed by provider Daily As Needed for Rhinitis or Allergies., Disp: , Rfl:   •  Glucosamine 500 MG capsule, Take 1 capsule  "by mouth Daily., Disp: , Rfl:   •  KLOR-CON 10 MEQ CR tablet, Take 10 mEq by mouth 2 (Two) Times a Day., Disp: , Rfl:   •  labetalol (NORMODYNE) 300 MG tablet, Take 300 mg by mouth Every 12 (Twelve) Hours., Disp: , Rfl:   •  losartan (COZAAR) 50 MG tablet, Take 1 tablet by mouth Daily., Disp: 30 tablet, Rfl: 0  •  Omega-3 Fatty Acids (FISH OIL) 1000 MG capsule capsule, Take 1,000 mg by mouth Daily With Breakfast., Disp: , Rfl:   •  simvastatin (ZOCOR) 20 MG tablet, Take 20 mg by mouth Every Night., Disp: , Rfl:     Review of Systems   Constitutional: Negative for chills and fever.   Cardiovascular: Negative for chest pain.   Gastrointestinal: Negative for nausea and vomiting.   Psychiatric/Behavioral: Negative for sleep disturbance.     Objective   /84   Pulse 70   Ht 170.2 cm (67\")   Wt 98.9 kg (218 lb)   SpO2 98% Comment: RA  Breastfeeding? No   BMI 34.14 kg/m²   Physical Exam   Constitutional: She appears well-developed. She does not appear ill. No distress.   HENT:   Head: Atraumatic.   Nose: Nose normal.   Eyes: Conjunctivae and EOM are normal. No scleral icterus.   Neck: Neck supple. No JVD present.   Cardiovascular: Normal rate, regular rhythm, S1 normal and S2 normal.   Pulmonary/Chest: Effort normal and breath sounds normal. She has no wheezes. She has no rales.   Abdominal: Soft. She exhibits no distension. There is no tenderness.   Musculoskeletal: She exhibits no deformity.   Neurological: She is alert.   Skin: Skin is warm. No rash noted.   Psychiatric: She has a normal mood and affect.        -----------------------------------------------------------------------------------------------  Recent Imaging:    Examination. CT CHEST WO CONTRAST  History: Solitary lung nodule.  DLP: 624 mGycm.  The CT scan of the chest is performed without intravenous contrast  enhancement. The images are acquired in axial plane with subsequent  reconstruction in coronal and sagittal plane.  The comparison is " made with the previous study dated 9/19/2018.  A noncalcified oval nodule in the right upper lobe anteriorly, image  #62 in axial plane, is again noted and measures 1 cm in greatest  dimension. This is unchanged in the previous study.  A smaller nodule in the left upper lobe anterior segment, image #50 in  axial plane, is stable and measures 3 mm in greatest dimension. This  is unchanged in the previous study.  No new nodules are seen.  Large calcified granuloma seen located posteriorly in the lingular  segment of the left upper lobe.  Mild dependent atelectasis seen at left base posteriorly.  There is asymmetrical thyromegaly with a poorly defined calcified  nodule located posteriorly in the right lobe and displacing the  trachea and esophagus towards left. This is unchanged.  Normal appearing axillary lymph nodes are seen.  The breast is suboptimally evaluated. No discrete mass or nodule is  seen. There are benign calcification in the breasts bilaterally.  Atheromatous changes of thoracic aorta are seen. No aneurysmal  dilatation.  Atheromatous changes of coronary arteries are seen.  A small fluid accumulation adjacent and above the distal left anterior  pulmonary vein and the left atrium is stable. No evidence of  mediastinal lymphadenopathy.  The visualized unenhanced liver and spleen appear unremarkable.  Moderate lobulation of the left adrenal gland is seen. Large  low-density mass from the posterior aspect of the right kidney and a  smaller low density nodules of the left kidney partially visualized  and suboptimally evaluated.  Moderate chronic degenerative changes of the thoracic spine are seen  with a mild dextroscoliosis. No focal bony lesion.  IMPRESSION:  The stable pulmonary nodules. Another follow-up  examination in 12 months is recommended.  Other findings as above.  Signed by Dr Edgar Aguilar on 3/18/2019 1:46  PM  -----------------------------------------------------------------------------------------------  Pulmonary Functions Testing Results 2018:  Interpretation :  1.  Spirometry reveals a mild decrease the patient's FEF 75%, and otherwise is within normal limits.  2.  There is improvement in spirometry postbronchodilator and postbronchodilator spirometry is normal.  3.  Lung volumes reveal hyperinflation.  4.  Diffusion capacity is within normal limits.  Wilbert Loving MD  -----------------------------------------------------------------------------------------------  Assessment/Plan   Problem List Items Addressed This Visit        Respiratory    PRINCE on CPAP      Other Visit Diagnoses     Nodule of upper lobe of right lung    -  Primary    Dyspnea, unspecified type        Personal history of nicotine dependence        quit 1983        Patient's Body mass index is 34.14 kg/m². BMI is above normal parameters. Recommendations include: referral to primary care.      The RUL nodule remains stable going back to the original screening scan in 2/2017  PRINCE well controlled  No additional ct needed regarding the lung nodule.  It meets benign criteria.  No need for screening ct as she stopped smoking over 15 years ago  Dyspnea is minimal at this point, may reflect some minimal bronchospasm    Electronically signed by Adrián Carlos MD, 3/27/2019, 10:47 AM

## 2019-04-29 ENCOUNTER — OFFICE VISIT (OUTPATIENT)
Dept: INTERNAL MEDICINE | Age: 74
End: 2019-04-29
Payer: MEDICARE

## 2019-04-29 VITALS
DIASTOLIC BLOOD PRESSURE: 70 MMHG | HEIGHT: 67 IN | SYSTOLIC BLOOD PRESSURE: 134 MMHG | BODY MASS INDEX: 35.79 KG/M2 | HEART RATE: 69 BPM | OXYGEN SATURATION: 97 % | WEIGHT: 228 LBS

## 2019-04-29 DIAGNOSIS — E78.00 PURE HYPERCHOLESTEROLEMIA: ICD-10-CM

## 2019-04-29 DIAGNOSIS — R73.01 IMPAIRED FASTING GLUCOSE: ICD-10-CM

## 2019-04-29 DIAGNOSIS — Z00.00 ROUTINE GENERAL MEDICAL EXAMINATION AT A HEALTH CARE FACILITY: ICD-10-CM

## 2019-04-29 DIAGNOSIS — Z23 NEED FOR PROPHYLACTIC VACCINATION AGAINST DIPHTHERIA-TETANUS-PERTUSSIS (DTP): ICD-10-CM

## 2019-04-29 DIAGNOSIS — E78.49 OTHER HYPERLIPIDEMIA: Primary | ICD-10-CM

## 2019-04-29 LAB
ALBUMIN SERPL-MCNC: 4.3 G/DL (ref 3.5–5.2)
ALP BLD-CCNC: 136 U/L (ref 35–104)
ALT SERPL-CCNC: 14 U/L (ref 5–33)
ANION GAP SERPL CALCULATED.3IONS-SCNC: 13 MMOL/L (ref 7–19)
AST SERPL-CCNC: 13 U/L (ref 5–32)
BILIRUB SERPL-MCNC: 0.6 MG/DL (ref 0.2–1.2)
BUN BLDV-MCNC: 12 MG/DL (ref 8–23)
CALCIUM SERPL-MCNC: 10.3 MG/DL (ref 8.8–10.2)
CHLORIDE BLD-SCNC: 104 MMOL/L (ref 98–111)
CHOLESTEROL, TOTAL: 136 MG/DL (ref 160–199)
CO2: 24 MMOL/L (ref 22–29)
CREAT SERPL-MCNC: 0.7 MG/DL (ref 0.5–0.9)
GFR NON-AFRICAN AMERICAN: >60
GLUCOSE BLD-MCNC: 112 MG/DL (ref 74–109)
HBA1C MFR BLD: 5.5 % (ref 4–6)
HCT VFR BLD CALC: 40.7 % (ref 37–47)
HDLC SERPL-MCNC: 49 MG/DL (ref 65–121)
HEMOGLOBIN: 13.2 G/DL (ref 12–16)
LDL CHOLESTEROL CALCULATED: 54 MG/DL
MCH RBC QN AUTO: 29.4 PG (ref 27–31)
MCHC RBC AUTO-ENTMCNC: 32.4 G/DL (ref 33–37)
MCV RBC AUTO: 90.6 FL (ref 81–99)
PDW BLD-RTO: 13 % (ref 11.5–14.5)
PLATELET # BLD: 194 K/UL (ref 130–400)
PMV BLD AUTO: 11.3 FL (ref 9.4–12.3)
POTASSIUM SERPL-SCNC: 4.1 MMOL/L (ref 3.5–5)
RBC # BLD: 4.49 M/UL (ref 4.2–5.4)
SODIUM BLD-SCNC: 141 MMOL/L (ref 136–145)
TOTAL PROTEIN: 6.6 G/DL (ref 6.6–8.7)
TRIGL SERPL-MCNC: 163 MG/DL (ref 0–149)
TSH SERPL DL<=0.05 MIU/L-ACNC: 1 UIU/ML (ref 0.27–4.2)
WBC # BLD: 7.4 K/UL (ref 4.8–10.8)

## 2019-04-29 PROCEDURE — 1123F ACP DISCUSS/DSCN MKR DOCD: CPT | Performed by: INTERNAL MEDICINE

## 2019-04-29 PROCEDURE — 3017F COLORECTAL CA SCREEN DOC REV: CPT | Performed by: INTERNAL MEDICINE

## 2019-04-29 PROCEDURE — 4040F PNEUMOC VAC/ADMIN/RCVD: CPT | Performed by: INTERNAL MEDICINE

## 2019-04-29 PROCEDURE — G0439 PPPS, SUBSEQ VISIT: HCPCS | Performed by: INTERNAL MEDICINE

## 2019-04-29 ASSESSMENT — LIFESTYLE VARIABLES: HOW OFTEN DO YOU HAVE A DRINK CONTAINING ALCOHOL: 0

## 2019-04-29 ASSESSMENT — PATIENT HEALTH QUESTIONNAIRE - PHQ9
SUM OF ALL RESPONSES TO PHQ QUESTIONS 1-9: 0
SUM OF ALL RESPONSES TO PHQ QUESTIONS 1-9: 0

## 2019-04-29 NOTE — PATIENT INSTRUCTIONS
Personalized Preventive Plan for Celi Garcia - 4/29/2019  Medicare offers a range of preventive health benefits. Some of the tests and screenings are paid in full while other may be subject to a deductible, co-insurance, and/or copay. Some of these benefits include a comprehensive review of your medical history including lifestyle, illnesses that may run in your family, and various assessments and screenings as appropriate. After reviewing your medical record and screening and assessments performed today your provider may have ordered immunizations, labs, imaging, and/or referrals for you. A list of these orders (if applicable) as well as your Preventive Care list are included within your After Visit Summary for your review. Other Preventive Recommendations:    · A preventive eye exam performed by an eye specialist is recommended every 1-2 years to screen for glaucoma; cataracts, macular degeneration, and other eye disorders. · A preventive dental visit is recommended every 6 months. · Try to get at least 150 minutes of exercise per week or 10,000 steps per day on a pedometer . · Order or download the FREE \"Exercise & Physical Activity: Your Everyday Guide\" from [employer]. Call 6-733.490.6728 or search [employer] online. · You need 9999-6400 mg of calcium and 9234-2873 IU of vitamin D per day. It is possible to meet your calcium requirement with diet alone, but a vitamin D supplement is usually necessary to meet this goal.  · When exposed to the sun, use a sunscreen that protects against both UVA and UVB radiation with an SPF of 30 or greater. Reapply every 2 to 3 hours or after sweating, drying off with a towel, or swimming. · Always wear a seat belt when traveling in a car. Always wear a helmet when riding a bicycle or motorcycle.

## 2019-04-29 NOTE — PROGRESS NOTES
Chief Complaint   Patient presents with   Mike Pham Ou does breast exams    Atrial Fibrillation       HPI: Pt wears cpap all night every night. Past Medical History:   Diagnosis Date    Arthritis     Heart palpitations     Hypercalcemia 2017    Hyperlipidemia     Hypertension     Impaired fasting glucose 2017    Lumbar spinal stenosis     Lung nodule, solitary 2017    Right upper lobe    Multinodular goiter 2017    Obstructive sleep apnea 10/11/2018    Paroxysmal atrial fibrillation ([address] 2018    Primary osteoarthritis involving multiple joints 2017    Wears glasses        Past Surgical History:   Procedure Laterality Date    BREAST BIOPSY  12/3/2007    stereotactic left, fibrocystic changes    BREAST BIOPSY  2007    stereotactic left, fibrocystic changes    CARDIAC CATHETERIZATION      negative    CATARACT REMOVAL Bilateral     OTHER SURGICAL HISTORY      skin cancer spot on her nose removed    TUBAL LIGATION         Family History   Problem Relation Age of Onset    Hypertension Father        Social History     Socioeconomic History    Marital status:      Spouse name: Not on file    Number of children: Not on file    Years of education: Not on file    Highest education level: Not on file   Occupational History    Not on file   Social Needs    Financial resource strain: Not on file    Food insecurity:     Worry: Not on file     Inability: Not on file    Transportation needs:     Medical: Not on file     Non-medical: Not on file   Tobacco Use    Smoking status: Former Smoker     Packs/day: 0.50     Years: 20.00     Pack years: 10.00     Last attempt to quit: 1996     Years since quittin.2    Smokeless tobacco: Never Used    Tobacco comment: 2.5 ppd for 17 years. Stopped . Substance and Sexual Activity    Alcohol use:  Yes    Drug use: No    Sexual activity: Not on file   Lifestyle    Physical activity:     Days per week: Not on file     Minutes per session: Not on file    Stress: Not on file   Relationships    Social connections:     Talks on phone: Not on file     Gets together: Not on file     Attends [employer] service: Not on file     Active member of club or organization: Not on file     Attends meetings of clubs or organizations: Not on file     Relationship status: Not on file    Intimate partner violence:     Fear of current or ex partner: Not on file     Emotionally abused: Not on file     Physically abused: Not on file     Forced sexual activity: Not on file   Other Topics Concern    Not on file   Social History Narrative    Not on file       No Known Allergies    Current Outpatient Medications   Medication Sig Dispense Refill    bumetanide (BUMEX) 1 MG tablet TAKE 1 TABLET DAILY 90 tablet 3    rosuvastatin (CRESTOR) 10 MG tablet Take 1 tablet by mouth daily 90 tablet 3    apixaban (ELIQUIS) 5 MG TABS tablet Take 1 tablet by mouth every 12 hours 180 tablet 3    cloNIDine (CATAPRES) 0.1 MG tablet Take 1 tablet by mouth 2 times daily 180 tablet 3    potassium chloride (KLOR-CON M) 10 MEQ extended release tablet Take 1 tablet by mouth 2 times daily 180 tablet 3    labetalol (NORMODYNE) 300 MG tablet Take 1 tablet by mouth 2 times daily 180 tablet 3    diltiazem (CARDIZEM CD) 360 MG extended release capsule Take 1 capsule by mouth daily 90 capsule 3    losartan (COZAAR) 50 MG tablet Take 1 tablet by mouth daily 90 tablet 3    fluticasone (FLONASE) 50 MCG/ACT nasal spray USE 2 SPRAYS IN EACH NOSTRIL ONCE DAILY 3 Bottle 3    Cholecalciferol (VITAMIN D3) 1000 units TABS Take 2 tablets by mouth daily       FISH OIL 1 capsule by Does not apply route daily.  Glucosamine 500 MG CAPS Take 1 capsule by mouth 2 times daily. No current facility-administered medications for this visit.         Review of Systems    /70 (Site: Left Upper Arm)   Pulse 69   Ht 5' 7\" (1.702 m) Wt 228 lb (103.4 kg)   SpO2 97%   BMI 35.71 kg/m²   BP Readings from Last 7 Encounters:   04/29/19 134/70   10/02/18 120/84   08/17/18 126/76   02/08/18 128/80   12/20/17 138/84   11/27/17 138/80   08/07/17 128/72     Wt Readings from Last 7 Encounters:   04/29/19 228 lb (103.4 kg)   10/02/18 219 lb (99.3 kg)   08/17/18 221 lb 12.8 oz (100.6 kg)   02/08/18 235 lb (106.6 kg)   12/20/17 237 lb (107.5 kg)   08/07/17 230 lb (104.3 kg)   11/22/16 233 lb (105.7 kg)     BMI Readings from Last 7 Encounters:   04/29/19 35.71 kg/m²   10/02/18 34.30 kg/m²   08/17/18 34.74 kg/m²   02/08/18 36.81 kg/m²   12/20/17 37.12 kg/m²   08/07/17 36.02 kg/m²   11/22/16 36.49 kg/m²     Resp Readings from Last 7 Encounters:   02/08/18 18   12/20/17 18   11/22/16 18       Physical Exam    Results for orders placed or performed in visit on 04/29/19   Hemoglobin A1C   Result Value Ref Range    Hemoglobin A1C 5.5 4.0 - 6.0 %   Lipid Panel   Result Value Ref Range    Cholesterol, Total 136 (L) 160 - 199 mg/dL    Triglycerides 163 (H) 0 - 149 mg/dL    HDL 49 (L) 65 - 121 mg/dL    LDL Calculated 54 <100 mg/dL   TSH without Reflex   Result Value Ref Range    TSH 0.998 0.270 - 4.200 uIU/mL   Comprehensive Metabolic Panel   Result Value Ref Range    Sodium 141 136 - 145 mmol/L    Potassium 4.1 3.5 - 5.0 mmol/L    Chloride 104 98 - 111 mmol/L    CO2 24 22 - 29 mmol/L    Anion Gap 13 7 - 19 mmol/L    Glucose 112 (H) 74 - 109 mg/dL    BUN 12 8 - 23 mg/dL    CREATININE 0.7 0.5 - 0.9 mg/dL    GFR Non-African American >60 >60    Calcium 10.3 (H) 8.8 - 10.2 mg/dL    Total Protein 6.6 6.6 - 8.7 g/dL    Alb 4.3 3.5 - 5.2 g/dL    Total Bilirubin 0.6 0.2 - 1.2 mg/dL    Alkaline Phosphatase 136 (H) 35 - 104 U/L    ALT 14 5 - 33 U/L    AST 13 5 - 32 U/L   CBC   Result Value Ref Range    WBC 7.4 4.8 - 10.8 K/uL    RBC 4.49 4.20 - 5.40 M/uL    Hemoglobin 13.2 12.0 - 16.0 g/dL    Hematocrit 40.7 37.0 - 47.0 %    MCV 90.6 81.0 - 99.0 fL    MCH 29.4 27.0 - 31.0 pg MCHC 32.4 (L) 33.0 - 37.0 g/dL    RDW 13.0 11.5 - 14.5 %    Platelets 743 821 - 210 K/uL    MPV 11.3 9.4 - 12.3 fL       ASSESSMENT/ PLAN:  There are no diagnoses linked to this encounter. Medicare Annual Wellness Visit  Name: Dannie Bailey Date: 2019   MRN: [id number] Sex: Female   Age: 68 y.o. Ethnicity: Non-/Non    : 1945 Race: Windy Ahn is here for Medicare AWV (Dr. Fransisca Stringer does breast exams) and Atrial Fibrillation    Screenings for behavioral, psychosocial and functional/safety risks, and cognitive dysfunction are all negative except as indicated below. These results, as well as other patient data from the [address] form, are documented in Flowsheets linked to this Encounter. No Known Allergies  Prior to Visit Medications    Medication Sig Taking? Authorizing Provider   bumetanide (BUMEX) 1 MG tablet TAKE 1 TABLET DAILY  Juliann Pickering MD   rosuvastatin (CRESTOR) 10 MG tablet Take 1 tablet by mouth daily  Juliann Pickering MD   apixaban (ELIQUIS) 5 MG TABS tablet Take 1 tablet by mouth every 12 hours  Juliann Pickering MD   cloNIDine (CATAPRES) 0.1 MG tablet Take 1 tablet by mouth 2 times daily  Juliann Pickering MD   potassium chloride (KLOR-CON M) 10 MEQ extended release tablet Take 1 tablet by mouth 2 times daily  Juliann Pickering MD   labetalol (NORMODYNE) 300 MG tablet Take 1 tablet by mouth 2 times daily  Juliann Pickering MD   diltiazem (CARDIZEM CD) 360 MG extended release capsule Take 1 capsule by mouth daily  Juliann Pickering MD   losartan (COZAAR) 50 MG tablet Take 1 tablet by mouth daily  Juliann Pickering MD   fluticasone (FLONASE) 50 MCG/ACT nasal spray USE 2 SPRAYS IN EACH NOSTRIL ONCE DAILY  Juliann Pickering MD   Cholecalciferol (VITAMIN D3) 1000 units TABS Take 2 tablets by mouth daily   Historical Provider, MD   FISH OIL 1 capsule by Does not apply route daily.     Historical Provider, MD   Glucosamine 500 MG CAPS Take 1 capsule by mouth 2 times daily. Historical Provider, MD     Past Medical History:   Diagnosis Date    Arthritis     Heart palpitations     Hypercalcemia 8/7/2017    Hyperlipidemia     Hypertension     Impaired fasting glucose 8/7/2017    Lumbar spinal stenosis     Lung nodule, solitary 8/7/2017    Right upper lobe    Multinodular goiter 8/7/2017    Obstructive sleep apnea 10/11/2018    Paroxysmal atrial fibrillation ([address] 8/17/2018    Primary osteoarthritis involving multiple joints 8/7/2017    Wears glasses      Past Surgical History:   Procedure Laterality Date    BREAST BIOPSY  12/3/2007    stereotactic left, fibrocystic changes    BREAST BIOPSY  12/17/2007    stereotactic left, fibrocystic changes    CARDIAC CATHETERIZATION      negative    CATARACT REMOVAL Bilateral 2017    OTHER SURGICAL HISTORY  2013    skin cancer spot on her nose removed    TUBAL LIGATION       Family History   Problem Relation Age of Onset    Hypertension Father        CareTeam (Including outside providers/suppliers regularly involved in providing care):   Patient Care Team:  Maricruz Vences MD as PCP - General (Internal Medicine)    Wt Readings from Last 3 Encounters:   04/29/19 228 lb (103.4 kg)   10/02/18 219 lb (99.3 kg)   08/17/18 221 lb 12.8 oz (100.6 kg)     Vitals:    04/29/19 1402   BP: 134/70   Site: Left Upper Arm   Pulse: 69   SpO2: 97%   Weight: 228 lb (103.4 kg)   Height: 5' 7\" (1.702 m)     Body mass index is 35.71 kg/m². Based upon direct observation of the patient, evaluation of cognition reveals none  Patient's complete Health Risk Assessment and screening values have been reviewed and are found in Flowsheets. The following problems were reviewed today and where indicated follow up appointments were made and/or referrals ordered.     Positive Risk Factor Screenings with Interventions:     General Health:  General  In general, how would you say your health is?: Very Good  Do you get the social and emotional support that you need?: Yes  Do you have a Living Will?: (!) No  General Health Risk Interventions:  · none    Health Habits/Nutrition:  Health Habits/Nutrition  Do you exercise for at least 20 minutes 2-3 times per week?: Yes  Have you lost any weight without trying in the past 3 months?: No  Do you eat fewer than 2 meals per day?: No  Have you seen a dentist within the past year?: Yes  Body mass index is 35.71 kg/m². Health Habits/Nutrition Interventions:  · watch healthy diet and exercise     Personalized Preventive Plan   Current Health Maintenance Status  Immunization History   Administered Date(s) Administered    Influenza, High Dose (Fluzone 65 yrs and older) 11/14/2017, 09/26/2018    Pneumococcal 13-valent Conjugate ([id number]) 11/14/2017    Pneumococcal Polysaccharide ([id number]) 11/15/2016    Zoster Subunit (Shingrix) 09/24/2018, 01/24/2019        Health Maintenance   Topic Date Due    Hepatitis C screen  1945    DTaP/Tdap/Td vaccine (1 - Tdap) 08/07/1964    A1C test (Diabetic or Prediabetic)  04/29/2020    Potassium monitoring  04/29/2020    Creatinine monitoring  04/29/2020    Breast cancer screen  11/28/2020    Colon cancer screen colonoscopy  03/31/2021    Lipid screen  04/29/2024    DEXA (modify frequency per FRAX score)  Completed    Flu vaccine  Completed    Shingles Vaccine  Completed    Pneumococcal 65+ years Vaccine  Completed     Recommendations for Preventive Services Due: see orders and patient instructions/AVS.  .   Recommended screening schedule for the next 5-10 years is provided to the patient in written form: see Patient Instructions/AVS.

## 2019-05-29 ENCOUNTER — TELEPHONE (OUTPATIENT)
Dept: CARDIOLOGY | Facility: CLINIC | Age: 74
End: 2019-05-29

## 2019-05-29 NOTE — TELEPHONE ENCOUNTER
Partial knee replacement with Dr. Hassan in July. She needs cardiac clearance. Can she hold Eliquis and for how long?

## 2019-06-13 ENCOUNTER — TELEPHONE (OUTPATIENT)
Dept: INTERNAL MEDICINE | Age: 74
End: 2019-06-13

## 2019-06-13 RX ORDER — PIMECROLIMUS 10 MG/G
CREAM TOPICAL
Qty: 120 G | Refills: 0 | Status: SHIPPED
Start: 2019-06-13 | End: 2019-07-15

## 2019-06-13 NOTE — TELEPHONE ENCOUNTER
She was here in April and has been using vaseline and gloves at night, but needs something for dry,cracked, peeling skin on hands.    Wants sent to [facility]

## 2019-07-09 DIAGNOSIS — I10 ESSENTIAL HYPERTENSION: ICD-10-CM

## 2019-07-09 DIAGNOSIS — I48.0 PAROXYSMAL ATRIAL FIBRILLATION (HCC): ICD-10-CM

## 2019-07-09 RX ORDER — DILTIAZEM HYDROCHLORIDE 360 MG/1
360 CAPSULE, EXTENDED RELEASE ORAL DAILY
Qty: 90 CAPSULE | Refills: 3 | Status: SHIPPED | OUTPATIENT
Start: 2019-07-09 | End: 2020-06-17 | Stop reason: SDUPTHER

## 2019-07-09 RX ORDER — LOSARTAN POTASSIUM 50 MG/1
50 TABLET ORAL DAILY
Qty: 90 TABLET | Refills: 3 | Status: SHIPPED | OUTPATIENT
Start: 2019-07-09 | End: 2020-05-07 | Stop reason: SDUPTHER

## 2019-07-15 ENCOUNTER — HOSPITAL ENCOUNTER (OUTPATIENT)
Dept: PREADMISSION TESTING | Age: 74
Discharge: HOME | End: 2019-07-19
Payer: MEDICARE

## 2019-07-15 ENCOUNTER — HOSPITAL ENCOUNTER (OUTPATIENT)
Dept: GENERAL RADIOLOGY | Age: 74
Discharge: HOME | End: 2019-07-15
Payer: MEDICARE

## 2019-07-15 VITALS — HEIGHT: 67 IN | WEIGHT: 222 LBS | BODY MASS INDEX: 34.84 KG/M2

## 2019-07-15 LAB
ALBUMIN SERPL-MCNC: 4.4 G/DL (ref 3.5–5.2)
ALP BLD-CCNC: 124 U/L (ref 35–104)
ALT SERPL-CCNC: 15 U/L (ref 5–33)
ANION GAP SERPL CALCULATED.3IONS-SCNC: 11 MMOL/L (ref 7–19)
APTT: 31 SEC (ref 26–36.2)
AST SERPL-CCNC: 13 U/L (ref 5–32)
BASOPHILS ABSOLUTE: 0.1 K/UL (ref 0–0.2)
BASOPHILS RELATIVE PERCENT: 0.7 % (ref 0–1)
BILIRUB SERPL-MCNC: 0.6 MG/DL (ref 0.2–1.2)
BILIRUBIN URINE: NEGATIVE
BLOOD, URINE: NEGATIVE
BUN BLDV-MCNC: 16 MG/DL (ref 8–23)
CALCIUM SERPL-MCNC: 10.5 MG/DL (ref 8.8–10.2)
CHLORIDE BLD-SCNC: 106 MMOL/L (ref 98–111)
CLARITY: CLEAR
CO2: 25 MMOL/L (ref 22–29)
COLOR: NORMAL
CREAT SERPL-MCNC: 0.7 MG/DL (ref 0.5–0.9)
EOSINOPHILS ABSOLUTE: 0.2 K/UL (ref 0–0.6)
EOSINOPHILS RELATIVE PERCENT: 2.9 % (ref 0–5)
GFR NON-AFRICAN AMERICAN: >60
GLUCOSE BLD-MCNC: 108 MG/DL (ref 74–109)
GLUCOSE URINE: NEGATIVE MG/DL
HCT VFR BLD CALC: 41.8 % (ref 37–47)
HEMOGLOBIN: 13.2 G/DL (ref 12–16)
INR BLD: 1.26 (ref 0.88–1.18)
KETONES, URINE: NEGATIVE MG/DL
LEUKOCYTE ESTERASE, URINE: NEGATIVE
LYMPHOCYTES ABSOLUTE: 1.6 K/UL (ref 1.1–4.5)
LYMPHOCYTES RELATIVE PERCENT: 20.9 % (ref 20–40)
MCH RBC QN AUTO: 29.1 PG (ref 27–31)
MCHC RBC AUTO-ENTMCNC: 31.6 G/DL (ref 33–37)
MCV RBC AUTO: 92.1 FL (ref 81–99)
MONOCYTES ABSOLUTE: 0.7 K/UL (ref 0–0.9)
MONOCYTES RELATIVE PERCENT: 9 % (ref 0–10)
NEUTROPHILS ABSOLUTE: 5.1 K/UL (ref 1.5–7.5)
NEUTROPHILS RELATIVE PERCENT: 66.2 % (ref 50–65)
NITRITE, URINE: NEGATIVE
PDW BLD-RTO: 13.3 % (ref 11.5–14.5)
PH UA: 6.5 (ref 5–8)
PLATELET # BLD: 203 K/UL (ref 130–400)
PMV BLD AUTO: 11.6 FL (ref 9.4–12.3)
POTASSIUM SERPL-SCNC: 4.4 MMOL/L (ref 3.5–5)
PROTEIN UA: NEGATIVE MG/DL
PROTHROMBIN TIME: 15.2 SEC (ref 12–14.6)
RBC # BLD: 4.54 M/UL (ref 4.2–5.4)
SODIUM BLD-SCNC: 142 MMOL/L (ref 136–145)
SPECIFIC GRAVITY UA: 1.02 (ref 1–1.03)
TOTAL PROTEIN: 6.6 G/DL (ref 6.6–8.7)
URINE REFLEX TO CULTURE: NORMAL
UROBILINOGEN, URINE: 1 E.U./DL
WBC # BLD: 7.7 K/UL (ref 4.8–10.8)

## 2019-07-15 PROCEDURE — 85730 THROMBOPLASTIN TIME PARTIAL: CPT

## 2019-07-15 PROCEDURE — 87081 CULTURE SCREEN ONLY: CPT

## 2019-07-15 PROCEDURE — 93005 ELECTROCARDIOGRAM TRACING: CPT

## 2019-07-15 PROCEDURE — 71046 X-RAY EXAM CHEST 2 VIEWS: CPT

## 2019-07-15 PROCEDURE — 80053 COMPREHEN METABOLIC PANEL: CPT

## 2019-07-15 PROCEDURE — 81003 URINALYSIS AUTO W/O SCOPE: CPT

## 2019-07-15 PROCEDURE — 85025 COMPLETE CBC W/AUTO DIFF WBC: CPT

## 2019-07-15 PROCEDURE — 85610 PROTHROMBIN TIME: CPT

## 2019-07-15 RX ORDER — BUMETANIDE 1 MG/1
1 TABLET ORAL DAILY
COMMUNITY
End: 2019-11-04 | Stop reason: SDUPTHER

## 2019-07-15 RX ORDER — PREGABALIN 75 MG/1
75 CAPSULE ORAL ONCE
Status: CANCELLED | OUTPATIENT
Start: 2019-07-22

## 2019-07-15 RX ORDER — ACETAMINOPHEN 500 MG
1000 TABLET ORAL ONCE
Status: CANCELLED | OUTPATIENT
Start: 2019-07-22

## 2019-07-15 RX ORDER — CELECOXIB 200 MG/1
200 CAPSULE ORAL ONCE
Status: CANCELLED | OUTPATIENT
Start: 2019-07-22

## 2019-07-15 RX ORDER — OXYCODONE HCL 10 MG/1
10 TABLET, FILM COATED, EXTENDED RELEASE ORAL ONCE
Status: CANCELLED | OUTPATIENT
Start: 2019-07-22

## 2019-07-15 RX ORDER — DEXAMETHASONE SODIUM PHOSPHATE 10 MG/ML
10 INJECTION INTRAMUSCULAR; INTRAVENOUS ONCE
Status: CANCELLED | OUTPATIENT
Start: 2019-07-22

## 2019-07-16 LAB
EKG P AXIS: 13 DEGREES
EKG P-R INTERVAL: 138 MS
EKG Q-T INTERVAL: 392 MS
EKG QRS DURATION: 84 MS
EKG QTC CALCULATION (BAZETT): 385 MS
EKG T AXIS: 19 DEGREES
MRSA CULTURE ONLY: NORMAL

## 2019-07-22 ENCOUNTER — ANESTHESIA EVENT (OUTPATIENT)
Dept: OPERATING ROOM | Age: 74
End: 2019-07-22
Payer: MEDICARE

## 2019-07-22 ENCOUNTER — ANESTHESIA (OUTPATIENT)
Dept: OPERATING ROOM | Age: 74
End: 2019-07-22
Payer: MEDICARE

## 2019-07-22 ENCOUNTER — APPOINTMENT (OUTPATIENT)
Dept: GENERAL RADIOLOGY | Age: 74
End: 2019-07-22
Attending: ORTHOPAEDIC SURGERY
Payer: MEDICARE

## 2019-07-22 ENCOUNTER — HOSPITAL ENCOUNTER (OUTPATIENT)
Age: 74
Setting detail: OBSERVATION
Discharge: HOME HEALTH | End: 2019-07-23
Attending: ORTHOPAEDIC SURGERY | Admitting: ORTHOPAEDIC SURGERY
Payer: MEDICARE

## 2019-07-22 VITALS
SYSTOLIC BLOOD PRESSURE: 105 MMHG | RESPIRATION RATE: 22 BRPM | DIASTOLIC BLOOD PRESSURE: 61 MMHG | TEMPERATURE: 96.3 F | OXYGEN SATURATION: 99 %

## 2019-07-22 DIAGNOSIS — M15.9 PRIMARY OSTEOARTHRITIS INVOLVING MULTIPLE JOINTS: Primary | ICD-10-CM

## 2019-07-22 PROBLEM — M17.11 PRIMARY OSTEOARTHRITIS OF RIGHT KNEE: Status: ACTIVE | Noted: 2019-07-22

## 2019-07-22 PROBLEM — M17.10 ARTHRITIS OF KNEE: Status: ACTIVE | Noted: 2019-07-22

## 2019-07-22 LAB
ABO/RH: NORMAL
ANTIBODY SCREEN: NORMAL

## 2019-07-22 PROCEDURE — 3700000001 HC ADD 15 MINUTES (ANESTHESIA): Performed by: ORTHOPAEDIC SURGERY

## 2019-07-22 PROCEDURE — 86850 RBC ANTIBODY SCREEN: CPT

## 2019-07-22 PROCEDURE — 2580000003 HC RX 258: Performed by: ORTHOPAEDIC SURGERY

## 2019-07-22 PROCEDURE — G0378 HOSPITAL OBSERVATION PER HR: HCPCS

## 2019-07-22 PROCEDURE — 2500000003 HC RX 250 WO HCPCS: Performed by: ORTHOPAEDIC SURGERY

## 2019-07-22 PROCEDURE — 6360000002 HC RX W HCPCS: Performed by: ANESTHESIOLOGY

## 2019-07-22 PROCEDURE — C9290 INJ, BUPIVACAINE LIPOSOME: HCPCS | Performed by: ORTHOPAEDIC SURGERY

## 2019-07-22 PROCEDURE — 2580000003 HC RX 258: Performed by: NURSE ANESTHETIST, CERTIFIED REGISTERED

## 2019-07-22 PROCEDURE — 7100000001 HC PACU RECOVERY - ADDTL 15 MIN: Performed by: ORTHOPAEDIC SURGERY

## 2019-07-22 PROCEDURE — C1713 ANCHOR/SCREW BN/BN,TIS/BN: HCPCS | Performed by: ORTHOPAEDIC SURGERY

## 2019-07-22 PROCEDURE — 73560 X-RAY EXAM OF KNEE 1 OR 2: CPT

## 2019-07-22 PROCEDURE — 3700000000 HC ANESTHESIA ATTENDED CARE: Performed by: ORTHOPAEDIC SURGERY

## 2019-07-22 PROCEDURE — C1776 JOINT DEVICE (IMPLANTABLE): HCPCS | Performed by: ORTHOPAEDIC SURGERY

## 2019-07-22 PROCEDURE — 3600000015 HC SURGERY LEVEL 5 ADDTL 15MIN: Performed by: ORTHOPAEDIC SURGERY

## 2019-07-22 PROCEDURE — 7100000000 HC PACU RECOVERY - FIRST 15 MIN: Performed by: ORTHOPAEDIC SURGERY

## 2019-07-22 PROCEDURE — 6360000002 HC RX W HCPCS: Performed by: ORTHOPAEDIC SURGERY

## 2019-07-22 PROCEDURE — 97161 PT EVAL LOW COMPLEX 20 MIN: CPT

## 2019-07-22 PROCEDURE — 2709999900 HC NON-CHARGEABLE SUPPLY: Performed by: ORTHOPAEDIC SURGERY

## 2019-07-22 PROCEDURE — 2700000000 HC OXYGEN THERAPY PER DAY

## 2019-07-22 PROCEDURE — 2500000003 HC RX 250 WO HCPCS: Performed by: NURSE ANESTHETIST, CERTIFIED REGISTERED

## 2019-07-22 PROCEDURE — 86900 BLOOD TYPING SEROLOGIC ABO: CPT

## 2019-07-22 PROCEDURE — 6370000000 HC RX 637 (ALT 250 FOR IP): Performed by: ORTHOPAEDIC SURGERY

## 2019-07-22 PROCEDURE — 2720000010 HC SURG SUPPLY STERILE: Performed by: ORTHOPAEDIC SURGERY

## 2019-07-22 PROCEDURE — 6360000002 HC RX W HCPCS: Performed by: NURSE ANESTHETIST, CERTIFIED REGISTERED

## 2019-07-22 PROCEDURE — 97116 GAIT TRAINING THERAPY: CPT

## 2019-07-22 PROCEDURE — 86901 BLOOD TYPING SEROLOGIC RH(D): CPT

## 2019-07-22 PROCEDURE — 64447 NJX AA&/STRD FEMORAL NRV IMG: CPT | Performed by: NURSE ANESTHETIST, CERTIFIED REGISTERED

## 2019-07-22 PROCEDURE — 3600000005 HC SURGERY LEVEL 5 BASE: Performed by: ORTHOPAEDIC SURGERY

## 2019-07-22 PROCEDURE — 36415 COLL VENOUS BLD VENIPUNCTURE: CPT

## 2019-07-22 DEVICE — IMPLANTABLE DEVICE: Type: IMPLANTABLE DEVICE | Status: FUNCTIONAL

## 2019-07-22 DEVICE — Z  INACTIVE USE 2750024 CEMENT BONE 40GM W/ GENTMYCN HI VISC PALACOS R+G: Type: IMPLANTABLE DEVICE | Site: KNEE | Status: FUNCTIONAL

## 2019-07-22 DEVICE — IMPLANTABLE DEVICE: Type: IMPLANTABLE DEVICE | Site: KNEE | Status: FUNCTIONAL

## 2019-07-22 DEVICE — PSN PK CMT FEM RM SZ 3: Type: IMPLANTABLE DEVICE | Site: KNEE | Status: FUNCTIONAL

## 2019-07-22 RX ORDER — MIDAZOLAM HYDROCHLORIDE 1 MG/ML
2 INJECTION INTRAMUSCULAR; INTRAVENOUS
Status: COMPLETED | OUTPATIENT
Start: 2019-07-22 | End: 2019-07-22

## 2019-07-22 RX ORDER — SUCCINYLCHOLINE CHLORIDE 20 MG/ML
INJECTION INTRAMUSCULAR; INTRAVENOUS PRN
Status: DISCONTINUED | OUTPATIENT
Start: 2019-07-22 | End: 2019-07-22 | Stop reason: SDUPTHER

## 2019-07-22 RX ORDER — SODIUM CHLORIDE 9 MG/ML
INJECTION, SOLUTION INTRAVENOUS CONTINUOUS
Status: DISCONTINUED | OUTPATIENT
Start: 2019-07-22 | End: 2019-07-23 | Stop reason: HOSPADM

## 2019-07-22 RX ORDER — DIAZEPAM 5 MG/1
5 TABLET ORAL EVERY 6 HOURS PRN
Status: DISCONTINUED | OUTPATIENT
Start: 2019-07-22 | End: 2019-07-23 | Stop reason: HOSPADM

## 2019-07-22 RX ORDER — DIPHENHYDRAMINE HYDROCHLORIDE 50 MG/ML
12.5 INJECTION INTRAMUSCULAR; INTRAVENOUS
Status: DISCONTINUED | OUTPATIENT
Start: 2019-07-22 | End: 2019-07-22 | Stop reason: HOSPADM

## 2019-07-22 RX ORDER — SODIUM CHLORIDE 0.9 % (FLUSH) 0.9 %
10 SYRINGE (ML) INJECTION PRN
Status: DISCONTINUED | OUTPATIENT
Start: 2019-07-22 | End: 2019-07-23 | Stop reason: HOSPADM

## 2019-07-22 RX ORDER — FENTANYL CITRATE 50 UG/ML
50 INJECTION, SOLUTION INTRAMUSCULAR; INTRAVENOUS
Status: DISCONTINUED | OUTPATIENT
Start: 2019-07-22 | End: 2019-07-22 | Stop reason: HOSPADM

## 2019-07-22 RX ORDER — ACETAMINOPHEN 325 MG/1
650 TABLET ORAL EVERY 8 HOURS SCHEDULED
Status: DISCONTINUED | OUTPATIENT
Start: 2019-07-22 | End: 2019-07-23 | Stop reason: HOSPADM

## 2019-07-22 RX ORDER — SODIUM CHLORIDE 0.9 % (FLUSH) 0.9 %
10 SYRINGE (ML) INJECTION EVERY 12 HOURS SCHEDULED
Status: DISCONTINUED | OUTPATIENT
Start: 2019-07-22 | End: 2019-07-23 | Stop reason: HOSPADM

## 2019-07-22 RX ORDER — CLOBETASOL PROPIONATE 0.5 MG/G
OINTMENT TOPICAL 2 TIMES DAILY PRN
COMMUNITY
End: 2022-03-07

## 2019-07-22 RX ORDER — FAMCICLOVIR 500 MG/1
500 TABLET, FILM COATED ORAL DAILY PRN
COMMUNITY
End: 2019-11-04 | Stop reason: SDUPTHER

## 2019-07-22 RX ORDER — LABETALOL 100 MG/1
300 TABLET, FILM COATED ORAL 2 TIMES DAILY
Status: DISCONTINUED | OUTPATIENT
Start: 2019-07-22 | End: 2019-07-23 | Stop reason: HOSPADM

## 2019-07-22 RX ORDER — DEXAMETHASONE SODIUM PHOSPHATE 10 MG/ML
10 INJECTION INTRAMUSCULAR; INTRAVENOUS ONCE
Status: DISCONTINUED | OUTPATIENT
Start: 2019-07-22 | End: 2019-07-22 | Stop reason: HOSPADM

## 2019-07-22 RX ORDER — TRAMADOL HYDROCHLORIDE 50 MG/1
100 TABLET ORAL EVERY 6 HOURS
Status: DISCONTINUED | OUTPATIENT
Start: 2019-07-22 | End: 2019-07-22 | Stop reason: DRUGHIGH

## 2019-07-22 RX ORDER — FLUTICASONE PROPIONATE 50 MCG
1 SPRAY, SUSPENSION (ML) NASAL DAILY PRN
COMMUNITY
End: 2019-11-04 | Stop reason: SDUPTHER

## 2019-07-22 RX ORDER — LABETALOL 20 MG/4 ML (5 MG/ML) INTRAVENOUS SYRINGE
5 EVERY 10 MIN PRN
Status: DISCONTINUED | OUTPATIENT
Start: 2019-07-22 | End: 2019-07-22 | Stop reason: HOSPADM

## 2019-07-22 RX ORDER — TRAMADOL HYDROCHLORIDE 50 MG/1
50 TABLET ORAL EVERY 6 HOURS
Status: DISCONTINUED | OUTPATIENT
Start: 2019-07-22 | End: 2019-07-23 | Stop reason: HOSPADM

## 2019-07-22 RX ORDER — DOCUSATE SODIUM 100 MG/1
100 CAPSULE, LIQUID FILLED ORAL 2 TIMES DAILY
Status: DISCONTINUED | OUTPATIENT
Start: 2019-07-22 | End: 2019-07-23 | Stop reason: HOSPADM

## 2019-07-22 RX ORDER — TRANEXAMIC ACID 100 MG/ML
INJECTION, SOLUTION INTRAVENOUS PRN
Status: DISCONTINUED | OUTPATIENT
Start: 2019-07-22 | End: 2019-07-22 | Stop reason: SDUPTHER

## 2019-07-22 RX ORDER — DIPHENHYDRAMINE HCL 25 MG
25 TABLET ORAL EVERY 6 HOURS PRN
Status: DISCONTINUED | OUTPATIENT
Start: 2019-07-22 | End: 2019-07-23 | Stop reason: HOSPADM

## 2019-07-22 RX ORDER — MELATONIN
2 DAILY
Status: DISCONTINUED | OUTPATIENT
Start: 2019-07-22 | End: 2019-07-23 | Stop reason: HOSPADM

## 2019-07-22 RX ORDER — CEFAZOLIN SODIUM 1 G/50ML
INJECTION, SOLUTION INTRAVENOUS PRN
Status: DISCONTINUED | OUTPATIENT
Start: 2019-07-22 | End: 2019-07-22 | Stop reason: SDUPTHER

## 2019-07-22 RX ORDER — DILTIAZEM HYDROCHLORIDE 180 MG/1
360 CAPSULE, COATED, EXTENDED RELEASE ORAL DAILY
Status: DISCONTINUED | OUTPATIENT
Start: 2019-07-23 | End: 2019-07-23 | Stop reason: HOSPADM

## 2019-07-22 RX ORDER — PREGABALIN 75 MG/1
75 CAPSULE ORAL ONCE
Status: COMPLETED | OUTPATIENT
Start: 2019-07-22 | End: 2019-07-22

## 2019-07-22 RX ORDER — SODIUM CHLORIDE, SODIUM LACTATE, POTASSIUM CHLORIDE, CALCIUM CHLORIDE 600; 310; 30; 20 MG/100ML; MG/100ML; MG/100ML; MG/100ML
INJECTION, SOLUTION INTRAVENOUS CONTINUOUS
Status: DISCONTINUED | OUTPATIENT
Start: 2019-07-22 | End: 2019-07-22

## 2019-07-22 RX ORDER — OXYCODONE HYDROCHLORIDE 5 MG/1
10 TABLET ORAL EVERY 4 HOURS PRN
Status: DISCONTINUED | OUTPATIENT
Start: 2019-07-22 | End: 2019-07-23 | Stop reason: HOSPADM

## 2019-07-22 RX ORDER — CELECOXIB 200 MG/1
200 CAPSULE ORAL ONCE
Status: COMPLETED | OUTPATIENT
Start: 2019-07-22 | End: 2019-07-22

## 2019-07-22 RX ORDER — POLYETHYLENE GLYCOL 3350 17 G/17G
17 POWDER, FOR SOLUTION ORAL DAILY
Status: DISCONTINUED | OUTPATIENT
Start: 2019-07-22 | End: 2019-07-23 | Stop reason: HOSPADM

## 2019-07-22 RX ORDER — HYDRALAZINE HYDROCHLORIDE 20 MG/ML
5 INJECTION INTRAMUSCULAR; INTRAVENOUS EVERY 10 MIN PRN
Status: DISCONTINUED | OUTPATIENT
Start: 2019-07-22 | End: 2019-07-22 | Stop reason: HOSPADM

## 2019-07-22 RX ORDER — OXYCODONE HCL 10 MG/1
10 TABLET, FILM COATED, EXTENDED RELEASE ORAL EVERY 12 HOURS SCHEDULED
Status: DISCONTINUED | OUTPATIENT
Start: 2019-07-22 | End: 2019-07-23 | Stop reason: HOSPADM

## 2019-07-22 RX ORDER — BUMETANIDE 1 MG/1
1 TABLET ORAL DAILY
Status: DISCONTINUED | OUTPATIENT
Start: 2019-07-22 | End: 2019-07-23 | Stop reason: HOSPADM

## 2019-07-22 RX ORDER — ACETAMINOPHEN 500 MG
1000 TABLET ORAL ONCE
Status: COMPLETED | OUTPATIENT
Start: 2019-07-22 | End: 2019-07-22

## 2019-07-22 RX ORDER — OXYCODONE HCL 10 MG/1
10 TABLET, FILM COATED, EXTENDED RELEASE ORAL ONCE
Status: COMPLETED | OUTPATIENT
Start: 2019-07-22 | End: 2019-07-22

## 2019-07-22 RX ORDER — OXYCODONE HYDROCHLORIDE 5 MG/1
20 TABLET ORAL EVERY 4 HOURS PRN
Status: DISCONTINUED | OUTPATIENT
Start: 2019-07-22 | End: 2019-07-23 | Stop reason: HOSPADM

## 2019-07-22 RX ORDER — PROMETHAZINE HYDROCHLORIDE 25 MG/ML
6.25 INJECTION, SOLUTION INTRAMUSCULAR; INTRAVENOUS
Status: DISCONTINUED | OUTPATIENT
Start: 2019-07-22 | End: 2019-07-22 | Stop reason: HOSPADM

## 2019-07-22 RX ORDER — MEPERIDINE HYDROCHLORIDE 50 MG/ML
12.5 INJECTION INTRAMUSCULAR; INTRAVENOUS; SUBCUTANEOUS EVERY 5 MIN PRN
Status: DISCONTINUED | OUTPATIENT
Start: 2019-07-22 | End: 2019-07-22 | Stop reason: HOSPADM

## 2019-07-22 RX ORDER — ROCURONIUM BROMIDE 10 MG/ML
INJECTION, SOLUTION INTRAVENOUS PRN
Status: DISCONTINUED | OUTPATIENT
Start: 2019-07-22 | End: 2019-07-22 | Stop reason: SDUPTHER

## 2019-07-22 RX ORDER — SODIUM CHLORIDE 0.9 % (FLUSH) 0.9 %
10 SYRINGE (ML) INJECTION PRN
Status: DISCONTINUED | OUTPATIENT
Start: 2019-07-22 | End: 2019-07-22 | Stop reason: HOSPADM

## 2019-07-22 RX ORDER — ONDANSETRON 2 MG/ML
INJECTION INTRAMUSCULAR; INTRAVENOUS PRN
Status: DISCONTINUED | OUTPATIENT
Start: 2019-07-22 | End: 2019-07-22 | Stop reason: SDUPTHER

## 2019-07-22 RX ORDER — ONDANSETRON 2 MG/ML
4 INJECTION INTRAMUSCULAR; INTRAVENOUS EVERY 6 HOURS PRN
Status: DISCONTINUED | OUTPATIENT
Start: 2019-07-22 | End: 2019-07-23 | Stop reason: HOSPADM

## 2019-07-22 RX ORDER — MORPHINE SULFATE 2 MG/ML
4 INJECTION, SOLUTION INTRAMUSCULAR; INTRAVENOUS EVERY 5 MIN PRN
Status: DISCONTINUED | OUTPATIENT
Start: 2019-07-22 | End: 2019-07-22 | Stop reason: HOSPADM

## 2019-07-22 RX ORDER — ROSUVASTATIN CALCIUM 10 MG/1
10 TABLET, COATED ORAL NIGHTLY
Status: DISCONTINUED | OUTPATIENT
Start: 2019-07-22 | End: 2019-07-23 | Stop reason: HOSPADM

## 2019-07-22 RX ORDER — ROPIVACAINE HYDROCHLORIDE 5 MG/ML
INJECTION, SOLUTION EPIDURAL; INFILTRATION; PERINEURAL
Status: COMPLETED | OUTPATIENT
Start: 2019-07-22 | End: 2019-07-22

## 2019-07-22 RX ORDER — METOCLOPRAMIDE HYDROCHLORIDE 5 MG/ML
10 INJECTION INTRAMUSCULAR; INTRAVENOUS
Status: DISCONTINUED | OUTPATIENT
Start: 2019-07-22 | End: 2019-07-22 | Stop reason: HOSPADM

## 2019-07-22 RX ORDER — ENALAPRILAT 2.5 MG/2ML
1.25 INJECTION INTRAVENOUS
Status: DISCONTINUED | OUTPATIENT
Start: 2019-07-22 | End: 2019-07-22 | Stop reason: HOSPADM

## 2019-07-22 RX ORDER — PROPOFOL 10 MG/ML
INJECTION, EMULSION INTRAVENOUS PRN
Status: DISCONTINUED | OUTPATIENT
Start: 2019-07-22 | End: 2019-07-22 | Stop reason: SDUPTHER

## 2019-07-22 RX ORDER — SODIUM CHLORIDE 9 MG/ML
INJECTION, SOLUTION INTRAVENOUS CONTINUOUS
Status: DISCONTINUED | OUTPATIENT
Start: 2019-07-22 | End: 2019-07-22

## 2019-07-22 RX ORDER — POTASSIUM CHLORIDE 750 MG/1
10 TABLET, EXTENDED RELEASE ORAL 2 TIMES DAILY
Status: DISCONTINUED | OUTPATIENT
Start: 2019-07-22 | End: 2019-07-23 | Stop reason: HOSPADM

## 2019-07-22 RX ORDER — CLINDAMYCIN PHOSPHATE 900 MG/50ML
900 INJECTION INTRAVENOUS EVERY 8 HOURS
Status: DISCONTINUED | OUTPATIENT
Start: 2019-07-22 | End: 2019-07-23 | Stop reason: HOSPADM

## 2019-07-22 RX ORDER — LIDOCAINE HYDROCHLORIDE 10 MG/ML
1 INJECTION, SOLUTION EPIDURAL; INFILTRATION; INTRACAUDAL; PERINEURAL
Status: DISCONTINUED | OUTPATIENT
Start: 2019-07-22 | End: 2019-07-22 | Stop reason: HOSPADM

## 2019-07-22 RX ORDER — SODIUM CHLORIDE, SODIUM LACTATE, POTASSIUM CHLORIDE, CALCIUM CHLORIDE 600; 310; 30; 20 MG/100ML; MG/100ML; MG/100ML; MG/100ML
INJECTION, SOLUTION INTRAVENOUS CONTINUOUS PRN
Status: DISCONTINUED | OUTPATIENT
Start: 2019-07-22 | End: 2019-07-22 | Stop reason: SDUPTHER

## 2019-07-22 RX ORDER — MORPHINE SULFATE 2 MG/ML
2 INJECTION, SOLUTION INTRAMUSCULAR; INTRAVENOUS EVERY 5 MIN PRN
Status: DISCONTINUED | OUTPATIENT
Start: 2019-07-22 | End: 2019-07-22 | Stop reason: HOSPADM

## 2019-07-22 RX ORDER — SODIUM CHLORIDE 0.9 % (FLUSH) 0.9 %
10 SYRINGE (ML) INJECTION EVERY 12 HOURS SCHEDULED
Status: DISCONTINUED | OUTPATIENT
Start: 2019-07-22 | End: 2019-07-22 | Stop reason: HOSPADM

## 2019-07-22 RX ORDER — 0.9 % SODIUM CHLORIDE 0.9 %
500 INTRAVENOUS SOLUTION INTRAVENOUS PRN
Status: DISCONTINUED | OUTPATIENT
Start: 2019-07-22 | End: 2019-07-23 | Stop reason: HOSPADM

## 2019-07-22 RX ORDER — DEXAMETHASONE SODIUM PHOSPHATE 10 MG/ML
INJECTION INTRAMUSCULAR; INTRAVENOUS PRN
Status: DISCONTINUED | OUTPATIENT
Start: 2019-07-22 | End: 2019-07-22 | Stop reason: SDUPTHER

## 2019-07-22 RX ORDER — FENTANYL CITRATE 50 UG/ML
25 INJECTION, SOLUTION INTRAMUSCULAR; INTRAVENOUS
Status: DISCONTINUED | OUTPATIENT
Start: 2019-07-22 | End: 2019-07-22 | Stop reason: HOSPADM

## 2019-07-22 RX ORDER — MIDAZOLAM HYDROCHLORIDE 1 MG/ML
2 INJECTION INTRAMUSCULAR; INTRAVENOUS ONCE
Status: DISCONTINUED | OUTPATIENT
Start: 2019-07-22 | End: 2019-07-23 | Stop reason: HOSPADM

## 2019-07-22 RX ORDER — LIDOCAINE HYDROCHLORIDE 10 MG/ML
INJECTION, SOLUTION INFILTRATION; PERINEURAL PRN
Status: DISCONTINUED | OUTPATIENT
Start: 2019-07-22 | End: 2019-07-22 | Stop reason: SDUPTHER

## 2019-07-22 RX ADMIN — APIXABAN 5 MG: 5 TABLET, FILM COATED ORAL at 19:59

## 2019-07-22 RX ADMIN — TRAMADOL HYDROCHLORIDE 50 MG: 50 TABLET, FILM COATED ORAL at 23:02

## 2019-07-22 RX ADMIN — POTASSIUM CHLORIDE 10 MEQ: 750 TABLET, EXTENDED RELEASE ORAL at 19:59

## 2019-07-22 RX ADMIN — HYDROMORPHONE HYDROCHLORIDE 0.5 MG: 1 INJECTION, SOLUTION INTRAMUSCULAR; INTRAVENOUS; SUBCUTANEOUS at 07:38

## 2019-07-22 RX ADMIN — LABETALOL HYDROCHLORIDE 300 MG: 100 TABLET, FILM COATED ORAL at 19:59

## 2019-07-22 RX ADMIN — OXYCODONE HYDROCHLORIDE 10 MG: 10 TABLET, FILM COATED, EXTENDED RELEASE ORAL at 06:06

## 2019-07-22 RX ADMIN — CELECOXIB 200 MG: 200 CAPSULE ORAL at 06:06

## 2019-07-22 RX ADMIN — ACETAMINOPHEN 1000 MG: 500 TABLET, FILM COATED ORAL at 06:06

## 2019-07-22 RX ADMIN — SUGAMMADEX 100 MG: 100 INJECTION, SOLUTION INTRAVENOUS at 08:38

## 2019-07-22 RX ADMIN — TRANEXAMIC ACID 1000 MG: 100 INJECTION, SOLUTION INTRAVENOUS at 08:29

## 2019-07-22 RX ADMIN — OXYCODONE HYDROCHLORIDE 10 MG: 5 TABLET ORAL at 10:36

## 2019-07-22 RX ADMIN — Medication 2 G: at 23:02

## 2019-07-22 RX ADMIN — CLINDAMYCIN PHOSPHATE 900 MG: 900 INJECTION, SOLUTION INTRAVENOUS at 19:34

## 2019-07-22 RX ADMIN — FENTANYL CITRATE 150 MCG: 50 INJECTION INTRAMUSCULAR; INTRAVENOUS at 07:19

## 2019-07-22 RX ADMIN — APIXABAN 5 MG: 5 TABLET, FILM COATED ORAL at 10:36

## 2019-07-22 RX ADMIN — PROPOFOL 160 MG: 10 INJECTION, EMULSION INTRAVENOUS at 07:04

## 2019-07-22 RX ADMIN — SODIUM CHLORIDE: 9 INJECTION, SOLUTION INTRAVENOUS at 09:24

## 2019-07-22 RX ADMIN — OXYCODONE HYDROCHLORIDE 10 MG: 10 TABLET, FILM COATED, EXTENDED RELEASE ORAL at 20:01

## 2019-07-22 RX ADMIN — Medication 2 G: at 14:53

## 2019-07-22 RX ADMIN — OXYCODONE HYDROCHLORIDE 10 MG: 5 TABLET ORAL at 14:56

## 2019-07-22 RX ADMIN — HYDROMORPHONE HYDROCHLORIDE 0.5 MG: 1 INJECTION, SOLUTION INTRAMUSCULAR; INTRAVENOUS; SUBCUTANEOUS at 07:36

## 2019-07-22 RX ADMIN — ONDANSETRON HYDROCHLORIDE 4 MG: 2 INJECTION, SOLUTION INTRAMUSCULAR; INTRAVENOUS at 08:19

## 2019-07-22 RX ADMIN — DEXAMETHASONE SODIUM PHOSPHATE 10 MG: 10 INJECTION INTRAMUSCULAR; INTRAVENOUS at 07:31

## 2019-07-22 RX ADMIN — CLINDAMYCIN PHOSPHATE 900 MG: 900 INJECTION, SOLUTION INTRAVENOUS at 10:36

## 2019-07-22 RX ADMIN — DOCUSATE SODIUM 100 MG: 100 CAPSULE, LIQUID FILLED ORAL at 10:36

## 2019-07-22 RX ADMIN — MIDAZOLAM 2 MG: 1 INJECTION INTRAMUSCULAR; INTRAVENOUS at 06:53

## 2019-07-22 RX ADMIN — BUMETANIDE 1 MG: 1 TABLET ORAL at 10:36

## 2019-07-22 RX ADMIN — FENTANYL CITRATE 100 MCG: 50 INJECTION INTRAMUSCULAR; INTRAVENOUS at 07:04

## 2019-07-22 RX ADMIN — DOCUSATE SODIUM 100 MG: 100 CAPSULE, LIQUID FILLED ORAL at 19:59

## 2019-07-22 RX ADMIN — SODIUM CHLORIDE, SODIUM LACTATE, POTASSIUM CHLORIDE, AND CALCIUM CHLORIDE: 600; 310; 30; 20 INJECTION, SOLUTION INTRAVENOUS at 07:00

## 2019-07-22 RX ADMIN — ROSUVASTATIN CALCIUM 10 MG: 10 TABLET, FILM COATED ORAL at 19:59

## 2019-07-22 RX ADMIN — TRANEXAMIC ACID 1000 MG: 100 INJECTION, SOLUTION INTRAVENOUS at 07:16

## 2019-07-22 RX ADMIN — SUCCINYLCHOLINE CHLORIDE 140 MG: 20 INJECTION, SOLUTION INTRAMUSCULAR; INTRAVENOUS; PARENTERAL at 07:05

## 2019-07-22 RX ADMIN — PREGABALIN 75 MG: 75 CAPSULE ORAL at 06:06

## 2019-07-22 RX ADMIN — CEFAZOLIN SODIUM 2 G: 1 INJECTION, SOLUTION INTRAVENOUS at 07:05

## 2019-07-22 RX ADMIN — ACETAMINOPHEN 650 MG: 325 TABLET ORAL at 23:02

## 2019-07-22 RX ADMIN — ROCURONIUM BROMIDE 40 MG: 10 INJECTION INTRAVENOUS at 07:30

## 2019-07-22 RX ADMIN — OXYCODONE HYDROCHLORIDE 20 MG: 5 TABLET ORAL at 19:33

## 2019-07-22 RX ADMIN — LIDOCAINE HYDROCHLORIDE 50 MG: 10 INJECTION, SOLUTION INFILTRATION; PERINEURAL at 07:04

## 2019-07-22 RX ADMIN — SODIUM CHLORIDE, POTASSIUM CHLORIDE, SODIUM LACTATE AND CALCIUM CHLORIDE: 600; 310; 30; 20 INJECTION, SOLUTION INTRAVENOUS at 06:06

## 2019-07-22 RX ADMIN — POTASSIUM CHLORIDE 10 MEQ: 750 TABLET, EXTENDED RELEASE ORAL at 10:36

## 2019-07-22 RX ADMIN — SODIUM CHLORIDE: 9 INJECTION, SOLUTION INTRAVENOUS at 22:49

## 2019-07-22 RX ADMIN — ROPIVACAINE HYDROCHLORIDE 20 ML: 5 INJECTION, SOLUTION EPIDURAL; INFILTRATION; PERINEURAL at 06:55

## 2019-07-22 RX ADMIN — ACETAMINOPHEN 650 MG: 325 TABLET ORAL at 14:53

## 2019-07-22 ASSESSMENT — PAIN SCALES - GENERAL
PAINLEVEL_OUTOF10: 6
PAINLEVEL_OUTOF10: 1
PAINLEVEL_OUTOF10: 4
PAINLEVEL_OUTOF10: 3
PAINLEVEL_OUTOF10: 6

## 2019-07-22 ASSESSMENT — ENCOUNTER SYMPTOMS: SHORTNESS OF BREATH: 0

## 2019-07-22 ASSESSMENT — PAIN DESCRIPTION - LOCATION
LOCATION: KNEE
LOCATION: KNEE

## 2019-07-22 ASSESSMENT — PAIN DESCRIPTION - ORIENTATION
ORIENTATION: RIGHT
ORIENTATION: RIGHT

## 2019-07-22 ASSESSMENT — PAIN DESCRIPTION - PAIN TYPE
TYPE: SURGICAL PAIN
TYPE: ACUTE PAIN

## 2019-07-22 ASSESSMENT — PAIN SCALES - WONG BAKER: WONGBAKER_NUMERICALRESPONSE: 0

## 2019-07-22 ASSESSMENT — COPD QUESTIONNAIRES: CAT_SEVERITY: MILD

## 2019-07-22 NOTE — CARE COORDINATION
Patient attended [employer] [facility] [employer] [facility] on 7-.   Electronically signed by Andria Muhammad RN on 7/22/2019 at 12:48 PM

## 2019-07-22 NOTE — H&P
I have reviewed the history and physical for planned procedure. I have re-examined the patient and there are no changes to the history and physical unless noted below.     Electronically signed by Mayela Cruz MD on 7/22/2019 at 6:34 AM

## 2019-07-22 NOTE — OP NOTE
[facility] [address] [address] [facility]                                OPERATIVE REPORT    PATIENT NAME: Peg Harris                 :        1945  MED REC NO:   [id number]                              ROOM:       [facility]  ACCOUNT NO:   [de-identified]                           ADMIT DATE: 2019  PROVIDER:     Areli Thomas MD    DATE OF PROCEDURE:  2019    PREOPERATIVE DIAGNOSIS:  Right medial compartment osteoarthritis. POSTOPERATIVE DIAGNOSIS:  Right medial compartment osteoarthritis. OPERATION PERFORMED:  Right partial knee replacement on medial side. SURGEON:  Areli Thomas MD    FIRST SURGICAL ASSISTANT:  Jagdeep Buenrostro PA-C. Please note, he is a  critical assistant in exposure and placement of implants. ANESTHESIA:  General with adductor canal block. EBL:  100 mL. FLUIDS:  1700 mL crystalloid. URINE OUTPUT:  250 mL. TOURNIQUET TIME:  52 minutes. COMPONENTS USED:  Rai Persona partial knee system, femur size 3,  tibia size E, polyethylene size 9. INDICATIONS:  This is a 70-year-old lady with progressive medial  compartment osteoarthritis, right knee, failing conservative care. Because of this, she elected for the above procedure. OPERATIVE PROCEDURE:  After informed consent, she was given 2 gm of  Ancef, 1 gm of tranexamic acid, underwent adductor canal block and a  general anesthetic. Tourniquet was placed higher around the right upper  thigh. Right leg and knee were prepped and draped in the usual sterile  fashion. Leg was esmarched. Tourniquet was inflated to 300 mmHg. Direct midline incision was made. Parapatellar approach was made in the  knee. We then noted the patient had changes in the medial compartment. The lateral patellofemoral compartment was fairly well preserved. The  5-degree extramedullary tibial cutting guide was used.   We _____ to take  4 mm off the shallow of the midline tracking of the patella. Parapatellar approach was closed with interrupted #1 Vicryl suture, 2-0  Vicryl suture for subcutaneous tissues, and 2-0 Vicryl for subcuticular  stitch. Prineo Dermabond and soft dressings were applied. The patient  was taken to the recovery room in stable condition. POSTOPERATIVE PLANS:  1. She will be on a typical partial knee protocol. 2.  She will be on 2 doses of Ancef, 6 doses of clindamycin. 3.  The patient has atrial fibrillation and we will resume her previous  Eliquis dose. 4.  We will keep her on telemetry for 23 hours due to the fact that she  has a history of atrial fibrillation.         Janine Franco MD    D: 07/22/2019 9:47:35      T: 07/22/2019 11:26:04     [email or web address]  Job#: [id number]     Doc#: [id number]    CC:

## 2019-07-23 VITALS
BODY MASS INDEX: 34.84 KG/M2 | HEIGHT: 67 IN | SYSTOLIC BLOOD PRESSURE: 164 MMHG | WEIGHT: 222 LBS | RESPIRATION RATE: 16 BRPM | DIASTOLIC BLOOD PRESSURE: 79 MMHG | HEART RATE: 67 BPM | TEMPERATURE: 97.1 F | OXYGEN SATURATION: 90 %

## 2019-07-23 LAB
ANION GAP SERPL CALCULATED.3IONS-SCNC: 12 MMOL/L (ref 7–19)
BUN BLDV-MCNC: 15 MG/DL (ref 8–23)
CALCIUM SERPL-MCNC: 9.6 MG/DL (ref 8.8–10.2)
CHLORIDE BLD-SCNC: 100 MMOL/L (ref 98–111)
CO2: 24 MMOL/L (ref 22–29)
CREAT SERPL-MCNC: 0.8 MG/DL (ref 0.5–0.9)
GFR NON-AFRICAN AMERICAN: >60
GLUCOSE BLD-MCNC: 172 MG/DL (ref 74–109)
HCT VFR BLD CALC: 36.4 % (ref 37–47)
HEMOGLOBIN: 11.4 G/DL (ref 12–16)
POTASSIUM REFLEX MAGNESIUM: 4.5 MMOL/L (ref 3.5–5)
SODIUM BLD-SCNC: 136 MMOL/L (ref 136–145)

## 2019-07-23 PROCEDURE — 80048 BASIC METABOLIC PNL TOTAL CA: CPT

## 2019-07-23 PROCEDURE — 97116 GAIT TRAINING THERAPY: CPT

## 2019-07-23 PROCEDURE — 85014 HEMATOCRIT: CPT

## 2019-07-23 PROCEDURE — G0378 HOSPITAL OBSERVATION PER HR: HCPCS

## 2019-07-23 PROCEDURE — 2500000003 HC RX 250 WO HCPCS: Performed by: ORTHOPAEDIC SURGERY

## 2019-07-23 PROCEDURE — 36415 COLL VENOUS BLD VENIPUNCTURE: CPT

## 2019-07-23 PROCEDURE — 85018 HEMOGLOBIN: CPT

## 2019-07-23 PROCEDURE — 2580000003 HC RX 258: Performed by: ORTHOPAEDIC SURGERY

## 2019-07-23 PROCEDURE — 6370000000 HC RX 637 (ALT 250 FOR IP): Performed by: ORTHOPAEDIC SURGERY

## 2019-07-23 RX ORDER — OXYCODONE HYDROCHLORIDE 5 MG/1
5 TABLET ORAL SEE ADMIN INSTRUCTIONS
Qty: 90 TABLET | Refills: 0 | Status: SHIPPED | OUTPATIENT
Start: 2019-07-23 | End: 2019-08-23

## 2019-07-23 RX ADMIN — DOCUSATE SODIUM 100 MG: 100 CAPSULE, LIQUID FILLED ORAL at 08:25

## 2019-07-23 RX ADMIN — TRAMADOL HYDROCHLORIDE 50 MG: 50 TABLET, FILM COATED ORAL at 05:39

## 2019-07-23 RX ADMIN — CLINDAMYCIN PHOSPHATE 900 MG: 900 INJECTION, SOLUTION INTRAVENOUS at 03:50

## 2019-07-23 RX ADMIN — APIXABAN 5 MG: 5 TABLET, FILM COATED ORAL at 08:23

## 2019-07-23 RX ADMIN — TRAMADOL HYDROCHLORIDE 50 MG: 50 TABLET, FILM COATED ORAL at 10:50

## 2019-07-23 RX ADMIN — DILTIAZEM HYDROCHLORIDE 360 MG: 180 CAPSULE, COATED, EXTENDED RELEASE ORAL at 08:23

## 2019-07-23 RX ADMIN — OXYCODONE HYDROCHLORIDE 10 MG: 5 TABLET ORAL at 03:50

## 2019-07-23 RX ADMIN — CLINDAMYCIN PHOSPHATE 900 MG: 900 INJECTION, SOLUTION INTRAVENOUS at 10:51

## 2019-07-23 RX ADMIN — Medication 10 ML: at 10:50

## 2019-07-23 RX ADMIN — POLYETHYLENE GLYCOL 3350 17 G: 17 POWDER, FOR SOLUTION ORAL at 08:25

## 2019-07-23 RX ADMIN — ACETAMINOPHEN 650 MG: 325 TABLET ORAL at 05:39

## 2019-07-23 RX ADMIN — ACETAMINOPHEN 650 MG: 325 TABLET ORAL at 13:49

## 2019-07-23 RX ADMIN — OXYCODONE HYDROCHLORIDE 10 MG: 10 TABLET, FILM COATED, EXTENDED RELEASE ORAL at 08:24

## 2019-07-23 RX ADMIN — OXYCODONE HYDROCHLORIDE 10 MG: 5 TABLET ORAL at 13:49

## 2019-07-23 ASSESSMENT — PAIN SCALES - GENERAL
PAINLEVEL_OUTOF10: 0
PAINLEVEL_OUTOF10: 1
PAINLEVEL_OUTOF10: 4
PAINLEVEL_OUTOF10: 4
PAINLEVEL_OUTOF10: 1
PAINLEVEL_OUTOF10: 1
PAINLEVEL_OUTOF10: 3

## 2019-07-23 ASSESSMENT — PAIN DESCRIPTION - PAIN TYPE: TYPE: SURGICAL PAIN

## 2019-07-23 ASSESSMENT — PAIN DESCRIPTION - FREQUENCY: FREQUENCY: INTERMITTENT

## 2019-07-23 ASSESSMENT — PAIN DESCRIPTION - LOCATION: LOCATION: KNEE

## 2019-07-23 ASSESSMENT — PAIN - FUNCTIONAL ASSESSMENT: PAIN_FUNCTIONAL_ASSESSMENT: ACTIVITIES ARE NOT PREVENTED

## 2019-07-23 ASSESSMENT — PAIN DESCRIPTION - ORIENTATION: ORIENTATION: RIGHT

## 2019-07-23 NOTE — DISCHARGE SUMMARY
mg, Intravenous, Q8H  traMADol (ULTRAM) tablet 50 mg, 50 mg, Oral, Q6H  acetaminophen (TYLENOL) tablet 650 mg, 650 mg, Oral, 3 times per day  polyethylene glycol (GLYCOLAX) packet 17 g, 17 g, Oral, Daily    Post-operatively the patients diet was advanced as tolerated and their incision was checked on POD #1. The incision is dressing in place, clean, dry and intact with no signs of infection. The patient remained neurovascularly intact in the lower extremity and had intact pulses distally. Patients calf remained soft and showed no evidence of DVT. The patient was able to move their knee without any problems post-operatively. Physical therapy and occupational therapy were consulted and began working with the patient post-operatively. The patient progressed with PT/OT as would be expected and continued to improve through their stay. The patients pain was initially controlled with IV medications but we were able to transition to oral pain medications soon after arrival to the floor and their pain remained under good control through their hospital stay. From a medical standpoint the patient remained stable and continued to have the medicine team follow throughout their stay. The patients dressing was changed/incison was checked on day of d/c. The patient will be discharged at this time to Home  with their current diet restrictions and will continue to follow the knee precautions outlined to them by us and PT/OT. Condition on Discharge: Stable    Plan  Return visit in 5 weeks. .  Patient was instructed on the use of pain medications, the signs and symptoms of infection, and was given our number to call should they have any questions or concerns following discharge.

## 2019-07-23 NOTE — PROGRESS NOTES
Physical Therapy  Name: Artis Romero  MRN:  [id number]  Date of service:  7/23/2019 07/23/19 1122   Subjective   Subjective Patient agreeable to work with therapy   Pain Screening   Patient Currently in Pain Yes   Vital Signs   Temp 97.1 °F (36.2 °C)   Temp Source Temporal   Pulse 67   Heart Rate Source Monitor   Resp 16   BP (!) 164/79   BP Location Left Arm   BP Upper/Lower Upper   Level of Consciousness 0   MEWS Score 1   Oxygen Therapy   SpO2 90 %   O2 Device None (Room air)   Bed Mobility   Supine to Sit Stand by assistance   Sit to Supine Stand by assistance   Scooting Stand by assistance   Transfers   Sit to Stand Contact guard assistance   Stand to sit Contact guard assistance   Bed to Chair Contact guard assistance   Ambulation   WB Status FWB   Ambulation 1   Device Rolling Walker   Assistance Contact guard assistance   Quality of Gait antalgic and guarded, but no LOB noted, steady   Gait Deviations Slow [place]   Distance 80'   Comments increased gait distance   Other Activities   Comment Assisted patient to and from the bathroom   Short term goals   Time Frame for Short term goals 14 DAYS   Short term goal 1 BED MOB MOD IND   Short term goal 2 TRANSFERS SUP-IND   Short term goal 3 ' RW SUP-IND   Short term goal 4 UP/DOWN 3 STEPS CGA   Conditions Requiring Skilled Therapeutic Intervention   Assessment Discussed proper technique for up/down steps, patient will have son helping her get into the house (he is a PTA), so she felt comfortable with being able to do them properly with her son and did not feel the need to practice in the hospital. Assisted patient back to bed and left with all needs in reach.          Electronically signed by Kimberly Blake PTA on 7/23/2019 at 5:35 PM

## 2019-07-23 NOTE — CARE COORDINATION
Spoke with patient regarding MD orders for [place] services. Patient agreeable and has chosen [facility]. Referral Faxed. [address] [place] [address] [phone]. [phone]. Please notify [facility] when patient discharges and fax DC Summary,  DC med list and any new [place] orders.   Karishma Ayala RN, Home Care Liaison  [phone] P  Electronically signed by Ancelmo Howard RN on 7/23/2019 at 11:19 AM

## 2019-07-25 ENCOUNTER — TELEPHONE (OUTPATIENT)
Dept: INTERNAL MEDICINE | Age: 74
End: 2019-07-25

## 2019-07-25 ENCOUNTER — TELEPHONE (OUTPATIENT)
Dept: INTERNAL MEDICINE CLINIC | Age: 74
End: 2019-07-25

## 2019-07-31 LAB
ANION GAP SERPL CALCULATED.3IONS-SCNC: 12 MMOL/L (ref 7–19)
BASOPHILS ABSOLUTE: 0 K/UL (ref 0–0.2)
BASOPHILS RELATIVE PERCENT: 0.4 % (ref 0–1)
BUN BLDV-MCNC: 17 MG/DL (ref 8–23)
CALCIUM SERPL-MCNC: 10.5 MG/DL (ref 8.8–10.2)
CHLORIDE BLD-SCNC: 104 MMOL/L (ref 98–111)
CO2: 24 MMOL/L (ref 22–29)
CREAT SERPL-MCNC: 0.8 MG/DL (ref 0.5–0.9)
EOSINOPHILS ABSOLUTE: 0.2 K/UL (ref 0–0.6)
EOSINOPHILS RELATIVE PERCENT: 2.5 % (ref 0–5)
GFR NON-AFRICAN AMERICAN: >60
GLUCOSE BLD-MCNC: 101 MG/DL (ref 74–109)
HCT VFR BLD CALC: 38.8 % (ref 37–47)
HEMOGLOBIN: 12.5 G/DL (ref 12–16)
LYMPHOCYTES ABSOLUTE: 1.8 K/UL (ref 1.1–4.5)
LYMPHOCYTES RELATIVE PERCENT: 19 % (ref 20–40)
MCH RBC QN AUTO: 29.3 PG (ref 27–31)
MCHC RBC AUTO-ENTMCNC: 32.2 G/DL (ref 33–37)
MCV RBC AUTO: 90.9 FL (ref 81–99)
MONOCYTES ABSOLUTE: 0.9 K/UL (ref 0–0.9)
MONOCYTES RELATIVE PERCENT: 9.4 % (ref 0–10)
NEUTROPHILS ABSOLUTE: 6.3 K/UL (ref 1.5–7.5)
NEUTROPHILS RELATIVE PERCENT: 67.8 % (ref 50–65)
PDW BLD-RTO: 13.5 % (ref 11.5–14.5)
PLATELET # BLD: 257 K/UL (ref 130–400)
PMV BLD AUTO: 10.9 FL (ref 9.4–12.3)
POTASSIUM SERPL-SCNC: 4.5 MMOL/L (ref 3.5–5)
RBC # BLD: 4.27 M/UL (ref 4.2–5.4)
SODIUM BLD-SCNC: 140 MMOL/L (ref 136–145)
WBC # BLD: 9.3 K/UL (ref 4.8–10.8)

## 2019-08-06 ENCOUNTER — TELEPHONE (OUTPATIENT)
Dept: INPATIENT UNIT | Age: 74
End: 2019-08-06

## 2019-10-23 ENCOUNTER — TELEPHONE (OUTPATIENT)
Dept: CARDIOLOGY | Facility: CLINIC | Age: 74
End: 2019-10-23

## 2019-10-28 DIAGNOSIS — E78.49 OTHER HYPERLIPIDEMIA: ICD-10-CM

## 2019-10-28 DIAGNOSIS — R73.01 IMPAIRED FASTING GLUCOSE: ICD-10-CM

## 2019-10-28 LAB
ALBUMIN SERPL-MCNC: 4.4 G/DL (ref 3.5–5.2)
ALP BLD-CCNC: 134 U/L (ref 35–104)
ALT SERPL-CCNC: 13 U/L (ref 5–33)
ANION GAP SERPL CALCULATED.3IONS-SCNC: 15 MMOL/L (ref 7–19)
AST SERPL-CCNC: 13 U/L (ref 5–32)
BILIRUB SERPL-MCNC: 0.5 MG/DL (ref 0.2–1.2)
BUN BLDV-MCNC: 12 MG/DL (ref 8–23)
CALCIUM SERPL-MCNC: 10.3 MG/DL (ref 8.8–10.2)
CHLORIDE BLD-SCNC: 104 MMOL/L (ref 98–111)
CHOLESTEROL, TOTAL: 140 MG/DL (ref 160–199)
CO2: 25 MMOL/L (ref 22–29)
CREAT SERPL-MCNC: 0.6 MG/DL (ref 0.5–0.9)
GFR NON-AFRICAN AMERICAN: >60
GLUCOSE BLD-MCNC: 105 MG/DL (ref 74–109)
HBA1C MFR BLD: 5.5 % (ref 4–6)
HCT VFR BLD CALC: 41.3 % (ref 37–47)
HDLC SERPL-MCNC: 50 MG/DL (ref 65–121)
HEMOGLOBIN: 13.1 G/DL (ref 12–16)
LDL CHOLESTEROL CALCULATED: 58 MG/DL
MCH RBC QN AUTO: 29.1 PG (ref 27–31)
MCHC RBC AUTO-ENTMCNC: 31.7 G/DL (ref 33–37)
MCV RBC AUTO: 91.8 FL (ref 81–99)
PDW BLD-RTO: 13.1 % (ref 11.5–14.5)
PLATELET # BLD: 200 K/UL (ref 130–400)
PMV BLD AUTO: 11.5 FL (ref 9.4–12.3)
POTASSIUM SERPL-SCNC: 4.2 MMOL/L (ref 3.5–5)
RBC # BLD: 4.5 M/UL (ref 4.2–5.4)
SODIUM BLD-SCNC: 144 MMOL/L (ref 136–145)
TOTAL PROTEIN: 6.7 G/DL (ref 6.6–8.7)
TRIGL SERPL-MCNC: 158 MG/DL (ref 0–149)
TSH SERPL DL<=0.05 MIU/L-ACNC: 1.17 UIU/ML (ref 0.27–4.2)
WBC # BLD: 8.4 K/UL (ref 4.8–10.8)

## 2019-10-29 DIAGNOSIS — I10 ESSENTIAL HYPERTENSION: ICD-10-CM

## 2019-10-29 DIAGNOSIS — I48.0 PAROXYSMAL ATRIAL FIBRILLATION (HCC): ICD-10-CM

## 2019-10-29 RX ORDER — CLONIDINE HYDROCHLORIDE 0.1 MG/1
TABLET ORAL
Qty: 180 TABLET | Refills: 4 | Status: SHIPPED | OUTPATIENT
Start: 2019-10-29 | End: 2020-12-18 | Stop reason: SDUPTHER

## 2019-10-29 RX ORDER — POTASSIUM CHLORIDE 750 MG/1
TABLET, EXTENDED RELEASE ORAL
Qty: 180 TABLET | Refills: 4 | Status: SHIPPED | OUTPATIENT
Start: 2019-10-29 | End: 2020-12-18 | Stop reason: SDUPTHER

## 2019-10-29 RX ORDER — APIXABAN 5 MG/1
TABLET, FILM COATED ORAL
Qty: 180 TABLET | Refills: 4 | Status: SHIPPED | OUTPATIENT
Start: 2019-10-29 | End: 2020-12-18 | Stop reason: SDUPTHER

## 2019-10-29 RX ORDER — ROSUVASTATIN CALCIUM 10 MG/1
TABLET, COATED ORAL
Qty: 90 TABLET | Refills: 4 | Status: SHIPPED | OUTPATIENT
Start: 2019-10-29 | End: 2020-12-22 | Stop reason: SDUPTHER

## 2019-10-29 RX ORDER — LABETALOL 300 MG/1
TABLET, FILM COATED ORAL
Qty: 180 TABLET | Refills: 4 | Status: SHIPPED | OUTPATIENT
Start: 2019-10-29 | End: 2020-12-18 | Stop reason: SDUPTHER

## 2019-11-04 ENCOUNTER — OFFICE VISIT (OUTPATIENT)
Dept: INTERNAL MEDICINE | Age: 74
End: 2019-11-04
Payer: MEDICARE

## 2019-11-04 VITALS
HEIGHT: 67 IN | WEIGHT: 226 LBS | HEART RATE: 66 BPM | BODY MASS INDEX: 35.47 KG/M2 | SYSTOLIC BLOOD PRESSURE: 124 MMHG | OXYGEN SATURATION: 96 % | DIASTOLIC BLOOD PRESSURE: 66 MMHG

## 2019-11-04 DIAGNOSIS — Z23 NEED FOR INFLUENZA VACCINATION: ICD-10-CM

## 2019-11-04 DIAGNOSIS — R91.1 LUNG NODULE, SOLITARY: ICD-10-CM

## 2019-11-04 DIAGNOSIS — M48.061 SPINAL STENOSIS OF LUMBAR REGION, UNSPECIFIED WHETHER NEUROGENIC CLAUDICATION PRESENT: ICD-10-CM

## 2019-11-04 DIAGNOSIS — J30.9 ALLERGIC RHINITIS, UNSPECIFIED SEASONALITY, UNSPECIFIED TRIGGER: ICD-10-CM

## 2019-11-04 DIAGNOSIS — R73.01 IMPAIRED FASTING GLUCOSE: ICD-10-CM

## 2019-11-04 DIAGNOSIS — E04.2 MULTINODULAR GOITER: ICD-10-CM

## 2019-11-04 DIAGNOSIS — E78.00 PURE HYPERCHOLESTEROLEMIA: ICD-10-CM

## 2019-11-04 DIAGNOSIS — I10 ESSENTIAL HYPERTENSION: Primary | ICD-10-CM

## 2019-11-04 DIAGNOSIS — B00.1 COLD SORE: ICD-10-CM

## 2019-11-04 DIAGNOSIS — E21.3 HYPERPARATHYROIDISM (HCC): ICD-10-CM

## 2019-11-04 DIAGNOSIS — G47.33 OBSTRUCTIVE SLEEP APNEA: ICD-10-CM

## 2019-11-04 DIAGNOSIS — M15.9 PRIMARY OSTEOARTHRITIS INVOLVING MULTIPLE JOINTS: ICD-10-CM

## 2019-11-04 PROCEDURE — 3017F COLORECTAL CA SCREEN DOC REV: CPT | Performed by: INTERNAL MEDICINE

## 2019-11-04 PROCEDURE — 4040F PNEUMOC VAC/ADMIN/RCVD: CPT | Performed by: INTERNAL MEDICINE

## 2019-11-04 PROCEDURE — 99214 OFFICE O/P EST MOD 30 MIN: CPT | Performed by: INTERNAL MEDICINE

## 2019-11-04 PROCEDURE — G8417 CALC BMI ABV UP PARAM F/U: HCPCS | Performed by: INTERNAL MEDICINE

## 2019-11-04 PROCEDURE — G0008 ADMIN INFLUENZA VIRUS VAC: HCPCS | Performed by: INTERNAL MEDICINE

## 2019-11-04 PROCEDURE — G8427 DOCREV CUR MEDS BY ELIG CLIN: HCPCS | Performed by: INTERNAL MEDICINE

## 2019-11-04 PROCEDURE — G8399 PT W/DXA RESULTS DOCUMENT: HCPCS | Performed by: INTERNAL MEDICINE

## 2019-11-04 PROCEDURE — 1090F PRES/ABSN URINE INCON ASSESS: CPT | Performed by: INTERNAL MEDICINE

## 2019-11-04 PROCEDURE — 1123F ACP DISCUSS/DSCN MKR DOCD: CPT | Performed by: INTERNAL MEDICINE

## 2019-11-04 PROCEDURE — 1036F TOBACCO NON-USER: CPT | Performed by: INTERNAL MEDICINE

## 2019-11-04 PROCEDURE — 90653 IIV ADJUVANT VACCINE IM: CPT | Performed by: INTERNAL MEDICINE

## 2019-11-04 PROCEDURE — G8482 FLU IMMUNIZE ORDER/ADMIN: HCPCS | Performed by: INTERNAL MEDICINE

## 2019-11-04 RX ORDER — BUMETANIDE 1 MG/1
1 TABLET ORAL DAILY
Qty: 90 TABLET | Refills: 3 | Status: SHIPPED | OUTPATIENT
Start: 2019-11-04 | End: 2020-09-26 | Stop reason: SDUPTHER

## 2019-11-04 RX ORDER — FAMCICLOVIR 500 MG/1
1500 TABLET, FILM COATED ORAL DAILY PRN
Qty: 30 TABLET | Refills: 0 | Status: SHIPPED | OUTPATIENT
Start: 2019-11-04 | End: 2020-03-23

## 2019-11-04 RX ORDER — FLUTICASONE PROPIONATE 50 MCG
2 SPRAY, SUSPENSION (ML) NASAL DAILY PRN
Qty: 3 BOTTLE | Refills: 3 | Status: SHIPPED | OUTPATIENT
Start: 2019-11-04 | End: 2020-12-18

## 2019-11-11 ENCOUNTER — OFFICE VISIT (OUTPATIENT)
Dept: NEUROLOGY | Facility: CLINIC | Age: 74
End: 2019-11-11

## 2019-11-11 VITALS
DIASTOLIC BLOOD PRESSURE: 82 MMHG | HEIGHT: 67 IN | BODY MASS INDEX: 35.79 KG/M2 | SYSTOLIC BLOOD PRESSURE: 150 MMHG | WEIGHT: 228 LBS | OXYGEN SATURATION: 97 % | HEART RATE: 71 BPM

## 2019-11-11 DIAGNOSIS — I10 ESSENTIAL HYPERTENSION: ICD-10-CM

## 2019-11-11 DIAGNOSIS — G47.33 OSA ON CPAP: Primary | ICD-10-CM

## 2019-11-11 DIAGNOSIS — Z99.89 OSA ON CPAP: Primary | ICD-10-CM

## 2019-11-11 PROCEDURE — 99213 OFFICE O/P EST LOW 20 MIN: CPT | Performed by: PHYSICIAN ASSISTANT

## 2019-11-11 RX ORDER — ROSUVASTATIN CALCIUM 10 MG/1
1 TABLET, COATED ORAL DAILY
COMMUNITY
Start: 2019-10-29

## 2019-11-11 RX ORDER — CLOBETASOL PROPIONATE 0.5 MG/G
OINTMENT TOPICAL
COMMUNITY
End: 2020-04-28

## 2019-11-11 RX ORDER — FAMCICLOVIR 500 MG/1
1500 TABLET ORAL DAILY PRN
COMMUNITY
Start: 2019-11-04

## 2019-11-12 ENCOUNTER — OFFICE VISIT (OUTPATIENT)
Dept: CARDIOLOGY | Facility: CLINIC | Age: 74
End: 2019-11-12

## 2019-11-12 VITALS
WEIGHT: 228 LBS | DIASTOLIC BLOOD PRESSURE: 82 MMHG | BODY MASS INDEX: 35.79 KG/M2 | HEIGHT: 67 IN | HEART RATE: 61 BPM | SYSTOLIC BLOOD PRESSURE: 138 MMHG

## 2019-11-12 DIAGNOSIS — I48.0 PAF (PAROXYSMAL ATRIAL FIBRILLATION) (HCC): Primary | ICD-10-CM

## 2019-11-12 PROCEDURE — 99214 OFFICE O/P EST MOD 30 MIN: CPT | Performed by: NURSE PRACTITIONER

## 2019-11-12 PROCEDURE — 93000 ELECTROCARDIOGRAM COMPLETE: CPT | Performed by: NURSE PRACTITIONER

## 2019-11-12 NOTE — PROGRESS NOTES
"    Subjective:     Encounter Date:11/11/2019      Patient ID: Anne-Marie Hayes is a 74 y.o. female.    Chief Complaint: follow up atrial fibrillation   The patient presents to follow up regarding her history of paroxysmal atrial fibrillation. She states she feels \"a flutter\" every now and then, but denies any palpitations of significance. She is feeling well overall. She is having left knee surgery per Dr. Mo otoole. She denies chest pain, shortness of breath, orthopnea, PND, syncope or pre syncope. She has mild lower extremity edema. She reports good blood pressure control and compliance with her medications.         The following portions of the patient's history were reviewed and updated as appropriate: allergies, current medications, past family history, past medical history, past social history, past surgical history and problem list.  /82   Pulse 61   Ht 170.2 cm (67\")   Wt 103 kg (228 lb)   BMI 35.71 kg/m²   No Known Allergies    Current Outpatient Medications:   •  apixaban (ELIQUIS) 5 MG tablet tablet, Take 1 tablet by mouth Every 12 (Twelve) Hours., Disp: 60 tablet, Rfl: 0  •  bumetanide (BUMEX) 1 MG tablet, Take 1 mg by mouth Daily., Disp: , Rfl:   •  cholecalciferol (VITAMIN D3) 1000 units tablet, Take 1,000 Units by mouth 2 (Two) Times a Day., Disp: , Rfl:   •  clobetasol (TEMOVATE) 0.05 % ointment, Apply  topically to the appropriate area as directed., Disp: , Rfl:   •  CloNIDine (CATAPRES) 0.1 MG tablet, Take 0.1 mg by mouth 2 (Two) Times a Day., Disp: , Rfl:   •  diltiaZEM CD (CARDIZEM CD) 360 MG 24 hr capsule, Take 1 capsule by mouth Daily., Disp: 30 capsule, Rfl: 11  •  famciclovir (FAMVIR) 500 MG tablet, Take 1,500 mg by mouth As Needed., Disp: , Rfl:   •  fluticasone (FLONASE) 50 MCG/ACT nasal spray, 2 sprays into the nostril(s) as directed by provider Daily As Needed for Rhinitis or Allergies., Disp: , Rfl:   •  Glucosamine 500 MG capsule, Take 1 capsule by mouth Daily., Disp: , " Rfl:   •  KLOR-CON 10 MEQ CR tablet, Take 10 mEq by mouth 2 (Two) Times a Day., Disp: , Rfl:   •  labetalol (NORMODYNE) 300 MG tablet, Take 300 mg by mouth Every 12 (Twelve) Hours., Disp: , Rfl:   •  losartan (COZAAR) 50 MG tablet, Take 1 tablet by mouth Daily., Disp: 30 tablet, Rfl: 0  •  Multiple Vitamins-Minerals (PRESERVISION AREDS 2 PO), Take 1 tablet by mouth 2 (Two) Times a Day., Disp: , Rfl:   •  Omega-3 Fatty Acids (FISH OIL) 1000 MG capsule capsule, Take 1,000 mg by mouth Daily With Breakfast., Disp: , Rfl:   •  rosuvastatin (CRESTOR) 10 MG tablet, Take 1 tablet by mouth Daily., Disp: , Rfl:   Past Medical History:   Diagnosis Date   • Arthritis    • Atrial fibrillation (CMS/HCC)    • Hyperlipidemia    • Hypertension      Past Surgical History:   Procedure Laterality Date   • CARDIAC CATHETERIZATION  1987   • TUBAL ABDOMINAL LIGATION       Social History     Socioeconomic History   • Marital status:      Spouse name: Not on file   • Number of children: Not on file   • Years of education: Not on file   • Highest education level: Not on file   Tobacco Use   • Smoking status: Former Smoker   • Smokeless tobacco: Never Used   Substance and Sexual Activity   • Alcohol use: Yes     Alcohol/week: 4.2 oz     Types: 7 Glasses of wine per week   • Drug use: No   • Sexual activity: Defer     Family History   Problem Relation Age of Onset   • Atrial fibrillation Mother    • Heart disease Father    • Breast cancer Neg Hx    • Ovarian cancer Neg Hx    • Uterine cancer Neg Hx    • Colon cancer Neg Hx    • Melanoma Neg Hx        Review of Systems   Constitution: Negative for chills, diaphoresis, fever and weakness.   HENT: Negative for nosebleeds.    Eyes: Negative for visual disturbance.   Cardiovascular: Positive for palpitations (occasional flutter ). Negative for chest pain, claudication, cyanosis, dyspnea on exertion, irregular heartbeat, leg swelling, near-syncope, orthopnea, paroxysmal nocturnal dyspnea and  syncope.   Respiratory: Negative for cough, hemoptysis, shortness of breath, sputum production and wheezing.    Hematologic/Lymphatic: Negative for bleeding problem.   Skin: Negative for color change and flushing.   Musculoskeletal: Negative for falls.   Gastrointestinal: Negative for bloating, abdominal pain, hematemesis, hematochezia, melena, nausea and vomiting.   Genitourinary: Negative for hematuria.   Neurological: Negative for dizziness and light-headedness.   Psychiatric/Behavioral: Negative for altered mental status.         ECG 12 Lead  Date/Time: 11/12/2019 8:20 AM  Performed by: Yumiko Dykes APRN  Authorized by: Yumiko Dykes APRN   Comparison: compared with previous ECG from 3/11/2019  Similar to previous ECG  Rhythm: sinus rhythm               Objective:     Physical Exam   Constitutional: She is oriented to person, place, and time. She appears well-developed and well-nourished. No distress.   HENT:   Head: Normocephalic and atraumatic.   Eyes: Pupils are equal, round, and reactive to light.   Neck: Normal range of motion. Neck supple. No JVD present. No thyromegaly present.   Cardiovascular: Normal rate, regular rhythm and intact distal pulses. Exam reveals no gallop and no friction rub.   Murmur heard.  Pulmonary/Chest: Effort normal and breath sounds normal. No respiratory distress. She has no wheezes. She has no rales. She exhibits no tenderness.   Abdominal: Soft. Bowel sounds are normal. She exhibits no distension. There is no tenderness.   Musculoskeletal: Normal range of motion. She exhibits no edema.   Neurological: She is alert and oriented to person, place, and time. No cranial nerve deficit.   Skin: Skin is warm and dry. She is not diaphoretic.   Psychiatric: She has a normal mood and affect. Her behavior is normal.       Lab Review:   Lab Results   Component Value Date    GLUCOSE 105 10/28/2019    BUN 12 10/28/2019    CREATININE 0.6 10/28/2019    EGFRIFNONA >60 10/28/2019    BCR 17.9  08/12/2018    K 4.2 10/28/2019    CO2 25 10/28/2019    CALCIUM 10.3 (H) 10/28/2019    ALBUMIN 4.4 10/28/2019    AST 13 10/28/2019    ALT 13 10/28/2019     Lab Results   Component Value Date    CHOL 150 08/07/2018    CHLPL 140 (L) 10/28/2019    TRIG 158 (H) 10/28/2019    HDL 50 (L) 10/28/2019    LDL 58 10/28/2019     Previous Cardiac Testing and Procedures:  - ROBEL (8/13/2018) Normal left ventricular systolic function, mild MR, mild to moderate TR, no evidence of left atrial appendage thrombus.  - Cardioversion (8/13/2018) Successful cardioversion from atrial fibrillation to sinus rhythm      Assessment:          Diagnosis Plan   1. PAF (paroxysmal atrial fibrillation) (CMS/[facility])  Maintaining NSR   Anticoagulated with Eliquis  On labetalol and diltiazem      2. Obstructive sleep apnea syndrome  Compliant with CPAP     3. Essential hypertension  Controlled     4. Mixed hyperlipidemia  Continue statin, followed by pcp     5. Class 1 obesity due to excess calories without serious comorbidity with body mass index (BMI) of 34.0 to 34.9 in adult  Patient's Body mass index is 35.71 kg/m². BMI is above normal parameters. Recommendations include: exercise counseling and nutrition counseling.        Plan:       As noted above  Continue current medical therapy listed above  Follow up 1 year, sooner if needed for new or worsening symptoms or concerns     From a revised cardiac risk index standpoint, the patient is low risk for a major cardiac event with knee surgery. Hold Eliquis for 48 hours prior to surgery and resume when felt safe to do so following surgery.

## 2019-11-18 ASSESSMENT — ENCOUNTER SYMPTOMS
COUGH: 0
ABDOMINAL DISTENTION: 0
EYE REDNESS: 0
EYE DISCHARGE: 0
ABDOMINAL PAIN: 0
SINUS PRESSURE: 0
SHORTNESS OF BREATH: 0
BACK PAIN: 1

## 2019-12-04 ENCOUNTER — TELEPHONE (OUTPATIENT)
Dept: SURGERY | Age: 74
End: 2019-12-04

## 2019-12-04 DIAGNOSIS — Z12.31 VISIT FOR SCREENING MAMMOGRAM: Primary | ICD-10-CM

## 2019-12-19 ENCOUNTER — OFFICE VISIT (OUTPATIENT)
Dept: SURGERY | Age: 74
End: 2019-12-19
Payer: MEDICARE

## 2019-12-19 ENCOUNTER — HOSPITAL ENCOUNTER (OUTPATIENT)
Dept: WOMENS IMAGING | Age: 74
Discharge: HOME | End: 2019-12-19
Payer: MEDICARE

## 2019-12-19 VITALS — WEIGHT: 229 LBS | TEMPERATURE: 98.2 F | HEIGHT: 67 IN | BODY MASS INDEX: 35.94 KG/M2

## 2019-12-19 DIAGNOSIS — Z12.31 VISIT FOR SCREENING MAMMOGRAM: Primary | ICD-10-CM

## 2019-12-19 DIAGNOSIS — Z12.31 VISIT FOR SCREENING MAMMOGRAM: ICD-10-CM

## 2019-12-19 PROCEDURE — 1123F ACP DISCUSS/DSCN MKR DOCD: CPT | Performed by: PHYSICIAN ASSISTANT

## 2019-12-19 PROCEDURE — 99213 OFFICE O/P EST LOW 20 MIN: CPT | Performed by: PHYSICIAN ASSISTANT

## 2019-12-19 PROCEDURE — G8482 FLU IMMUNIZE ORDER/ADMIN: HCPCS | Performed by: PHYSICIAN ASSISTANT

## 2019-12-19 PROCEDURE — 77063 BREAST TOMOSYNTHESIS BI: CPT

## 2019-12-19 PROCEDURE — 1090F PRES/ABSN URINE INCON ASSESS: CPT | Performed by: PHYSICIAN ASSISTANT

## 2019-12-19 PROCEDURE — G8417 CALC BMI ABV UP PARAM F/U: HCPCS | Performed by: PHYSICIAN ASSISTANT

## 2019-12-19 PROCEDURE — G8427 DOCREV CUR MEDS BY ELIG CLIN: HCPCS | Performed by: PHYSICIAN ASSISTANT

## 2019-12-19 PROCEDURE — G8399 PT W/DXA RESULTS DOCUMENT: HCPCS | Performed by: PHYSICIAN ASSISTANT

## 2019-12-19 PROCEDURE — 4040F PNEUMOC VAC/ADMIN/RCVD: CPT | Performed by: PHYSICIAN ASSISTANT

## 2019-12-19 PROCEDURE — 3017F COLORECTAL CA SCREEN DOC REV: CPT | Performed by: PHYSICIAN ASSISTANT

## 2019-12-19 PROCEDURE — 1036F TOBACCO NON-USER: CPT | Performed by: PHYSICIAN ASSISTANT

## 2020-03-27 ENCOUNTER — TELEPHONE (OUTPATIENT)
Dept: CARDIOLOGY | Facility: CLINIC | Age: 75
End: 2020-03-27

## 2020-04-16 ENCOUNTER — HOSPITAL ENCOUNTER (EMERGENCY)
Facility: HOSPITAL | Age: 75
Discharge: HOME | End: 2020-04-17
Attending: INTERNAL MEDICINE | Admitting: INTERNAL MEDICINE

## 2020-04-16 ENCOUNTER — NURSE TRIAGE (OUTPATIENT)
Dept: CALL CENTER | Facility: HOSPITAL | Age: 75
End: 2020-04-16

## 2020-04-16 DIAGNOSIS — I48.0 PAROXYSMAL ATRIAL FIBRILLATION (HCC): Primary | ICD-10-CM

## 2020-04-16 PROCEDURE — 93005 ELECTROCARDIOGRAM TRACING: CPT | Performed by: INTERNAL MEDICINE

## 2020-04-16 PROCEDURE — 85610 PROTHROMBIN TIME: CPT | Performed by: INTERNAL MEDICINE

## 2020-04-16 PROCEDURE — 83880 ASSAY OF NATRIURETIC PEPTIDE: CPT | Performed by: INTERNAL MEDICINE

## 2020-04-16 PROCEDURE — 85025 COMPLETE CBC W/AUTO DIFF WBC: CPT | Performed by: INTERNAL MEDICINE

## 2020-04-16 PROCEDURE — 80053 COMPREHEN METABOLIC PANEL: CPT | Performed by: INTERNAL MEDICINE

## 2020-04-16 PROCEDURE — 93010 ELECTROCARDIOGRAM REPORT: CPT | Performed by: INTERNAL MEDICINE

## 2020-04-16 PROCEDURE — 99283 EMERGENCY DEPT VISIT LOW MDM: CPT

## 2020-04-16 PROCEDURE — 84484 ASSAY OF TROPONIN QUANT: CPT | Performed by: INTERNAL MEDICINE

## 2020-04-17 ENCOUNTER — TELEPHONE (OUTPATIENT)
Dept: CARDIOLOGY | Facility: CLINIC | Age: 75
End: 2020-04-17

## 2020-04-17 VITALS
WEIGHT: 228 LBS | OXYGEN SATURATION: 93 % | DIASTOLIC BLOOD PRESSURE: 94 MMHG | HEIGHT: 66 IN | TEMPERATURE: 98.3 F | HEART RATE: 79 BPM | BODY MASS INDEX: 36.64 KG/M2 | RESPIRATION RATE: 18 BRPM | SYSTOLIC BLOOD PRESSURE: 143 MMHG

## 2020-04-17 LAB
ALBUMIN SERPL-MCNC: 4.7 G/DL (ref 3.5–5.2)
ALBUMIN/GLOB SERPL: 2.2 G/DL
ALP SERPL-CCNC: 140 U/L (ref 39–117)
ALT SERPL W P-5'-P-CCNC: 13 U/L (ref 1–33)
ANION GAP SERPL CALCULATED.3IONS-SCNC: 13 MMOL/L (ref 5–15)
AST SERPL-CCNC: 11 U/L (ref 1–32)
BASOPHILS # BLD AUTO: 0.06 10*3/MM3 (ref 0–0.2)
BASOPHILS NFR BLD AUTO: 0.6 % (ref 0–1.5)
BILIRUB SERPL-MCNC: 0.3 MG/DL (ref 0.2–1.2)
BUN BLD-MCNC: 17 MG/DL (ref 8–23)
BUN/CREAT SERPL: 23 (ref 7–25)
CALCIUM SPEC-SCNC: 10.6 MG/DL (ref 8.6–10.5)
CHLORIDE SERPL-SCNC: 104 MMOL/L (ref 98–107)
CO2 SERPL-SCNC: 23 MMOL/L (ref 22–29)
CREAT BLD-MCNC: 0.74 MG/DL (ref 0.57–1)
DEPRECATED RDW RBC AUTO: 42.7 FL (ref 37–54)
EOSINOPHIL # BLD AUTO: 0.34 10*3/MM3 (ref 0–0.4)
EOSINOPHIL NFR BLD AUTO: 3.2 % (ref 0.3–6.2)
ERYTHROCYTE [DISTWIDTH] IN BLOOD BY AUTOMATED COUNT: 13.3 % (ref 12.3–15.4)
GFR SERPL CREATININE-BSD FRML MDRD: 77 ML/MIN/1.73
GLOBULIN UR ELPH-MCNC: 2.1 GM/DL
GLUCOSE BLD-MCNC: 158 MG/DL (ref 65–99)
HCT VFR BLD AUTO: 38.3 % (ref 34–46.6)
HGB BLD-MCNC: 12.7 G/DL (ref 12–15.9)
HOLD SPECIMEN: NORMAL
IMM GRANULOCYTES # BLD AUTO: 0.04 10*3/MM3 (ref 0–0.05)
IMM GRANULOCYTES NFR BLD AUTO: 0.4 % (ref 0–0.5)
INR PPP: 1.24 (ref 0.91–1.09)
LYMPHOCYTES # BLD AUTO: 1.53 10*3/MM3 (ref 0.7–3.1)
LYMPHOCYTES NFR BLD AUTO: 14.5 % (ref 19.6–45.3)
MCH RBC QN AUTO: 29 PG (ref 26.6–33)
MCHC RBC AUTO-ENTMCNC: 33.2 G/DL (ref 31.5–35.7)
MCV RBC AUTO: 87.4 FL (ref 79–97)
MONOCYTES # BLD AUTO: 0.89 10*3/MM3 (ref 0.1–0.9)
MONOCYTES NFR BLD AUTO: 8.5 % (ref 5–12)
NEUTROPHILS # BLD AUTO: 7.67 10*3/MM3 (ref 1.7–7)
NEUTROPHILS NFR BLD AUTO: 72.8 % (ref 42.7–76)
NRBC BLD AUTO-RTO: 0 /100 WBC (ref 0–0.2)
NT-PROBNP SERPL-MCNC: 1012 PG/ML (ref 5–900)
PLATELET # BLD AUTO: 199 10*3/MM3 (ref 140–450)
PMV BLD AUTO: 11.2 FL (ref 6–12)
POTASSIUM BLD-SCNC: 3.9 MMOL/L (ref 3.5–5.2)
PROT SERPL-MCNC: 6.8 G/DL (ref 6–8.5)
PROTHROMBIN TIME: 15.5 SECONDS (ref 11.9–14.6)
RBC # BLD AUTO: 4.38 10*6/MM3 (ref 3.77–5.28)
SODIUM BLD-SCNC: 140 MMOL/L (ref 136–145)
TROPONIN T SERPL-MCNC: <0.01 NG/ML (ref 0–0.03)
WBC NRBC COR # BLD: 10.53 10*3/MM3 (ref 3.4–10.8)

## 2020-04-17 RX ORDER — LOSARTAN POTASSIUM 100 MG/1
100 TABLET ORAL
Qty: 30 TABLET | Refills: 11 | Status: SHIPPED | OUTPATIENT
Start: 2020-04-17 | End: 2020-04-17 | Stop reason: SDUPTHER

## 2020-04-17 RX ORDER — LOSARTAN POTASSIUM 100 MG/1
100 TABLET ORAL
Qty: 90 TABLET | Refills: 3 | Status: SHIPPED | OUTPATIENT
Start: 2020-04-17 | End: 2022-03-21 | Stop reason: SDUPTHER

## 2020-04-17 NOTE — TELEPHONE ENCOUNTER
Okay to add and increase her losartan to 100 mg help control her blood pressure.  Given her symptoms it might be better to get her set up for a video visit or in office visit with APRN depending on patient's preference.

## 2020-04-17 NOTE — TELEPHONE ENCOUNTER
Spoke with patient.  Explained Dr. Lovelace's response.  I told her the office would be in contact with her regarding a visit with the APRN.  She expressed understanding.

## 2020-04-17 NOTE — TELEPHONE ENCOUNTER
Patient called the office today to report she is still going in and out of A-fib.  She has been monitoring it per our conversation from 03/27/2020, but last evening her BP went up too high.  She decided to go to the ER.  Her BP was more acceptable there and she presented rate controlled per her norm.  They sent her home with no interventions.  Today she reports her BP's are 138/105 and 153/106.  Her HR is 98.  She reports her HR is never over 100.  She denies dizziness, lightheadedness, or feeling like she is going to pass out.  She denies chest pain or pressure.  She denies swelling.  She does report occasional palpitations, feelings of skipped beats, and feeling like her heart is racing.  She reports she had a slight headache last evening.  She denies feeling like that during the current conversation.  She states last evening her BP's were 170/116 and 160/108.  She takes her BP with an automated wrist cuff.  She double checked it with a neighbor's arm cuff and then had a retired nurse down the street check it with a manual cuff.  The later of the two BP's were lower than the wrist cuff, but still elevated.  She states she just doesn't feel right and states that she thinks something is wrong.  She is currently taking Cardizem  mg daily, Losartan 50 mg daily, Catapres 0.1 mg BID, Labetalol 300 mg BID, Bumex 1 mg daily, and Eliquis 5 mg BID.  Please advise.

## 2020-04-17 NOTE — TELEPHONE ENCOUNTER
Pt stated she started feeling funny around 930. Was dizzy and felt like her heart was beating out of her chest. She took her bp and it was 170/116 , took again just now and it was 144/91. Pt was in afib almostt 2 yrs ago and she stated this is the way she felt.     Reason for Disposition  • Dizziness, lightheadedness, or weakness    Additional Information  • Negative: Passed out (i.e., lost consciousness, collapsed and was not responding)  • Negative: Shock suspected (e.g., cold/pale/clammy skin, too weak to stand, low BP, rapid pulse)  • Negative: Difficult to awaken or acting confused (e.g., disoriented, slurred speech)  • Negative: Visible sweat on face or sweat dripping down face  • Negative: Unable to walk, or can only walk with assistance (e.g., requires support)  • Negative: [1] Received SHOCK from implantable cardiac defibrillator AND [2] persisting symptoms (i.e., palpitations, lightheadedness)  • Negative: Sounds like a life-threatening emergency to the triager  • Negative: Chest pain  • Negative: Difficulty breathing  • Negative: [1] Heart beating very rapidly (e.g., > 140 / minute) AND [2] present now  (Exception: during exercise)  • Negative: Heart beating very slowly (e.g., < 50 / minute)  (Exception: athlete)  • Negative: New or worsened shortness of breath with activity (dyspnea on exertion)  • Negative: Patient sounds very sick or weak to the triager  • Negative: [1] Heart beating very rapidly (e.g., > 140 / minute) AND [2] not present now  (Exception: during exercise)  • Negative: [1] Skipped or extra beat(s) AND [2] increases with exercise or exertion  • Negative: [1] Skipped or extra beat(s) AND [2] occurs 4 or more times per minute  • Negative: New or worsened ankle swelling  • Negative: History of heart disease  (i.e., heart attack, bypass surgery, angina, angioplasty, CHF) (Exception: brief heart beat symptoms that went away and now feels well)  • Negative: Age > 60 years (Exception: brief  "heart beat symptoms that went away and now feels well)  • Negative: Taking water pill (i.e., diuretic) or heart medication (e.g., digoxin)  • Negative: Wearing a \"holter monitor\" or \"cardiac event monitor\"  • Negative: [1] Received SHOCK from implantable cardiac defibrillator AND [2] now feels well    Answer Assessment - Initial Assessment Questions  1. DESCRIPTION: \"Please describe your heart rate or heart beat that you are having\" (e.g., fast/slow, regular/irregular, skipped or extra beats, \"palpitations\")      Fast comes and goes wih regularity  2. ONSET: \"When did it start?\" (Minutes, hours or days)       2139  3. DURATION: \"How long does it last\" (e.g., seconds, minutes, hours)    4. PATTERN \"Does it come and go, or has it been constant since it started?\"  \"Does it get worse with exertion?\"   \"Are you feeling it now?\"    Comes and goes x 2-3 weeks  5. TAP: \"Using your hand, can you tap out what you are feeling on a chair or table in front of you, so that I can hear?\" (Note: not all patients can do this)     n/a  6. HEART RATE: \"Can you tell me your heart rate?\" \"How many beats in 15 seconds?\"  (Note: not all patients can do this)        80's  7. RECURRENT SYMPTOM: \"Have you ever had this before?\" If so, ask: \"When was the last time?\" and \"What happened that time?\"      [facility] 170/1126 now is 144/91  8. CAUSE: \"What do you think is causing the palpitations?\"    none  9. CARDIAC HISTORY: \"Do you have any history of heart disease?\" (e.g., heart attack, angina, bypass surgery, angioplasty, arrhythmia)    had afib a yr ago was in hospital   10. OTHER SYMPTOMS: \"Do you have any other symptoms?\" (e.g., dizziness, chest pain, sweating, difficulty breathing)   dizzy earlier   11. PREGNANCY: \"Is there any chance you are pregnant?\" \"When was your last menstrual period?\"      no    Protocols used: HEART RATE AND HEARTBEAT QUESTIONS-ADULT-AH      "

## 2020-04-17 NOTE — ED PROVIDER NOTES
Subjective   Ms. Hayes is a 74-year-old female with longstanding history of atrial fibrillation has been paroxysmal and is been cardioverted previously he states that whenever her heart goes out of rhythm she can feel it.  Apparently it went out 3 weeks ago but it is been rate controlled and she is talked with 1 of the nurse practitioners that works with Dr. Laird who explained to her that long as she was rate controlled and anticoagulated that no further emergent testing needs to be done however tonight she started having palpitations and checked her blood pressure and her blood pressure was up over 170 because this she became quite anxious and checked it a couple of times back to back with both the neighbor across the street and from a retired nurse who live down the street all of which confirmed that her blood pressure was up.  Because of this elevated blood pressure in conjunction with her atrial fibrillation she presented to the emergency room for further evaluation.  She states now that she has no palpitation sensation and she is feeling better.  She denies any fevers chills nausea vomiting shortness of breath or recent sick contact.          Review of Systems   Constitutional: Negative for chills, fatigue and fever.   HENT: Negative for congestion and facial swelling.    Eyes: Negative for photophobia, discharge and visual disturbance.   Respiratory: Negative for cough, shortness of breath and wheezing.    Cardiovascular: Positive for palpitations. Negative for chest pain and leg swelling.   Gastrointestinal: Negative for abdominal pain, diarrhea, nausea and vomiting.   Endocrine: Negative for cold intolerance and heat intolerance.   Genitourinary: Negative for difficulty urinating and urgency.   Musculoskeletal: Negative for arthralgias, joint swelling and myalgias.   Skin: Negative for color change and pallor.   Neurological: Negative for dizziness and light-headedness.   Hematological: Negative for  adenopathy. Does not bruise/bleed easily.   Psychiatric/Behavioral: Negative for agitation, behavioral problems and confusion.       Past Medical History:   Diagnosis Date   • Arthritis    • Atrial fibrillation (CMS/HCC)    • Hyperlipidemia    • Hypertension        No Known Allergies    Past Surgical History:   Procedure Laterality Date   • CARDIAC CATHETERIZATION  1987   • TUBAL ABDOMINAL LIGATION         Family History   Problem Relation Age of Onset   • Atrial fibrillation Mother    • Heart disease Father    • Breast cancer Neg Hx    • Ovarian cancer Neg Hx    • Uterine cancer Neg Hx    • Colon cancer Neg Hx    • Melanoma Neg Hx        Social History     Socioeconomic History   • Marital status:      Spouse name: Not on file   • Number of children: Not on file   • Years of education: Not on file   • Highest education level: Not on file   Tobacco Use   • Smoking status: Former Smoker   • Smokeless tobacco: Never Used   Substance and Sexual Activity   • Alcohol use: Yes     Alcohol/week: 7.0 standard drinks     Types: 7 Glasses of wine per week   • Drug use: No   • Sexual activity: Defer           Objective   Physical Exam   Constitutional: She is oriented to person, place, and time. She appears well-developed and well-nourished.   HENT:   Head: Normocephalic and atraumatic.   Mouth/Throat: Oropharynx is clear and moist.   Eyes: Pupils are equal, round, and reactive to light. Conjunctivae and EOM are normal.   Neck: Normal range of motion. Neck supple.   Cardiovascular: Normal rate, normal heart sounds and intact distal pulses. An irregularly irregular rhythm present.   Pulmonary/Chest: Effort normal and breath sounds normal.   Abdominal: Soft. Bowel sounds are normal. She exhibits no distension.   Musculoskeletal: Normal range of motion. She exhibits no edema.   Neurological: She is alert and oriented to person, place, and time. No cranial nerve deficit.   Skin: Skin is warm and dry.   Psychiatric: She has  a normal mood and affect. Her behavior is normal. Thought content normal.   Nursing note and vitals reviewed.      Procedures           ED Course  ED Course as of Apr 17 0023 Fri Apr 17, 2020   0022 Reviewed with the patient her labs and EKG and explained to her that given the fact that she was rate controlled and on anticoagulant she can follow with her primary provider.  I explained that sometimes blood pressure and heart rate fluctuating over 1 but if you rest and check it at rest if it is above 180 or heart rate stays consistently up over the 100s or 1 teens then coming back for further evaluation certainly a good idea.  Given the fact that the patient is asymptomatic with blood pressures in the 140s over 80s and heart rate in the 70s and 80s will now allow the patient be discharged home.    [RW]      ED Course User Index  [RW] Dre Welsh MD                                           [facility]    Final diagnoses:   Paroxysmal atrial fibrillation (CMS/[facility])            Dre Welsh MD  04/17/20 0024

## 2020-04-17 NOTE — DISCHARGE INSTRUCTIONS
Atrial Fibrillation  Atrial fibrillation is a type of irregular or rapid heartbeat (arrhythmia). In atrial fibrillation, the top part of the heart (atria) quivers in a chaotic pattern. This makes the heart unable to pump blood normally. Having atrial fibrillation can increase your risk for other health problems, such as:  · Blood can pool in the atria and form clots. If a clot travels to the brain, it can cause a stroke.  · The heart muscle may weaken from the irregular blood flow. This can cause heart failure.  Atrial fibrillation may start suddenly and stop on its own, or it may become a long-lasting problem.  What are the causes?  This condition is caused by some heart-related conditions or procedures, including:  · High blood pressure. This is the most common cause.  · Heart failure.  · Heart valve conditions.  · Inflammation of the sac that surrounds the heart (pericarditis).  · Heart surgery.  · Coronary artery disease.  · Certain heart rhythm disorders, such as Marley-Parkinson-White syndrome.  Other causes include:  · Pneumonia.  · Obstructive sleep apnea.  · Lung cancer.  · Thyroid problems, especially if the thyroid is overactive (hyperthyroidism).  · Excessive alcohol or drug use.  Sometimes, the cause of this condition is not known.  What increases the risk?  This condition is more likely to develop in:  · Older people.  · People who smoke.  · People who have diabetes mellitus.  · People who are overweight (obese).  · Athletes who exercise vigorously.  · People who have a family history.  What are the signs or symptoms?  Symptoms of this condition include:  · A feeling that your heart is beating rapidly or irregularly.  · A feeling of discomfort or pain in your chest.  · Shortness of breath.  · Sudden light-headedness or weakness.  · Getting tired easily during exercise.  In some cases, there are no symptoms.  How is this diagnosed?  Your health care provider may be able to detect atrial fibrillation when  taking your pulse. If detected, this condition may be diagnosed with:  · Electrocardiogram (ECG).  · Ambulatory cardiac monitor. This device records your heartbeats for 24 hours or more.  · Transthoracic echocardiogram (TTE) to evaluate how blood flows through your heart.  · Transesophageal echocardiogram (ROBEL) to view more detailed images of your heart.  · A stress test.  · Imaging tests, such as a CT scan or chest X-ray.  · Blood tests.  How is this treated?  This condition may be treated with:  · Medicines to slow down the heart rate or bring the heart's rhythm back to normal.  · Medicines to prevent blood clots from forming.  · Electrical cardioversion. This delivers a low-energy shock to the heart to reset its rhythm.  · Ablation. This procedure destroys the part of the heart tissue that sends abnormal signals.  · Left atrial appendage occlusion/excision. This seals off a common place in the atria where blood clots can form (left atrial appendage).  The goal of treatment is to prevent blood clots from forming and to keep your heart beating at a normal rate and rhythm. Treatment depends on underlying medical conditions and how you feel when you are experiencing fibrillation.  Follow these instructions at home:  Medicines  · Take over-the counter and prescription medicines only as told by your health care provider.  · If your health care provider prescribed a blood-thinning medicine (anticoagulant), take it exactly as told. Taking too much blood-thinning medicine can cause bleeding. Taking too little can enable a blood clot to form and travel to the brain, causing a stroke.  Lifestyle         · Do not use any products that contain nicotine or tobacco, such as cigarettes and e-cigarettes. If you need help quitting, ask your health care provider.  · Do not drink beverages that contain caffeine, such as coffee, soda, and tea.  · Follow diet instructions as told by your health care provider.  · Exercise regularly as  "told by your health care provider.  · Do not drink alcohol.  General instructions  · If you have obstructive sleep apnea, manage your condition as told by your health care provider.  · Maintain a healthy weight. Do not use diet pills unless your health care provider approves. Diet pills may make heart problems worse.  · Keep all follow-up visits as told by your health care provider. This is important.  Contact a health care provider if you:  · Notice a change in the rate, rhythm, or strength of your heartbeat.  · Are taking an anticoagulant and you notice increased bruising.  · Tire more easily when you exercise or exert yourself.  · Have a sudden change in weight.  Get help right away if you have:    · Chest pain, abdominal pain, sweating, or weakness.  · Difficulty breathing.  · Blood in your vomit, stool (feces), or urine.  · Any symptoms of a stroke. \"BE FAST\" is an easy way to remember the main warning signs of a stroke:  ? B - Balance. Signs are dizziness, sudden trouble walking, or loss of balance.  ? E - Eyes. Signs are trouble seeing or a sudden change in vision.  ? F - Face. Signs are sudden weakness or numbness of the face, or the face or eyelid drooping on one side.  ? A - Arms. Signs are weakness or numbness in an arm. This happens suddenly and usually on one side of the body.  ? S - Speech. Signs are sudden trouble speaking, slurred speech, or trouble understanding what people say.  ? T - Time. Time to call emergency services. Write down what time symptoms started.  · Other signs of a stroke, such as:  ? A sudden, severe headache with no known cause.  ? Nausea or vomiting.  ? Seizure.  These symptoms may represent a serious problem that is an emergency. Do not wait to see if the symptoms will go away. Get medical help right away. Call your local emergency services (911 in the [place] Do not drive yourself to the hospital.  Summary  · Atrial fibrillation is a type of irregular or rapid heartbeat " (arrhythmia).  · Symptoms include a feeling that your heart is beating fast or irregularly. In some cases, you may not have symptoms.  · The condition is treated with medicines to slow down the heart rate or bring the heart's rhythm back to normal. You may also need blood-thinning medicines to prevent blood clots.  · Get help right away if you have symptoms or signs of a stroke.  This information is not intended to replace advice given to you by your health care provider. Make sure you discuss any questions you have with your health care provider.  Document Released: 12/18/2006 Document Revised: 02/08/2019 Document Reviewed: 02/08/2019  [employer] Interactive Patient Education © 2020 [employer]

## 2020-04-20 ENCOUNTER — OFFICE VISIT (OUTPATIENT)
Dept: CARDIOLOGY | Facility: CLINIC | Age: 75
End: 2020-04-20

## 2020-04-20 ENCOUNTER — TELEPHONE (OUTPATIENT)
Dept: CARDIOLOGY | Facility: CLINIC | Age: 75
End: 2020-04-20

## 2020-04-20 VITALS
HEIGHT: 66 IN | DIASTOLIC BLOOD PRESSURE: 100 MMHG | OXYGEN SATURATION: 98 % | SYSTOLIC BLOOD PRESSURE: 139 MMHG | BODY MASS INDEX: 36 KG/M2 | WEIGHT: 224 LBS | HEART RATE: 98 BPM

## 2020-04-20 DIAGNOSIS — Z99.89 OSA ON CPAP: ICD-10-CM

## 2020-04-20 DIAGNOSIS — E78.2 MIXED HYPERLIPIDEMIA: ICD-10-CM

## 2020-04-20 DIAGNOSIS — G47.33 OSA ON CPAP: ICD-10-CM

## 2020-04-20 DIAGNOSIS — I48.0 PAF (PAROXYSMAL ATRIAL FIBRILLATION) (HCC): Primary | ICD-10-CM

## 2020-04-20 DIAGNOSIS — I10 ESSENTIAL HYPERTENSION: ICD-10-CM

## 2020-04-20 PROCEDURE — 99214 OFFICE O/P EST MOD 30 MIN: CPT | Performed by: NURSE PRACTITIONER

## 2020-04-20 PROCEDURE — 93000 ELECTROCARDIOGRAM COMPLETE: CPT | Performed by: NURSE PRACTITIONER

## 2020-04-20 RX ORDER — FLECAINIDE ACETATE 50 MG/1
50 TABLET ORAL 2 TIMES DAILY
Qty: 10 TABLET | Refills: 0 | Status: ON HOLD | OUTPATIENT
Start: 2020-04-20 | End: 2022-05-30 | Stop reason: ALTCHOICE

## 2020-04-20 RX ORDER — FLECAINIDE ACETATE 50 MG/1
50 TABLET ORAL 2 TIMES DAILY
Qty: 14 TABLET | Refills: 1 | OUTPATIENT
Start: 2020-04-20

## 2020-04-20 RX ORDER — FLECAINIDE ACETATE 50 MG/1
50 TABLET ORAL 2 TIMES DAILY
Qty: 180 TABLET | Refills: 3 | Status: SHIPPED | OUTPATIENT
Start: 2020-04-20 | End: 2020-04-20 | Stop reason: SDUPTHER

## 2020-04-20 NOTE — PROGRESS NOTES
"    Subjective:     Encounter Date:04/20/2020      Patient ID: Anne-Marie Hayes is a 74 y.o. female.    Chief Complaint: follow up paroxysmal atrial fibrillation and hypertension   The patient presents to follow up regarding her paroxysmal atrial fibrillation and hypertension. She was last seen in [facility] office 11/2019. At that time she was feeling well and maintaining normal sinus rhythm. She called the office 3/26/2020 with complaints of palpitations and believed she was in afib, but felt she had returned to normal sinus rhythm by the next day. She now states, in retrospect, she believes she may have been in afib since 3/26/2020 based on how she felt and irregular heart rate readings on her Fit Bit. Since that time, she reports more fatigue, dyspnea on exertion, intermittent palpitations (2-3 times per day, last up to 1 hour), and elevated blood pressures (occasionally up to 170s/110s). She had an acute episode of palpitations and dizziness 4/17/2020 (3 days ago) and presented to ER. She was found to be in rate controlled atrial fibrillation (70s-80s) and workup was unremarkable otherwise. She was discharged home in stable condition. She called in with reports of high blood pressures and her losartan was increased from 50 mg to 100 mg per Dr. Lovelace's recommendations. Since the increase she reports her blood pressures have improved somewhat. SBP is mostly 120s-150s. Heart rates have been 70s-90s. She reports compliance with her medications - she reports no missed doses of Eliquis in the past 4 weeks. She denies chest pain, orthopnea, PND, or syncope. She reports some mild chronic lower extremity edema.       The following portions of the patient's history were reviewed and updated as appropriate: allergies, current medications, past family history, past medical history, past social history, past surgical history and problem list.  /100   Pulse 98   Ht 167.6 cm (66\")   Wt 102 kg (224 lb)   SpO2 98%   BMI " 36.15 kg/m²   No Known Allergies    Current Outpatient Medications:   •  apixaban (ELIQUIS) 5 MG tablet tablet, Take 1 tablet by mouth Every 12 (Twelve) Hours., Disp: 60 tablet, Rfl: 0  •  bumetanide (BUMEX) 1 MG tablet, Take 1 mg by mouth Daily., Disp: , Rfl:   •  cholecalciferol (VITAMIN D3) 1000 units tablet, Take 1,000 Units by mouth 2 (Two) Times a Day., Disp: , Rfl:   •  clobetasol (TEMOVATE) 0.05 % ointment, Apply  topically to the appropriate area as directed., Disp: , Rfl:   •  CloNIDine (CATAPRES) 0.1 MG tablet, Take 0.1 mg by mouth 2 (Two) Times a Day., Disp: , Rfl:   •  diltiaZEM CD (CARDIZEM CD) 360 MG 24 hr capsule, Take 1 capsule by mouth Daily., Disp: 30 capsule, Rfl: 11  •  famciclovir (FAMVIR) 500 MG tablet, Take 1,500 mg by mouth As Needed., Disp: , Rfl:   •  fluticasone (FLONASE) 50 MCG/ACT nasal spray, 2 sprays into the nostril(s) as directed by provider Daily As Needed for Rhinitis or Allergies., Disp: , Rfl:   •  Glucosamine 500 MG capsule, Take 1 capsule by mouth Daily., Disp: , Rfl:   •  KLOR-CON 10 MEQ CR tablet, Take 10 mEq by mouth 2 (Two) Times a Day., Disp: , Rfl:   •  labetalol (NORMODYNE) 300 MG tablet, Take 300 mg by mouth Every 12 (Twelve) Hours., Disp: , Rfl:   •  losartan (COZAAR) 100 MG tablet, Take 1 tablet by mouth Daily., Disp: 90 tablet, Rfl: 3  •  Multiple Vitamins-Minerals (PRESERVISION AREDS 2 PO), Take 1 tablet by mouth 2 (Two) Times a Day., Disp: , Rfl:   •  Omega-3 Fatty Acids (FISH OIL) 1000 MG capsule capsule, Take 1,000 mg by mouth Daily With Breakfast., Disp: , Rfl:   •  rosuvastatin (CRESTOR) 10 MG tablet, Take 1 tablet by mouth Daily., Disp: , Rfl:   Past Medical History:   Diagnosis Date   • Arthritis    • Atrial fibrillation (CMS/HCC)    • Hyperlipidemia    • Hypertension      Past Surgical History:   Procedure Laterality Date   • CARDIAC CATHETERIZATION  1987   • REPLACEMENT TOTAL KNEE Right 07/2019   • TUBAL ABDOMINAL LIGATION       Social History      Socioeconomic History   • Marital status:      Spouse name: Not on file   • Number of children: Not on file   • Years of education: Not on file   • Highest education level: Not on file   Tobacco Use   • Smoking status: Former Smoker   • Smokeless tobacco: Never Used   Substance and Sexual Activity   • Alcohol use: Yes     Alcohol/week: 7.0 standard drinks     Types: 7 Glasses of wine per week   • Drug use: No   • Sexual activity: Defer     Family History   Problem Relation Age of Onset   • Atrial fibrillation Mother    • Heart disease Father    • Breast cancer Neg Hx    • Ovarian cancer Neg Hx    • Uterine cancer Neg Hx    • Colon cancer Neg Hx    • Melanoma Neg Hx        Review of Systems   Constitution: Positive for malaise/fatigue. Negative for chills, diaphoresis and fever.   HENT: Negative for nosebleeds.    Eyes: Negative for visual disturbance.   Cardiovascular: Positive for dyspnea on exertion, leg swelling and palpitations. Negative for chest pain, claudication, cyanosis, irregular heartbeat, near-syncope, orthopnea, paroxysmal nocturnal dyspnea and syncope.   Respiratory: Positive for shortness of breath (DUGAN ). Negative for cough, hemoptysis, sputum production and wheezing.    Hematologic/Lymphatic: Negative for bleeding problem.   Skin: Negative for color change and flushing.   Musculoskeletal: Negative for falls.   Gastrointestinal: Negative for bloating, abdominal pain, hematemesis, hematochezia, melena, nausea and vomiting.   Genitourinary: Negative for hematuria.   Neurological: Positive for dizziness. Negative for light-headedness and weakness.   Psychiatric/Behavioral: Negative for altered mental status.         ECG 12 Lead  Date/Time: 4/20/2020 9:59 AM  Performed by: Yumiko Dykes APRN  Authorized by: Yumiko Dykes APRN   Comparison: compared with previous ECG from 4/16/2020  Similar to previous ECG  Rhythm: atrial fibrillation  BPM: 98                 Objective:     Physical Exam    Constitutional: She is oriented to person, place, and time. She appears well-developed and well-nourished. No distress.   HENT:   Head: Normocephalic and atraumatic.   Eyes: Pupils are equal, round, and reactive to light.   Neck: Normal range of motion. Neck supple. No JVD present. No thyromegaly present.   Cardiovascular: Normal rate, normal heart sounds and intact distal pulses. An irregularly irregular rhythm present. Exam reveals no gallop and no friction rub.   No murmur heard.  Pulmonary/Chest: Effort normal and breath sounds normal. No respiratory distress. She has no wheezes. She has no rales. She exhibits no tenderness.   Abdominal: Soft. Bowel sounds are normal. She exhibits no distension. There is no tenderness.   Musculoskeletal: Normal range of motion. She exhibits edema (trace BLE edema ).   Neurological: She is alert and oriented to person, place, and time. No cranial nerve deficit.   Skin: Skin is warm and dry. She is not diaphoretic.   Psychiatric: She has a normal mood and affect. Her behavior is normal.     Previous Cardiac Testing and Procedures:  - ROBEL (8/13/2018) Normal left ventricular systolic function, mild MR, mild to moderate TR, no evidence of left atrial appendage thrombus.  - Cardioversion (8/13/2018) Successful cardioversion from atrial fibrillation to sinus rhythm      Assessment:          Diagnosis Plan   1. PAF (paroxysmal atrial fibrillation) (CMS/[facility])    See notes in HPI  She remains in rate controlled (with diltiazem and labetalol) atrial fibrillation today.  Anticoagulated with Eliquis  She is at least somewhat symptomatic as evidenced by her fatigue, DUGAN and palpitations.     2. Essential hypertension    Gradually improving with recent increase in losartan but elevated today     3. PRINCE on CPAP    Compliant with CPAP    4. Mixed hyperlipidemia  On statin, followed by pcp           Plan:       I will discuss the addition of an antiarrhythmic, followed by a cardioversion if she  remains in atrial fibrillation, with Dr. Lovelace. EP referral is a consideration as well.   Will hold of on adjusting other medications at this time as she is rate controlled and her losartan dose was just increased for hypertension 3 days ago. Continue current medical therapy as listed above.  Will tentatively schedule a 1 month follow up. She will call sooner with symptoms or concerns.

## 2020-04-20 NOTE — TELEPHONE ENCOUNTER
----- Message from Jovani Lovelace MD sent at 4/20/2020 12:17 PM CDT -----  Good with me.    ----- Message -----  From: Yumiko Dykes APRN  Sent: 4/20/2020  12:15 PM CDT  To: Jovnai Lovelace MD    Ok, based on her history will try flecainide 50 mg BID and see about getting her set up for CV next week if you agree? Thanks.    ----- Message -----  From: Jovani Lovelace MD  Sent: 4/20/2020  11:38 AM CDT  To: MANDO Butler    Think is reasonable to get her started on an antiarrhythmic drug and get her set up for a cardioversion sooner rather than later.  I could potentially do it next week.  ----- Message -----  From: Yumiko Dykes APRN  Sent: 4/20/2020  10:15 AM CDT  To: Jovani Lovelace MD    See note. Today's EKG is not scanned in yet, but should be shortly: she remains in atrial fibrillation with HR 98, normal QRS duration, QT/QTc 320/408. She had a normal LVEF on ROBEL 8/2018 and has no CAD history. She is somewhat symptomatic despite being rate controlled with diltiazem and labetalol. Would you go ahead and put her on an antiarrythmic and if so, which one? Then if she is not back in NSR at her 4 week follow up, cardiovert then or cardiovert sooner ? I assuming if she has recurrence despite an antiarrythmic, we could refer her to EP as well. Thank you!

## 2020-04-28 ENCOUNTER — ANESTHESIA (OUTPATIENT)
Dept: CARDIOLOGY | Facility: HOSPITAL | Age: 75
End: 2020-04-28

## 2020-04-28 ENCOUNTER — HOSPITAL ENCOUNTER (OUTPATIENT)
Dept: CARDIOLOGY | Facility: HOSPITAL | Age: 75
Discharge: HOME | End: 2020-04-28
Admitting: INTERNAL MEDICINE

## 2020-04-28 ENCOUNTER — ANESTHESIA EVENT (OUTPATIENT)
Dept: CARDIOLOGY | Facility: HOSPITAL | Age: 75
End: 2020-04-28

## 2020-04-28 VITALS — SYSTOLIC BLOOD PRESSURE: 151 MMHG | OXYGEN SATURATION: 97 % | HEART RATE: 74 BPM | DIASTOLIC BLOOD PRESSURE: 81 MMHG

## 2020-04-28 VITALS
BODY MASS INDEX: 34.81 KG/M2 | HEART RATE: 76 BPM | DIASTOLIC BLOOD PRESSURE: 69 MMHG | WEIGHT: 221.8 LBS | OXYGEN SATURATION: 96 % | HEIGHT: 67 IN | TEMPERATURE: 97.4 F | RESPIRATION RATE: 17 BRPM | SYSTOLIC BLOOD PRESSURE: 102 MMHG

## 2020-04-28 DIAGNOSIS — I48.0 PAF (PAROXYSMAL ATRIAL FIBRILLATION) (HCC): ICD-10-CM

## 2020-04-28 PROCEDURE — 25010000002 PROPOFOL 10 MG/ML EMULSION: Performed by: NURSE ANESTHETIST, CERTIFIED REGISTERED

## 2020-04-28 PROCEDURE — 92960 CARDIOVERSION ELECTRIC EXT: CPT

## 2020-04-28 PROCEDURE — 92960 CARDIOVERSION ELECTRIC EXT: CPT | Performed by: INTERNAL MEDICINE

## 2020-04-28 RX ORDER — FLUMAZENIL 0.1 MG/ML
0.2 INJECTION INTRAVENOUS AS NEEDED
Status: DISCONTINUED | OUTPATIENT
Start: 2020-04-28 | End: 2020-04-29 | Stop reason: HOSPADM

## 2020-04-28 RX ORDER — SODIUM CHLORIDE 0.9 % (FLUSH) 0.9 %
10 SYRINGE (ML) INJECTION AS NEEDED
Status: DISCONTINUED | OUTPATIENT
Start: 2020-04-28 | End: 2020-04-29 | Stop reason: HOSPADM

## 2020-04-28 RX ORDER — LABETALOL HYDROCHLORIDE 5 MG/ML
5 INJECTION, SOLUTION INTRAVENOUS
Status: DISCONTINUED | OUTPATIENT
Start: 2020-04-28 | End: 2020-04-29 | Stop reason: HOSPADM

## 2020-04-28 RX ORDER — ONDANSETRON 2 MG/ML
4 INJECTION INTRAMUSCULAR; INTRAVENOUS AS NEEDED
Status: SHIPPED | OUTPATIENT
Start: 2020-04-28 | End: 2020-04-28

## 2020-04-28 RX ORDER — SODIUM CHLORIDE 9 MG/ML
INJECTION, SOLUTION INTRAVENOUS CONTINUOUS PRN
Status: DISCONTINUED | OUTPATIENT
Start: 2020-04-28 | End: 2020-04-28 | Stop reason: SURG

## 2020-04-28 RX ORDER — SODIUM CHLORIDE 0.9 % (FLUSH) 0.9 %
10 SYRINGE (ML) INJECTION EVERY 12 HOURS SCHEDULED
Status: DISCONTINUED | OUTPATIENT
Start: 2020-04-28 | End: 2020-04-29 | Stop reason: HOSPADM

## 2020-04-28 RX ORDER — OXYCODONE AND ACETAMINOPHEN 10; 325 MG/1; MG/1
1 TABLET ORAL ONCE AS NEEDED
Status: DISCONTINUED | OUTPATIENT
Start: 2020-04-28 | End: 2020-04-29 | Stop reason: HOSPADM

## 2020-04-28 RX ORDER — FENTANYL CITRATE 50 UG/ML
25 INJECTION, SOLUTION INTRAMUSCULAR; INTRAVENOUS
Status: DISCONTINUED | OUTPATIENT
Start: 2020-04-28 | End: 2020-04-29 | Stop reason: HOSPADM

## 2020-04-28 RX ORDER — PROPOFOL 10 MG/ML
VIAL (ML) INTRAVENOUS AS NEEDED
Status: DISCONTINUED | OUTPATIENT
Start: 2020-04-28 | End: 2020-04-28 | Stop reason: SURG

## 2020-04-28 RX ORDER — MORPHINE SULFATE 10 MG/ML
2 INJECTION INTRAMUSCULAR; INTRAVENOUS; SUBCUTANEOUS
Status: DISCONTINUED | OUTPATIENT
Start: 2020-04-28 | End: 2020-04-29 | Stop reason: HOSPADM

## 2020-04-28 RX ORDER — NALOXONE HCL 0.4 MG/ML
0.04 VIAL (ML) INJECTION AS NEEDED
Status: DISCONTINUED | OUTPATIENT
Start: 2020-04-28 | End: 2020-04-29 | Stop reason: HOSPADM

## 2020-04-28 RX ORDER — OXYCODONE AND ACETAMINOPHEN 7.5; 325 MG/1; MG/1
2 TABLET ORAL ONCE AS NEEDED
Status: DISCONTINUED | OUTPATIENT
Start: 2020-04-28 | End: 2020-04-29 | Stop reason: HOSPADM

## 2020-04-28 RX ADMIN — Medication 10 ML: at 08:31

## 2020-04-28 RX ADMIN — PROPOFOL 50 MG: 10 INJECTION, EMULSION INTRAVENOUS at 08:41

## 2020-04-28 RX ADMIN — SODIUM CHLORIDE: 9 INJECTION, SOLUTION INTRAVENOUS at 08:37

## 2020-04-28 RX ADMIN — PROPOFOL 30 MG: 10 INJECTION, EMULSION INTRAVENOUS at 08:42

## 2020-04-28 NOTE — H&P
Chief Complaint: atrial fibrillation    HPI: Ms Hayes is a 74 year old female with significant past medical history of paroxysmal atrial fibrillation, hypertension, PRINCE, and hyperlipidemia.  She was recently seen in clinic by MANDO Julien and found to be back in atrial fibrillation that was rate controlled.  She was felt to be symptomatic and set up for a cardioversion today.       Patient Active Problem List   Diagnosis   • PAF (paroxysmal atrial fibrillation) (CMS/HCC)   • Atrial flutter with rapid ventricular response (CMS/HCC)   • PRINCE on CPAP   • Essential hypertension   • Mixed hyperlipidemia   • Class 2 obesity due to excess calories without serious comorbidity with body mass index (BMI) of 35.0 to 35.9 in adult       Past Medical History:   Diagnosis Date   • Arthritis    • Atrial fibrillation (CMS/HCC)    • Hyperlipidemia    • Hypertension      Past Surgical History:   Procedure Laterality Date   • CARDIAC CATHETERIZATION  1987   • REPLACEMENT TOTAL KNEE Right 07/2019   • TUBAL ABDOMINAL LIGATION         The following portions of the patient's history were reviewed and updated as appropriate: allergies, current medications, past family history, past medical history, past social history, past surgical history and problem list.    Social History     Socioeconomic History   • Marital status:      Spouse name: Not on file   • Number of children: Not on file   • Years of education: Not on file   • Highest education level: Not on file   Tobacco Use   • Smoking status: Former Smoker   • Smokeless tobacco: Never Used   Substance and Sexual Activity   • Alcohol use: Yes     Alcohol/week: 7.0 standard drinks     Types: 7 Glasses of wine per week   • Drug use: No   • Sexual activity: Defer       Family History   Problem Relation Age of Onset   • Atrial fibrillation Mother    • Heart disease Father    • Breast cancer Neg Hx    • Ovarian cancer Neg Hx    • Uterine cancer Neg Hx    • Colon cancer Neg Hx   "  • Melanoma Neg Hx        Review of Systems: A 12 system review of systems was completed and is negative except stated in HPI.     Objective   BP (!) 197/115 (BP Location: Right arm, Patient Position: Sitting)   Pulse 111   Temp 97.4 °F (36.3 °C) (Temporal)   Resp 15   Ht 170.2 cm (67\")   Wt 101 kg (221 lb 12.8 oz)   SpO2 98%   BMI 34.74 kg/m²     Physical Exam:  Physical Exam   Constitutional: She is oriented to person, place, and time. She appears well-developed and well-nourished.   HENT:   Head: Normocephalic and atraumatic.   Cardiovascular: Normal heart sounds. An irregularly irregular rhythm present. Tachycardia present.   No murmur heard.  Pulmonary/Chest: Effort normal and breath sounds normal.   Abdominal: She exhibits distension (obese).   Musculoskeletal: She exhibits no edema.   Neurological: She is alert and oriented to person, place, and time.   Skin: Skin is warm and dry.   Psychiatric: She has a normal mood and affect.       Lab Results   Component Value Date    TROPONINI <0.012 08/12/2018    TROPONINT <0.010 04/16/2020     Lab Results   Component Value Date    GLUCOSE 158 (H) 04/16/2020    CALCIUM 10.6 (H) 04/16/2020     04/16/2020    K 3.9 04/16/2020    CO2 23.0 04/16/2020     04/16/2020    BUN 17 04/16/2020    CREATININE 0.74 04/16/2020    EGFRIFNONA 77 04/16/2020    BCR 23.0 04/16/2020    ANIONGAP 13.0 04/16/2020     Lab Results   Component Value Date    WBC 10.53 04/16/2020    HGB 12.7 04/16/2020    HCT 38.3 04/16/2020    MCV 87.4 04/16/2020     04/16/2020     Lab Results   Component Value Date    CHOL 150 08/07/2018     Lab Results   Component Value Date    TRIG 158 (H) 10/28/2019    TRIG 163 (H) 04/29/2019    TRIG 153 (H) 08/07/2018     Lab Results   Component Value Date    HDL 50 (L) 10/28/2019    HDL 49 (L) 04/29/2019    HDL 51 08/07/2018     No components found for: LDLCALC  Lab Results   Component Value Date    LDL 58 10/28/2019    LDL 54 04/29/2019    LDL 71 " 08/07/2018     No results found for: HDLLDLRATIO  No components found for: CHOLHDL      Assessment:  1. Paroxysmal atrial fibrillation  2. Essential hypertension  3. PRINCE  4. Obesity: Body mass index is 34.74 kg/m².   5. Hyperlipidemia    Plan:  - cardioversion today

## 2020-04-28 NOTE — ANESTHESIA PREPROCEDURE EVALUATION
Anesthesia Evaluation     NPO Solid Status: > 8 hours  NPO Liquid Status: > 8 hours           Airway   Mallampati: II  TM distance: >3 FB  Neck ROM: full  No difficulty expected  Dental - normal exam     Pulmonary - normal exam   (+) sleep apnea on CPAP,   Cardiovascular   Exercise tolerance: poor (<4 METS)    Beta blocker given within 24 hours of surgery  Rhythm: irregular  Rate: normal    (+) hypertension, dysrhythmias Atrial Fib, hyperlipidemia,       Neuro/Psych  GI/Hepatic/Renal/Endo    (+) obesity,       Musculoskeletal     Abdominal    Substance History      OB/GYN          Other   arthritis,                      Anesthesia Plan    ASA 3     general       Anesthetic plan, all risks, benefits, and alternatives have been provided, discussed and informed consent has been obtained with: patient.

## 2020-04-28 NOTE — ANESTHESIA POSTPROCEDURE EVALUATION
"Patient: Anne-Marie Hayes    Procedure Summary     Date:  04/28/20 Room / Location:  [facility] CATH LAB    Anesthesia Start:  0837 Anesthesia Stop:  0850    Procedure:  CARDIOVERSION EXTERNAL IN CARDIOLOGY DEPARTMENT Diagnosis:       PAF (paroxysmal atrial fibrillation) (CMS/HCC)      (atrial fibrillation)    Scheduled Providers:  Jovani Lovelace MD; Leonardo Bearden MD Provider:  Chris Broussard CRNA    Anesthesia Type:  general ASA Status:  3          Anesthesia Type: general    Vitals  Vitals Value Taken Time   /110 4/28/2020  8:53 AM   Temp     Pulse 101 4/28/2020  8:56 AM   Resp     SpO2 90 % 4/28/2020  8:56 AM   Vitals shown include unvalidated device data.        Post Anesthesia Care and Evaluation    Patient location during evaluation: bedside (cath lab)  Patient participation: complete - patient participated  Level of consciousness: awake and alert  Pain management: adequate  Airway patency: patent  Anesthetic complications: No anesthetic complications    Cardiovascular status: acceptable  Respiratory status: acceptable  Hydration status: acceptable    Comments: Blood pressure (!) 133/110, pulse 73, temperature 97.4 °F (36.3 °C), temperature source Temporal, resp. rate (P) 16, height 170.2 cm (67\"), weight 101 kg (221 lb 12.8 oz), SpO2 98 %, not currently breastfeeding.    Pt discharged from PACU based on yosi score >8      "

## 2020-04-30 ENCOUNTER — TELEPHONE (OUTPATIENT)
Dept: INTERNAL MEDICINE | Age: 75
End: 2020-04-30

## 2020-04-30 ENCOUNTER — TELEPHONE (OUTPATIENT)
Dept: NEUROLOGY | Facility: CLINIC | Age: 75
End: 2020-04-30

## 2020-04-30 DIAGNOSIS — Z99.89 OSA ON CPAP: Primary | ICD-10-CM

## 2020-04-30 DIAGNOSIS — G47.33 OSA ON CPAP: Primary | ICD-10-CM

## 2020-04-30 NOTE — TELEPHONE ENCOUNTER
[facility] called to inform office that they accept option to do a Virtual Visit. Patient has been advised how to create a MyChart. Please call patient with any changes/questions/concerns.

## 2020-05-01 ENCOUNTER — TELEPHONE (OUTPATIENT)
Dept: NEUROLOGY | Facility: CLINIC | Age: 75
End: 2020-05-01

## 2020-05-01 NOTE — TELEPHONE ENCOUNTER
Anne-Marie notified an order for PAP supplies was faxed to [facility], they said they would contact her.

## 2020-05-04 DIAGNOSIS — R73.01 IMPAIRED FASTING GLUCOSE: ICD-10-CM

## 2020-05-04 DIAGNOSIS — E78.00 PURE HYPERCHOLESTEROLEMIA: ICD-10-CM

## 2020-05-04 DIAGNOSIS — E21.3 HYPERPARATHYROIDISM (HCC): ICD-10-CM

## 2020-05-04 LAB
ALBUMIN SERPL-MCNC: 4.7 G/DL (ref 3.5–5.2)
ALP BLD-CCNC: 128 U/L (ref 35–104)
ALT SERPL-CCNC: 14 U/L (ref 5–33)
ANION GAP SERPL CALCULATED.3IONS-SCNC: 13 MMOL/L (ref 7–19)
AST SERPL-CCNC: 12 U/L (ref 5–32)
BILIRUB SERPL-MCNC: 0.8 MG/DL (ref 0.2–1.2)
BUN BLDV-MCNC: 13 MG/DL (ref 8–23)
CALCIUM SERPL-MCNC: 10.6 MG/DL (ref 8.8–10.2)
CHLORIDE BLD-SCNC: 103 MMOL/L (ref 98–111)
CHOLESTEROL, TOTAL: 121 MG/DL (ref 160–199)
CO2: 26 MMOL/L (ref 22–29)
CREAT SERPL-MCNC: 0.7 MG/DL (ref 0.5–0.9)
GFR NON-AFRICAN AMERICAN: >60
GLUCOSE BLD-MCNC: 112 MG/DL (ref 74–109)
HBA1C MFR BLD: 5.8 % (ref 4–6)
HCT VFR BLD CALC: 43.3 % (ref 37–47)
HDLC SERPL-MCNC: 53 MG/DL (ref 65–121)
HEMOGLOBIN: 13.6 G/DL (ref 12–16)
LDL CHOLESTEROL CALCULATED: 41 MG/DL
MCH RBC QN AUTO: 28.8 PG (ref 27–31)
MCHC RBC AUTO-ENTMCNC: 31.4 G/DL (ref 33–37)
MCV RBC AUTO: 91.5 FL (ref 81–99)
PARATHYROID HORMONE INTACT: 183.4 PG/ML (ref 15–65)
PDW BLD-RTO: 13 % (ref 11.5–14.5)
PLATELET # BLD: 199 K/UL (ref 130–400)
PMV BLD AUTO: 10.7 FL (ref 9.4–12.3)
POTASSIUM SERPL-SCNC: 4.5 MMOL/L (ref 3.5–5)
RBC # BLD: 4.73 M/UL (ref 4.2–5.4)
SODIUM BLD-SCNC: 142 MMOL/L (ref 136–145)
TOTAL PROTEIN: 6.9 G/DL (ref 6.6–8.7)
TRIGL SERPL-MCNC: 133 MG/DL (ref 0–149)
TSH SERPL DL<=0.05 MIU/L-ACNC: 0.95 UIU/ML (ref 0.27–4.2)
VITAMIN D 25-HYDROXY: 50.7 NG/ML
WBC # BLD: 6.5 K/UL (ref 4.8–10.8)

## 2020-05-05 ENCOUNTER — TELEPHONE (OUTPATIENT)
Dept: INTERNAL MEDICINE | Age: 75
End: 2020-05-05

## 2020-05-05 NOTE — TELEPHONE ENCOUNTER
[facility] called to inform office that they decline option for Virtual Visit. Patient wants to change appt to ov. has concerns about using my chart. Please call patient with any changes/questions/concerns.

## 2020-05-07 ENCOUNTER — OFFICE VISIT (OUTPATIENT)
Dept: INTERNAL MEDICINE | Age: 75
End: 2020-05-07
Payer: MEDICARE

## 2020-05-07 VITALS
HEIGHT: 67 IN | WEIGHT: 220 LBS | SYSTOLIC BLOOD PRESSURE: 130 MMHG | OXYGEN SATURATION: 98 % | HEART RATE: 70 BPM | BODY MASS INDEX: 34.53 KG/M2 | DIASTOLIC BLOOD PRESSURE: 82 MMHG

## 2020-05-07 PROCEDURE — 1090F PRES/ABSN URINE INCON ASSESS: CPT | Performed by: INTERNAL MEDICINE

## 2020-05-07 PROCEDURE — G8417 CALC BMI ABV UP PARAM F/U: HCPCS | Performed by: INTERNAL MEDICINE

## 2020-05-07 PROCEDURE — G0439 PPPS, SUBSEQ VISIT: HCPCS | Performed by: INTERNAL MEDICINE

## 2020-05-07 PROCEDURE — G8427 DOCREV CUR MEDS BY ELIG CLIN: HCPCS | Performed by: INTERNAL MEDICINE

## 2020-05-07 PROCEDURE — 1036F TOBACCO NON-USER: CPT | Performed by: INTERNAL MEDICINE

## 2020-05-07 PROCEDURE — G8399 PT W/DXA RESULTS DOCUMENT: HCPCS | Performed by: INTERNAL MEDICINE

## 2020-05-07 PROCEDURE — 1123F ACP DISCUSS/DSCN MKR DOCD: CPT | Performed by: INTERNAL MEDICINE

## 2020-05-07 PROCEDURE — 4040F PNEUMOC VAC/ADMIN/RCVD: CPT | Performed by: INTERNAL MEDICINE

## 2020-05-07 PROCEDURE — 99213 OFFICE O/P EST LOW 20 MIN: CPT | Performed by: INTERNAL MEDICINE

## 2020-05-07 PROCEDURE — 3017F COLORECTAL CA SCREEN DOC REV: CPT | Performed by: INTERNAL MEDICINE

## 2020-05-07 RX ORDER — FLECAINIDE ACETATE 50 MG/1
50 TABLET ORAL 2 TIMES DAILY
Qty: 180 TABLET | Refills: 3
Start: 2020-05-07 | End: 2021-03-04 | Stop reason: SDUPTHER

## 2020-05-07 RX ORDER — LOSARTAN POTASSIUM 100 MG/1
100 TABLET ORAL DAILY
Qty: 90 TABLET | Refills: 3
Start: 2020-05-07 | End: 2021-03-04 | Stop reason: SDUPTHER

## 2020-05-07 ASSESSMENT — PATIENT HEALTH QUESTIONNAIRE - PHQ9
SUM OF ALL RESPONSES TO PHQ QUESTIONS 1-9: 0
1. LITTLE INTEREST OR PLEASURE IN DOING THINGS: 0
SUM OF ALL RESPONSES TO PHQ QUESTIONS 1-9: 1
1. LITTLE INTEREST OR PLEASURE IN DOING THINGS: 0
SUM OF ALL RESPONSES TO PHQ9 QUESTIONS 1 & 2: 1
SUM OF ALL RESPONSES TO PHQ QUESTIONS 1-9: 1
SUM OF ALL RESPONSES TO PHQ QUESTIONS 1-9: 0
2. FEELING DOWN, DEPRESSED OR HOPELESS: 1
SUM OF ALL RESPONSES TO PHQ9 QUESTIONS 1 & 2: 0
2. FEELING DOWN, DEPRESSED OR HOPELESS: 0

## 2020-05-07 ASSESSMENT — ENCOUNTER SYMPTOMS
COUGH: 0
SHORTNESS OF BREATH: 0
EYE DISCHARGE: 0
EYE REDNESS: 0
ABDOMINAL PAIN: 0
BACK PAIN: 1
SINUS PRESSURE: 0
ABDOMINAL DISTENTION: 0

## 2020-05-07 NOTE — PROGRESS NOTES
Chief Complaint   Patient presents with    Medicare AWV    Hypertension    Hyperlipidemia       HPI: This is an Medicare annual wellness visit. Since I last saw the patient she went back into atrial fib she says she was in it for part of April ended up going back to the hospital eventually and had cardioversion was probably in atrial fib several weeks. She has not had any further symptoms since being on flecainide and this cardioversion April 27. While in atrial fib she felt more tired more short of breath more lightheaded she does not feel that way now she says she can tell when she goes into atrial fib. She denies chest pain dyspnea abdominal pain. She says her mood is okay doing well. Is to have knee replacement but it got postponed due to the coronavirus pandemic right now her knee is doing better she is going to hold off on that for now.   Past Medical History:   Diagnosis Date    Arthritis     Heart palpitations     Hypercalcemia 8/7/2017    Hyperlipidemia     Hypertension     Impaired fasting glucose 8/7/2017    Lumbar spinal stenosis     Lung nodule, solitary 8/7/2017    Right upper lobe    Multinodular goiter 8/7/2017    Obstructive sleep apnea 10/11/2018    Paroxysmal atrial fibrillation ([address] 8/17/2018    Primary osteoarthritis involving multiple joints 8/7/2017    Wears glasses        Past Surgical History:   Procedure Laterality Date    BREAST BIOPSY  12/3/2007    stereotactic left, fibrocystic changes    BREAST BIOPSY  12/17/2007    stereotactic left, fibrocystic changes    CARDIAC CATHETERIZATION      negative    CARDIOVERSION      8/2018    CATARACT REMOVAL Bilateral 2017    KNEE ARTHROPLASTY Right 7/22/2019    RIGHT PARTIAL KNEE REPLACEMENT performed by Casimiro Bradshaw MD at [address] HISTORY  2013    skin cancer spot on her nose removed    TUBAL LIGATION         Family History   Problem Relation Age of Onset    Hypertension Father        Social History Socioeconomic History    Marital status:      Spouse name: Not on file    Number of children: Not on file    Years of education: Not on file    Highest education level: Not on file   Occupational History    Not on file   Social Needs    Financial resource strain: Not on file    Food insecurity     Worry: Not on file     Inability: Not on file    Transportation needs     Medical: Not on file     Non-medical: Not on file   Tobacco Use    Smoking status: Former Smoker     Packs/day: 2.00     Years: 20.00     Pack years: 40.00     Types: Cigarettes     Last attempt to quit:      Years since quittin.3    Smokeless tobacco: Never Used   Substance and Sexual Activity    Alcohol use: Yes     Comment: wine daily     Drug use: No    Sexual activity: Not on file   Lifestyle    Physical activity     Days per week: Not on file     Minutes per session: Not on file    Stress: Not on file   Relationships    Social connections     Talks on phone: Not on file     Gets together: Not on file     Attends L[employer]-day service: Not on file     Active member of club or organization: Not on file     Attends meetings of clubs or organizations: Not on file     Relationship status: Not on file    Intimate partner violence     Fear of current or ex partner: Not on file     Emotionally abused: Not on file     Physically abused: Not on file     Forced sexual activity: Not on file   Other Topics Concern    Not on file   Social History Narrative    Not on file       No Known Allergies    Current Outpatient Medications   Medication Sig Dispense Refill    losartan (COZAAR) 100 MG tablet Take 1 tablet by mouth daily 90 tablet 3    flecainide (TAMBOCOR) 50 MG tablet Take 1 tablet by mouth 2 times daily 180 tablet 3    famciclovir (FAMVIR) 500 MG tablet TAKE 3 TABLETS DAILY AS NEEDED FOR COLD SORES 30 tablet 1    Multiple Vitamins-Minerals (PRESERVISION AREDS 2+MULTI VIT PO) Take by mouth Indications: bid      fluticasone (FLONASE) 50 MCG/ACT nasal spray 2 sprays by Each Nostril route daily as needed for Rhinitis or Allergies 3 Bottle 3    bumetanide (BUMEX) 1 MG tablet Take 1 tablet by mouth daily 90 tablet 3    ELIQUIS 5 MG TABS tablet TAKE 1 TABLET EVERY 12 HOURS 180 tablet 4    rosuvastatin (CRESTOR) 10 MG tablet TAKE 1 TABLET DAILY 90 tablet 4    potassium chloride (KLOR-CON M) 10 MEQ extended release tablet TAKE 1 TABLET TWICE A  tablet 4    cloNIDine (CATAPRES) 0.1 MG tablet TAKE 1 TABLET TWICE A  tablet 4    labetalol (NORMODYNE) 300 MG tablet TAKE 1 TABLET TWICE A  tablet 4    clobetasol (TEMOVATE) 0.05 % ointment Apply topically 2 times daily as needed Apply topically 2 times daily.  diltiazem (CARDIZEM CD) 360 MG extended release capsule Take 1 capsule by mouth daily 90 capsule 3    Cholecalciferol (VITAMIN D3) 1000 units TABS Take 2 tablets by mouth daily       FISH OIL Take 1,000 mg by mouth daily       Glucosamine 500 MG CAPS Take 1 capsule by mouth daily        No current facility-administered medications for this visit. Review of Systems   Constitutional: Negative for chills, fatigue and fever. HENT: Negative for congestion and sinus pressure. Eyes: Negative for discharge and redness. Respiratory: Negative for cough and shortness of breath. Cardiovascular: Positive for leg swelling. Negative for chest pain and palpitations. Gastrointestinal: Negative for abdominal distention and abdominal pain. Genitourinary: Negative for dysuria, frequency and urgency. Musculoskeletal: Positive for arthralgias and back pain. Skin: Negative for rash and wound. Neurological: Negative for dizziness, light-headedness and headaches. Psychiatric/Behavioral: Negative for dysphoric mood and sleep disturbance. The patient is not nervous/anxious.         /82 (Site: Left Upper Arm)   Pulse 70   Ht 5' 7\" (1.702 m)   Wt 220 lb (99.8 kg)   SpO2 98%   BMI 34.46 kg/m²   BP Readings from Last 7 Encounters:   05/07/20 130/82   11/04/19 124/66   07/23/19 (!) 164/79   07/22/19 105/61   04/29/19 134/70   10/02/18 120/84   08/17/18 126/76     Wt Readings from Last 7 Encounters:   05/07/20 220 lb (99.8 kg)   12/19/19 229 lb (103.9 kg)   11/04/19 226 lb (102.5 kg)   07/22/19 222 lb (100.7 kg)   07/15/19 222 lb (100.7 kg)   04/29/19 228 lb (103.4 kg)   10/02/18 219 lb (99.3 kg)     BMI Readings from Last 7 Encounters:   05/07/20 34.46 kg/m²   12/19/19 35.87 kg/m²   11/04/19 35.40 kg/m²   07/22/19 34.77 kg/m²   07/15/19 34.77 kg/m²   04/29/19 35.71 kg/m²   10/02/18 34.30 kg/m²     Resp Readings from Last 7 Encounters:   07/23/19 16   07/22/19 22   02/08/18 18   12/20/17 18   11/22/16 18       Physical Exam  Constitutional:       General: She is not in acute distress. Appearance: Normal appearance. She is well-developed. HENT:      Right Ear: External ear normal. Tympanic membrane is not injected. Left Ear: External ear normal. Tympanic membrane is not injected. Mouth/Throat:      Pharynx: No oropharyngeal exudate. Eyes:      General: No scleral icterus. Conjunctiva/sclera: Conjunctivae normal.   Neck:      Musculoskeletal: Neck supple. Thyroid: No thyroid mass or thyromegaly. Vascular: No carotid bruit. Cardiovascular:      Rate and Rhythm: Normal rate and regular rhythm. Heart sounds: S1 normal and S2 normal. No murmur. No S3 or S4 sounds. Pulmonary:      Effort: Pulmonary effort is normal. No respiratory distress. Breath sounds: Normal breath sounds. No wheezing or rales. Abdominal:      General: Bowel sounds are normal. There is no distension. Palpations: Abdomen is soft. There is no mass. Tenderness: There is no abdominal tenderness. Musculoskeletal:      Comments: Some chronic arthritis changes   Lymphadenopathy:      Cervical: No cervical adenopathy.       Upper Body:      Right upper body: No supraclavicular times daily Yes Elo Caputo MD   famciclovir (FAMVIR) 500 MG tablet TAKE 3 TABLETS DAILY AS NEEDED FOR COLD SORES  Elo Caputo MD   Multiple Vitamins-Minerals (PRESERVISION AREDS 2+MULTI VIT PO) Take by mouth Indications: bid  Historical Provider, MD   fluticasone (FLONASE) 50 MCG/ACT nasal spray 2 sprays by Each Nostril route daily as needed for Rhinitis or Allergies  Elo Caputo MD   bumetanide (BUMEX) 1 MG tablet Take 1 tablet by mouth daily  Elo Caputo MD   ELIQUIS 5 MG TABS tablet TAKE 1 TABLET EVERY 12 HOURS  Elo Caputo MD   rosuvastatin (CRESTOR) 10 MG tablet TAKE 1 TABLET DAILY  Elo Caputo MD   potassium chloride (KLOR-CON M) 10 MEQ extended release tablet TAKE 1 Brand MD Pam   cloNIDine (CATAPRES) 0.1 MG tablet TAKE 1 TABLET TWICE Noreen Powers MD   labetalol (NORMODYNE) 300 MG tablet TAKE 1 TABLET TWICE A DAY  Laura Colorado MD   clobetasol (TEMOVATE) 0.05 % ointment Apply topically 2 times daily as needed Apply topically 2 times daily.   Historical Provider, MD   diltiazem (CARDIZEM CD) 360 MG extended release capsule Take 1 capsule by mouth daily  Elo Caputo MD   Cholecalciferol (VITAMIN D3) 1000 units TABS Take 2 tablets by mouth daily   Historical Provider, MD   FISH OIL Take 1,000 mg by mouth daily   Historical Provider, MD   Glucosamine 500 MG CAPS Take 1 capsule by mouth daily   Historical Provider, MD       Past Medical History:   Diagnosis Date    Arthritis     Heart palpitations     Hypercalcemia 8/7/2017    Hyperlipidemia     Hypertension     Impaired fasting glucose 8/7/2017    Lumbar spinal stenosis     Lung nodule, solitary 8/7/2017    Right upper lobe    Multinodular goiter 8/7/2017    Obstructive sleep apnea 10/11/2018    Paroxysmal atrial fibrillation ([address] 8/17/2018    Primary osteoarthritis involving multiple joints 8/7/2017    Wears glasses        Past Surgical History:   Procedure Laterality Date    BREAST BIOPSY  12/3/2007 lost any weight without trying in the past 3 months?: No  Do you eat fewer than 2 meals per day?: No  Have you seen a dentist within the past year?: Yes  Body mass index is 34.46 kg/m². Health Habits/Nutrition Interventions:  · Continue to watch healthy diet- cutting carbs- follow    Personalized Preventive Plan   Current Health Maintenance Status  Immunization History   Administered Date(s) Administered    Influenza, High Dose (Fluzone 65 yrs and older) 11/14/2017, 09/26/2018    Influenza, Triv, inactivated, subunit, adjuvanted, IM (Fluad 65 yrs and older) 11/04/2019    Pneumococcal Conjugate 13-valent ([id number]) 11/14/2017    Pneumococcal Polysaccharide ([id number]) 11/15/2016    Tdap (Boostrix, Adacel) 06/07/2019    Zoster Recombinant (Shingrix) 09/24/2018, 01/24/2019        Health Maintenance   Topic Date Due    Hepatitis C screen  1945    Annual Wellness Visit (AWV)  05/29/2019    Colon cancer screen colonoscopy  03/31/2021    A1C test (Diabetic or Prediabetic)  05/04/2021    Lipid screen  05/04/2021    Potassium monitoring  05/04/2021    Creatinine monitoring  05/04/2021    Breast cancer screen  12/19/2021    DTaP/Tdap/Td vaccine (2 - Td) 06/07/2029    DEXA (modify frequency per FRAX score)  Completed    Flu vaccine  Completed    Shingles Vaccine  Completed    Pneumococcal 65+ years Vaccine  Completed    Hepatitis A vaccine  Aged Out    Hepatitis B vaccine  Aged Out    Hib vaccine  Aged Out    Meningococcal (ACWY) vaccine  Aged Out     Recommendations for [employer] Due: see orders and patient instructions/AVS.  . Recommended screening schedule for the next 5-10 years is provided to the patient in written form: see Patient Instructions/AVS.    Karely Bearden was seen today for medicare awv, hypertension and hyperlipidemia.     Diagnoses and all orders for this visit:    Medicare annual wellness visit, subsequent    Essential hypertension  -     losartan (COZAAR) 100 MG

## 2020-05-07 NOTE — PATIENT INSTRUCTIONS
Personalized Preventive Plan for Darshana Elder - 5/7/2020  Medicare offers a range of preventive health benefits. Some of the tests and screenings are paid in full while other may be subject to a deductible, co-insurance, and/or copay. Some of these benefits include a comprehensive review of your medical history including lifestyle, illnesses that may run in your family, and various assessments and screenings as appropriate. After reviewing your medical record and screening and assessments performed today your provider may have ordered immunizations, labs, imaging, and/or referrals for you. A list of these orders (if applicable) as well as your Preventive Care list are included within your After Visit Summary for your review. Other Preventive Recommendations:    · A preventive eye exam performed by an eye specialist is recommended every 1-2 years to screen for glaucoma; cataracts, macular degeneration, and other eye disorders. · A preventive dental visit is recommended every 6 months. · Try to get at least 150 minutes of exercise per week or 10,000 steps per day on a pedometer . · Order or download the FREE \"Exercise & Physical Activity: Your Everyday Guide\" from [employer]. Call 6-614.140.3149 or search [employer] online. · You need 6843-3149 mg of calcium and 3111-4360 IU of vitamin D per day. It is possible to meet your calcium requirement with diet alone, but a vitamin D supplement is usually necessary to meet this goal.  · When exposed to the sun, use a sunscreen that protects against both UVA and UVB radiation with an SPF of 30 or greater. Reapply every 2 to 3 hours or after sweating, drying off with a towel, or swimming. · Always wear a seat belt when traveling in a car. Always wear a helmet when riding a bicycle or motorcycle.

## 2020-05-18 ENCOUNTER — OFFICE VISIT (OUTPATIENT)
Dept: CARDIOLOGY | Facility: CLINIC | Age: 75
End: 2020-05-18

## 2020-05-18 VITALS
BODY MASS INDEX: 34.84 KG/M2 | HEART RATE: 67 BPM | OXYGEN SATURATION: 98 % | HEIGHT: 67 IN | DIASTOLIC BLOOD PRESSURE: 83 MMHG | WEIGHT: 222 LBS | SYSTOLIC BLOOD PRESSURE: 148 MMHG

## 2020-05-18 DIAGNOSIS — I48.0 PAF (PAROXYSMAL ATRIAL FIBRILLATION) (HCC): Primary | ICD-10-CM

## 2020-05-18 PROCEDURE — 93000 ELECTROCARDIOGRAM COMPLETE: CPT | Performed by: NURSE PRACTITIONER

## 2020-05-18 PROCEDURE — 99214 OFFICE O/P EST MOD 30 MIN: CPT | Performed by: NURSE PRACTITIONER

## 2020-05-18 RX ORDER — CLOBETASOL PROPIONATE 0.5 MG/G
OINTMENT TOPICAL AS NEEDED
COMMUNITY
End: 2022-02-18

## 2020-05-18 NOTE — PROGRESS NOTES
Subjective:     Encounter Date:05/18/2020      Patient ID: Anne-Marie Hayes is a 74 y.o. female.    Chief Complaint: follow up paroxysmal atrial fibrillation and hypertension     The patient presents to follow up regarding her paroxysmal atrial fibrillation and hypertension. She was previously seen in [facility] office 11/2019. At that time she was feeling well and maintaining normal sinus rhythm. She called the office 3/26/2020 with complaints of palpitations and believed she was in afib, but felt she had returned to normal sinus rhythm by the next day. She now states, in retrospect, she believes she may have been in afib since 3/26/2020 based on how she felt and irregular heart rate readings on her Fit Bit. Since that time, she reported more fatigue, dyspnea on exertion, intermittent palpitations (2-3 times per day, last up to 1 hour), and elevated blood pressures (occasionally up to 170s/110s). She had an acute episode of palpitations and dizziness 4/17/2020 and presented to ER. She was found to be in rate controlled atrial fibrillation (70s-80s) and workup was unremarkable otherwise. She was discharged home in stable condition. She called in with reports of high blood pressures and her losartan was increased from 50 mg to 100 mg per Dr. Lovelace's recommendations.     She was seen for an appointment 4/20 and scheduled for a cardioversion due to persistent, rate controlled, symptomatic atrial fibrillation. The case was discussed with Dr. Lovelace and she was also started on low dose flecainide later that day. She underwent successful cardioversion per Dr. Lovelace on 4/28. She states she initially continued to feel poorly for a few days after that, but gradually her fatigue, DUGAN, and palpitations improved and she states she feels much better overall.       The following portions of the patient's history were reviewed and updated as appropriate: allergies, current medications, past family history, past medical history, past  "social history, past surgical history and problem list.  /83   Pulse 67   Ht 170.2 cm (67\")   Wt 101 kg (222 lb)   SpO2 98%   BMI 34.77 kg/m²   No Known Allergies    Current Outpatient Medications:   •  apixaban (ELIQUIS) 5 MG tablet tablet, Take 1 tablet by mouth Every 12 (Twelve) Hours., Disp: 60 tablet, Rfl: 0  •  bumetanide (BUMEX) 1 MG tablet, Take 1 mg by mouth Daily., Disp: , Rfl:   •  clobetasol (TEMOVATE) 0.05 % ointment, Apply  topically to the appropriate area as directed As Needed., Disp: , Rfl:   •  CloNIDine (CATAPRES) 0.1 MG tablet, Take 0.1 mg by mouth 2 (Two) Times a Day., Disp: , Rfl:   •  diltiaZEM CD (CARDIZEM CD) 360 MG 24 hr capsule, Take 1 capsule by mouth Daily., Disp: 30 capsule, Rfl: 11  •  famciclovir (FAMVIR) 500 MG tablet, Take 1,500 mg by mouth As Needed., Disp: , Rfl:   •  flecainide (TAMBOCOR) 50 MG tablet, Take 1 tablet by mouth 2 (Two) Times a Day., Disp: 10 tablet, Rfl: 0  •  fluticasone (FLONASE) 50 MCG/ACT nasal spray, 2 sprays into the nostril(s) as directed by provider Daily As Needed for Rhinitis or Allergies., Disp: , Rfl:   •  KLOR-CON 10 MEQ CR tablet, Take 10 mEq by mouth 2 (Two) Times a Day., Disp: , Rfl:   •  labetalol (NORMODYNE) 300 MG tablet, Take 300 mg by mouth Every 12 (Twelve) Hours., Disp: , Rfl:   •  losartan (COZAAR) 100 MG tablet, Take 1 tablet by mouth Daily., Disp: 90 tablet, Rfl: 3  •  Multiple Vitamins-Minerals (PRESERVISION AREDS 2 PO), Take 1 tablet by mouth 2 (Two) Times a Day., Disp: , Rfl:   •  Omega-3 Fatty Acids (FISH OIL) 1000 MG capsule capsule, Take 1,000 mg by mouth Daily With Breakfast., Disp: , Rfl:   •  rosuvastatin (CRESTOR) 10 MG tablet, Take 1 tablet by mouth Daily., Disp: , Rfl:   Past Medical History:   Diagnosis Date   • Arthritis    • Atrial fibrillation (CMS/HCC)    • Hyperlipidemia    • Hypertension      Past Surgical History:   Procedure Laterality Date   • CARDIAC CATHETERIZATION  1987   • REPLACEMENT TOTAL KNEE Right " 07/2019   • TUBAL ABDOMINAL LIGATION       Social History     Socioeconomic History   • Marital status:      Spouse name: Not on file   • Number of children: Not on file   • Years of education: Not on file   • Highest education level: Not on file   Tobacco Use   • Smoking status: Former Smoker   • Smokeless tobacco: Never Used   Substance and Sexual Activity   • Alcohol use: Yes     Comment: occ   • Drug use: No   • Sexual activity: Defer     Family History   Problem Relation Age of Onset   • Atrial fibrillation Mother    • Heart disease Father    • Breast cancer Neg Hx    • Ovarian cancer Neg Hx    • Uterine cancer Neg Hx    • Colon cancer Neg Hx    • Melanoma Neg Hx        Review of Systems   Constitution: Positive for malaise/fatigue (chronic, improved ). Negative for chills, diaphoresis and fever.   HENT: Negative for nosebleeds.    Eyes: Negative for visual disturbance.   Cardiovascular: Positive for dyspnea on exertion (chronic, improved) and leg swelling (chronic, stable ). Negative for chest pain, claudication, cyanosis, irregular heartbeat, near-syncope, orthopnea, palpitations, paroxysmal nocturnal dyspnea and syncope.   Respiratory: Negative for cough, hemoptysis, shortness of breath, sputum production and wheezing.    Hematologic/Lymphatic: Negative for bleeding problem.   Skin: Negative for color change and flushing.   Musculoskeletal: Negative for falls.   Gastrointestinal: Negative for bloating, abdominal pain, hematemesis, hematochezia, melena, nausea and vomiting.   Genitourinary: Negative for hematuria.   Neurological: Negative for dizziness, light-headedness and weakness.   Psychiatric/Behavioral: Negative for altered mental status.         ECG 12 Lead  Date/Time: 5/18/2020 10:39 AM  Performed by: Yumiko Dykes APRN  Authorized by: Yumiko Dykes APRN   Comparison: compared with previous ECG from 4/20/2020  Comparison to previous ECG: NSR replaces atrial fibrillation; anteroseptal T wave  inversions unchanged   Rhythm: sinus rhythm  BPM: 67                 Objective:     Physical Exam   Constitutional: She is oriented to person, place, and time. She appears well-developed and well-nourished. No distress.   HENT:   Head: Normocephalic and atraumatic.   Eyes: Pupils are equal, round, and reactive to light.   Neck: Normal range of motion. Neck supple. No JVD present. No thyromegaly present.   Cardiovascular: Normal rate, regular rhythm, normal heart sounds and intact distal pulses. Exam reveals no gallop and no friction rub.   No murmur heard.  Pulmonary/Chest: Effort normal and breath sounds normal. No respiratory distress. She has no wheezes. She has no rales. She exhibits no tenderness.   Abdominal: Soft. Bowel sounds are normal. She exhibits no distension. There is no tenderness.   Musculoskeletal: Normal range of motion. She exhibits edema (trace BLE edema ).   Neurological: She is alert and oriented to person, place, and time. No cranial nerve deficit.   Skin: Skin is warm and dry. She is not diaphoretic.   Psychiatric: She has a normal mood and affect. Her behavior is normal.     Previous Cardiac Testing and Procedures:  - ROBEL (8/13/2018) Normal left ventricular systolic function, mild MR, mild to moderate TR, no evidence of left atrial appendage thrombus.  - Cardioversion (8/13/2018) Successful cardioversion from atrial fibrillation to sinus rhythm  -Cardioversion (4/28/2020) successful cardioversion from atrial fibrillation to sinus rhythm       Assessment:          Diagnosis Plan   1. PAF (paroxysmal atrial fibrillation) (CMS/HCC)    Now maintaining NSR after 4/28 CV and recent initiation of flecainide.  Symptoms improved with restoration of NSR.  Anticoagulated with Eliquis      2. Essential hypertension    Elevated today, but pt states she just took her medications a few minutes ago  Continue to monitor  May need further adjustments in antihypertensives in future (she reports sbp 120s-150s  at home). Consider hydralazine in future if warranted.       3. PRINCE on CPAP    Compliant with CPAP    4. Mixed hyperlipidemia  On statin, followed by pcp           Plan:       Continue current medical therapy as listed above  Consider EP referral if symptomatic atrial fibrillation recurs  Continue to monitor BP and call [facility] or PCP with persistent elevations  Follow up 2-3 months, sooner with symptoms or concerns

## 2020-06-15 ENCOUNTER — HOSPITAL ENCOUNTER (OUTPATIENT)
Dept: WOMENS IMAGING | Age: 75
Discharge: HOME | End: 2020-06-15
Payer: MEDICARE

## 2020-06-15 PROCEDURE — 77080 DXA BONE DENSITY AXIAL: CPT

## 2020-06-17 NOTE — TELEPHONE ENCOUNTER
Zoie called requesting a refill of the below medication which has been pended for you:     Requested Prescriptions     Pending Prescriptions Disp Refills    dilTIAZem (CARDIZEM CD) 360 MG extended release capsule 90 capsule 3     Sig: Take 1 capsule by mouth daily       Last Appointment Date: 5/7/2020  Next Appointment Date: 11/10/2020    No Known Allergies

## 2020-06-18 RX ORDER — DILTIAZEM HYDROCHLORIDE 360 MG/1
360 CAPSULE, EXTENDED RELEASE ORAL DAILY
Qty: 90 CAPSULE | Refills: 3 | Status: SHIPPED | OUTPATIENT
Start: 2020-06-18 | End: 2020-07-06 | Stop reason: SDUPTHER

## 2020-07-06 RX ORDER — DILTIAZEM HYDROCHLORIDE 360 MG/1
360 CAPSULE, EXTENDED RELEASE ORAL DAILY
Qty: 90 CAPSULE | Refills: 3 | Status: SHIPPED | OUTPATIENT
Start: 2020-07-06

## 2020-07-20 ENCOUNTER — OFFICE VISIT (OUTPATIENT)
Dept: CARDIOLOGY | Facility: CLINIC | Age: 75
End: 2020-07-20

## 2020-07-20 VITALS
HEIGHT: 67 IN | OXYGEN SATURATION: 98 % | DIASTOLIC BLOOD PRESSURE: 82 MMHG | BODY MASS INDEX: 35.47 KG/M2 | WEIGHT: 226 LBS | SYSTOLIC BLOOD PRESSURE: 130 MMHG | HEART RATE: 59 BPM

## 2020-07-20 DIAGNOSIS — E66.09 CLASS 2 OBESITY DUE TO EXCESS CALORIES WITHOUT SERIOUS COMORBIDITY WITH BODY MASS INDEX (BMI) OF 35.0 TO 35.9 IN ADULT: ICD-10-CM

## 2020-07-20 DIAGNOSIS — E78.2 MIXED HYPERLIPIDEMIA: ICD-10-CM

## 2020-07-20 DIAGNOSIS — Z99.89 OSA ON CPAP: ICD-10-CM

## 2020-07-20 DIAGNOSIS — I48.0 PAF (PAROXYSMAL ATRIAL FIBRILLATION) (HCC): Primary | ICD-10-CM

## 2020-07-20 DIAGNOSIS — I10 ESSENTIAL HYPERTENSION: ICD-10-CM

## 2020-07-20 DIAGNOSIS — G47.33 OSA ON CPAP: ICD-10-CM

## 2020-07-20 PROBLEM — M19.90 ARTHRITIS: Status: ACTIVE | Noted: 2020-07-20

## 2020-07-20 PROCEDURE — 99214 OFFICE O/P EST MOD 30 MIN: CPT | Performed by: INTERNAL MEDICINE

## 2020-07-20 PROCEDURE — 93000 ELECTROCARDIOGRAM COMPLETE: CPT | Performed by: INTERNAL MEDICINE

## 2020-07-20 RX ORDER — DILTIAZEM HYDROCHLORIDE 240 MG/1
240 CAPSULE, COATED, EXTENDED RELEASE ORAL DAILY
Qty: 90 CAPSULE | Refills: 3 | Status: SHIPPED | OUTPATIENT
Start: 2020-07-20 | End: 2021-06-03

## 2020-07-20 NOTE — PROGRESS NOTES
Reason for Visit: cardiovascular follow up.    HPI:  Anne-Marie Hayes is a 74 y.o. female is here today for follow-up.  She underwent repeat cardioversion in April.  She remains in sinus rhythm on EKG today.  She feels well for the most part but does get tired easily.  She also has a lot of back and musculoskeletal issues.  She is not able to get much exercise.  She tries to get 4000 steps a day in.  Her weight has increased slightly.  She is compliant with her CPAP.      Previous Cardiac Testing and Procedures:  - ROBEL (8/13/2018) Normal left ventricular systolic function, mild MR, mild to moderate TR, no evidence of left atrial appendage thrombus.  - Cardioversion (8/13/2018) Successful cardioversion from atrial fibrillation to sinus rhythm  - Cardioversion (4/20/2020) successful cardioversion from atrial fibrillation to sinus rhythm    Patient Active Problem List   Diagnosis   • PAF (paroxysmal atrial fibrillation) (CMS/HCC)   • Atrial flutter with rapid ventricular response (CMS/HCC)   • PRINCE on CPAP   • Essential hypertension   • Mixed hyperlipidemia   • Class 2 obesity due to excess calories without serious comorbidity with body mass index (BMI) of 35.0 to 35.9 in adult   • Arthritis       Social History     Tobacco Use   • Smoking status: Former Smoker   • Smokeless tobacco: Never Used   Substance Use Topics   • Alcohol use: Yes     Comment: occ   • Drug use: No       Family History   Problem Relation Age of Onset   • Atrial fibrillation Mother    • Heart disease Father    • Breast cancer Neg Hx    • Ovarian cancer Neg Hx    • Uterine cancer Neg Hx    • Colon cancer Neg Hx    • Melanoma Neg Hx        The following portions of the patient's history were reviewed and updated as appropriate: allergies, current medications, past family history, past medical history, past social history, past surgical history and problem list.      Current Outpatient Medications:   •  apixaban (ELIQUIS) 5 MG tablet tablet, Take  "1 tablet by mouth Every 12 (Twelve) Hours., Disp: 60 tablet, Rfl: 0  •  bumetanide (BUMEX) 1 MG tablet, Take 1 mg by mouth Daily., Disp: , Rfl:   •  clobetasol (TEMOVATE) 0.05 % ointment, Apply  topically to the appropriate area as directed As Needed., Disp: , Rfl:   •  CloNIDine (CATAPRES) 0.1 MG tablet, Take 0.1 mg by mouth 2 (Two) Times a Day., Disp: , Rfl:   •  dilTIAZem CD (CARDIZEM CD) 240 MG 24 hr capsule, Take 1 capsule by mouth Daily., Disp: 90 capsule, Rfl: 3  •  famciclovir (FAMVIR) 500 MG tablet, Take 1,500 mg by mouth As Needed., Disp: , Rfl:   •  flecainide (TAMBOCOR) 50 MG tablet, Take 1 tablet by mouth 2 (Two) Times a Day., Disp: 10 tablet, Rfl: 0  •  fluticasone (FLONASE) 50 MCG/ACT nasal spray, 2 sprays into the nostril(s) as directed by provider Daily As Needed for Rhinitis or Allergies., Disp: , Rfl:   •  KLOR-CON 10 MEQ CR tablet, Take 10 mEq by mouth 2 (Two) Times a Day., Disp: , Rfl:   •  labetalol (NORMODYNE) 300 MG tablet, Take 300 mg by mouth Every 12 (Twelve) Hours., Disp: , Rfl:   •  losartan (COZAAR) 100 MG tablet, Take 1 tablet by mouth Daily., Disp: 90 tablet, Rfl: 3  •  Omega-3 Fatty Acids (FISH OIL) 1000 MG capsule capsule, Take 1,000 mg by mouth Daily With Breakfast., Disp: , Rfl:   •  rosuvastatin (CRESTOR) 10 MG tablet, Take 1 tablet by mouth Daily., Disp: , Rfl:     Review of Systems   Constitution: Positive for malaise/fatigue. Negative for chills and fever.   Cardiovascular: Negative for chest pain and paroxysmal nocturnal dyspnea.   Respiratory: Negative for cough and shortness of breath.    Skin: Negative for rash.   Musculoskeletal: Positive for arthritis, back pain and joint pain.   Gastrointestinal: Negative for abdominal pain and heartburn.   Neurological: Negative for dizziness and numbness.       Objective   /82 (BP Location: Left arm, Patient Position: Sitting, Cuff Size: Adult)   Pulse 59   Ht 170.2 cm (67.01\")   Wt 103 kg (226 lb)   SpO2 98%   BMI 35.39 " kg/m²   Physical Exam   Constitutional: She is oriented to person, place, and time. She appears well-developed and well-nourished.   HENT:   Head: Normocephalic and atraumatic.   Cardiovascular: Normal rate, regular rhythm and normal heart sounds.   No murmur heard.  Pulmonary/Chest: Effort normal and breath sounds normal.   Abdominal: She exhibits distension (obese).   Musculoskeletal: She exhibits no edema.   Neurological: She is alert and oriented to person, place, and time.   Skin: Skin is warm and dry.   Psychiatric: She has a normal mood and affect.       ECG 12 Lead  Date/Time: 7/20/2020 9:36 AM  Performed by: Jovani Lovelace MD  Authorized by: Jovani Lovelace MD   Comparison: compared with previous ECG from 5/18/2020  Rhythm: sinus rhythm  Rate: bradycardic              ICD-10-CM ICD-9-CM   1. PAF (paroxysmal atrial fibrillation) (CMS/[facility]) I48.0 427.31   2. Essential hypertension I10 401.9   3. Mixed hyperlipidemia E78.2 272.2   4. Class 2 obesity due to excess calories without serious comorbidity with body mass index (BMI) of 35.0 to 35.9 in adult E66.09 278.00    Z68.35 V85.35   5. PRINCE on CPAP G47.33 327.23    Z99.89 V46.8         Assessment/Plan:  1. Paroxysmal atrial fibrillation: In sinus rhythm following repeat cardioversion in April.   Decrease diltiazem down to 240 mg.  Continue flecainide and anticoagulation with Eliquis.     2.  Essential hypertension: Blood pressure is borderline today.  Reasonably well controlled at home.  Continue current medications.     3.  Hyperlipidemia: Managed on simvastatin.  Lipid panel followed by PCP.     4.  Obesity: Patient's Body mass index is 35.39 kg/m². BMI is above normal parameters. Recommendations include: exercise counseling and nutrition counseling.     5.  Obstructive sleep apnea: Uses CPAP regularly.

## 2020-09-28 RX ORDER — BUMETANIDE 1 MG/1
1 TABLET ORAL DAILY
Qty: 90 TABLET | Refills: 3 | Status: SHIPPED | OUTPATIENT
Start: 2020-09-28 | End: 2021-12-02 | Stop reason: SDUPTHER

## 2020-10-12 ENCOUNTER — NURSE ONLY (OUTPATIENT)
Dept: INTERNAL MEDICINE | Age: 75
End: 2020-10-12
Payer: MEDICARE

## 2020-10-12 PROCEDURE — 90694 VACC AIIV4 NO PRSRV 0.5ML IM: CPT | Performed by: INTERNAL MEDICINE

## 2020-10-12 PROCEDURE — G0008 ADMIN INFLUENZA VIRUS VAC: HCPCS | Performed by: INTERNAL MEDICINE

## 2020-11-05 DIAGNOSIS — R73.01 IMPAIRED FASTING GLUCOSE: ICD-10-CM

## 2020-11-05 DIAGNOSIS — E83.52 HYPERCALCEMIA: ICD-10-CM

## 2020-11-05 DIAGNOSIS — E78.00 PURE HYPERCHOLESTEROLEMIA: ICD-10-CM

## 2020-11-05 LAB
ALBUMIN SERPL-MCNC: 4.8 G/DL (ref 3.5–5.2)
ALP BLD-CCNC: 122 U/L (ref 35–104)
ALT SERPL-CCNC: 16 U/L (ref 5–33)
ANION GAP SERPL CALCULATED.3IONS-SCNC: 15 MMOL/L (ref 7–19)
AST SERPL-CCNC: 14 U/L (ref 5–32)
BILIRUB SERPL-MCNC: 0.6 MG/DL (ref 0.2–1.2)
BUN BLDV-MCNC: 12 MG/DL (ref 8–23)
CALCIUM SERPL-MCNC: 10.2 MG/DL (ref 8.8–10.2)
CHLORIDE BLD-SCNC: 106 MMOL/L (ref 98–111)
CHOLESTEROL, TOTAL: 133 MG/DL (ref 160–199)
CO2: 22 MMOL/L (ref 22–29)
CREAT SERPL-MCNC: 0.7 MG/DL (ref 0.5–0.9)
GFR AFRICAN AMERICAN: >59
GFR NON-AFRICAN AMERICAN: >60
GLUCOSE BLD-MCNC: 119 MG/DL (ref 74–109)
HBA1C MFR BLD: 5.7 % (ref 4–6)
HCT VFR BLD CALC: 41.3 % (ref 37–47)
HDLC SERPL-MCNC: 53 MG/DL (ref 65–121)
HEMOGLOBIN: 13 G/DL (ref 12–16)
LDL CHOLESTEROL CALCULATED: 54 MG/DL
MCH RBC QN AUTO: 28.9 PG (ref 27–31)
MCHC RBC AUTO-ENTMCNC: 31.5 G/DL (ref 33–37)
MCV RBC AUTO: 91.8 FL (ref 81–99)
PARATHYROID HORMONE INTACT: 185.8 PG/ML (ref 15–65)
PDW BLD-RTO: 13 % (ref 11.5–14.5)
PLATELET # BLD: 195 K/UL (ref 130–400)
PMV BLD AUTO: 11 FL (ref 9.4–12.3)
POTASSIUM SERPL-SCNC: 4.4 MMOL/L (ref 3.5–5)
RBC # BLD: 4.5 M/UL (ref 4.2–5.4)
SODIUM BLD-SCNC: 143 MMOL/L (ref 136–145)
TOTAL PROTEIN: 7 G/DL (ref 6.6–8.7)
TRIGL SERPL-MCNC: 132 MG/DL (ref 0–149)
TSH SERPL DL<=0.05 MIU/L-ACNC: 1.04 UIU/ML (ref 0.27–4.2)
VITAMIN D 25-HYDROXY: 42.7 NG/ML
WBC # BLD: 7.4 K/UL (ref 4.8–10.8)

## 2020-11-10 ENCOUNTER — TELEMEDICINE (OUTPATIENT)
Dept: INTERNAL MEDICINE | Age: 75
End: 2020-11-10
Payer: MEDICARE

## 2020-11-10 PROCEDURE — 4040F PNEUMOC VAC/ADMIN/RCVD: CPT | Performed by: INTERNAL MEDICINE

## 2020-11-10 PROCEDURE — 1090F PRES/ABSN URINE INCON ASSESS: CPT | Performed by: INTERNAL MEDICINE

## 2020-11-10 PROCEDURE — G8428 CUR MEDS NOT DOCUMENT: HCPCS | Performed by: INTERNAL MEDICINE

## 2020-11-10 PROCEDURE — G8399 PT W/DXA RESULTS DOCUMENT: HCPCS | Performed by: INTERNAL MEDICINE

## 2020-11-10 PROCEDURE — 99213 OFFICE O/P EST LOW 20 MIN: CPT | Performed by: INTERNAL MEDICINE

## 2020-11-10 PROCEDURE — 3017F COLORECTAL CA SCREEN DOC REV: CPT | Performed by: INTERNAL MEDICINE

## 2020-11-10 PROCEDURE — 1123F ACP DISCUSS/DSCN MKR DOCD: CPT | Performed by: INTERNAL MEDICINE

## 2020-11-10 ASSESSMENT — ENCOUNTER SYMPTOMS
FACIAL SWELLING: 0
SHORTNESS OF BREATH: 0
COUGH: 0
EYE REDNESS: 0
ABDOMINAL DISTENTION: 0
EYE DISCHARGE: 0
ABDOMINAL PAIN: 0

## 2020-11-10 NOTE — PROGRESS NOTES
11/10/2020    TELEHEALTH EVALUATION -- Audio/Visual (During TSVGN-64 public health emergency)    HPI: This is a video visit to follow-up medical problems including hypertension paroxysmal atrial fib arthritis of the knees obstructive sleep apnea patient is doing well but she does need her other knee replaced she is tolerating it until the pandemic settles down she is able to be active and the thing that limits her the most is knee pain. We addressed the issues of her sleep apnea she has a CPAP device she wears it nightly she is compliant with it wears it through the night and benefits from its use    Heriberto Shea (:  1945) has requested an audio/video evaluation for the following concern(s):    Hypertension arthritis    Review of Systems   Constitutional: Negative for chills, fatigue and fever. HENT: Negative for facial swelling. Eyes: Negative for discharge and redness. Respiratory: Negative for cough and shortness of breath. Cardiovascular: Negative for chest pain and palpitations. Gastrointestinal: Negative for abdominal distention and abdominal pain. Musculoskeletal: Positive for arthralgias. Skin: Negative for rash and wound. Neurological: Positive for light-headedness and headaches. Negative for facial asymmetry. Psychiatric/Behavioral: Negative for dysphoric mood and sleep disturbance. The patient is not nervous/anxious. Prior to Visit Medications    Medication Sig Taking?  Authorizing Provider   CPAP Machine MISC by Does not apply route  Historical Provider, MD   bumetanide (BUMEX) 1 MG tablet Take 1 tablet by mouth daily  Iveth Ortiz MD   dilTIAZem (CARDIZEM CD) 360 MG extended release capsule Take 1 capsule by mouth daily  Iveth Ortiz MD   losartan (COZAAR) 100 MG tablet Take 1 tablet by mouth daily  Iveth Ortiz MD   flecainide (TAMBOCOR) 50 MG tablet Take 1 tablet by mouth 2 times daily  Iveth Ortiz MD   famciclovir (FAMVIR) 500 MG tablet TAKE 3 TABLETS DAILY AS NEEDED FOR COLD SORES  Michael Gunderson MD   Multiple Vitamins-Minerals (PRESERVISION AREDS 2+MULTI VIT PO) Take by mouth Indications: bid  Historical Provider, MD   fluticasone (FLONASE) 50 MCG/ACT nasal spray 2 sprays by Each Nostril route daily as needed for Rhinitis or Allergies  Michael Gunderson MD   ELIQUIS 5 MG TABS tablet TAKE 1 TABLET EVERY 12 Avni Manrique MD   rosuvastatin (CRESTOR) 10 MG tablet TAKE 1 TABLET DAILY  Michael Gunderson MD   potassium chloride (KLOR-CON M) 10 MEQ extended release tablet TAKE 1 Emperatriz Swain MD   cloNIDine (CATAPRES) 0.1 MG tablet TAKE 1 TABLET TWICE Pedro Moura MD   labetalol (NORMODYNE) 300 MG tablet TAKE 1 TABLET TWICE A DAY  Laura Colorado MD   clobetasol (TEMOVATE) 0.05 % ointment Apply topically 2 times daily as needed Apply topically 2 times daily. Historical Provider, MD   Cholecalciferol (VITAMIN D3) 1000 units TABS Take 2 tablets by mouth daily   Historical Provider, MD   FISH OIL Take 1,000 mg by mouth daily   Historical Provider, MD   Glucosamine 500 MG CAPS Take 1 capsule by mouth daily   Historical Provider, MD       Social History     Tobacco Use    Smoking status: Former Smoker     Packs/day: 2.00     Years: 20.00     Pack years: 40.00     Types: Cigarettes     Last attempt to quit: 26     Years since quittin.8    Smokeless tobacco: Never Used   Substance Use Topics    Alcohol use: Yes     Comment: wine daily     Drug use: No        Allergies medications past medical history family history social history surgical history vaccines reviewed patient had a flu shot.     PHYSICAL EXAMINATION:  [ INSTRUCTIONS:  \"[x]\" Indicates a positive item  \"[]\" Indicates a negative item  -- DELETE ALL ITEMS NOT EXAMINED]  Vital Signs: (As obtained by patient/caregiver or practitioner observation)    Blood pressure-  Heart rate-    Respiratory rate-    Temperature-  Pulse oximetry-     Constitutional: [x] Appears well-developed and well-nourished [] No apparent distress      [] Abnormal-   Mental status  [x] Alert and awake  [] Oriented to person/place/time []Able to follow commands      Eyes:  EOM    []  Normal  [] Abnormal-  Sclera  [x]  Normal  [] Abnormal -         Discharge []  None visible  [] Abnormal -    HENT:   [x] Normocephalic, atraumatic. [] Abnormal   [] Mouth/Throat: Mucous membranes are moist.     External Ears [x] Normal  [] Abnormal-     Neck: [] No visualized mass     Pulmonary/Chest: [x] Respiratory effort normal.  [] No visualized signs of difficulty breathing or respiratory distress        [] Abnormal-      Musculoskeletal:   [] Normal gait with no signs of ataxia         [] Normal range of motion of neck        [] Abnormal-       Neurological:        [x] No Facial Asymmetry (Cranial nerve 7 motor function) (limited exam to video visit)          [x] No gaze palsy        [] Abnormal-         Skin:        [x] No significant exanthematous lesions or discoloration noted on facial skin         [] Abnormal-            Psychiatric:       [x] Normal Affect [] No Hallucinations        [] Abnormal-     Other pertinent observable physical exam findings-     ASSESSMENT/PLAN:  1. Paroxysmal atrial fibrillation (HCC)  stable    2. Hyperparathyroidism ([address]  Seem normal today  - Comprehensive Metabolic Panel; Future  - Vitamin D 25 Hydroxy; Future  - PTH, Intact; Future    3. Obstructive sleep apnea  She is compliant with her CPAP she wears it all night greater than 6 hours she feels better with that use and wears it nightly. She needs her CPAP reordered    4. Essential hypertension  Stable follow    5. Pure hypercholesterolemia  Follow  - CBC; Future  - TSH without Reflex; Future  - Lipid Panel; Future    6. Impaired fasting glucose    - Hemoglobin A1C; Future    7. Primary osteoarthritis involving multiple joints  stable    8. Encounter for hepatitis C screening test for low risk patient    - Hepatitis C Antibody;  Future      No follow-ups on file. Miguel Smith is a 76 y.o. female being evaluated by a Virtual Visit (video visit) encounter to address concerns as mentioned above. A caregiver was present when appropriate. Due to this being a TeleHealth encounter (During KKJFV-14 public health emergency), evaluation of the following organ systems was limited: Vitals/Constitutional/EENT/Resp/CV/GI//MS/Neuro/Skin/Heme-Lymph-Imm. Pursuant to the emergency declaration under the [address] [place] [address], [address] [place] [address] and the [employer] Act, this Virtual Visit was conducted with patient's (and/or legal guardian's) consent, to reduce the patient's risk of exposure to COVID-19 and provide necessary medical care. The patient (and/or legal guardian) has also been advised to contact this office for worsening conditions or problems, and seek emergency medical treatment and/or call 911 if deemed necessary. Patient identification was verified at the start of the visit: yes  Total time spent on this encounter: 13'  Services were provided through a video synchronous discussion virtually to substitute for in-person clinic visit. Patient and provider were located at their individual homes. --Stephen Clark MD on 11/10/2020 at 10:54 AM    An electronic signature was used to authenticate this note.

## 2020-12-18 RX ORDER — FLUTICASONE PROPIONATE 50 MCG
SPRAY, SUSPENSION (ML) NASAL
Qty: 48 G | Refills: 3 | Status: SHIPPED | OUTPATIENT
Start: 2020-12-18 | End: 2021-11-28

## 2020-12-21 RX ORDER — CLONIDINE HYDROCHLORIDE 0.1 MG/1
0.1 TABLET ORAL 2 TIMES DAILY
Qty: 180 TABLET | Refills: 3 | Status: SHIPPED | OUTPATIENT
Start: 2020-12-21 | End: 2021-12-02 | Stop reason: SDUPTHER

## 2020-12-21 RX ORDER — ROSUVASTATIN CALCIUM 10 MG/1
TABLET, COATED ORAL
Qty: 90 TABLET | Refills: 4 | Status: CANCELLED | OUTPATIENT
Start: 2020-12-21

## 2020-12-21 RX ORDER — LABETALOL 300 MG/1
300 TABLET, FILM COATED ORAL 2 TIMES DAILY
Qty: 180 TABLET | Refills: 3 | Status: SHIPPED | OUTPATIENT
Start: 2020-12-21 | End: 2021-12-02 | Stop reason: SDUPTHER

## 2020-12-21 RX ORDER — POTASSIUM CHLORIDE 750 MG/1
10 TABLET, EXTENDED RELEASE ORAL 2 TIMES DAILY
Qty: 180 TABLET | Refills: 3 | Status: SHIPPED | OUTPATIENT
Start: 2020-12-21 | End: 2021-12-02 | Stop reason: SDUPTHER

## 2020-12-22 RX ORDER — ROSUVASTATIN CALCIUM 10 MG/1
TABLET, COATED ORAL
Qty: 90 TABLET | Refills: 4 | Status: SHIPPED | OUTPATIENT
Start: 2020-12-22 | End: 2022-03-07 | Stop reason: SDUPTHER

## 2021-01-13 ENCOUNTER — OFFICE VISIT (OUTPATIENT)
Dept: SURGERY | Age: 76
End: 2021-01-13
Payer: MEDICARE

## 2021-01-13 ENCOUNTER — HOSPITAL ENCOUNTER (OUTPATIENT)
Dept: WOMENS IMAGING | Age: 76
Discharge: HOME | End: 2021-01-13
Payer: MEDICARE

## 2021-01-13 VITALS — DIASTOLIC BLOOD PRESSURE: 80 MMHG | HEART RATE: 88 BPM | SYSTOLIC BLOOD PRESSURE: 146 MMHG

## 2021-01-13 DIAGNOSIS — Z12.31 VISIT FOR SCREENING MAMMOGRAM: Primary | ICD-10-CM

## 2021-01-13 DIAGNOSIS — Z12.31 VISIT FOR SCREENING MAMMOGRAM: ICD-10-CM

## 2021-01-13 PROCEDURE — 1036F TOBACCO NON-USER: CPT | Performed by: PHYSICIAN ASSISTANT

## 2021-01-13 PROCEDURE — 1123F ACP DISCUSS/DSCN MKR DOCD: CPT | Performed by: PHYSICIAN ASSISTANT

## 2021-01-13 PROCEDURE — 99213 OFFICE O/P EST LOW 20 MIN: CPT | Performed by: PHYSICIAN ASSISTANT

## 2021-01-13 PROCEDURE — G8484 FLU IMMUNIZE NO ADMIN: HCPCS | Performed by: PHYSICIAN ASSISTANT

## 2021-01-13 PROCEDURE — G8399 PT W/DXA RESULTS DOCUMENT: HCPCS | Performed by: PHYSICIAN ASSISTANT

## 2021-01-13 PROCEDURE — G8417 CALC BMI ABV UP PARAM F/U: HCPCS | Performed by: PHYSICIAN ASSISTANT

## 2021-01-13 PROCEDURE — 1090F PRES/ABSN URINE INCON ASSESS: CPT | Performed by: PHYSICIAN ASSISTANT

## 2021-01-13 PROCEDURE — 4040F PNEUMOC VAC/ADMIN/RCVD: CPT | Performed by: PHYSICIAN ASSISTANT

## 2021-01-13 PROCEDURE — G8427 DOCREV CUR MEDS BY ELIG CLIN: HCPCS | Performed by: PHYSICIAN ASSISTANT

## 2021-01-13 PROCEDURE — 77067 SCR MAMMO BI INCL CAD: CPT

## 2021-01-13 PROCEDURE — 3017F COLORECTAL CA SCREEN DOC REV: CPT | Performed by: PHYSICIAN ASSISTANT

## 2021-01-22 ENCOUNTER — OFFICE VISIT (OUTPATIENT)
Dept: CARDIOLOGY | Facility: CLINIC | Age: 76
End: 2021-01-22

## 2021-01-22 VITALS
WEIGHT: 230 LBS | BODY MASS INDEX: 36.96 KG/M2 | DIASTOLIC BLOOD PRESSURE: 79 MMHG | HEIGHT: 66 IN | HEART RATE: 60 BPM | SYSTOLIC BLOOD PRESSURE: 132 MMHG

## 2021-01-22 DIAGNOSIS — E78.2 MIXED HYPERLIPIDEMIA: ICD-10-CM

## 2021-01-22 DIAGNOSIS — G47.33 OSA ON CPAP: ICD-10-CM

## 2021-01-22 DIAGNOSIS — E66.09 CLASS 2 OBESITY DUE TO EXCESS CALORIES WITHOUT SERIOUS COMORBIDITY WITH BODY MASS INDEX (BMI) OF 37.0 TO 37.9 IN ADULT: ICD-10-CM

## 2021-01-22 DIAGNOSIS — I48.0 PAF (PAROXYSMAL ATRIAL FIBRILLATION) (HCC): Primary | ICD-10-CM

## 2021-01-22 DIAGNOSIS — I10 ESSENTIAL HYPERTENSION: ICD-10-CM

## 2021-01-22 DIAGNOSIS — Z01.818 PREOPERATIVE EVALUATION TO RULE OUT SURGICAL CONTRAINDICATION: ICD-10-CM

## 2021-01-22 DIAGNOSIS — Z99.89 OSA ON CPAP: ICD-10-CM

## 2021-01-22 PROCEDURE — 93000 ELECTROCARDIOGRAM COMPLETE: CPT | Performed by: INTERNAL MEDICINE

## 2021-01-22 PROCEDURE — 99214 OFFICE O/P EST MOD 30 MIN: CPT | Performed by: INTERNAL MEDICINE

## 2021-01-22 NOTE — PROGRESS NOTES
Reason for Visit: cardiovascular follow up.    HPI:  Anne-Marie Hayes is a 75 y.o. female is here today for 6 month follow-up. She has been doing well and not having any issues or complaints.  She denies any chest pain, palpitations, dizziness, syncope, PND, or orthopnea.  She just feels an occasional skipped beat.  She has not been getting much exercise.  Her knees bother her and limit her activity somewhat.  She has tried to lose weight but despite eating healthy and cutting her calories she has been unsuccessful.  Her weight is up 4 pounds compared to July.    Previous Cardiac Testing and Procedures:  - ROBEL (8/13/2018) Normal left ventricular systolic function, mild MR, mild to moderate TR, no evidence of left atrial appendage thrombus.  - Cardioversion (8/13/2018) Successful cardioversion from atrial fibrillation to sinus rhythm  - Cardioversion (4/20/2020) successful cardioversion from atrial fibrillation to sinus rhythm  - Lipid panel (1/5/2020) total cholesterol 133, HDL 53, LDL 54, triglycerides 132    Patient Active Problem List   Diagnosis   • PAF (paroxysmal atrial fibrillation) (CMS/HCC)   • Atrial flutter with rapid ventricular response (CMS/HCC)   • PRINCE on CPAP   • Essential hypertension   • Mixed hyperlipidemia   • Class 2 obesity due to excess calories without serious comorbidity with body mass index (BMI) of 37.0 to 37.9 in adult   • Arthritis       Social History     Tobacco Use   • Smoking status: Former Smoker   • Smokeless tobacco: Never Used   Substance Use Topics   • Alcohol use: Yes     Comment: occ   • Drug use: No       Family History   Problem Relation Age of Onset   • Atrial fibrillation Mother    • Heart disease Father    • Breast cancer Neg Hx    • Ovarian cancer Neg Hx    • Uterine cancer Neg Hx    • Colon cancer Neg Hx    • Melanoma Neg Hx        The following portions of the patient's history were reviewed and updated as appropriate: allergies, current medications, past family  history, past medical history, past social history, past surgical history and problem list.      Current Outpatient Medications:   •  apixaban (ELIQUIS) 5 MG tablet tablet, Take 1 tablet by mouth Every 12 (Twelve) Hours., Disp: 60 tablet, Rfl: 0  •  bumetanide (BUMEX) 1 MG tablet, Take 1 mg by mouth Daily., Disp: , Rfl:   •  clobetasol (TEMOVATE) 0.05 % ointment, Apply  topically to the appropriate area as directed As Needed., Disp: , Rfl:   •  CloNIDine (CATAPRES) 0.1 MG tablet, Take 0.1 mg by mouth 2 (Two) Times a Day., Disp: , Rfl:   •  dilTIAZem CD (CARDIZEM CD) 240 MG 24 hr capsule, Take 1 capsule by mouth Daily., Disp: 90 capsule, Rfl: 3  •  famciclovir (FAMVIR) 500 MG tablet, Take 1,500 mg by mouth As Needed., Disp: , Rfl:   •  flecainide (TAMBOCOR) 50 MG tablet, Take 1 tablet by mouth 2 (Two) Times a Day., Disp: 10 tablet, Rfl: 0  •  fluticasone (FLONASE) 50 MCG/ACT nasal spray, 2 sprays into the nostril(s) as directed by provider Daily As Needed for Rhinitis or Allergies., Disp: , Rfl:   •  KLOR-CON 10 MEQ CR tablet, Take 10 mEq by mouth 2 (Two) Times a Day., Disp: , Rfl:   •  labetalol (NORMODYNE) 300 MG tablet, Take 300 mg by mouth Every 12 (Twelve) Hours., Disp: , Rfl:   •  losartan (COZAAR) 100 MG tablet, Take 1 tablet by mouth Daily., Disp: 90 tablet, Rfl: 3  •  Multiple Vitamins-Minerals (PRESERVISION AREDS 2 PO), Take  by mouth., Disp: , Rfl:   •  Omega-3 Fatty Acids (FISH OIL) 1000 MG capsule capsule, Take 1,000 mg by mouth Daily With Breakfast., Disp: , Rfl:   •  rosuvastatin (CRESTOR) 10 MG tablet, Take 1 tablet by mouth Daily., Disp: , Rfl:     Review of Systems   Constitution: Negative for chills and fever.   Cardiovascular: Positive for irregular heartbeat. Negative for chest pain, paroxysmal nocturnal dyspnea and syncope.   Respiratory: Negative for cough and shortness of breath.    Skin: Negative for rash.   Musculoskeletal: Positive for joint pain.   Gastrointestinal: Negative for abdominal  "pain and heartburn.   Neurological: Negative for dizziness and numbness.       Objective   /79   Pulse 60   Ht 167.6 cm (66\")   Wt 104 kg (230 lb)   BMI 37.12 kg/m²   Constitutional:       Appearance: Well-developed. Obese.   HENT:      Head: Normocephalic and atraumatic.   Pulmonary:      Effort: Pulmonary effort is normal.      Breath sounds: Normal breath sounds.   Cardiovascular:      Normal rate. Regular rhythm.      Murmurs: There is no murmur.      No gallop. No click.   Edema:     Peripheral edema absent.   Skin:     General: Skin is warm and dry.   Neurological:      Mental Status: Alert and oriented to person, place, and time.   Psychiatric:         Attention and Perception: Attention normal.         Mood and Affect: Mood normal.         Speech: Speech normal.         ECG 12 Lead    Date/Time: 1/22/2021 9:18 AM  Performed by: Jovani Lovelace MD  Authorized by: oJvani Lovelace MD   Comparison: compared with previous ECG from 7/21/2020  Similar to previous ECG  Rhythm: sinus rhythm  Rate: normal  Other findings: non-specific ST-T wave changes              ICD-10-CM ICD-9-CM   1. PAF (paroxysmal atrial fibrillation) (CMS/[facility])  I48.0 427.31   2. Essential hypertension  I10 401.9   3. Mixed hyperlipidemia  E78.2 272.2   4. Class 2 obesity due to excess calories without serious comorbidity with body mass index (BMI) of 37.0 to 37.9 in adult  E66.09 278.00    Z68.37 V85.37   5. PRINCE on CPAP  G47.33 327.23    Z99.89 V46.8   6. Preoperative evaluation to rule out surgical contraindication  Z01.818 V72.83         Assessment/Plan:  1.  Paroxysmal atrial fibrillation: Repeat cardioversion 4/2020.  Remains in sinus rhythm on EKG today.  Continue diltiazem, flecainide, and Eliquis.     2.  Essential hypertension: Blood pressure remains reasonably well controlled on current therapy.  Continue clonidine, diltiazem, labetalol, and losartan.     3.  Mixed hyperlipidemia: Lipid panel from 1/5/2020 was reviewed and " demonstrates good control.  Continue rosuvastatin and omega-3 fish oil.     4.  Obesity: Patient's Body mass index is 37.12 kg/m².  Weight is up 4 pounds compared to last visit.  BMI is above normal parameters. Recommendations include: exercise counseling and nutrition counseling     5.  Obstructive sleep apnea:  Continue CPAP.    6.  Preoperative evaluation: Patient has upcoming colonoscopy.  Acceptable to hold Eliquis for 48 to 72 hours prior to the procedure and resume when safe afterwards.

## 2021-01-25 NOTE — PROGRESS NOTES
HPI:  Betina Turk is in for yearly follow-up breast check. She has not noticed any changes in her breasts. EXAMINATION:  DE DIGITAL SCREEN W OR WO CAD BILATERAL  1/13/2021   11:40 AM   CLINICAL HISTORY: Screening. Jessie Spear is no family history of breast   cancer. The patient has had prior left breast stereotactic biopsies,   benign. COMPARISON STUDIES: 12/19/2019, 11/28/2018 and 11/27/2017. BREAST DENSITY: There are scattered fibroglandular densities that may   lower the sensitivity of mammography (Pattern B). TECHNIQUE: Digital 2-D and 3-D mammography were performed. Computer   aided detection was utilized. The patient's information has been added to a reminder sytem with a   target due date for the next mammogram.   FINDINGS: There are no dominant masses. Mild tissue nodularity is   stable. There are scattered benign calcifications. There are 2 marker   clips in the left breast related to prior image guided biopsy. There   are no suspicious microcalcifications. There is no skin thickening or   other abnormality.       Impression   1. Benign mammogram. BI-RADS 2.   2. Screening mammography recommended in one year. BREAST EXAM:  On examination, she has fibrocystic changes throughout both breasts, no dominant masses, no skin or nipple changes and no axillary adenopathy. I see nothing suspicious for breast cancer. ASSESSMENT:  Benign fibrocystic changes              PLAN:  I will plan to see her back in 1 year for physical exam and bilateral mammograms. She will contact me if anything significant changes.

## 2021-02-12 ENCOUNTER — IMMUNIZATION (OUTPATIENT)
Age: 76
End: 2021-02-12
Payer: MEDICARE

## 2021-02-12 PROCEDURE — 91300 COVID-19, PFIZER VACCINE 30MCG/0.3ML DOSE: CPT | Performed by: FAMILY MEDICINE

## 2021-02-12 PROCEDURE — 0001A COVID-19, PFIZER VACCINE 30MCG/0.3ML DOSE: CPT | Performed by: FAMILY MEDICINE

## 2021-02-25 PROBLEM — I48.92 ATRIAL FLUTTER WITH RAPID VENTRICULAR RESPONSE (HCC): Status: ACTIVE | Noted: 2018-08-12

## 2021-02-25 PROBLEM — M19.90 ARTHRITIS: Status: ACTIVE | Noted: 2020-07-20

## 2021-03-04 DIAGNOSIS — I10 ESSENTIAL HYPERTENSION: ICD-10-CM

## 2021-03-04 DIAGNOSIS — I48.0 PAROXYSMAL ATRIAL FIBRILLATION (HCC): ICD-10-CM

## 2021-03-05 ENCOUNTER — IMMUNIZATION (OUTPATIENT)
Age: 76
End: 2021-03-05
Payer: MEDICARE

## 2021-03-05 PROCEDURE — 91300 COVID-19, PFIZER VACCINE 30MCG/0.3ML DOSE: CPT | Performed by: FAMILY MEDICINE

## 2021-03-05 PROCEDURE — 0002A COVID-19, PFIZER VACCINE 30MCG/0.3ML DOSE: CPT | Performed by: FAMILY MEDICINE

## 2021-03-05 RX ORDER — FLECAINIDE ACETATE 50 MG/1
50 TABLET ORAL 2 TIMES DAILY
Qty: 180 TABLET | Refills: 3 | Status: SHIPPED | OUTPATIENT
Start: 2021-03-05 | End: 2022-03-07 | Stop reason: SDUPTHER

## 2021-03-05 RX ORDER — LOSARTAN POTASSIUM 100 MG/1
100 TABLET ORAL DAILY
Qty: 90 TABLET | Refills: 3 | Status: SHIPPED | OUTPATIENT
Start: 2021-03-05 | End: 2022-03-07 | Stop reason: SDUPTHER

## 2021-04-20 ENCOUNTER — OFFICE VISIT (OUTPATIENT)
Dept: GASTROENTEROLOGY | Facility: CLINIC | Age: 76
End: 2021-04-20

## 2021-04-20 VITALS
TEMPERATURE: 97.5 F | HEART RATE: 70 BPM | SYSTOLIC BLOOD PRESSURE: 122 MMHG | HEIGHT: 66 IN | DIASTOLIC BLOOD PRESSURE: 72 MMHG | BODY MASS INDEX: 36.48 KG/M2 | OXYGEN SATURATION: 98 % | WEIGHT: 227 LBS

## 2021-04-20 DIAGNOSIS — Z79.01 ANTICOAGULATED: ICD-10-CM

## 2021-04-20 DIAGNOSIS — I10 HTN (HYPERTENSION), BENIGN: ICD-10-CM

## 2021-04-20 DIAGNOSIS — Z78.9 NONSMOKER: ICD-10-CM

## 2021-04-20 DIAGNOSIS — E66.9 OBESITY, UNSPECIFIED OBESITY SEVERITY, UNSPECIFIED OBESITY TYPE: ICD-10-CM

## 2021-04-20 DIAGNOSIS — Z86.010 HX OF ADENOMATOUS COLONIC POLYPS: Primary | ICD-10-CM

## 2021-04-20 PROCEDURE — 99214 OFFICE O/P EST MOD 30 MIN: CPT | Performed by: CLINICAL NURSE SPECIALIST

## 2021-04-20 RX ORDER — SODIUM, POTASSIUM,MAG SULFATES 17.5-3.13G
SOLUTION, RECONSTITUTED, ORAL ORAL
Qty: 177 ML | Refills: 0 | Status: ON HOLD | OUTPATIENT
Start: 2021-04-20 | End: 2021-05-18

## 2021-04-20 NOTE — PROGRESS NOTES
Anne-Marie Hayes  1945 4/20/2021  Chief Complaint   Patient presents with   • Colonoscopy     Subjective   HPI  Anne-Marie Hayes is a 75 y.o. female who presents as a referral for preventative maintenance. She has no complaints of nausea or vomiting. No change in bowels. No wt loss. No BRBPR. No melena. There is NO family hx for colon cancer. No abdominal pain.  Past Medical History:   Diagnosis Date   • Arthritis    • Atrial fibrillation (CMS/HCC)    • Hyperlipidemia    • Hypertension      Past Surgical History:   Procedure Laterality Date   • CARDIAC CATHETERIZATION  1987   • COLONOSCOPY W/ POLYPECTOMY  03/31/2016    Tubular adenoma ascending colon repeat exam in 5 years   • REPLACEMENT TOTAL KNEE Right 07/2019   • TUBAL ABDOMINAL LIGATION       Outpatient Medications Marked as Taking for the 4/20/21 encounter (Office Visit) with Galina Figueroa APRN   Medication Sig Dispense Refill   • apixaban (ELIQUIS) 5 MG tablet tablet Take 1 tablet by mouth Every 12 (Twelve) Hours. 60 tablet 0   • bumetanide (BUMEX) 1 MG tablet Take 1 mg by mouth Daily.     • clobetasol (TEMOVATE) 0.05 % ointment Apply  topically to the appropriate area as directed As Needed.     • CloNIDine (CATAPRES) 0.1 MG tablet Take 0.1 mg by mouth 2 (Two) Times a Day.     • dilTIAZem CD (CARDIZEM CD) 240 MG 24 hr capsule Take 1 capsule by mouth Daily. 90 capsule 3   • famciclovir (FAMVIR) 500 MG tablet Take 1,500 mg by mouth As Needed.     • flecainide (TAMBOCOR) 50 MG tablet Take 1 tablet by mouth 2 (Two) Times a Day. 10 tablet 0   • fluticasone (FLONASE) 50 MCG/ACT nasal spray 2 sprays into the nostril(s) as directed by provider Daily As Needed for Rhinitis or Allergies.     • KLOR-CON 10 MEQ CR tablet Take 10 mEq by mouth 2 (Two) Times a Day.     • labetalol (NORMODYNE) 300 MG tablet Take 300 mg by mouth Every 12 (Twelve) Hours.     • losartan (COZAAR) 100 MG tablet Take 1 tablet by mouth Daily. 90 tablet 3   • Multiple  Vitamins-Minerals (PRESERVISION AREDS 2 PO) Take  by mouth.     • Omega-3 Fatty Acids (FISH OIL) 1000 MG capsule capsule Take 1,000 mg by mouth Daily With Breakfast.     • rosuvastatin (CRESTOR) 10 MG tablet Take 1 tablet by mouth Daily.       No Known Allergies  Social History     Socioeconomic History   • Marital status:      Spouse name: Not on file   • Number of children: Not on file   • Years of education: Not on file   • Highest education level: Not on file   Tobacco Use   • Smoking status: Former Smoker   • Smokeless tobacco: Never Used   Substance and Sexual Activity   • Alcohol use: Yes     Comment: occ   • Drug use: No   • Sexual activity: Defer     Family History   Problem Relation Age of Onset   • Atrial fibrillation Mother    • Heart disease Father    • Breast cancer Neg Hx    • Ovarian cancer Neg Hx    • Uterine cancer Neg Hx    • Colon cancer Neg Hx    • Melanoma Neg Hx    • Colon polyps Neg Hx      Health Maintenance   Topic Date Due   • HEPATITIS C SCREENING  Never done   • ANNUAL WELLNESS VISIT  Never done   • ZOSTER VACCINE (3 of 3) 03/21/2019   • INFLUENZA VACCINE  08/01/2021   • LIPID PANEL  11/05/2021   • DXA SCAN  06/15/2022   • MAMMOGRAM  01/13/2023   • COLONOSCOPY  03/31/2026   • TDAP/TD VACCINES (3 - Td) 06/07/2029   • COVID-19 Vaccine  Completed   • Pneumococcal Vaccine 65+  Completed       REVIEW OF SYSTEMS  General: well appearing, no fever chills or sweats, no unexplained wt loss  HEENT: no acute visual or hearing disturbances  Cardiovascular: No chest pain or palpitations  Pulmonary: No shortness of breath, coughing, wheezing or hemoptysis  : No burning, urgency, hematuria, or dysuria  Musculoskeletal: No joint pain or stiffness  Peripheral: no edema  Skin: No lesions or rashes  Neuro: No dizziness, headaches, stroke, syncope  Endocrine: No hot or cold intolerances  Hematological: No blood dyscrasias    Objective   Vitals:    04/20/21 0936   BP: 122/72   Pulse: 70   Temp:  "97.5 °F (36.4 °C)   SpO2: 98%   Weight: 103 kg (227 lb)   Height: 167.6 cm (66\")     Body mass index is 36.64 kg/m².  Patient's Body mass index is 36.64 kg/m². BMI is above normal parameters. Recommendations include: nutrition counseling.      PHYSICAL EXAM  General: age appropriate well nourished well appearing, no acute distress  Head: normocephalic and atraumatic  Global assessment-supple  Neck-No JVD noted, no lymphadenopathy  Pulmonary-clear to auscultation bilaterally, normal respiratory effort  Cardiovascular-normal rate and rhythm, normal heart sounds, S1 and S2 noted  Abdomen-soft, non tender, non distended, normal bowel sounds all 4 quadrants, no hepatosplenomegaly noted  Extremities-No clubbing cyanosis or edema  Neuro-Non focal, converses appropriately, awake, alert, oriented    Assessment/Plan     Diagnoses and all orders for this visit:    1. Hx of adenomatous colonic polyps (Primary)  -     Suprep Bowel Prep Kit 17.5-3.13-1.6 GM/177ML solution oral solution; Take as directed by office instructions provided  Dispense: 177 mL; Refill: 0  -     Case Request; Standing  -     Follow Anesthesia Guidelines / Standing Orders; Future  -     Obtain Informed Consent; Future  -     Implement Anesthesia Orders Day of Procedure; Standing  -     Obtain Informed Consent; Standing  -     Verify bowel prep was successful; Standing  -     Case Request    2. HTN (hypertension), benign  Comments:  cont BP medication the day of procedure    3. Nonsmoker    4. Obesity, unspecified obesity severity, unspecified obesity type    5. Anticoagulated  Comments:  Elimaryanais to hold per Dr Lovelace for Afib        COLONOSCOPY WITH ANESTHESIA (N/A)  Body mass index is 36.64 kg/m².    Patient instructions on prep prior to procedure provided to the patient.    All risks, benefits, alternatives, and indications of colonoscopy procedure have been discussed with the patient. Risks to include perforation of the colon requiring possible surgery or " colostomy, risk of bleeding from biopsies or removal of colon tissue, possibility of missing a colon polyp or cancer, or adverse drug reaction.  Benefits to include the diagnosis and management of disease of the colon and rectum. Alternatives to include barium enema, radiographic evaluation, lab testing or no intervention. Pt verbalizes understanding and agrees.     Galina Figueroa, APRN  2021  09:57 CDT      IF YOU SMOKE OR USE TOBACCO PLEASE READ THE FOLLOWIN minutes reading provided    Why is smoking bad for me?  Smoking increases the risk of heart disease, lung disease, vascular disease, stroke, and cancer.     If you smoke, STOP!    If you would like more information on quitting smoking, please visit the [employer] website: [email or web address]   This link will provide additional resources including the QUIT line and the Beat the Pack support groups.     For more information:    Quit Now [place]  -QUIT-NOW  [email or web address]

## 2021-04-21 ENCOUNTER — TELEPHONE (OUTPATIENT)
Dept: GASTROENTEROLOGY | Facility: CLINIC | Age: 76
End: 2021-04-21

## 2021-04-21 PROBLEM — Z86.0101 HX OF ADENOMATOUS COLONIC POLYPS: Status: ACTIVE | Noted: 2021-04-21

## 2021-04-21 PROBLEM — Z86.010 HX OF ADENOMATOUS COLONIC POLYPS: Status: ACTIVE | Noted: 2021-04-21

## 2021-04-21 NOTE — TELEPHONE ENCOUNTER
----- Message from Jovani Lovelace MD sent at 4/21/2021 12:57 PM CDT -----  It is acceptable for patient to hold anticoagulation for 48 to 72 hours prior to the procedure and resume when safe afterwards.  Bridging with Lovenox is not indicated.  Patient needs to understand that there is a small risk of thromboembolic events while off anticoagulation.  ----- Message -----  From: Jose Khanna MA  Sent: 4/21/2021   8:50 AM CDT  To: Jovani Lovelace MD                                                                                                                                          [address] [facility] [address] [place] [facility], [address]  [facility] [address]  Phone: [phone]  Fax: [phone]            April 21, 2021               Dear Dr. Lovelace,      Anne-Marie HERI Abigail 1945 is being considered for colonoscopy for history of polyps. Anne-Marie LIRIANO Abigail is tentatively scheduled for 5/18/21.  In order to do this procedure, we will need her to be off of Eliquis for 2 days.       If this patient will need Lovenox therapy while off regularly prescribed anticoagulation therapy we ask you please arrange this with the patient.     Lovenox Requirement  Yes/No     Other:              Approved By:     Date:                 Thank you,            Delio Leslie M.D.

## 2021-05-07 DIAGNOSIS — E78.00 PURE HYPERCHOLESTEROLEMIA: ICD-10-CM

## 2021-05-07 DIAGNOSIS — E21.3 HYPERPARATHYROIDISM (HCC): ICD-10-CM

## 2021-05-07 DIAGNOSIS — Z11.59 ENCOUNTER FOR HEPATITIS C SCREENING TEST FOR LOW RISK PATIENT: ICD-10-CM

## 2021-05-07 DIAGNOSIS — R73.01 IMPAIRED FASTING GLUCOSE: ICD-10-CM

## 2021-05-07 LAB
ALBUMIN SERPL-MCNC: 4.4 G/DL (ref 3.5–5.2)
ALP BLD-CCNC: 113 U/L (ref 35–104)
ALT SERPL-CCNC: 15 U/L (ref 5–33)
ANION GAP SERPL CALCULATED.3IONS-SCNC: 11 MMOL/L (ref 7–19)
AST SERPL-CCNC: 13 U/L (ref 5–32)
BILIRUB SERPL-MCNC: 0.6 MG/DL (ref 0.2–1.2)
BUN BLDV-MCNC: 11 MG/DL (ref 8–23)
CALCIUM SERPL-MCNC: 10.1 MG/DL (ref 8.8–10.2)
CHLORIDE BLD-SCNC: 104 MMOL/L (ref 98–111)
CHOLESTEROL, TOTAL: 122 MG/DL (ref 160–199)
CO2: 25 MMOL/L (ref 22–29)
CREAT SERPL-MCNC: 0.6 MG/DL (ref 0.5–0.9)
GFR AFRICAN AMERICAN: >59
GFR NON-AFRICAN AMERICAN: >60
GLUCOSE BLD-MCNC: 114 MG/DL (ref 74–109)
HBA1C MFR BLD: 5.3 % (ref 4–6)
HCT VFR BLD CALC: 39.9 % (ref 37–47)
HDLC SERPL-MCNC: 47 MG/DL (ref 65–121)
HEMOGLOBIN: 12.7 G/DL (ref 12–16)
HEPATITIS C ANTIBODY INTERPRETATION: NORMAL
LDL CHOLESTEROL CALCULATED: 46 MG/DL
MCH RBC QN AUTO: 28.7 PG (ref 27–31)
MCHC RBC AUTO-ENTMCNC: 31.8 G/DL (ref 33–37)
MCV RBC AUTO: 90.3 FL (ref 81–99)
PARATHYROID HORMONE INTACT: 280.2 PG/ML (ref 15–65)
PDW BLD-RTO: 13.1 % (ref 11.5–14.5)
PLATELET # BLD: 177 K/UL (ref 130–400)
PMV BLD AUTO: 12.1 FL (ref 9.4–12.3)
POTASSIUM SERPL-SCNC: 4.5 MMOL/L (ref 3.5–5)
RBC # BLD: 4.42 M/UL (ref 4.2–5.4)
SODIUM BLD-SCNC: 140 MMOL/L (ref 136–145)
TOTAL PROTEIN: 6.6 G/DL (ref 6.6–8.7)
TRIGL SERPL-MCNC: 143 MG/DL (ref 0–149)
TSH SERPL DL<=0.05 MIU/L-ACNC: 1.03 UIU/ML (ref 0.27–4.2)
VITAMIN D 25-HYDROXY: 38.1 NG/ML
WBC # BLD: 7 K/UL (ref 4.8–10.8)

## 2021-05-07 ASSESSMENT — LIFESTYLE VARIABLES
HOW OFTEN DO YOU HAVE A DRINK CONTAINING ALCOHOL: FOUR OR MORE TIMES A WEEK
HOW OFTEN DURING THE LAST YEAR HAVE YOU FAILED TO DO WHAT WAS NORMALLY EXPECTED FROM YOU BECAUSE OF DRINKING: NEVER
HOW OFTEN DURING THE LAST YEAR HAVE YOU NEEDED AN ALCOHOLIC DRINK FIRST THING IN THE MORNING TO GET YOURSELF GOING AFTER A NIGHT OF HEAVY DRINKING: NEVER
HOW OFTEN DURING THE LAST YEAR HAVE YOU NEEDED AN ALCOHOLIC DRINK FIRST THING IN THE MORNING TO GET YOURSELF GOING AFTER A NIGHT OF HEAVY DRINKING: 0
AUDIT-C TOTAL SCORE: 0
HAS A RELATIVE, FRIEND, DOCTOR, OR ANOTHER HEALTH PROFESSIONAL EXPRESSED CONCERN ABOUT YOUR DRINKING OR SUGGESTED YOU CUT DOWN: NO
HOW OFTEN DO YOU HAVE A DRINK CONTAINING ALCOHOL: 4
HOW MANY STANDARD DRINKS CONTAINING ALCOHOL DO YOU HAVE ON A TYPICAL DAY: ONE OR TWO
AUDIT TOTAL SCORE: 0
HOW OFTEN DURING THE LAST YEAR HAVE YOU BEEN UNABLE TO REMEMBER WHAT HAPPENED THE NIGHT BEFORE BECAUSE YOU HAD BEEN DRINKING: NEVER
HOW OFTEN DURING THE LAST YEAR HAVE YOU BEEN UNABLE TO REMEMBER WHAT HAPPENED THE NIGHT BEFORE BECAUSE YOU HAD BEEN DRINKING: 0
HOW OFTEN DURING THE LAST YEAR HAVE YOU FAILED TO DO WHAT WAS NORMALLY EXPECTED FROM YOU BECAUSE OF DRINKING: 0
HOW OFTEN DO YOU HAVE SIX OR MORE DRINKS ON ONE OCCASION: NEVER
HOW OFTEN DURING THE LAST YEAR HAVE YOU HAD A FEELING OF GUILT OR REMORSE AFTER DRINKING: NEVER
HOW OFTEN DO YOU HAVE SIX OR MORE DRINKS ON ONE OCCASION: 0
HAS A RELATIVE, FRIEND, DOCTOR, OR ANOTHER HEALTH PROFESSIONAL EXPRESSED CONCERN ABOUT YOUR DRINKING OR SUGGESTED YOU CUT DOWN: 0
HOW OFTEN DURING THE LAST YEAR HAVE YOU FOUND THAT YOU WERE NOT ABLE TO STOP DRINKING ONCE YOU HAD STARTED: NEVER
HAVE YOU OR SOMEONE ELSE BEEN INJURED AS A RESULT OF YOUR DRINKING: NO

## 2021-05-07 ASSESSMENT — PATIENT HEALTH QUESTIONNAIRE - PHQ9: 2. FEELING DOWN, DEPRESSED OR HOPELESS: 0

## 2021-05-13 ENCOUNTER — OFFICE VISIT (OUTPATIENT)
Dept: INTERNAL MEDICINE | Age: 76
End: 2021-05-13
Payer: MEDICARE

## 2021-05-13 VITALS
HEIGHT: 67 IN | BODY MASS INDEX: 35.63 KG/M2 | WEIGHT: 227 LBS | DIASTOLIC BLOOD PRESSURE: 70 MMHG | HEART RATE: 72 BPM | SYSTOLIC BLOOD PRESSURE: 130 MMHG | OXYGEN SATURATION: 98 %

## 2021-05-13 DIAGNOSIS — G47.33 OBSTRUCTIVE SLEEP APNEA: ICD-10-CM

## 2021-05-13 DIAGNOSIS — E78.00 PURE HYPERCHOLESTEROLEMIA: ICD-10-CM

## 2021-05-13 DIAGNOSIS — M48.061 SPINAL STENOSIS OF LUMBAR REGION, UNSPECIFIED WHETHER NEUROGENIC CLAUDICATION PRESENT: ICD-10-CM

## 2021-05-13 DIAGNOSIS — Z00.00 MEDICARE ANNUAL WELLNESS VISIT, SUBSEQUENT: Primary | ICD-10-CM

## 2021-05-13 DIAGNOSIS — M15.9 PRIMARY OSTEOARTHRITIS INVOLVING MULTIPLE JOINTS: ICD-10-CM

## 2021-05-13 DIAGNOSIS — E55.9 VITAMIN D DEFICIENCY: ICD-10-CM

## 2021-05-13 DIAGNOSIS — R73.01 IMPAIRED FASTING GLUCOSE: ICD-10-CM

## 2021-05-13 DIAGNOSIS — Z00.00 ROUTINE GENERAL MEDICAL EXAMINATION AT A HEALTH CARE FACILITY: ICD-10-CM

## 2021-05-13 DIAGNOSIS — G89.29 CHRONIC MIDLINE THORACIC BACK PAIN: ICD-10-CM

## 2021-05-13 DIAGNOSIS — R91.1 INCIDENTAL LUNG NODULE, GREATER THAN OR EQUAL TO 8MM: ICD-10-CM

## 2021-05-13 DIAGNOSIS — I48.0 PAROXYSMAL ATRIAL FIBRILLATION (HCC): ICD-10-CM

## 2021-05-13 DIAGNOSIS — M54.6 CHRONIC MIDLINE THORACIC BACK PAIN: ICD-10-CM

## 2021-05-13 PROCEDURE — 4040F PNEUMOC VAC/ADMIN/RCVD: CPT | Performed by: INTERNAL MEDICINE

## 2021-05-13 PROCEDURE — 99213 OFFICE O/P EST LOW 20 MIN: CPT | Performed by: INTERNAL MEDICINE

## 2021-05-13 PROCEDURE — G8399 PT W/DXA RESULTS DOCUMENT: HCPCS | Performed by: INTERNAL MEDICINE

## 2021-05-13 PROCEDURE — G8427 DOCREV CUR MEDS BY ELIG CLIN: HCPCS | Performed by: INTERNAL MEDICINE

## 2021-05-13 PROCEDURE — G8417 CALC BMI ABV UP PARAM F/U: HCPCS | Performed by: INTERNAL MEDICINE

## 2021-05-13 PROCEDURE — 1123F ACP DISCUSS/DSCN MKR DOCD: CPT | Performed by: INTERNAL MEDICINE

## 2021-05-13 PROCEDURE — 1090F PRES/ABSN URINE INCON ASSESS: CPT | Performed by: INTERNAL MEDICINE

## 2021-05-13 PROCEDURE — 3017F COLORECTAL CA SCREEN DOC REV: CPT | Performed by: INTERNAL MEDICINE

## 2021-05-13 PROCEDURE — G0439 PPPS, SUBSEQ VISIT: HCPCS | Performed by: INTERNAL MEDICINE

## 2021-05-13 PROCEDURE — 1036F TOBACCO NON-USER: CPT | Performed by: INTERNAL MEDICINE

## 2021-05-13 SDOH — ECONOMIC STABILITY: FOOD INSECURITY: WITHIN THE PAST 12 MONTHS, THE FOOD YOU BOUGHT JUST DIDN'T LAST AND YOU DIDN'T HAVE MONEY TO GET MORE.: NEVER TRUE

## 2021-05-13 SDOH — ECONOMIC STABILITY: FOOD INSECURITY: WITHIN THE PAST 12 MONTHS, YOU WORRIED THAT YOUR FOOD WOULD RUN OUT BEFORE YOU GOT MONEY TO BUY MORE.: NEVER TRUE

## 2021-05-13 ASSESSMENT — ENCOUNTER SYMPTOMS
SHORTNESS OF BREATH: 0
FACIAL SWELLING: 0
BACK PAIN: 1
EYE DISCHARGE: 0
ABDOMINAL DISTENTION: 0
EYE REDNESS: 0
ABDOMINAL PAIN: 0
COUGH: 0

## 2021-05-13 NOTE — PROGRESS NOTES
Chief Complaint   Patient presents with    Medicare AWV    Hypertension    Hyperlipidemia       HPI: Patient is here today for Medicare annual wellness visit and to follow-up history of atrial fibrillation hypertension hyperlipidemia and other medical issues. She is having quite a bit of back pain in her right posterior thoracic area. She has some lower back pain that radiates up. Her back pain is quite limiting. She denies chest pressure she has some stable dyspnea no nausea vomiting. Since I last saw her she has had one prolonged episode of atrial fib pt had an episode of atrial fib about 3/13- lasted from supper to bedtime. She had done a lot of heavy lifting that day had been at the [facility].     Past Medical History:   Diagnosis Date    Arthritis     Heart palpitations     Hypercalcemia 8/7/2017    Hyperlipidemia     Hypertension     Impaired fasting glucose 8/7/2017    Lumbar spinal stenosis     Lung nodule, solitary 8/7/2017    Right upper lobe    Multinodular goiter 8/7/2017    Obstructive sleep apnea 10/11/2018    Paroxysmal atrial fibrillation ([address] 8/17/2018    Primary osteoarthritis involving multiple joints 8/7/2017    Wears glasses        Past Surgical History:   Procedure Laterality Date    BREAST BIOPSY  12/3/2007    stereotactic left, fibrocystic changes    BREAST BIOPSY  12/17/2007    stereotactic left, fibrocystic changes    CARDIAC CATHETERIZATION      negative    CARDIOVERSION      8/2018    CATARACT REMOVAL Bilateral 2017    KNEE ARTHROPLASTY Right 7/22/2019    RIGHT PARTIAL KNEE REPLACEMENT performed by Laina Marvin MD at [address] [facility]  2013    skin cancer spot on her nose removed    TUBAL LIGATION         Family History   Problem Relation Age of Onset    Hypertension Father        Social History     Socioeconomic History    Marital status:      Spouse name: Not on file    Number of children: Not on file    Years of education: Not on file    Highest education level: Not on file   Occupational History    Not on file   Social Needs    Financial resource strain: Not very hard    Food insecurity     Worry: Never true     Inability: Never true    Transportation needs     Medical: Not on file     Non-medical: Not on file   Tobacco Use    Smoking status: Former Smoker     Packs/day: 2.00     Years: 20.00     Pack years: 40.00     Types: Cigarettes     Quit date: 26     Years since quittin.3    Smokeless tobacco: Never Used   Substance and Sexual Activity    Alcohol use: Yes     Comment: wine daily     Drug use: No    Sexual activity: Not on file   Lifestyle    Physical activity     Days per week: Not on file     Minutes per session: Not on file    Stress: Not on file   Relationships    Social connections     Talks on phone: Not on file     Gets together: Not on file     Attends Jehovah's witness service: Not on file     Active member of club or organization: Not on file     Attends meetings of clubs or organizations: Not on file     Relationship status: Not on file    Intimate partner violence     Fear of current or ex partner: Not on file     Emotionally abused: Not on file     Physically abused: Not on file     Forced sexual activity: Not on file   Other Topics Concern    Not on file   Social History Narrative    Not on file       No Known Allergies    Current Outpatient Medications   Medication Sig Dispense Refill    losartan (COZAAR) 100 MG tablet Take 1 tablet by mouth daily 90 tablet 3    flecainide (TAMBOCOR) 50 MG tablet Take 1 tablet by mouth 2 times daily 180 tablet 3    rosuvastatin (CRESTOR) 10 MG tablet TAKE 1 TABLET DAILY 90 tablet 4    apixaban (ELIQUIS) 5 MG TABS tablet Take 1 tablet by mouth 2 times daily 180 tablet 3    potassium chloride (KLOR-CON M) 10 MEQ extended release tablet Take 1 tablet by mouth 2 times daily 180 tablet 3    cloNIDine (CATAPRES) 0.1 MG tablet Take 1 tablet by mouth 2 times daily 180 tablet 3    labetalol (NORMODYNE) 300 MG tablet Take 1 tablet by mouth 2 times daily 180 tablet 3    fluticasone (FLONASE) 50 MCG/ACT nasal spray USE 2 SPRAYS IN EACH NOSTRIL DAILY AS NEEDED FOR RHINITIS OR ALLERGIES 48 g 3    CPAP Machine MISC by Does not apply route      bumetanide (BUMEX) 1 MG tablet Take 1 tablet by mouth daily 90 tablet 3    dilTIAZem (CARDIZEM CD) 360 MG extended release capsule Take 1 capsule by mouth daily 90 capsule 3    famciclovir (FAMVIR) 500 MG tablet TAKE 3 TABLETS DAILY AS NEEDED FOR COLD SORES 30 tablet 1    Multiple Vitamins-Minerals (PRESERVISION AREDS 2+MULTI VIT PO) Take by mouth Indications: bid      clobetasol (TEMOVATE) 0.05 % ointment Apply topically 2 times daily as needed Apply topically 2 times daily.  FISH OIL Take 1,000 mg by mouth daily        No current facility-administered medications for this visit. Review of Systems   Constitutional: Negative for chills, fatigue and fever. HENT: Negative for facial swelling. Eyes: Negative for discharge and redness. Respiratory: Negative for cough and shortness of breath. Cardiovascular: Positive for palpitations. Negative for chest pain. Gastrointestinal: Negative for abdominal distention and abdominal pain. Musculoskeletal: Positive for arthralgias and back pain. Skin: Negative for rash and wound. Neurological: Negative for facial asymmetry, light-headedness and headaches. Psychiatric/Behavioral: Negative for dysphoric mood and sleep disturbance. The patient is not nervous/anxious.         /70   Pulse 72   Ht 5' 7\" (1.702 m)   Wt 227 lb (103 kg)   SpO2 98%   BMI 35.55 kg/m²   BP Readings from Last 7 Encounters:   05/13/21 130/70   01/13/21 (!) 146/80   05/07/20 130/82   11/04/19 124/66   07/23/19 (!) 164/79   07/22/19 105/61   04/29/19 134/70     Wt Readings from Last 7 Encounters:   05/13/21 227 lb (103 kg)   05/07/20 220 lb (99.8 kg)   12/19/19 229 lb (103.9 kg) up to date with routine screening recomendations and vaccines. 2. Paroxysmal atrial fibrillation [facility])  She follows with cardiology at [facility] we reviewed their last note discussed with her continue current plan of care and follow  - CBC; Future  - Comprehensive Metabolic Panel; Future    3. Spinal stenosis of lumbar region, unspecified whether neurogenic claudication present  Again see below I went to image her lungs but we are also going to refer her to physical therapy. - External Referral To Physical Therapy    4. Obstructive sleep apnea  Patient wears her CPAP nightly she benefits from its use wears it all night long 7 or 8 hours per night. Very compliant. 5. Primary osteoarthritis involving multiple joints  Patient with primary arthritis watch closely did well with a knee replacement at some point will need the other knee replaced work on healthy diet weight loss follow    6. Incidental lung nodule, greater than or equal to 8mm  She quit smoking more than 15 years ago and pulmonary did clear her from further follow-up but I would feel better to reimage her chest with pain in this area with the size of the nodule. - CT CHEST WO CONTRAST; Future    7. Chronic midline thoracic back pain  I want to reimage her chest she had a lung nodule that was 1 cm we had referred her to pulmonary and they followed up in released her from follow-up because it was stable for 2 years. I have concern with her pain being in this area with a history of smoking although she quit more than 15 years ago I think it is wiser that we check it again.  - External Referral To Physical Therapy    8. Impaired fasting glucose  Healthy diet exercise and follow  - Vitamin D 25 Hydroxy; Future    9. Pure hypercholesterolemia  Continue current medications current plan of care stable on Crestor  - TSH without Reflex; Future  - Lipid Panel; Future    10.  Vitamin D deficiency  Vitamin D supplementation and follow  - Vitamin D 25 Hydroxy; Future                    Medicare Annual Wellness Visit  Name: Sheri Ji Date: 2021   MRN: [id number] Sex: Female   Age: 76 y.o. Ethnicity: Non-/Non    : 1945 Race: Royer Krause is here for Medicare AWV, Hypertension, and Hyperlipidemia    Screenings for behavioral, psychosocial and functional/safety risks, and cognitive dysfunction are all negative except as indicated below. These results, as well as other patient data from the [address] form, are documented in Flowsheets linked to this Encounter. No Known Allergies    Prior to Visit Medications    Medication Sig Taking?  Authorizing Provider   losartan (COZAAR) 100 MG tablet Take 1 tablet by mouth daily  Ann Ulrich MD   flecainide (TAMBOCOR) 50 MG tablet Take 1 tablet by mouth 2 times daily  Ann Ulrich MD   rosuvastatin (CRESTOR) 10 MG tablet TAKE 1 TABLET DAILY  Ann Ulrich MD   apixaban (ELIQUIS) 5 MG TABS tablet Take 1 tablet by mouth 2 times daily  Ann Ulrich MD   potassium chloride (KLOR-CON M) 10 MEQ extended release tablet Take 1 tablet by mouth 2 times daily  Ann Ulrich MD   cloNIDine (CATAPRES) 0.1 MG tablet Take 1 tablet by mouth 2 times daily  Ann Ulrich MD   labetalol (NORMODYNE) 300 MG tablet Take 1 tablet by mouth 2 times daily  Ann Ulrich MD   fluticasone (FLONASE) 50 MCG/ACT nasal spray USE 2 SPRAYS IN EACH NOSTRIL DAILY AS NEEDED FOR RHINITIS OR ALLERGIES  Ann Ulrich MD   CPAP Machine MISC by Does not apply route  Historical Provider, MD   bumetanide (BUMEX) 1 MG tablet Take 1 tablet by mouth daily  Ann Ulrich MD   dilTIAZem (CARDIZEM CD) 360 MG extended release capsule Take 1 capsule by mouth daily  Ann Ulrich MD   famciclovir (FAMVIR) 500 MG tablet TAKE 3 TABLETS DAILY AS NEEDED FOR COLD SORES  Ann Ulrich MD   Multiple Vitamins-Minerals (PRESERVISION AREDS 2+MULTI VIT PO) Take by mouth Indications: bid  Historical Provider, MD   clobetasol have been reviewed and are found in [facility] [address] [facility]. The following problems were reviewed today and where indicated follow up appointments were made and/or referrals ordered. Positive Risk Factor Screenings with Interventions:           Health Habits/Nutrition:  Health Habits/Nutrition  Do you exercise for at least 20 minutes 2-3 times per week?: Yes  Have you lost any weight without trying in the past 3 months?: No  Do you eat only one meal per day?: No  Have you seen the dentist within the past year?: Yes  Body mass index: (!) 35.55  Health Habits/Nutrition Interventions:  · work on healthy diet suggested lose it or my fitness pal or weight watchers elif. Were just cutting back carbs and sugar in general.  We need to get her back pain better so she can exercise more.      Safety:  Safety  Do you have working smoke detectors?: Yes  Have all throw rugs been removed or fastened?: (!) No  Do you have non-slip mats or surfaces in all bathtubs/showers?: Yes  Do all of your stairways have a railing or banister?: Yes  Are your doorways, halls and stairs free of clutter?: Yes  Do you always fasten your seatbelt when you are in a car?: Yes  Safety Interventions:  · Caution against falling     Personalized Preventive Plan   Current Health Maintenance Status  Immunization History   Administered Date(s) Administered    COVID-19, Allen Peter, PF, 30mcg/0.3mL 02/12/2021, 03/05/2021    Influenza, High Dose (Fluzone 65 yrs and older) 11/14/2017, 09/26/2018    Influenza, Quadv, adjuvanted, 65 yrs +, IM, PF (Fluad) 10/12/2020    Influenza, Triv, inactivated, subunit, adjuvanted, IM (Fluad 65 yrs and older) 11/04/2019    Pneumococcal Conjugate 13-valent ([id number]) 11/14/2017    Pneumococcal Polysaccharide ([id number]) 11/15/2016    Tdap (Boostrix, Adacel) 06/07/2019    Zoster Recombinant (Shingrix) 09/24/2018, 01/24/2019        Health Maintenance   Topic Date Due    Annual Wellness Visit (AWV)  Never done    Colon cancer screen colonoscopy  03/31/2021    A1C test (Diabetic or Prediabetic)  05/07/2022    Lipid screen  05/07/2022    Potassium monitoring  05/07/2022    Creatinine monitoring  05/07/2022    DTaP/Tdap/Td vaccine (2 - Td) 06/07/2029    DEXA (modify frequency per FRAX score)  Completed    Flu vaccine  Completed    Shingles Vaccine  Completed    Pneumococcal 65+ years Vaccine  Completed    COVID-19 Vaccine  Completed    Hepatitis C screen  Completed    Hepatitis A vaccine  Aged Out    Hepatitis B vaccine  Aged Out    Hib vaccine  Aged Out    Meningococcal (ACWY) vaccine  Aged Out     Recommendations for Luminetx Due: see orders and patient instructions/AVS.  . Recommended screening schedule for the next 5-10 years is provided to the patient in written form: see Patient Instructions/AVS.    Zoie was seen today for medicare awv, hypertension and hyperlipidemia. Diagnoses and all orders for this visit:    Medicare annual wellness visit, subsequent    Paroxysmal atrial fibrillation ([facility] [address]  -     CBC; Future  -     Comprehensive Metabolic Panel; Future    Spinal stenosis of lumbar region, unspecified whether neurogenic claudication present  -     External Referral To Physical Therapy    Obstructive sleep apnea    Primary osteoarthritis involving multiple joints    Incidental lung nodule, greater than or equal to 8mm  -     CT CHEST WO CONTRAST; Future    Chronic midline thoracic back pain  -     External Referral To Physical Therapy    Impaired fasting glucose  -     Vitamin D 25 Hydroxy; Future    Pure hypercholesterolemia  -     TSH without Reflex; Future  -     Lipid Panel; Future    Vitamin D deficiency  -     Vitamin D 25 Hydroxy;  Future

## 2021-05-14 NOTE — PATIENT INSTRUCTIONS
Personalized Preventive Plan for Ti Dobson - 5/13/2021  Medicare offers a range of preventive health benefits. Some of the tests and screenings are paid in full while other may be subject to a deductible, co-insurance, and/or copay. Some of these benefits include a comprehensive review of your medical history including lifestyle, illnesses that may run in your family, and various assessments and screenings as appropriate. After reviewing your medical record and screening and assessments performed today your provider may have ordered immunizations, labs, imaging, and/or referrals for you. A list of these orders (if applicable) as well as your Preventive Care list are included within your After Visit Summary for your review. Other Preventive Recommendations:    · A preventive eye exam performed by an eye specialist is recommended every 1-2 years to screen for glaucoma; cataracts, macular degeneration, and other eye disorders. · A preventive dental visit is recommended every 6 months. · Try to get at least 150 minutes of exercise per week or 10,000 steps per day on a pedometer . · Order or download the FREE \"Exercise & Physical Activity: Your Everyday Guide\" from [employer]. Call 2-631.696.3143 or search [employer] online. · You need 6039-6134 mg of calcium and 9431-1396 IU of vitamin D per day. It is possible to meet your calcium requirement with diet alone, but a vitamin D supplement is usually necessary to meet this goal.  · When exposed to the sun, use a sunscreen that protects against both UVA and UVB radiation with an SPF of 30 or greater. Reapply every 2 to 3 hours or after sweating, drying off with a towel, or swimming. · Always wear a seat belt when traveling in a car. Always wear a helmet when riding a bicycle or motorcycle.

## 2021-05-18 ENCOUNTER — HOSPITAL ENCOUNTER (OUTPATIENT)
Facility: HOSPITAL | Age: 76
Setting detail: HOSPITAL OUTPATIENT SURGERY
Discharge: HOME | End: 2021-05-18
Attending: INTERNAL MEDICINE | Admitting: INTERNAL MEDICINE

## 2021-05-18 ENCOUNTER — ANESTHESIA EVENT (OUTPATIENT)
Dept: GASTROENTEROLOGY | Facility: HOSPITAL | Age: 76
End: 2021-05-18

## 2021-05-18 ENCOUNTER — ANESTHESIA (OUTPATIENT)
Dept: GASTROENTEROLOGY | Facility: HOSPITAL | Age: 76
End: 2021-05-18

## 2021-05-18 VITALS
HEART RATE: 86 BPM | HEIGHT: 66 IN | OXYGEN SATURATION: 96 % | SYSTOLIC BLOOD PRESSURE: 160 MMHG | BODY MASS INDEX: 35.81 KG/M2 | DIASTOLIC BLOOD PRESSURE: 79 MMHG | WEIGHT: 222.8 LBS | RESPIRATION RATE: 21 BRPM | TEMPERATURE: 97.1 F

## 2021-05-18 DIAGNOSIS — Z86.010 HX OF ADENOMATOUS COLONIC POLYPS: ICD-10-CM

## 2021-05-18 PROCEDURE — 25010000002 PROPOFOL 10 MG/ML EMULSION: Performed by: NURSE ANESTHETIST, CERTIFIED REGISTERED

## 2021-05-18 PROCEDURE — 45385 COLONOSCOPY W/LESION REMOVAL: CPT | Performed by: INTERNAL MEDICINE

## 2021-05-18 PROCEDURE — 88305 TISSUE EXAM BY PATHOLOGIST: CPT | Performed by: INTERNAL MEDICINE

## 2021-05-18 RX ORDER — METOPROLOL TARTRATE 5 MG/5ML
INJECTION INTRAVENOUS AS NEEDED
Status: DISCONTINUED | OUTPATIENT
Start: 2021-05-18 | End: 2021-05-18 | Stop reason: SURG

## 2021-05-18 RX ORDER — SODIUM CHLORIDE 0.9 % (FLUSH) 0.9 %
10 SYRINGE (ML) INJECTION AS NEEDED
Status: DISCONTINUED | OUTPATIENT
Start: 2021-05-18 | End: 2021-05-18 | Stop reason: HOSPADM

## 2021-05-18 RX ORDER — PROPOFOL 10 MG/ML
VIAL (ML) INTRAVENOUS AS NEEDED
Status: DISCONTINUED | OUTPATIENT
Start: 2021-05-18 | End: 2021-05-18 | Stop reason: SURG

## 2021-05-18 RX ORDER — SODIUM CHLORIDE 9 MG/ML
500 INJECTION, SOLUTION INTRAVENOUS CONTINUOUS PRN
Status: DISCONTINUED | OUTPATIENT
Start: 2021-05-18 | End: 2021-05-18 | Stop reason: HOSPADM

## 2021-05-18 RX ORDER — LIDOCAINE HYDROCHLORIDE 20 MG/ML
INJECTION, SOLUTION EPIDURAL; INFILTRATION; INTRACAUDAL; PERINEURAL AS NEEDED
Status: DISCONTINUED | OUTPATIENT
Start: 2021-05-18 | End: 2021-05-18 | Stop reason: SURG

## 2021-05-18 RX ADMIN — PROPOFOL 50 MG: 10 INJECTION, EMULSION INTRAVENOUS at 09:39

## 2021-05-18 RX ADMIN — PROPOFOL 100 MG: 10 INJECTION, EMULSION INTRAVENOUS at 09:34

## 2021-05-18 RX ADMIN — PROPOFOL 50 MG: 10 INJECTION, EMULSION INTRAVENOUS at 09:49

## 2021-05-18 RX ADMIN — PROPOFOL 50 MG: 10 INJECTION, EMULSION INTRAVENOUS at 09:46

## 2021-05-18 RX ADMIN — LIDOCAINE HYDROCHLORIDE 100 MG: 20 INJECTION, SOLUTION EPIDURAL; INFILTRATION; INTRACAUDAL; PERINEURAL at 09:34

## 2021-05-18 RX ADMIN — PROPOFOL 50 MG: 10 INJECTION, EMULSION INTRAVENOUS at 09:44

## 2021-05-18 RX ADMIN — PROPOFOL 50 MG: 10 INJECTION, EMULSION INTRAVENOUS at 09:36

## 2021-05-18 RX ADMIN — SODIUM CHLORIDE 500 ML: 9 INJECTION, SOLUTION INTRAVENOUS at 07:29

## 2021-05-18 RX ADMIN — METOPROLOL TARTRATE 3 MG: 5 INJECTION INTRAVENOUS at 09:25

## 2021-05-18 RX ADMIN — METOPROLOL TARTRATE 2 MG: 5 INJECTION INTRAVENOUS at 08:45

## 2021-05-18 RX ADMIN — PROPOFOL 50 MG: 10 INJECTION, EMULSION INTRAVENOUS at 09:41

## 2021-05-18 NOTE — ANESTHESIA POSTPROCEDURE EVALUATION
Patient: Anne-Marie Hayes    Procedure Summary     Date: 05/18/21 Room / Location: [facility] ENDOSCOPY 2 / [facility] ENDOSCOPY    Anesthesia Start: 0928 Anesthesia Stop: 0952    Procedure: COLONOSCOPY WITH ANESTHESIA (N/A ) Diagnosis:       Hx of adenomatous colonic polyps      (Hx of adenomatous colonic polyps [Z86.010])    Surgeons: Delio Leslie MD Provider: FATOUMATA Light CRNA    Anesthesia Type: MAC ASA Status: 2          Anesthesia Type: MAC    Vitals  Vitals Value Taken Time   BP     Temp     Pulse 73 05/18/21 0954   Resp     SpO2 98 % 05/18/21 0954   Vitals shown include unvalidated device data.        Post Anesthesia Care and Evaluation    Patient location during evaluation: PACU  Patient participation: complete - patient participated  Level of consciousness: awake and alert  Pain score: 0  Pain management: adequate  Airway patency: patent  Anesthetic complications: No anesthetic complications    Cardiovascular status: acceptable and stable  Respiratory status: acceptable and unassisted  Hydration status: acceptable

## 2021-05-18 NOTE — ANESTHESIA PREPROCEDURE EVALUATION
Anesthesia Evaluation     no history of anesthetic complications:  NPO Solid Status: > 8 hours  NPO Liquid Status: > 2 hours           Airway   Mallampati: II  TM distance: >3 FB  Neck ROM: full  No difficulty expected  Dental          Pulmonary    (+) a smoker Former, sleep apnea on CPAP,   Cardiovascular   Exercise tolerance: poor (<4 METS)    (+) hypertension, dysrhythmias Atrial Fib, Atrial Flutter, hyperlipidemia,       Neuro/Psych  (-) seizures, TIA, CVA  GI/Hepatic/Renal/Endo    (+) obesity,     (-) liver disease, no renal disease, diabetes    Musculoskeletal     Abdominal    Substance History      OB/GYN          Other   arthritis,                      Anesthesia Plan    ASA 2     MAC     intravenous induction     Anesthetic plan, all risks, benefits, and alternatives have been provided, discussed and informed consent has been obtained with: patient.

## 2021-05-19 LAB
LAB AP CASE REPORT: NORMAL
LAB AP CLINICAL INFORMATION: NORMAL
PATH REPORT.FINAL DX SPEC: NORMAL
PATH REPORT.GROSS SPEC: NORMAL

## 2021-05-21 ENCOUNTER — HOSPITAL ENCOUNTER (OUTPATIENT)
Dept: CT IMAGING | Age: 76
Discharge: HOME | End: 2021-05-21
Payer: MEDICARE

## 2021-05-21 DIAGNOSIS — R91.1 INCIDENTAL LUNG NODULE, GREATER THAN OR EQUAL TO 8MM: ICD-10-CM

## 2021-05-21 PROCEDURE — 71250 CT THORAX DX C-: CPT

## 2021-06-03 RX ORDER — DILTIAZEM HYDROCHLORIDE 240 MG/1
CAPSULE, COATED, EXTENDED RELEASE ORAL
Qty: 90 CAPSULE | Refills: 3 | Status: SHIPPED | OUTPATIENT
Start: 2021-06-03 | End: 2022-06-01 | Stop reason: HOSPADM

## 2021-06-16 DIAGNOSIS — E04.2 MULTIPLE THYROID NODULES: Primary | ICD-10-CM

## 2021-06-24 ENCOUNTER — HOSPITAL ENCOUNTER (OUTPATIENT)
Dept: ULTRASOUND IMAGING | Age: 76
Discharge: HOME | End: 2021-06-24
Payer: MEDICARE

## 2021-06-24 DIAGNOSIS — E04.2 MULTIPLE THYROID NODULES: ICD-10-CM

## 2021-06-24 PROCEDURE — 76536 US EXAM OF HEAD AND NECK: CPT

## 2021-07-06 ENCOUNTER — TELEPHONE (OUTPATIENT)
Dept: INTERNAL MEDICINE | Age: 76
End: 2021-07-06

## 2021-07-06 NOTE — TELEPHONE ENCOUNTER
She would like to know if anything needs to be done due to nodules noted on thyroid ultrasound? Also her cpap has been recalled, but she has to use it until they can send her another one, which may take several months. The foam deteriorates and may become a carcinogen. She has not seen any black dots in the water chamber like they told her to look for.

## 2021-08-10 ENCOUNTER — OFFICE VISIT (OUTPATIENT)
Dept: CARDIOLOGY | Facility: CLINIC | Age: 76
End: 2021-08-10

## 2021-08-10 VITALS
WEIGHT: 229 LBS | DIASTOLIC BLOOD PRESSURE: 78 MMHG | BODY MASS INDEX: 36.8 KG/M2 | HEIGHT: 66 IN | SYSTOLIC BLOOD PRESSURE: 154 MMHG | OXYGEN SATURATION: 99 % | HEART RATE: 70 BPM

## 2021-08-10 DIAGNOSIS — E78.2 MIXED HYPERLIPIDEMIA: ICD-10-CM

## 2021-08-10 DIAGNOSIS — Z99.89 OSA ON CPAP: ICD-10-CM

## 2021-08-10 DIAGNOSIS — E66.09 CLASS 2 OBESITY DUE TO EXCESS CALORIES WITHOUT SERIOUS COMORBIDITY WITH BODY MASS INDEX (BMI) OF 37.0 TO 37.9 IN ADULT: ICD-10-CM

## 2021-08-10 DIAGNOSIS — I10 ESSENTIAL HYPERTENSION: ICD-10-CM

## 2021-08-10 DIAGNOSIS — G47.33 OSA ON CPAP: ICD-10-CM

## 2021-08-10 DIAGNOSIS — Z79.01 CHRONIC ANTICOAGULATION: ICD-10-CM

## 2021-08-10 DIAGNOSIS — I48.0 PAF (PAROXYSMAL ATRIAL FIBRILLATION) (HCC): Primary | ICD-10-CM

## 2021-08-10 PROCEDURE — 99214 OFFICE O/P EST MOD 30 MIN: CPT | Performed by: NURSE PRACTITIONER

## 2021-08-10 PROCEDURE — 93000 ELECTROCARDIOGRAM COMPLETE: CPT | Performed by: NURSE PRACTITIONER

## 2021-08-10 NOTE — PROGRESS NOTES
Subjective:     Encounter Date: 08/10/2021      Patient ID: Anne-Marie Hayes is a 76 y.o. female with a history of paroxysmal atrial fibrillation s/p CV 4/2020, chronic anticoagulation on Eliquis, hypertension, hyperlipidemia and obesity.     Chief Complaint: 6 month follow up  Atrial Fibrillation  Presents for follow-up visit. Symptoms are negative for bradycardia, chest pain, dizziness, hemodynamic instability, hypertension, hypotension, palpitations, shortness of breath, syncope, tachycardia and weakness. The symptoms have been stable. Past medical history includes atrial fibrillation and hyperlipidemia. There are no medication compliance problems.   Hypertension  This is a chronic problem. The current episode started more than 1 year ago. Condition status: patient reports better control. Pertinent negatives include no anxiety, chest pain, malaise/fatigue, orthopnea, palpitations, peripheral edema, PND or shortness of breath. Risk factors for coronary artery disease include dyslipidemia and obesity. Current antihypertension treatment includes angiotensin blockers, beta blockers and calcium channel blockers. There are no compliance problems.  Identifiable causes of hypertension include sleep apnea.   Hyperlipidemia  This is a chronic problem. The current episode started more than 1 year ago. The problem is controlled. Exacerbating diseases include obesity. Pertinent negatives include no chest pain or shortness of breath. Current antihyperlipidemic treatment includes statins. There are no compliance problems.  Risk factors for coronary artery disease include dyslipidemia, hypertension and obesity.     Patient presents today for 6 month routine follow up of atrial fibrillation. She reports that she has been doing well. She had an episode in March where she felt herself go into atrial fibrillation [place] she states that she felt like it was out until she went to bed and then  "when she woke up it was normal. She reports that she has noticed a decrease in the amount of \"flutters\" that she feels. She states that she has some chronic dyspnea on exertion that is unchanged. She denies any chest pain. She reports that her CPap machine has been recalled and she is not getting any information on when/how they plan to fix it. She reports checking her blood pressure periodically and it is usually running 130-140/70-80. Patient is on Eliquis and denies any bleeding issues. Patient follows with Dr Culver as PCP.    Previous Cardiac Testing and Procedures:  - ROBEL (8/13/2018) Normal left ventricular systolic function, mild MR, mild to moderate TR, no evidence of left atrial appendage thrombus.  - Cardioversion (8/13/2018) Successful cardioversion from atrial fibrillation to sinus rhythm  - Cardioversion (4/20/2020) successful cardioversion from atrial fibrillation to sinus rhythm  - Lipid panel (1/5/2020) total cholesterol 133, HDL 53, LDL 54, triglycerides 132    The following portions of the patient's history were reviewed and updated as appropriate: allergies, current medications, past family history, past medical history, past social history, past surgical history and problem list.    No Known Allergies    Current Outpatient Medications:   •  apixaban (ELIQUIS) 5 MG tablet tablet, Take 1 tablet by mouth Every 12 (Twelve) Hours., Disp: 60 tablet, Rfl: 0  •  bumetanide (BUMEX) 1 MG tablet, Take 1 mg by mouth Daily., Disp: , Rfl:   •  clobetasol (TEMOVATE) 0.05 % ointment, Apply  topically to the appropriate area as directed As Needed., Disp: , Rfl:   •  CloNIDine (CATAPRES) 0.1 MG tablet, Take 0.1 mg by mouth 2 (Two) Times a Day., Disp: , Rfl:   •  dilTIAZem CD (CARDIZEM CD) 240 MG 24 hr capsule, TAKE 1 CAPSULE DAILY, Disp: 90 capsule, Rfl: 3  •  famciclovir (FAMVIR) 500 MG tablet, Take 1,500 mg by mouth As Needed., Disp: , Rfl:   •  flecainide (TAMBOCOR) 50 MG tablet, Take 1 tablet by mouth 2 (Two) " Times a Day., Disp: 10 tablet, Rfl: 0  •  fluticasone (FLONASE) 50 MCG/ACT nasal spray, 2 sprays into the nostril(s) as directed by provider Daily As Needed for Rhinitis or Allergies., Disp: , Rfl:   •  KLOR-CON 10 MEQ CR tablet, Take 10 mEq by mouth 2 (Two) Times a Day., Disp: , Rfl:   •  labetalol (NORMODYNE) 300 MG tablet, Take 300 mg by mouth Every 12 (Twelve) Hours., Disp: , Rfl:   •  losartan (COZAAR) 100 MG tablet, Take 1 tablet by mouth Daily., Disp: 90 tablet, Rfl: 3  •  Omega-3 Fatty Acids (FISH OIL) 1000 MG capsule capsule, Take 1,000 mg by mouth Daily With Breakfast., Disp: , Rfl:   •  rosuvastatin (CRESTOR) 10 MG tablet, Take 1 tablet by mouth Daily., Disp: , Rfl:   Past Medical History:   Diagnosis Date   • Arthritis    • Atrial fibrillation (CMS/HCC)    • Hyperlipidemia    • Hypertension      Social History     Socioeconomic History   • Marital status:      Spouse name: Not on file   • Number of children: Not on file   • Years of education: Not on file   • Highest education level: Not on file   Tobacco Use   • Smoking status: Former Smoker     Years: 20.00   • Smokeless tobacco: Never Used   Vaping Use   • Vaping Use: Never used   Substance and Sexual Activity   • Alcohol use: Yes     Comment: occ   • Drug use: No   • Sexual activity: Defer       Review of Systems   Constitutional: Negative for malaise/fatigue.   Cardiovascular: Positive for dyspnea on exertion (chronic and unchanged). Negative for chest pain, irregular heartbeat, leg swelling, near-syncope, orthopnea, palpitations, paroxysmal nocturnal dyspnea and syncope.   Respiratory: Negative for shortness of breath.    Neurological: Negative for dizziness and weakness.   All other systems reviewed and are negative.         Objective:     Vitals reviewed.   Constitutional:       General: Not in acute distress.     Appearance: Normal appearance. Well-developed. Morbidly obese.   Eyes:      Pupils: Pupils are equal, round, and reactive to  "light.   HENT:      Head: Normocephalic and atraumatic.      Nose: Nose normal.   Neck:      Vascular: No carotid bruit.   Pulmonary:      Effort: Pulmonary effort is normal. No respiratory distress.      Breath sounds: Normal breath sounds. No wheezing. No rales.   Cardiovascular:      Normal rate. Regular rhythm.      Murmurs: There is no murmur.   Edema:     Peripheral edema absent.   Abdominal:      General: There is no distension.      Palpations: Abdomen is soft.   Musculoskeletal: Normal range of motion.      Cervical back: Normal range of motion and neck supple. Skin:     General: Skin is warm.      Findings: No erythema or rash.   Neurological:      General: No focal deficit present.      Mental Status: Alert and oriented to person, place, and time.   Psychiatric:         Attention and Perception: Attention normal.         Mood and Affect: Mood normal.         Speech: Speech normal.         Behavior: Behavior normal.         Thought Content: Thought content normal.         Judgment: Judgment normal.         /78   Pulse 70   Ht 167.6 cm (65.98\")   Wt 104 kg (229 lb)   SpO2 99%   BMI 36.98 kg/m²       ECG 12 Lead    Date/Time: 8/10/2021 8:54 AM  Performed by: Steven Elizondo APRN  Authorized by: Steven Elizondo APRN   Comparison: compared with previous ECG from 1/22/2021  Similar to previous ECG  Rhythm: sinus rhythm  Rate: normal  BPM: 62  Other findings: T wave abnormality            Lab Review:     CV 4/2020:  Interpretation Summary  · The cardioversion was successful.  · Post cardioversion the patient displayed a sinus rhythm.        Lab Results   Component Value Date    CHOL 150 08/07/2018    CHLPL 122 (L) 05/07/2021    TRIG 143 05/07/2021    HDL 47 (L) 05/07/2021    LDL 46 05/07/2021     I have personally reviewed past office notes, and labs prior to patients office visit  Assessment:          Diagnosis Plan   1. PAF (paroxysmal atrial fibrillation) (CMS/HCC)     2. Chronic anticoagulation  "    3. Essential hypertension     4. Mixed hyperlipidemia     5. PRINCE on CPAP     6. Class 2 obesity due to excess calories without serious comorbidity with body mass index (BMI) of 37.0 to 37.9 in adult            Plan:       1. PAF: EKG today shows NSR rate of 62. Continue cardizem, flecainide and labetalol. Patient is on Eliquis and denies any bleeding issues.    2. Chronic anticoagulation: Patient is on Eliquis and denies any bleeding issues.    3. Hypertension: Elevated in office; she reports better control at home. Recommend her to monitor and if consistently running >140/90 to notify office.     4. Hyperlipidemia: LDL controlled at 54. Continue rosuvastatin    5. PRINCE on CPAP: wears CPap, working with company for repairing/replacing machine.    6. Patient's Body mass index is 36.98 kg/m². indicating that she is obese (BMI >30). Obesity-related health conditions include the following: obstructive sleep apnea, hypertension and dyslipidemias. Obesity is unchanged. BMI is is above average; BMI management plan is completed. We discussed portion control and increasing exercise..    I spent 35 minutes caring for Anne-Marie on this date of service. This time includes time spent by me in the following activities: preparing for the visit, reviewing tests, obtaining and/or reviewing a separately obtained history, performing a medically appropriate examination and/or evaluation and documenting information in the medical record    I spent 3 minutes on the separately reported service of EKG. This time is not included in the time used to support the E/M service also reported today.      Patient is to follow up in 6 months or sooner if needed

## 2021-09-13 ENCOUNTER — TELEPHONE (OUTPATIENT)
Dept: INTERNAL MEDICINE | Age: 76
End: 2021-09-13

## 2021-09-13 NOTE — TELEPHONE ENCOUNTER
Pt called requesting a New CPAP order due to hers having a recall. Cannot be [facility]. Patient would like to come and pick it up.

## 2021-09-13 NOTE — TELEPHONE ENCOUNTER
We can handwrite out for CPAP machine and supplies take it to a supply place and she can have them fax us if we need to change the order

## 2021-09-14 ENCOUNTER — NURSE ONLY (OUTPATIENT)
Dept: INTERNAL MEDICINE | Age: 76
End: 2021-09-14
Payer: MEDICARE

## 2021-09-14 DIAGNOSIS — Z23 NEED FOR VACCINATION: Primary | ICD-10-CM

## 2021-09-14 PROCEDURE — 90694 VACC AIIV4 NO PRSRV 0.5ML IM: CPT | Performed by: INTERNAL MEDICINE

## 2021-09-14 PROCEDURE — G0008 ADMIN INFLUENZA VIRUS VAC: HCPCS | Performed by: INTERNAL MEDICINE

## 2021-09-14 NOTE — PROGRESS NOTES
After obtaining consent, and per orders of Dr. Shaneka Landaverde, injection of Fluad given in Right deltoid by Sandi He MA. Patient instructed to remain in clinic for 20 minutes afterwards, and to report any adverse reaction to me immediately.

## 2021-11-08 DIAGNOSIS — E78.00 PURE HYPERCHOLESTEROLEMIA: ICD-10-CM

## 2021-11-08 DIAGNOSIS — I48.0 PAROXYSMAL ATRIAL FIBRILLATION (HCC): ICD-10-CM

## 2021-11-08 DIAGNOSIS — R73.01 IMPAIRED FASTING GLUCOSE: ICD-10-CM

## 2021-11-08 DIAGNOSIS — E55.9 VITAMIN D DEFICIENCY: ICD-10-CM

## 2021-11-08 LAB
ALBUMIN SERPL-MCNC: 4.5 G/DL (ref 3.5–5.2)
ALP BLD-CCNC: 105 U/L (ref 35–104)
ALT SERPL-CCNC: 14 U/L (ref 5–33)
ANION GAP SERPL CALCULATED.3IONS-SCNC: 13 MMOL/L (ref 7–19)
AST SERPL-CCNC: 12 U/L (ref 5–32)
BILIRUB SERPL-MCNC: 0.5 MG/DL (ref 0.2–1.2)
BUN BLDV-MCNC: 15 MG/DL (ref 8–23)
CALCIUM SERPL-MCNC: 10.3 MG/DL (ref 8.8–10.2)
CHLORIDE BLD-SCNC: 103 MMOL/L (ref 98–111)
CHOLESTEROL, TOTAL: 127 MG/DL (ref 160–199)
CO2: 25 MMOL/L (ref 22–29)
CREAT SERPL-MCNC: 0.8 MG/DL (ref 0.5–0.9)
GFR AFRICAN AMERICAN: >59
GFR NON-AFRICAN AMERICAN: >60
GLUCOSE BLD-MCNC: 116 MG/DL (ref 74–109)
HCT VFR BLD CALC: 43.8 % (ref 37–47)
HDLC SERPL-MCNC: 51 MG/DL (ref 65–121)
HEMOGLOBIN: 13.4 G/DL (ref 12–16)
LDL CHOLESTEROL CALCULATED: 51 MG/DL
MCH RBC QN AUTO: 28.6 PG (ref 27–31)
MCHC RBC AUTO-ENTMCNC: 30.6 G/DL (ref 33–37)
MCV RBC AUTO: 93.4 FL (ref 81–99)
PDW BLD-RTO: 13.2 % (ref 11.5–14.5)
PLATELET # BLD: 199 K/UL (ref 130–400)
PMV BLD AUTO: 11.6 FL (ref 9.4–12.3)
POTASSIUM SERPL-SCNC: 4.6 MMOL/L (ref 3.5–5)
RBC # BLD: 4.69 M/UL (ref 4.2–5.4)
SODIUM BLD-SCNC: 141 MMOL/L (ref 136–145)
TOTAL PROTEIN: 6.4 G/DL (ref 6.6–8.7)
TRIGL SERPL-MCNC: 126 MG/DL (ref 0–149)
TSH SERPL DL<=0.05 MIU/L-ACNC: 0.81 UIU/ML (ref 0.27–4.2)
VITAMIN D 25-HYDROXY: 37.1 NG/ML
WBC # BLD: 7.6 K/UL (ref 4.8–10.8)

## 2021-11-15 ENCOUNTER — OFFICE VISIT (OUTPATIENT)
Dept: INTERNAL MEDICINE | Age: 76
End: 2021-11-15
Payer: MEDICARE

## 2021-11-15 VITALS
SYSTOLIC BLOOD PRESSURE: 138 MMHG | BODY MASS INDEX: 36.26 KG/M2 | HEART RATE: 84 BPM | WEIGHT: 231 LBS | DIASTOLIC BLOOD PRESSURE: 76 MMHG | OXYGEN SATURATION: 97 % | HEIGHT: 67 IN

## 2021-11-15 DIAGNOSIS — E83.52 HYPERCALCEMIA: ICD-10-CM

## 2021-11-15 DIAGNOSIS — R73.01 IMPAIRED FASTING GLUCOSE: ICD-10-CM

## 2021-11-15 DIAGNOSIS — G47.33 OBSTRUCTIVE SLEEP APNEA: ICD-10-CM

## 2021-11-15 DIAGNOSIS — E21.3 HYPERPARATHYROIDISM (HCC): ICD-10-CM

## 2021-11-15 DIAGNOSIS — E78.00 PURE HYPERCHOLESTEROLEMIA: ICD-10-CM

## 2021-11-15 DIAGNOSIS — B00.1 COLD SORE: ICD-10-CM

## 2021-11-15 DIAGNOSIS — M15.9 PRIMARY OSTEOARTHRITIS INVOLVING MULTIPLE JOINTS: ICD-10-CM

## 2021-11-15 DIAGNOSIS — E55.9 VITAMIN D DEFICIENCY: ICD-10-CM

## 2021-11-15 DIAGNOSIS — I10 PRIMARY HYPERTENSION: Primary | ICD-10-CM

## 2021-11-15 DIAGNOSIS — I48.0 PAROXYSMAL ATRIAL FIBRILLATION (HCC): ICD-10-CM

## 2021-11-15 PROCEDURE — G8399 PT W/DXA RESULTS DOCUMENT: HCPCS | Performed by: INTERNAL MEDICINE

## 2021-11-15 PROCEDURE — G8417 CALC BMI ABV UP PARAM F/U: HCPCS | Performed by: INTERNAL MEDICINE

## 2021-11-15 PROCEDURE — 99214 OFFICE O/P EST MOD 30 MIN: CPT | Performed by: INTERNAL MEDICINE

## 2021-11-15 PROCEDURE — 1036F TOBACCO NON-USER: CPT | Performed by: INTERNAL MEDICINE

## 2021-11-15 PROCEDURE — 4040F PNEUMOC VAC/ADMIN/RCVD: CPT | Performed by: INTERNAL MEDICINE

## 2021-11-15 PROCEDURE — G8427 DOCREV CUR MEDS BY ELIG CLIN: HCPCS | Performed by: INTERNAL MEDICINE

## 2021-11-15 PROCEDURE — 1123F ACP DISCUSS/DSCN MKR DOCD: CPT | Performed by: INTERNAL MEDICINE

## 2021-11-15 PROCEDURE — G8484 FLU IMMUNIZE NO ADMIN: HCPCS | Performed by: INTERNAL MEDICINE

## 2021-11-15 PROCEDURE — 1090F PRES/ABSN URINE INCON ASSESS: CPT | Performed by: INTERNAL MEDICINE

## 2021-11-15 RX ORDER — FAMCICLOVIR 500 MG/1
TABLET, FILM COATED ORAL
Qty: 30 TABLET | Refills: 1 | Status: CANCELLED | OUTPATIENT
Start: 2021-11-15

## 2021-11-15 NOTE — PROGRESS NOTES
Comment: wine daily     Drug use: No    Sexual activity: Not on file   Other Topics Concern    Not on file   Social History Narrative    Not on file     Social Determinants of Health     Financial Resource Strain: Low Risk     Difficulty of Paying Living Expenses: Not very hard   Food Insecurity: No Food Insecurity    Worried About Running Out of Food in the Last Year: Never true    Claudette of Food in the Last Year: Never true   Transportation Needs:     Lack of Transportation (Medical): Not on file    Lack of Transportation (Non-Medical):  Not on file   Physical Activity:     Days of Exercise per Week: Not on file    Minutes of Exercise per Session: Not on file   Stress:     Feeling of Stress : Not on file   Social Connections:     Frequency of Communication with Friends and Family: Not on file    Frequency of Social Gatherings with Friends and Family: Not on file    Attends Buddhism Services: Not on file    Active Member of [address] [place] [address] or Organizations: Not on file    Attends Club or Organization Meetings: Not on file    Marital Status: Not on file   Intimate Partner Violence:     Fear of Current or Ex-Partner: Not on file    Emotionally Abused: Not on file    Physically Abused: Not on file    Sexually Abused: Not on file   Housing Stability:     Unable to Pay for Housing in the Last Year: Not on file    Number of [place] in the Last Year: Not on file    Unstable Housing in the Last Year: Not on file       No Known Allergies    Current Outpatient Medications   Medication Sig Dispense Refill    losartan (COZAAR) 100 MG tablet Take 1 tablet by mouth daily 90 tablet 3    flecainide (TAMBOCOR) 50 MG tablet Take 1 tablet by mouth 2 times daily 180 tablet 3    rosuvastatin (CRESTOR) 10 MG tablet TAKE 1 TABLET DAILY 90 tablet 4    apixaban (ELIQUIS) 5 MG TABS tablet Take 1 tablet by mouth 2 times daily 180 tablet 3    potassium chloride (KLOR-CON M) 10 MEQ extended release tablet Take 1 tablet by mouth 2 times daily 180 tablet 3    cloNIDine (CATAPRES) 0.1 MG tablet Take 1 tablet by mouth 2 times daily 180 tablet 3    labetalol (NORMODYNE) 300 MG tablet Take 1 tablet by mouth 2 times daily 180 tablet 3    CPAP Machine MISC by Does not apply route      bumetanide (BUMEX) 1 MG tablet Take 1 tablet by mouth daily 90 tablet 3    dilTIAZem (CARDIZEM CD) 360 MG extended release capsule Take 1 capsule by mouth daily 90 capsule 3    Multiple Vitamins-Minerals (PRESERVISION AREDS 2+MULTI VIT PO) Take by mouth Indications: bid      clobetasol (TEMOVATE) 0.05 % ointment Apply topically 2 times daily as needed Apply topically 2 times daily.  FISH OIL Take 1,000 mg by mouth daily       fluticasone (FLONASE) 50 MCG/ACT nasal spray USE 2 SPRAYS IN EACH NOSTRIL DAILY AS NEEDED FOR RHINITIS OR ALLERGIES 48 g 3    famciclovir (FAMVIR) 500 MG tablet TAKE 3 TABLETS DAILY AS NEEDED FOR COLD SORES 90 tablet 0     No current facility-administered medications for this visit. Review of Systems    /76   Pulse 84   Ht 5' 7\" (1.702 m)   Wt 231 lb (104.8 kg)   SpO2 97%   BMI 36.18 kg/m²   BP Readings from Last 7 Encounters:   11/15/21 138/76   05/13/21 130/70   01/13/21 (!) 146/80   05/07/20 130/82   11/04/19 124/66   07/23/19 (!) 164/79   07/22/19 105/61     Wt Readings from Last 7 Encounters:   11/15/21 231 lb (104.8 kg)   05/13/21 227 lb (103 kg)   05/07/20 220 lb (99.8 kg)   12/19/19 229 lb (103.9 kg)   11/04/19 226 lb (102.5 kg)   07/22/19 222 lb (100.7 kg)   07/15/19 222 lb (100.7 kg)     BMI Readings from Last 7 Encounters:   11/15/21 36.18 kg/m²   05/13/21 35.55 kg/m²   05/07/20 34.46 kg/m²   12/19/19 35.87 kg/m²   11/04/19 35.40 kg/m²   07/22/19 34.77 kg/m²   07/15/19 34.77 kg/m²     Resp Readings from Last 7 Encounters:   07/23/19 16   07/22/19 22   02/08/18 18   12/20/17 18   11/22/16 18       Physical Exam  Constitutional:       General: She is not in acute distress.   Eyes: General: No scleral icterus. Cardiovascular:      Heart sounds: Normal heart sounds. Pulmonary:      Breath sounds: Normal breath sounds. Musculoskeletal:      Cervical back: Neck supple. Right lower leg: Edema present. Left lower leg: Edema present. Comments: Trace edema and arthritis changes   Lymphadenopathy:      Cervical: No cervical adenopathy. Skin:     Findings: No rash. Results for orders placed or performed in visit on 11/08/21   Vitamin D 25 Hydroxy   Result Value Ref Range    Vit D, 25-Hydroxy 37.1 >=30 ng/mL   Lipid Panel   Result Value Ref Range    Cholesterol, Total 127 (L) 160 - 199 mg/dL    Triglycerides 126 0 - 149 mg/dL    HDL 51 (L) 65 - 121 mg/dL    LDL Calculated 51 <100 mg/dL   TSH without Reflex   Result Value Ref Range    TSH 0.809 0.270 - 4.200 uIU/mL   Comprehensive Metabolic Panel   Result Value Ref Range    Sodium 141 136 - 145 mmol/L    Potassium 4.6 3.5 - 5.0 mmol/L    Chloride 103 98 - 111 mmol/L    CO2 25 22 - 29 mmol/L    Anion Gap 13 7 - 19 mmol/L    Glucose 116 (H) 74 - 109 mg/dL    BUN 15 8 - 23 mg/dL    CREATININE 0.8 0.5 - 0.9 mg/dL    GFR Non-African American >60 >60    GFR African American >59 >59    Calcium 10.3 (H) 8.8 - 10.2 mg/dL    Total Protein 6.4 (L) 6.6 - 8.7 g/dL    Albumin 4.5 3.5 - 5.2 g/dL    Total Bilirubin 0.5 0.2 - 1.2 mg/dL    Alkaline Phosphatase 105 (H) 35 - 104 U/L    ALT 14 5 - 33 U/L    AST 12 5 - 32 U/L   CBC   Result Value Ref Range    WBC 7.6 4.8 - 10.8 K/uL    RBC 4.69 4.20 - 5.40 M/uL    Hemoglobin 13.4 12.0 - 16.0 g/dL    Hematocrit 43.8 37.0 - 47.0 %    MCV 93.4 81.0 - 99.0 fL    MCH 28.6 27.0 - 31.0 pg    MCHC 30.6 (L) 33.0 - 37.0 g/dL    RDW 13.2 11.5 - 14.5 %    Platelets 240 500 - 256 K/uL    MPV 11.6 9.4 - 12.3 fL       ASSESSMENT/ PLAN:  1. Primary hypertension  Continue current bp mediactions- f/u closely     2. Cold sore  Prn famvir    3.  Paroxysmal atrial fibrillation (HCC)  eliquis and rate controlled - tambocor     4. Obstructive sleep apnea  Pt's cpap recalled and lots difficulty getting replaced - we will see if can find out any info when available    5. Primary osteoarthritis involving multiple joints  Stable right now with her arthritis follows with orthopedics    6. Pure hypercholesterolemia  Continue statin therapy with Crestor    7. Hyperparathyroidism ([address]  Serum PTH stable follow    8. Vitamin D deficiency check lab follow replace as needed    9. Impaired fasting glucose healthy diet exercise low-carb intake and follow check hemoglobin A1c next visit these labs are reconciled and reviewed and discussed    10. Hypercalcemia follow parathyroid hormone and calcium closely    Chart, medications, labs, vaccines reviewed. Keep up to date with routine care and follow up. Call with any problems or complaints. Keep up to date with routine screening recomendations and vaccines.   CBC CMP lipid TSH parathyroid hormone vitamin D ordered for next visit

## 2021-11-16 DIAGNOSIS — B00.1 COLD SORE: ICD-10-CM

## 2021-11-16 RX ORDER — FAMCICLOVIR 500 MG/1
TABLET, FILM COATED ORAL
Qty: 90 TABLET | Refills: 0 | Status: CANCELLED | OUTPATIENT
Start: 2021-11-16

## 2021-11-18 DIAGNOSIS — B00.1 COLD SORE: ICD-10-CM

## 2021-11-18 RX ORDER — FAMCICLOVIR 500 MG/1
TABLET, FILM COATED ORAL
Qty: 90 TABLET | Refills: 0 | Status: SHIPPED | OUTPATIENT
Start: 2021-11-18

## 2021-11-25 DIAGNOSIS — J30.9 ALLERGIC RHINITIS, UNSPECIFIED SEASONALITY, UNSPECIFIED TRIGGER: ICD-10-CM

## 2021-11-27 NOTE — TELEPHONE ENCOUNTER
Isabelle Clifford called to request a refill on her medication.       Last office visit : 11/15/2021   Next office visit : 5/24/2022     Requested Prescriptions     Pending Prescriptions Disp Refills    fluticasone (FLONASE) 50 MCG/ACT nasal spray [Pharmacy Med Name: FLUTICASONE PROP NASAL SPRAY 16GM 50MCG] 48 g 3     Sig: USE 2 SPRAYS IN EACH NOSTRIL DAILY AS NEEDED FOR RHINITIS OR ALLERGIES            Kathy Andres

## 2021-11-28 RX ORDER — FLUTICASONE PROPIONATE 50 MCG
SPRAY, SUSPENSION (ML) NASAL
Qty: 48 G | Refills: 3 | Status: SHIPPED | OUTPATIENT
Start: 2021-11-28

## 2021-12-02 DIAGNOSIS — I10 ESSENTIAL HYPERTENSION: ICD-10-CM

## 2021-12-02 DIAGNOSIS — I48.0 PAROXYSMAL ATRIAL FIBRILLATION (HCC): ICD-10-CM

## 2021-12-02 RX ORDER — CLONIDINE HYDROCHLORIDE 0.1 MG/1
0.1 TABLET ORAL 2 TIMES DAILY
Qty: 180 TABLET | Refills: 3 | Status: SHIPPED | OUTPATIENT
Start: 2021-12-02

## 2021-12-02 RX ORDER — LABETALOL 300 MG/1
300 TABLET, FILM COATED ORAL 2 TIMES DAILY
Qty: 180 TABLET | Refills: 3 | Status: SHIPPED | OUTPATIENT
Start: 2021-12-02 | End: 2022-06-09

## 2021-12-02 RX ORDER — BUMETANIDE 1 MG/1
1 TABLET ORAL DAILY
Qty: 90 TABLET | Refills: 3 | Status: SHIPPED | OUTPATIENT
Start: 2021-12-02 | End: 2022-06-09

## 2021-12-02 RX ORDER — POTASSIUM CHLORIDE 750 MG/1
10 TABLET, EXTENDED RELEASE ORAL 2 TIMES DAILY
Qty: 180 TABLET | Refills: 3 | Status: SHIPPED | OUTPATIENT
Start: 2021-12-02

## 2021-12-02 NOTE — TELEPHONE ENCOUNTER
Daron East called to request a refill on her medication.       Last office visit : 11/15/2021   Next office visit : 5/24/2022     Requested Prescriptions     Pending Prescriptions Disp Refills    bumetanide (BUMEX) 1 MG tablet 90 tablet 3     Sig: Take 1 tablet by mouth daily    apixaban (ELIQUIS) 5 MG TABS tablet 180 tablet 3     Sig: Take 1 tablet by mouth 2 times daily    potassium chloride (KLOR-CON M) 10 MEQ extended release tablet 180 tablet 3     Sig: Take 1 tablet by mouth 2 times daily    cloNIDine (CATAPRES) 0.1 MG tablet 180 tablet 3     Sig: Take 1 tablet by mouth 2 times daily    labetalol (NORMODYNE) 300 MG tablet 180 tablet 3     Sig: Take 1 tablet by mouth 2 times daily            Yuliana Loza

## 2022-02-14 ENCOUNTER — TELEPHONE (OUTPATIENT)
Dept: INTERNAL MEDICINE | Age: 77
End: 2022-02-14

## 2022-02-14 ENCOUNTER — NURSE TRIAGE (OUTPATIENT)
Dept: OTHER | Facility: CLINIC | Age: 77
End: 2022-02-14

## 2022-02-14 DIAGNOSIS — R13.12 OROPHARYNGEAL DYSPHAGIA: ICD-10-CM

## 2022-02-14 DIAGNOSIS — E01.0 THYROMEGALY: Primary | ICD-10-CM

## 2022-02-14 NOTE — TELEPHONE ENCOUNTER
She had spoke with Dr. Jarrett Rodriguez about her thyroid US back in June or July and Dr. Jarrett Rodriguez told her that she needed to have it repeated in December and she never got a call about that. She is now having trouble swallowing and would like to have this done.

## 2022-02-14 NOTE — TELEPHONE ENCOUNTER
I just put in the order I see her on the 17th if there is any way we can get the thyroid ultrasound prior to that so I could go over it with her at that appointment .

## 2022-02-14 NOTE — TELEPHONE ENCOUNTER
Received call from Chuckie Begum at [facility] [employer]  PORTER with [employer] Complaint. Subjective: Caller states \"feels like a lump in her throat, difficulty swallowing at times\"     Current Symptoms: difficulty swallowing    Onset: 3 months ago; gradual, worsening    Associated Symptoms: NA    Pain Severity: 0/10; N/A; none    Temperature: none    What has been tried: nothing    LMP: NA Pregnant: NA    Recommended disposition: to be seen in next 3 days. Needs U/S scheduled states never received a call to schedule and was to be done last 11/2021. Care advice provided, patient verbalizes understanding; denies any other questions or concerns; instructed to call back for any new or worsening symptoms. Writer provided warm transfer to Luis Alberto Galindo at [facility] AND [employer]  PORTER for appointment scheduling     Attention Provider: Thank you for allowing me to participate in the care of your patient. The patient was connected to triage in response to information provided to the E[facility]/PSC. Please do not respond through this encounter as the response is not directed to a shared pool.           Reason for Disposition   Patient wants to be seen    Protocols used: SWALLOWING DIFFICULTY-ADULT-[place]

## 2022-02-14 NOTE — TELEPHONE ENCOUNTER
Thanks - I saw Next apt 2/17 and assumed me but now I see Jennifer Chilel just call her with report when back

## 2022-02-15 ENCOUNTER — HOSPITAL ENCOUNTER (OUTPATIENT)
Dept: ULTRASOUND IMAGING | Age: 77
Discharge: HOME | End: 2022-02-15
Payer: MEDICARE

## 2022-02-15 DIAGNOSIS — E01.0 THYROMEGALY: ICD-10-CM

## 2022-02-15 DIAGNOSIS — R13.12 OROPHARYNGEAL DYSPHAGIA: ICD-10-CM

## 2022-02-15 PROCEDURE — 76536 US EXAM OF HEAD AND NECK: CPT

## 2022-02-16 ENCOUNTER — TELEPHONE (OUTPATIENT)
Dept: INTERNAL MEDICINE | Age: 77
End: 2022-02-16

## 2022-02-16 DIAGNOSIS — E01.0 THYROMEGALY: Primary | ICD-10-CM

## 2022-02-16 NOTE — TELEPHONE ENCOUNTER
----- Message from Jesus Foster MD sent at 2/16/2022 12:46 PM CST -----  She has some assymetrical enlargement of the thyroid the right side is larger than the left the right side is similar to the last ultrasound the left is maybe a little larger. They do not see a discrete mass or nodule.   I think it would be helpful to see ENT either Dr. Sierra Marcos or Debby Sloan at [facility] or Dr. Madai Young at [facility] let me know who she wants to see and I can place a referral

## 2022-02-17 ENCOUNTER — HOSPITAL ENCOUNTER (OUTPATIENT)
Dept: WOMENS IMAGING | Age: 77
Discharge: HOME | End: 2022-02-17
Payer: MEDICARE

## 2022-02-17 ENCOUNTER — OFFICE VISIT (OUTPATIENT)
Dept: SURGERY | Age: 77
End: 2022-02-17
Payer: MEDICARE

## 2022-02-17 VITALS — TEMPERATURE: 97.9 F | WEIGHT: 239 LBS | HEIGHT: 66 IN | BODY MASS INDEX: 38.41 KG/M2

## 2022-02-17 DIAGNOSIS — Z12.31 VISIT FOR SCREENING MAMMOGRAM: Primary | ICD-10-CM

## 2022-02-17 DIAGNOSIS — Z12.31 VISIT FOR SCREENING MAMMOGRAM: ICD-10-CM

## 2022-02-17 PROCEDURE — 1036F TOBACCO NON-USER: CPT | Performed by: PHYSICIAN ASSISTANT

## 2022-02-17 PROCEDURE — G8427 DOCREV CUR MEDS BY ELIG CLIN: HCPCS | Performed by: PHYSICIAN ASSISTANT

## 2022-02-17 PROCEDURE — 77063 BREAST TOMOSYNTHESIS BI: CPT

## 2022-02-17 PROCEDURE — G8399 PT W/DXA RESULTS DOCUMENT: HCPCS | Performed by: PHYSICIAN ASSISTANT

## 2022-02-17 PROCEDURE — 1123F ACP DISCUSS/DSCN MKR DOCD: CPT | Performed by: PHYSICIAN ASSISTANT

## 2022-02-17 PROCEDURE — G8417 CALC BMI ABV UP PARAM F/U: HCPCS | Performed by: PHYSICIAN ASSISTANT

## 2022-02-17 PROCEDURE — 4040F PNEUMOC VAC/ADMIN/RCVD: CPT | Performed by: PHYSICIAN ASSISTANT

## 2022-02-17 PROCEDURE — G8484 FLU IMMUNIZE NO ADMIN: HCPCS | Performed by: PHYSICIAN ASSISTANT

## 2022-02-17 PROCEDURE — 1090F PRES/ABSN URINE INCON ASSESS: CPT | Performed by: PHYSICIAN ASSISTANT

## 2022-02-17 PROCEDURE — 99213 OFFICE O/P EST LOW 20 MIN: CPT | Performed by: PHYSICIAN ASSISTANT

## 2022-02-17 NOTE — PROGRESS NOTES
HPI:  Viridiana Cain is in for follow-up breast check. She has not noticed any changes in her breasts. Her imaging was reviewed and is noted below. SCREENING BILATERAL DIGITAL MAMMOGRAM WITH TOMOSYNTHESIS 2/17/2022   11:05 AM   CLINICAL HISTORY: Screening.     COMPARISON STUDIES: 1/13/2021, 12/19/2019 11/28/2018. FINDINGS:    Digital CC and MLO views of bilateral breasts were obtained. Tomosynthesis in the MLO and CC projections was also performed. The breast tissue is heterogeneously dense (approximately 50-75%   glandular tissue) consistent with a type C parenchymal pattern,   limiting the sensitivity of mammography. No suspicious masses or   calcifications are identified. There has been no interval change. This   study was interpreted with CAD. IMPRESSION AND RECOMMENDATION:    No mammographic evidence of malignancy. Recommendation is for the   patient to return for routine mammography in one year or sooner, if   clinically indicated. BIRADS Category 2 - Benign findings       BREAST EXAM:  On examination, she has fibrocystic changes throughout both breasts, no dominant masses, no skin or nipple changes and no axillary adenopathy. I see nothing suspicious for breast cancer. ASSESSMENT:  Benign fibrocystic changes              PLAN:  I will plan to see her back in 1 year for physical exam and mammograms. She will contact me if anything significant changes. 20 minutes spent, which includes face to face with patient, record review, evaluation, planning, and education.

## 2022-02-18 ENCOUNTER — OFFICE VISIT (OUTPATIENT)
Dept: CARDIOLOGY | Facility: CLINIC | Age: 77
End: 2022-02-18

## 2022-02-18 ENCOUNTER — LAB (OUTPATIENT)
Dept: LAB | Facility: HOSPITAL | Age: 77
End: 2022-02-18

## 2022-02-18 VITALS
WEIGHT: 237 LBS | OXYGEN SATURATION: 100 % | BODY MASS INDEX: 38.09 KG/M2 | HEIGHT: 66 IN | DIASTOLIC BLOOD PRESSURE: 80 MMHG | SYSTOLIC BLOOD PRESSURE: 132 MMHG | HEART RATE: 56 BPM

## 2022-02-18 DIAGNOSIS — E78.2 MIXED HYPERLIPIDEMIA: ICD-10-CM

## 2022-02-18 DIAGNOSIS — R06.09 DYSPNEA ON EXERTION: ICD-10-CM

## 2022-02-18 DIAGNOSIS — I10 ESSENTIAL HYPERTENSION: ICD-10-CM

## 2022-02-18 DIAGNOSIS — E66.01 SEVERE OBESITY (BMI 35.0-39.9) WITH COMORBIDITY: ICD-10-CM

## 2022-02-18 DIAGNOSIS — I48.0 PAF (PAROXYSMAL ATRIAL FIBRILLATION): Primary | ICD-10-CM

## 2022-02-18 DIAGNOSIS — Z79.01 CHRONIC ANTICOAGULATION: ICD-10-CM

## 2022-02-18 PROBLEM — I48.92 ATRIAL FLUTTER WITH RAPID VENTRICULAR RESPONSE: Status: RESOLVED | Noted: 2018-08-12 | Resolved: 2022-02-18

## 2022-02-18 LAB — NT-PROBNP SERPL-MCNC: 310.8 PG/ML (ref 0–1800)

## 2022-02-18 PROCEDURE — 99214 OFFICE O/P EST MOD 30 MIN: CPT | Performed by: INTERNAL MEDICINE

## 2022-02-18 PROCEDURE — 93000 ELECTROCARDIOGRAM COMPLETE: CPT | Performed by: INTERNAL MEDICINE

## 2022-02-18 PROCEDURE — 36415 COLL VENOUS BLD VENIPUNCTURE: CPT | Performed by: INTERNAL MEDICINE

## 2022-02-18 PROCEDURE — 83880 ASSAY OF NATRIURETIC PEPTIDE: CPT | Performed by: INTERNAL MEDICINE

## 2022-02-18 NOTE — PROGRESS NOTES
Reason for Visit: cardiovascular follow up.    HPI:  Anne-Marie Hayes is a 76 y.o. female is here today for 6-month follow-up.  She has felt a little more short of breath than usual recently.  Over the past 6 months the dyspnea on exertion has worsened.  She now gets out of breath with less activity than she use to.  She has been unable to lose weight and keep it off.  She will lose a few lbs but ultimately gain it back.  She has gained 8 lbs compared to her last visit in August.  She has also been less active because it is harder to do things and has bought a golf cart to get around her yard.      Previous Cardiac Testing and Procedures:  - ROBEL (8/13/2018) Normal left ventricular systolic function, mild MR, mild to moderate TR, no evidence of left atrial appendage thrombus.  - Cardioversion (8/13/2018) Successful cardioversion from atrial fibrillation to sinus rhythm  - Cardioversion (4/20/2020) successful cardioversion from atrial fibrillation to sinus rhythm  - Lipid panel (1/5/2020) total cholesterol 133, HDL 53, LDL 54, triglycerides 132  - Lipid panel (11/8/2021) total cholesterol 127, HDL 51, LDL 51, triglycerides 126  - BMP (11/8/2021) creatinine 0.8, potassium 4.6, sodium 141    Patient Active Problem List   Diagnosis   • PAF (paroxysmal atrial fibrillation) (HCC)   • PRINCE on CPAP   • Essential hypertension   • Mixed hyperlipidemia   • Severe obesity (BMI 35.0-39.9) with comorbidity (HCC)   • Arthritis   • Hx of adenomatous colonic polyps   • Chronic anticoagulation       Social History     Tobacco Use   • Smoking status: Former Smoker     Years: 20.00   • Smokeless tobacco: Never Used   Vaping Use   • Vaping Use: Never used   Substance Use Topics   • Alcohol use: Yes     Comment: occ   • Drug use: No       Family History   Problem Relation Age of Onset   • Atrial fibrillation Mother    • Heart disease Father    • Breast cancer Neg Hx    • Ovarian cancer Neg Hx    • Uterine cancer Neg Hx    • Colon  cancer Neg Hx    • Melanoma Neg Hx    • Colon polyps Neg Hx        The following portions of the patient's history were reviewed and updated as appropriate: allergies, current medications, past family history, past medical history, past social history, past surgical history and problem list.      Current Outpatient Medications:   •  apixaban (ELIQUIS) 5 MG tablet tablet, Take 1 tablet by mouth Every 12 (Twelve) Hours., Disp: 60 tablet, Rfl: 0  •  bumetanide (BUMEX) 1 MG tablet, Take 1 mg by mouth Daily., Disp: , Rfl:   •  CloNIDine (CATAPRES) 0.1 MG tablet, Take 0.1 mg by mouth 2 (Two) Times a Day., Disp: , Rfl:   •  dilTIAZem CD (CARDIZEM CD) 240 MG 24 hr capsule, TAKE 1 CAPSULE DAILY, Disp: 90 capsule, Rfl: 3  •  famciclovir (FAMVIR) 500 MG tablet, Take 1,500 mg by mouth As Needed., Disp: , Rfl:   •  flecainide (TAMBOCOR) 50 MG tablet, Take 1 tablet by mouth 2 (Two) Times a Day., Disp: 10 tablet, Rfl: 0  •  fluticasone (FLONASE) 50 MCG/ACT nasal spray, 2 sprays into the nostril(s) as directed by provider Daily As Needed for Rhinitis or Allergies., Disp: , Rfl:   •  KLOR-CON 10 MEQ CR tablet, Take 10 mEq by mouth 2 (Two) Times a Day., Disp: , Rfl:   •  labetalol (NORMODYNE) 300 MG tablet, Take 300 mg by mouth Every 12 (Twelve) Hours., Disp: , Rfl:   •  losartan (COZAAR) 100 MG tablet, Take 1 tablet by mouth Daily., Disp: 90 tablet, Rfl: 3  •  Omega-3 Fatty Acids (FISH OIL) 1000 MG capsule capsule, Take 1,000 mg by mouth Daily With Breakfast., Disp: , Rfl:   •  rosuvastatin (CRESTOR) 10 MG tablet, Take 1 tablet by mouth Daily., Disp: , Rfl:     Review of Systems   Constitutional: Positive for malaise/fatigue and weight gain. Negative for chills and fever.   Cardiovascular: Positive for dyspnea on exertion. Negative for chest pain and paroxysmal nocturnal dyspnea.   Respiratory: Positive for shortness of breath. Negative for cough.    Skin: Negative for rash.   Musculoskeletal: Positive for joint pain.  "  Gastrointestinal: Negative for abdominal pain and heartburn.   Neurological: Negative for dizziness and numbness.       Objective   /80 (BP Location: Left arm, Patient Position: Sitting, Cuff Size: Adult)   Pulse 56   Ht 167.6 cm (65.98\")   Wt 108 kg (237 lb)   SpO2 100%   BMI 38.27 kg/m²   Constitutional:       Appearance: Well-developed. Obese.   HENT:      Head: Normocephalic and atraumatic.   Pulmonary:      Effort: Pulmonary effort is normal.      Breath sounds: Normal breath sounds.   Cardiovascular:      Normal rate. Regular rhythm.      Murmurs: There is no murmur.      No gallop. No click.   Edema:     Peripheral edema absent.   Skin:     General: Skin is warm and dry.   Neurological:      Mental Status: Alert and oriented to person, place, and time.         ECG 12 Lead    Date/Time: 2/18/2022 12:56 PM  Performed by: Jovani Lovelace MD  Authorized by: Jovani Lovelace MD   Comparison: compared with previous ECG from 8/10/2021  Similar to previous ECG  Rhythm: sinus bradycardia    Clinical impression: normal ECG              ICD-10-CM ICD-9-CM   1. PAF (paroxysmal atrial fibrillation) ([facility])  I48.0 427.31   2. Essential hypertension  I10 401.9   3. Mixed hyperlipidemia  E78.2 272.2   4. Chronic anticoagulation  Z79.01 V58.61   5. Severe obesity (BMI 35.0-39.9) with comorbidity ([facility])  E66.01 278.01   6. Dyspnea on exertion  R06.00 786.09         Assessment/Plan:  1.  Paroxysmal atrial fibrillation:  Sinus bradycardia on EKG today.  Continue diltiazem, flecainide, and Eliquis.     2.  Essential hypertension: Blood pressure is acceptable today.  Continue current medical therapy.     3.  Mixed hyperlipidemia:  Good control on rosuvastatin and omega-3 fish oil based on repeat lipid panel from 11/8/2021.    4.  Obesity: Patient's Body mass index is 38.27 kg/m². indicating that she is morbidly obese (BMI > 40 or > 35 with obesity - related health condition). Obesity-related health conditions include " the following: obstructive sleep apnea, hypertension and dyslipidemias. Obesity is worsening. BMI is is above average; BMI management plan is completed. We discussed portion control and increasing exercise.      5.  Obstructive sleep apnea:  Continue CPAP.  Encourage her to contact sleep clinic if she continues to experience a high fluctuations in her oxygen saturations during the night.     6.  Dyspnea on exertion: Obesity and deconditioning are likely contributing.  Evaluate further with an echocardiogram and BNP.  If these are unremarkable stress testing could be considered.

## 2022-02-22 ENCOUNTER — TELEPHONE (OUTPATIENT)
Dept: CARDIOLOGY | Facility: CLINIC | Age: 77
End: 2022-02-22

## 2022-02-22 NOTE — TELEPHONE ENCOUNTER
----- Message from Jovani Lovelace MD sent at 2/22/2022  8:50 AM CST -----  Please let her know that the BNP is normal which indicates a low risk of heart failure.

## 2022-03-07 DIAGNOSIS — I10 ESSENTIAL HYPERTENSION: ICD-10-CM

## 2022-03-07 DIAGNOSIS — I48.0 PAROXYSMAL ATRIAL FIBRILLATION (HCC): ICD-10-CM

## 2022-03-07 RX ORDER — ROSUVASTATIN CALCIUM 10 MG/1
TABLET, COATED ORAL
Qty: 90 TABLET | Refills: 3 | Status: SHIPPED | OUTPATIENT
Start: 2022-03-07

## 2022-03-07 RX ORDER — LOSARTAN POTASSIUM 100 MG/1
100 TABLET ORAL DAILY
Qty: 90 TABLET | Refills: 3 | Status: SHIPPED | OUTPATIENT
Start: 2022-03-07

## 2022-03-07 RX ORDER — FLECAINIDE ACETATE 50 MG/1
50 TABLET ORAL 2 TIMES DAILY
Qty: 180 TABLET | Refills: 3 | Status: SHIPPED | OUTPATIENT
Start: 2022-03-07 | End: 2022-06-09

## 2022-03-20 PROBLEM — E01.0 THYROMEGALY: Status: ACTIVE | Noted: 2022-03-20

## 2022-03-20 PROBLEM — E21.3 HYPERPARATHYROIDISM (HCC): Status: ACTIVE | Noted: 2022-03-20

## 2022-03-20 PROBLEM — E83.52 HYPERCALCEMIA: Status: ACTIVE | Noted: 2022-03-20

## 2022-03-20 PROBLEM — E04.2 NONTOXIC MULTINODULAR GOITER: Status: ACTIVE | Noted: 2022-03-20

## 2022-03-21 ENCOUNTER — HOSPITAL ENCOUNTER (OUTPATIENT)
Dept: CARDIOLOGY | Facility: HOSPITAL | Age: 77
Discharge: HOME | End: 2022-03-21
Admitting: INTERNAL MEDICINE

## 2022-03-21 ENCOUNTER — OFFICE VISIT (OUTPATIENT)
Dept: OTOLARYNGOLOGY | Facility: CLINIC | Age: 77
End: 2022-03-21

## 2022-03-21 VITALS
BODY MASS INDEX: 39.49 KG/M2 | WEIGHT: 237 LBS | HEIGHT: 65 IN | DIASTOLIC BLOOD PRESSURE: 80 MMHG | SYSTOLIC BLOOD PRESSURE: 132 MMHG

## 2022-03-21 VITALS
HEIGHT: 65 IN | BODY MASS INDEX: 38.32 KG/M2 | HEART RATE: 60 BPM | TEMPERATURE: 97.9 F | DIASTOLIC BLOOD PRESSURE: 80 MMHG | WEIGHT: 230 LBS | SYSTOLIC BLOOD PRESSURE: 159 MMHG

## 2022-03-21 DIAGNOSIS — E21.3 HYPERPARATHYROIDISM: ICD-10-CM

## 2022-03-21 DIAGNOSIS — E04.2 NONTOXIC MULTINODULAR GOITER: Primary | ICD-10-CM

## 2022-03-21 DIAGNOSIS — G47.33 OSA ON CPAP: ICD-10-CM

## 2022-03-21 DIAGNOSIS — Z99.89 OSA ON CPAP: ICD-10-CM

## 2022-03-21 DIAGNOSIS — Z79.01 CHRONIC ANTICOAGULATION: ICD-10-CM

## 2022-03-21 DIAGNOSIS — E01.0 THYROMEGALY: ICD-10-CM

## 2022-03-21 DIAGNOSIS — E66.01 SEVERE OBESITY (BMI 35.0-39.9) WITH COMORBIDITY: ICD-10-CM

## 2022-03-21 DIAGNOSIS — M85.80 OSTEOPENIA, UNSPECIFIED LOCATION: ICD-10-CM

## 2022-03-21 DIAGNOSIS — E83.52 HYPERCALCEMIA: ICD-10-CM

## 2022-03-21 PROCEDURE — 99203 OFFICE O/P NEW LOW 30 MIN: CPT | Performed by: OTOLARYNGOLOGY

## 2022-03-21 PROCEDURE — 93306 TTE W/DOPPLER COMPLETE: CPT | Performed by: INTERNAL MEDICINE

## 2022-03-21 PROCEDURE — 93306 TTE W/DOPPLER COMPLETE: CPT

## 2022-03-21 RX ORDER — LOSARTAN POTASSIUM 100 MG/1
1 TABLET ORAL DAILY
COMMUNITY
Start: 2022-03-07

## 2022-03-21 RX ORDER — AMOXICILLIN 500 MG/1
CAPSULE ORAL
COMMUNITY
Start: 2022-03-08 | End: 2022-05-20

## 2022-03-21 NOTE — PROGRESS NOTES
"    MANDO Dunlap  [facility]  [address], [address]  [place] [address]  Fax [phone]  Phone [phone]      Visit Type: NEW PATIENT   Chief Complaint   Patient presents with   • Thyroid Problem        HPI  Anne-Marie Hayes is a 76 y.o. female who presents for evaluation of thyroid problems. She has had findings of thyroid enlargement. This was found on workup for an unrelated condition. The patient has not had a history of radiation exposure in the past. The patient has not hada family history of thyroid malignancy. The patient has had compression symptoms. The patient has had a visible enlargement/ nodule in the thyroid area.  She reports since approximately November, she has had a feeling of having to swallow around something, she describes it as \"like a snake trying to eat\".  She has a history of atrial fibrillation, she reports over the last year she has remained in a normal sinus rhythm.  She has had two cardioversions in the past.  She is anticoagulated on eliquis, she has been able to hold this in the past for a knee replacement surgery.   She has had bone and joint pain in her knees and back.  She has a history of osteopenia. She reports her last bone density scan was approximately 1 year ago.    Past Medical History:   Diagnosis Date   • Arthritis    • Atrial fibrillation (HCC)    • Enlarged thyroid    • Hyperlipidemia    • Hypertension        Past Surgical History:   Procedure Laterality Date   • CARDIAC CATHETERIZATION  1987   • COLONOSCOPY N/A 5/18/2021    Procedure: COLONOSCOPY WITH ANESTHESIA;  Surgeon: Delio Leslie MD;  Location: [facility] ENDOSCOPY;  Service: Gastroenterology;  Laterality: N/A;  Pre: Hx Colon Polyps  Post: Colon Polyps, Diverticulosis  Dr. Ania Oseguera  CO2 Inflation Used   • COLONOSCOPY W/ POLYPECTOMY  03/31/2016    Tubular adenoma ascending colon repeat exam in 5 years   • REPLACEMENT " TOTAL KNEE Right 07/2019   • TUBAL ABDOMINAL LIGATION         Family History: Her family history includes Atrial fibrillation in her mother; Heart disease in her father.     Social History: She  reports that she has quit smoking. She quit after 20.00 years of use. She has never used smokeless tobacco. She reports current alcohol use. She reports that she does not use drugs.    Home Medications:  amoxicillin, apixaban, bumetanide, cloNIDine, dilTIAZem CD, famciclovir, fish oil, flecainide, fluticasone, labetalol, losartan, potassium chloride, and rosuvastatin    Allergies:  She has No Known Allergies.       Vital Signs:   Temp:  [97.9 °F (36.6 °C)] 97.9 °F (36.6 °C)  Heart Rate:  [60] 60  BP: (132-159)/(80) 159/80  ENT Physical Exam  Constitutional  Appearance: patient appears well-developed, well-nourished and well-groomed,  Communication/Voice: communication appropriate for developmental age; vocal quality normal;  Head and Face  Appearance: head appears normal, face appears normal and face appears atraumatic;  Palpation: facial palpation normal;  Salivary: glands normal;  Neck  Neck: neck normal; neck palpation normal;  Thyroid: enlargement present on bilateral lobes;  Respiratory  Inspection: breathing unlabored; normal breathing rate;  Auscultation: breath sounds are clear;  Cardiovascular  Inspection: extremities are warm and well perfused; no peripheral edema present;  Auscultation: regular rate and rhythm;  Lymphatic  Palpation: lymph nodes normal;  Neurovestibular  Mental Status: alert and oriented;  Psychiatric: mood normal; affect is appropriate;  Cranial Nerves: cranial nerves intact;         Result Review    RESULTS REVIEW    I have reviewed the patients old records in the chart.     Assessment/Plan    Diagnoses and all orders for this visit:    1. Nontoxic multinodular goiter (Primary)    2. Thyromegaly    3. Hyperparathyroidism (HCC)    4. Hypercalcemia    5. Severe obesity (BMI 35.0-39.9) with  comorbidity (HCC)    6. Chronic anticoagulation    7. PRINCE on CPAP                  No follow-ups on file.      Galina Osorio, APRN  03/21/22  14:15 CDT     Physician Attestation  I have seen and examined Anne-Marie Hayes and have reviewed the notes, assessments, and/or procedures and I concur with this documentation.    She has a thyromegaly that is causing compression symptoms.  CT scanning has shown consistently enlarged thyroid worse on the right-hand side.  She is also had elevated calcium and parathyroid levels since at least 2000.  She has not had kidney stones, peptic ulcer disease but does have some bone and joint pain and a lot of fatigue.  Her TSH level was normal.    Examination showed a diffusely enlarged thyroid.    Results review:  Thyroid ultrasound from 2/15/2022 showed thyromegaly on the right side with nodularity but without a discrete mass.  Left side was heterogeneous and nodular.  The right lobe measured 8.5 x 4 x 4.7 cm on the left side measured 6.5 x 2.3 x 3.5 cm    Bone density scanning on 6/15/2020 showed osteopenia    She has had a history of hyperthyroidism with consistently elevated parathyroid levels dating back to April 16, 2020.          Most Recent Value  3/22/2019 - 3/21/2022   TSH Baseline 0.270 - 4.200 uIU/mL 0.809  11/8/2021   PTH Intact (Serial Monitor) 15.0 - 65.0 pg/mL 280.2 (A)  5/7/2021   Calcium 8.8 - 10.2 mg/dL 10.3 (A)  11/8/2021     Assessment:  1. Nontoxic multinodular goiter    2. Thyromegaly    3. Hyperparathyroidism (HCC)    4. Hypercalcemia    5. Severe obesity (BMI 35.0-39.9) with comorbidity (HCC)    6. Chronic anticoagulation    7. PRINCE on CPAP    8. Osteopenia, unspecified location      Plan:  We will give Sestamibi scan before proceeding with plans for thyroidectomy to make sure that we can identify a parathyroid adenoma that can be addressed at the time of the operation.  See her back in 4 weeks.  We will go ahead and have cardiology give us an approval to  be off her Eliquis for the surgery.    Eric Olvera MD  3/21/2022  14:53 CDT

## 2022-03-22 LAB
BH CV ECHO MEAS - AO MAX PG (FULL): 13.9 MMHG
BH CV ECHO MEAS - AO MAX PG: 20.8 MMHG
BH CV ECHO MEAS - AO MEAN PG (FULL): 7 MMHG
BH CV ECHO MEAS - AO MEAN PG: 10 MMHG
BH CV ECHO MEAS - AO ROOT AREA (BSA CORRECTED): 1.5
BH CV ECHO MEAS - AO ROOT AREA: 7.8 CM^2
BH CV ECHO MEAS - AO ROOT DIAM: 3.2 CM
BH CV ECHO MEAS - AO V2 MAX: 228 CM/SEC
BH CV ECHO MEAS - AO V2 MEAN: 141 CM/SEC
BH CV ECHO MEAS - AO V2 VTI: 53.4 CM
BH CV ECHO MEAS - AVA(I,A): 1.8 CM^2
BH CV ECHO MEAS - AVA(I,D): 1.8 CM^2
BH CV ECHO MEAS - AVA(V,A): 1.8 CM^2
BH CV ECHO MEAS - AVA(V,D): 1.8 CM^2
BH CV ECHO MEAS - BSA(HAYCOCK): 2.3 M^2
BH CV ECHO MEAS - BSA: 2.1 M^2
BH CV ECHO MEAS - BZI_BMI: 39.4 KILOGRAMS/M^2
BH CV ECHO MEAS - BZI_METRIC_HEIGHT: 165.1 CM
BH CV ECHO MEAS - BZI_METRIC_WEIGHT: 107.5 KG
BH CV ECHO MEAS - EDV(CUBED): 225.9 ML
BH CV ECHO MEAS - EDV(MOD-SP4): 63 ML
BH CV ECHO MEAS - EDV(TEICH): 186.2 ML
BH CV ECHO MEAS - EF(CUBED): 75.3 %
BH CV ECHO MEAS - EF(MOD-SP4): 67.3 %
BH CV ECHO MEAS - EF(TEICH): 66.3 %
BH CV ECHO MEAS - ESV(CUBED): 55.7 ML
BH CV ECHO MEAS - ESV(MOD-SP4): 20.6 ML
BH CV ECHO MEAS - ESV(TEICH): 62.7 ML
BH CV ECHO MEAS - FS: 37.3 %
BH CV ECHO MEAS - IVS/LVPW: 0.97
BH CV ECHO MEAS - IVSD: 1.1 CM
BH CV ECHO MEAS - LA DIMENSION: 4.8 CM
BH CV ECHO MEAS - LA/AO: 1.5
BH CV ECHO MEAS - LAT PEAK E' VEL: 7.8 CM/SEC
BH CV ECHO MEAS - LV DIASTOLIC VOL/BSA (35-75): 29.6 ML/M^2
BH CV ECHO MEAS - LV MASS(C)D: 274.8 GRAMS
BH CV ECHO MEAS - LV MASS(C)DI: 129.2 GRAMS/M^2
BH CV ECHO MEAS - LV MAX PG: 6.9 MMHG
BH CV ECHO MEAS - LV MEAN PG: 3 MMHG
BH CV ECHO MEAS - LV SYSTOLIC VOL/BSA (12-30): 9.7 ML/M^2
BH CV ECHO MEAS - LV V1 MAX: 131 CM/SEC
BH CV ECHO MEAS - LV V1 MEAN: 82.2 CM/SEC
BH CV ECHO MEAS - LV V1 VTI: 30.3 CM
BH CV ECHO MEAS - LVIDD: 6.1 CM
BH CV ECHO MEAS - LVIDS: 3.8 CM
BH CV ECHO MEAS - LVLD AP4: 6.1 CM
BH CV ECHO MEAS - LVLS AP4: 5 CM
BH CV ECHO MEAS - LVOT AREA (M): 3.1 CM^2
BH CV ECHO MEAS - LVOT AREA: 3.1 CM^2
BH CV ECHO MEAS - LVOT DIAM: 2 CM
BH CV ECHO MEAS - LVPWD: 1.1 CM
BH CV ECHO MEAS - MED PEAK E' VEL: 5 CM/SEC
BH CV ECHO MEAS - MV A MAX VEL: 96.5 CM/SEC
BH CV ECHO MEAS - MV DEC SLOPE: 672 CM/SEC^2
BH CV ECHO MEAS - MV DEC TIME: 0.19 SEC
BH CV ECHO MEAS - MV E MAX VEL: 83.9 CM/SEC
BH CV ECHO MEAS - MV E/A: 0.87
BH CV ECHO MEAS - MV P1/2T MAX VEL: 138 CM/SEC
BH CV ECHO MEAS - MV P1/2T: 60.1 MSEC
BH CV ECHO MEAS - MVA P1/2T LCG: 1.6 CM^2
BH CV ECHO MEAS - MVA(P1/2T): 3.7 CM^2
BH CV ECHO MEAS - PA MAX PG: 6.7 MMHG
BH CV ECHO MEAS - PA V2 MAX: 129 CM/SEC
BH CV ECHO MEAS - RAP SYSTOLE: 5 MMHG
BH CV ECHO MEAS - RVSP: 37.7 MMHG
BH CV ECHO MEAS - SI(AO): 195.7 ML/M^2
BH CV ECHO MEAS - SI(CUBED): 80 ML/M^2
BH CV ECHO MEAS - SI(LVOT): 44.8 ML/M^2
BH CV ECHO MEAS - SI(MOD-SP4): 19.9 ML/M^2
BH CV ECHO MEAS - SI(TEICH): 58.1 ML/M^2
BH CV ECHO MEAS - SV(AO): 416.2 ML
BH CV ECHO MEAS - SV(CUBED): 170.1 ML
BH CV ECHO MEAS - SV(LVOT): 95.2 ML
BH CV ECHO MEAS - SV(MOD-SP4): 42.4 ML
BH CV ECHO MEAS - SV(TEICH): 123.5 ML
BH CV ECHO MEAS - TR MAX VEL: 286 CM/SEC
BH CV ECHO MEASUREMENTS AVERAGE E/E' RATIO: 13.11
LEFT ATRIUM VOLUME INDEX: 55 ML/M2
MAXIMAL PREDICTED HEART RATE: 144 BPM
STRESS TARGET HR: 122 BPM

## 2022-03-24 ENCOUNTER — TELEPHONE (OUTPATIENT)
Dept: CARDIOLOGY | Facility: CLINIC | Age: 77
End: 2022-03-24

## 2022-03-24 NOTE — TELEPHONE ENCOUNTER
----- Message from Jovani Lovelace MD sent at 3/23/2022  5:19 PM CDT -----  I called her and discussed the results of her echocardiogram.  There is diastolic function mildly elevated pressures in her lungs.  I advised her to continue taking the Bumex and she can take an extra dose if she has worsening shortness of breath or weight gain.

## 2022-04-08 ENCOUNTER — HOSPITAL ENCOUNTER (OUTPATIENT)
Dept: NUCLEAR MEDICINE | Facility: HOSPITAL | Age: 77
Discharge: HOME | End: 2022-04-08

## 2022-04-08 DIAGNOSIS — Z01.818 PREOPERATIVE TESTING: Primary | ICD-10-CM

## 2022-04-08 PROCEDURE — 0 TECHNETIUM SESTAMIBI: Performed by: EMERGENCY MEDICINE

## 2022-04-08 PROCEDURE — 78071 PARATHYRD PLANAR W/WO SUBTRJ: CPT

## 2022-04-08 PROCEDURE — A9500 TC99M SESTAMIBI: HCPCS | Performed by: EMERGENCY MEDICINE

## 2022-04-08 RX ADMIN — TECHNETIUM TC 99M SESTAMIBI 1 DOSE: 1 INJECTION INTRAVENOUS at 07:20

## 2022-04-15 ENCOUNTER — LAB (OUTPATIENT)
Dept: LAB | Facility: HOSPITAL | Age: 77
End: 2022-04-15

## 2022-04-15 DIAGNOSIS — Z01.818 PREOPERATIVE TESTING: ICD-10-CM

## 2022-04-15 LAB — SARS-COV-2 ORF1AB RESP QL NAA+PROBE: NOT DETECTED

## 2022-04-15 PROCEDURE — U0004 COV-19 TEST NON-CDC HGH THRU: HCPCS

## 2022-04-15 PROCEDURE — C9803 HOPD COVID-19 SPEC COLLECT: HCPCS

## 2022-04-18 ENCOUNTER — OFFICE VISIT (OUTPATIENT)
Dept: OTOLARYNGOLOGY | Facility: CLINIC | Age: 77
End: 2022-04-18

## 2022-04-18 VITALS
BODY MASS INDEX: 38.65 KG/M2 | SYSTOLIC BLOOD PRESSURE: 159 MMHG | HEART RATE: 61 BPM | TEMPERATURE: 96.9 F | WEIGHT: 232 LBS | DIASTOLIC BLOOD PRESSURE: 76 MMHG | HEIGHT: 65 IN

## 2022-04-18 DIAGNOSIS — E04.2 NONTOXIC MULTINODULAR GOITER: Primary | ICD-10-CM

## 2022-04-18 DIAGNOSIS — E01.0 THYROMEGALY: ICD-10-CM

## 2022-04-18 DIAGNOSIS — E21.3 HYPERPARATHYROIDISM: ICD-10-CM

## 2022-04-18 DIAGNOSIS — Z79.01 CHRONIC ANTICOAGULATION: ICD-10-CM

## 2022-04-18 DIAGNOSIS — E83.52 HYPERCALCEMIA: ICD-10-CM

## 2022-04-18 PROCEDURE — 99214 OFFICE O/P EST MOD 30 MIN: CPT | Performed by: OTOLARYNGOLOGY

## 2022-04-18 NOTE — PROGRESS NOTES
Eric Olvera MD  [facility]AR NOSE [facility] THROAT  [address], [address]  [place] [address]  Fax [phone]  Phone [phone]      Visit Type: FOLLOW UP   Chief Complaint   Patient presents with   • Thyroid Problem     Has scan, here for follow up        HPI  Anne-Marie Hayes is a  76 y.o. female who is here for follow up.  She has not had new complaints.  She has had a history of thyroid enlargement with compression symptoms.  She has had a long history of hyperparathyroidism with elevated parathyroid and calcium levels.       Past Medical History:   Diagnosis Date   • Arthritis    • Atrial fibrillation (HCC)    • Enlarged thyroid    • Hyperlipidemia    • Hypertension        Past Surgical History:   Procedure Laterality Date   • CARDIAC CATHETERIZATION  1987   • COLONOSCOPY N/A 5/18/2021    Procedure: COLONOSCOPY WITH ANESTHESIA;  Surgeon: Delio Leslie MD;  Location: [facility] ENDOSCOPY;  Service: Gastroenterology;  Laterality: N/A;  Pre: Hx Colon Polyps  Post: Colon Polyps, Diverticulosis  Dr. Anai Oseguera  CO2 Inflation Used   • COLONOSCOPY W/ POLYPECTOMY  03/31/2016    Tubular adenoma ascending colon repeat exam in 5 years   • REPLACEMENT TOTAL KNEE Right 07/2019   • TUBAL ABDOMINAL LIGATION         Family History: Her family history includes Atrial fibrillation in her mother; Heart disease in her father.     Social History: She  reports that she has quit smoking. She quit after 20.00 years of use. She has never used smokeless tobacco. She reports current alcohol use. She reports that she does not use drugs.    Home Medications:  amoxicillin, apixaban, bumetanide, cloNIDine, dilTIAZem CD, famciclovir, fish oil, flecainide, fluticasone, labetalol, losartan, potassium chloride, and rosuvastatin    Allergies:  She has No Known Allergies.       Vital Signs:   Temp:  [96.9 °F (36.1 °C)] 96.9 °F (36.1 °C)  Heart Rate:  [61] 61  BP: (159)/(76)  159/76  ENT Physical Exam       Result Review    RESULTS REVIEW    Thyroid ultrasound from 2/15/2022 showed thyromegaly on the right side with nodularity but without a discrete mass.  Left side was heterogeneous and nodular.  The right lobe measured 8.5 x 4 x 4.7 cm on the left side measured 6.5 x 2.3 x 3.5 cm    Bone density scanning on 6/15/2020 showed osteopenia    She has had a history of hyperparathyroidism with consistently elevated parathyroid levels dating back to April 16, 2020.          Most Recent Value  3/22/2019 - 3/21/2022   TSH Baseline 0.270 - 4.200 uIU/mL 0.809  11/8/2021   PTH Intact (Serial Monitor) 15.0 - 65.0 pg/mL 280.2 (A)  5/7/2021   Calcium 8.8 - 10.2 mg/dL 10.3 (A)  11/8/2021     NM Parathyroid Scan w SPECT (04/08/2022 11:19)  No localizing uptake seen.        Assessment/Plan    Diagnoses and all orders for this visit:    1. Nontoxic multinodular goiter (Primary)    2. Thyromegaly    3. Hyperparathyroidism (HCC)    4. Chronic anticoagulation    5. Hypercalcemia       Medical and surgical options were discussed including observation with serial ultrasounds and thyroid lobectomy and parathyroid exploration. Risks, benefits and alternatives were discussed and questions were answered. After considering the options, patient wanted more time to consider.  I recommend either considering surgery or following up in 6 months with repeat studies.               She will get back with us when she has decided.    Eric Olvera MD  04/18/22  14:49 CDT

## 2022-04-19 ENCOUNTER — TELEPHONE (OUTPATIENT)
Dept: INTERNAL MEDICINE | Age: 77
End: 2022-04-19

## 2022-04-19 DIAGNOSIS — R13.13 PHARYNGEAL DYSPHAGIA: Primary | ICD-10-CM

## 2022-04-19 NOTE — TELEPHONE ENCOUNTER
She is going to have thyroid surgery, but would like to speak with you first about upcoming surgery.

## 2022-04-25 NOTE — TELEPHONE ENCOUNTER
I called her - I recommend she proceed with surgery --- I also sent in nexium to take in case dysphagia related to that  -- -- wait on EGD until thyroid surgery done

## 2022-05-17 DIAGNOSIS — E21.3 HYPERPARATHYROIDISM (HCC): ICD-10-CM

## 2022-05-17 DIAGNOSIS — E55.9 VITAMIN D DEFICIENCY: ICD-10-CM

## 2022-05-17 DIAGNOSIS — E78.00 PURE HYPERCHOLESTEROLEMIA: ICD-10-CM

## 2022-05-17 DIAGNOSIS — R73.01 IMPAIRED FASTING GLUCOSE: ICD-10-CM

## 2022-05-17 LAB
ALBUMIN SERPL-MCNC: 4.6 G/DL (ref 3.5–5.2)
ALP BLD-CCNC: 114 U/L (ref 35–104)
ALT SERPL-CCNC: 15 U/L (ref 5–33)
ANION GAP SERPL CALCULATED.3IONS-SCNC: 8 MMOL/L (ref 7–19)
AST SERPL-CCNC: 12 U/L (ref 5–32)
BILIRUB SERPL-MCNC: 0.6 MG/DL (ref 0.2–1.2)
BUN BLDV-MCNC: 12 MG/DL (ref 8–23)
CALCIUM SERPL-MCNC: 9.9 MG/DL (ref 8.8–10.2)
CHLORIDE BLD-SCNC: 107 MMOL/L (ref 98–111)
CHOLESTEROL, TOTAL: 137 MG/DL (ref 160–199)
CO2: 25 MMOL/L (ref 22–29)
CREAT SERPL-MCNC: 0.7 MG/DL (ref 0.5–0.9)
GFR AFRICAN AMERICAN: >59
GFR NON-AFRICAN AMERICAN: >60
GLUCOSE BLD-MCNC: 120 MG/DL (ref 74–109)
HBA1C MFR BLD: 5.6 % (ref 4–6)
HCT VFR BLD CALC: 39.3 % (ref 37–47)
HDLC SERPL-MCNC: 54 MG/DL (ref 65–121)
HEMOGLOBIN: 12.5 G/DL (ref 12–16)
LDL CHOLESTEROL CALCULATED: 56 MG/DL
MCH RBC QN AUTO: 29.3 PG (ref 27–31)
MCHC RBC AUTO-ENTMCNC: 31.8 G/DL (ref 33–37)
MCV RBC AUTO: 92 FL (ref 81–99)
PARATHYROID HORMONE INTACT: 146.5 PG/ML (ref 15–65)
PDW BLD-RTO: 13 % (ref 11.5–14.5)
PLATELET # BLD: 183 K/UL (ref 130–400)
PMV BLD AUTO: 11.8 FL (ref 9.4–12.3)
POTASSIUM SERPL-SCNC: 4.3 MMOL/L (ref 3.5–5)
RBC # BLD: 4.27 M/UL (ref 4.2–5.4)
SODIUM BLD-SCNC: 140 MMOL/L (ref 136–145)
TOTAL PROTEIN: 6.3 G/DL (ref 6.6–8.7)
TRIGL SERPL-MCNC: 133 MG/DL (ref 0–149)
TSH SERPL DL<=0.05 MIU/L-ACNC: 1.06 UIU/ML (ref 0.27–4.2)
VITAMIN D 25-HYDROXY: 42.4 NG/ML
WBC # BLD: 6.3 K/UL (ref 4.8–10.8)

## 2022-05-20 ENCOUNTER — OFFICE VISIT (OUTPATIENT)
Dept: CARDIOLOGY | Facility: CLINIC | Age: 77
End: 2022-05-20

## 2022-05-20 VITALS
OXYGEN SATURATION: 99 % | HEART RATE: 63 BPM | HEIGHT: 65 IN | WEIGHT: 226 LBS | DIASTOLIC BLOOD PRESSURE: 84 MMHG | SYSTOLIC BLOOD PRESSURE: 132 MMHG | BODY MASS INDEX: 37.65 KG/M2

## 2022-05-20 DIAGNOSIS — E66.01 SEVERE OBESITY (BMI 35.0-39.9) WITH COMORBIDITY: ICD-10-CM

## 2022-05-20 DIAGNOSIS — Z01.818 PREOPERATIVE EVALUATION TO RULE OUT SURGICAL CONTRAINDICATION: ICD-10-CM

## 2022-05-20 DIAGNOSIS — G47.33 OSA ON CPAP: ICD-10-CM

## 2022-05-20 DIAGNOSIS — I48.0 PAF (PAROXYSMAL ATRIAL FIBRILLATION): Primary | ICD-10-CM

## 2022-05-20 DIAGNOSIS — E78.2 MIXED HYPERLIPIDEMIA: ICD-10-CM

## 2022-05-20 DIAGNOSIS — I10 ESSENTIAL HYPERTENSION: ICD-10-CM

## 2022-05-20 DIAGNOSIS — Z79.01 CHRONIC ANTICOAGULATION: ICD-10-CM

## 2022-05-20 DIAGNOSIS — Z99.89 OSA ON CPAP: ICD-10-CM

## 2022-05-20 DIAGNOSIS — R06.09 DYSPNEA ON EXERTION: ICD-10-CM

## 2022-05-20 PROCEDURE — 93000 ELECTROCARDIOGRAM COMPLETE: CPT | Performed by: INTERNAL MEDICINE

## 2022-05-20 PROCEDURE — 99214 OFFICE O/P EST MOD 30 MIN: CPT | Performed by: INTERNAL MEDICINE

## 2022-05-20 RX ORDER — FUROSEMIDE 40 MG/1
40 TABLET ORAL DAILY
Qty: 90 TABLET | Refills: 3 | Status: SHIPPED | OUTPATIENT
Start: 2022-05-20

## 2022-05-20 NOTE — PROGRESS NOTES
Reason for Visit: cardiovascular follow up.    HPI:  Anne-Marie Hayes is a 76 y.o. female is here today for follow-up.  At last visit in February she noted worsening shortness of breath and dyspnea on exertion.  Echo and BNP were ordered.  The echo was done on 3/21/2022 and showed normal LV systolic function with grade 2 diastolic dysfunction and mildly elevated RVSP.  proBNP was within normal limits.  She has been watching her diet and exercising.  Her weigh is down since last visit.  She feels her breathing is getting better.  She denies any chest pain, palpitations, dizziness, syncope, PND, or orthopnea.  She feels like the Bumex takes too long to work and she has to go bed at night.      Previous Cardiac Testing and Procedures:  - ROBEL (8/13/2018) Normal left ventricular systolic function, mild MR, mild to moderate TR, no evidence of left atrial appendage thrombus.  - Cardioversion (8/13/2018) Successful cardioversion from atrial fibrillation to sinus rhythm  - Cardioversion (4/20/2020) successful cardioversion from atrial fibrillation to sinus rhythm  - Lipid panel (1/5/2020) total cholesterol 133, HDL 53, LDL 54, triglycerides 132  - Lipid panel (11/8/2021) total cholesterol 127, HDL 51, LDL 51, triglycerides 126  - BMP (11/8/2021) creatinine 0.8, potassium 4.6, sodium 141  - proBNP (2/18/2022) 311, normal 0-1800  - Echo (3/21/2022) EF 66-70%, grade 2 diastolic dysfunction, normal RV size and function, RVSP 35-45 mmHg, no significant valve dysfunction      Patient Active Problem List   Diagnosis   • PAF (paroxysmal atrial fibrillation) (HCC)   • PRINCE on CPAP   • Essential hypertension   • Mixed hyperlipidemia   • Severe obesity (BMI 35.0-39.9) with comorbidity (HCC)   • Arthritis   • Hx of adenomatous colonic polyps   • Chronic anticoagulation   • Nontoxic multinodular goiter   • Thyromegaly   • Hyperparathyroidism (HCC)   • Hypercalcemia       Social History     Tobacco Use   • Smoking status: Former Smoker      Years: 20.00   • Smokeless tobacco: Never Used   Vaping Use   • Vaping Use: Never used   Substance Use Topics   • Alcohol use: Yes     Comment: occ   • Drug use: No       Family History   Problem Relation Age of Onset   • Atrial fibrillation Mother    • Heart disease Father    • Breast cancer Neg Hx    • Ovarian cancer Neg Hx    • Uterine cancer Neg Hx    • Colon cancer Neg Hx    • Melanoma Neg Hx    • Colon polyps Neg Hx        The following portions of the patient's history were reviewed and updated as appropriate: allergies, current medications, past family history, past medical history, past social history, past surgical history and problem list.      Current Outpatient Medications:   •  apixaban (ELIQUIS) 5 MG tablet tablet, Take 1 tablet by mouth Every 12 (Twelve) Hours., Disp: 60 tablet, Rfl: 0  •  CloNIDine (CATAPRES) 0.1 MG tablet, Take 0.1 mg by mouth 2 (Two) Times a Day., Disp: , Rfl:   •  dilTIAZem CD (CARDIZEM CD) 240 MG 24 hr capsule, TAKE 1 CAPSULE DAILY, Disp: 90 capsule, Rfl: 3  •  famciclovir (FAMVIR) 500 MG tablet, Take 1,500 mg by mouth As Needed., Disp: , Rfl:   •  flecainide (TAMBOCOR) 50 MG tablet, Take 1 tablet by mouth 2 (Two) Times a Day., Disp: 10 tablet, Rfl: 0  •  fluticasone (FLONASE) 50 MCG/ACT nasal spray, 2 sprays into the nostril(s) as directed by provider Daily As Needed for Rhinitis or Allergies., Disp: , Rfl:   •  KLOR-CON 10 MEQ CR tablet, Take 10 mEq by mouth 2 (Two) Times a Day., Disp: , Rfl:   •  labetalol (NORMODYNE) 300 MG tablet, Take 300 mg by mouth Every 12 (Twelve) Hours., Disp: , Rfl:   •  losartan (COZAAR) 100 MG tablet, Take 1 tablet by mouth Daily., Disp: , Rfl:   •  Omega-3 Fatty Acids (FISH OIL) 1000 MG capsule capsule, Take 1,000 mg by mouth Daily With Breakfast., Disp: , Rfl:   •  rosuvastatin (CRESTOR) 10 MG tablet, Take 1 tablet by mouth Daily., Disp: , Rfl:   •  furosemide (LASIX) 40 MG tablet, Take 1 tablet by mouth Daily., Disp: 90 tablet, Rfl:  "3    Review of Systems   Constitutional: Positive for weight loss. Negative for chills and fever.   Cardiovascular: Negative for chest pain and paroxysmal nocturnal dyspnea.   Respiratory: Positive for shortness of breath. Negative for cough.    Skin: Negative for rash.   Gastrointestinal: Negative for abdominal pain and heartburn.   Neurological: Negative for dizziness and numbness.       Objective   /84 (BP Location: Left arm, Patient Position: Sitting, Cuff Size: Adult)   Pulse 63   Ht 165.1 cm (65\")   Wt 103 kg (226 lb)   SpO2 99%   BMI 37.61 kg/m²   Constitutional:       Appearance: Well-developed. Obese.   HENT:      Head: Normocephalic and atraumatic.   Pulmonary:      Effort: Pulmonary effort is normal.      Breath sounds: Normal breath sounds.   Cardiovascular:      Normal rate. Regular rhythm.      Murmurs: There is no murmur.      No gallop. No click.   Edema:     Peripheral edema absent.   Skin:     General: Skin is warm and dry.   Neurological:      Mental Status: Alert and oriented to person, place, and time.         ECG 12 Lead    Date/Time: 5/20/2022 8:38 AM  Performed by: Jovani Lovelace MD  Authorized by: Jovani Lovelace MD   Comparison: compared with previous ECG from 2/18/2022  Similar to previous ECG  Rhythm: sinus rhythm  Rate: normal    Clinical impression: normal ECG              ICD-10-CM ICD-9-CM   1. PAF (paroxysmal atrial fibrillation) ([facility])  I48.0 427.31   2. Essential hypertension  I10 401.9   3. Mixed hyperlipidemia  E78.2 272.2   4. Severe obesity (BMI 35.0-39.9) with comorbidity ([facility])  E66.01 278.01   5. PRINCE on CPAP  G47.33 327.23    Z99.89 V46.8   6. Dyspnea on exertion  R06.00 786.09   7. Chronic anticoagulation  Z79.01 V58.61   8. Preoperative evaluation to rule out surgical contraindication  Z01.818 V72.83         Assessment/Plan:  1. Paroxysmal atrial fibrillation: She remains in sinus rhythm on EKG.  Has not had any palpitations or other evidence of recent " recurrence.  Continue diltiazem, flecainide, and Eliquis.     2.  Essential hypertension:  Blood pressure remains in the acceptable range.  Continue losartan, diltiazem, and clonidine.     3.  Mixed hyperlipidemia: Lipid panel from 11/8/2021 showed good control.  Continue rosuvastatin and omega-3 fish oil.     4.  Obesity: Class 2 Severe Obesity (BMI >=35 and <=39.9). Obesity-related health conditions include the following: obstructive sleep apnea, hypertension and dyslipidemias. Obesity is improving with lifestyle modifications. BMI is is above average; BMI management plan is completed. We discussed portion control and increasing exercise.       5.  Obstructive sleep apnea: Managed on CPAP.     6.  Dyspnea on exertion: Likely multifactorial due to obesity, deconditioning, and diastolic dysfunction.  Echo from 3/21/2022 showed EF of 66 to 70% with grade 2 diastolic dysfunction and mildly elevated RVSP of 35 to 45 mmHg.  proBNP from 2/18/2022 was normal.  Continue exercise and weight loss.  Change Bumex to frusemide to see if it helps her nocturia.      7.  Chronic anticoagulation: Continue Eliquis.    8.  Preoperative evaluation: Patient is upcoming thyroid surgery.  No further cardiac testing is indicated prior to surgery.  Acceptable to hold Eliquis for 48 to 72 hours prior to surgery and resume when safe afterwards.

## 2022-05-22 DIAGNOSIS — R13.13 PHARYNGEAL DYSPHAGIA: ICD-10-CM

## 2022-05-23 ENCOUNTER — LAB (OUTPATIENT)
Dept: LAB | Facility: HOSPITAL | Age: 77
End: 2022-05-23

## 2022-05-23 ENCOUNTER — PRE-ADMISSION TESTING (OUTPATIENT)
Dept: PREADMISSION TESTING | Facility: HOSPITAL | Age: 77
End: 2022-05-23

## 2022-05-23 VITALS
HEART RATE: 75 BPM | BODY MASS INDEX: 36.67 KG/M2 | WEIGHT: 228.18 LBS | HEIGHT: 66 IN | RESPIRATION RATE: 18 BRPM | DIASTOLIC BLOOD PRESSURE: 71 MMHG | SYSTOLIC BLOOD PRESSURE: 152 MMHG | OXYGEN SATURATION: 99 %

## 2022-05-23 DIAGNOSIS — E83.52 HYPERCALCEMIA: ICD-10-CM

## 2022-05-23 DIAGNOSIS — E21.3 HYPERPARATHYROIDISM: ICD-10-CM

## 2022-05-23 LAB — SARS-COV-2 ORF1AB RESP QL NAA+PROBE: NOT DETECTED

## 2022-05-23 PROCEDURE — U0004 COV-19 TEST NON-CDC HGH THRU: HCPCS

## 2022-05-23 PROCEDURE — C9803 HOPD COVID-19 SPEC COLLECT: HCPCS

## 2022-05-23 NOTE — DISCHARGE INSTRUCTIONS
Before you come to the hospital        Arrival time: AS DIRECTED BY OFFICE     YOU MAY TAKE THE FOLLOWING MEDICATION(S) THE MORNING OF SURGERY WITH A SIP OF WATER: Labetolol     Do not take your Losartan within 24 hours of surgery as directed by anesthesia           ALL OTHER HOME MEDICATION CHECK WITH YOUR PHYSICIAN (especially if you are taking diabetes medicines or blood thinners)        If you were given and instructed to use a germ- killing soap, use as directed the night before surgery and the morning of surgery before coming to the hospital.             Eating and drinking restrictions prior to scheduled arrival time    2 Hours before arrival time STOP   Drinking Clear liquids (water, apple juice-no pulp)     6 Hours before arrival time STOP   Milk or drinks that contain milk, full liquids    6 Hours before arrival time STOP   Light meals or foods, such as toast or cereal    8 Hours before arrival time STOP   Heavy foods, such as meat, fried foods, or fatty foods    (It is extremely important that you follow these guidelines to prevent delay or cancelation of your procedure)     Clear Liquids  Water and flavored water                                                                      Clear Fruit juices, such as cranberry juice and apple juice.  Black coffee (NO cream of any kind, including powdered).  Plain tea  Clear bouillon or broth.  Flavored gelatin.  Soda.  Gatorade or Powerade.  Full liquid examples  Juices that have pulp.  Frozen ice pops that contain fruit pieces.  Coffee with creamer  Milk.  Yogurt.              MANAGING PAIN AFTER SURGERY    We know you are probably wondering what your pain will be like after surgery.  Following surgery it is unrealistic to expect you will not have pain.   Pain is how our bodies let us know that something is wrong or cautions us to be careful.  That said, our goal is to make your pain tolerable.    Methods we may use to treat your pain include (oral or IV  medications, PCAs, epidurals, nerve blocks, etc.)   While some procedures require IV pain medications for a short time after surgery, transitioning to pain medications by mouth allows for better management of pain.   Your nurse will encourage you to take oral pain medications whenever possible.  IV medications work almost immediately, but only last a short while.  Taking medications by mouth allows for a more constant level of medication in your blood stream for a longer period of time.      Once your pain is out of control it is harder to get back under control.  It is important you are aware when your next dose of pain medication is due.  If you are admitted, your nurse may write the time of your next dose on the white board in your room to help you remember.      We are interested in your pain and encourage you to inform us about aggravating factors during your visit.   Many times a simple repositioning every few hours can make a big difference.    If your physician says it is okay, do not let your pain prevent you from getting out of bed. Be sure to call your nurse for assistance prior to getting up so you do not fall.      Before surgery, please decide your tolerable pain goal.  These faces help describe the pain ratings we use on a 0-10 scale.   Be prepared to tell us your goal and whether or not you take pain or anxiety medications at home.

## 2022-05-23 NOTE — PROGRESS NOTES
It is acceptable for the patient to hold Eliquis for 48 to 72 hours prior to the procedure and resume when safe afterwards.  Bridging with Lovenox is not indicated.

## 2022-05-24 ENCOUNTER — TELEPHONE (OUTPATIENT)
Dept: OTOLARYNGOLOGY | Facility: CLINIC | Age: 77
End: 2022-05-24

## 2022-05-24 ENCOUNTER — OFFICE VISIT (OUTPATIENT)
Dept: INTERNAL MEDICINE | Age: 77
End: 2022-05-24
Payer: MEDICARE

## 2022-05-24 VITALS
WEIGHT: 225 LBS | OXYGEN SATURATION: 97 % | SYSTOLIC BLOOD PRESSURE: 130 MMHG | DIASTOLIC BLOOD PRESSURE: 70 MMHG | HEIGHT: 66 IN | BODY MASS INDEX: 36.16 KG/M2 | HEART RATE: 66 BPM

## 2022-05-24 DIAGNOSIS — Z00.00 MEDICARE ANNUAL WELLNESS VISIT, SUBSEQUENT: Primary | ICD-10-CM

## 2022-05-24 DIAGNOSIS — E04.2 MULTINODULAR GOITER: ICD-10-CM

## 2022-05-24 DIAGNOSIS — E78.00 PURE HYPERCHOLESTEROLEMIA: ICD-10-CM

## 2022-05-24 DIAGNOSIS — G47.33 OBSTRUCTIVE SLEEP APNEA: ICD-10-CM

## 2022-05-24 DIAGNOSIS — M15.9 PRIMARY OSTEOARTHRITIS INVOLVING MULTIPLE JOINTS: ICD-10-CM

## 2022-05-24 DIAGNOSIS — R73.01 IMPAIRED FASTING GLUCOSE: ICD-10-CM

## 2022-05-24 DIAGNOSIS — I48.0 PAROXYSMAL ATRIAL FIBRILLATION (HCC): ICD-10-CM

## 2022-05-24 DIAGNOSIS — E21.3 HYPERPARATHYROIDISM (HCC): ICD-10-CM

## 2022-05-24 DIAGNOSIS — I10 PRIMARY HYPERTENSION: ICD-10-CM

## 2022-05-24 DIAGNOSIS — M48.061 SPINAL STENOSIS OF LUMBAR REGION, UNSPECIFIED WHETHER NEUROGENIC CLAUDICATION PRESENT: ICD-10-CM

## 2022-05-24 PROCEDURE — 1090F PRES/ABSN URINE INCON ASSESS: CPT | Performed by: INTERNAL MEDICINE

## 2022-05-24 PROCEDURE — 99213 OFFICE O/P EST LOW 20 MIN: CPT | Performed by: INTERNAL MEDICINE

## 2022-05-24 PROCEDURE — G8399 PT W/DXA RESULTS DOCUMENT: HCPCS | Performed by: INTERNAL MEDICINE

## 2022-05-24 PROCEDURE — G8427 DOCREV CUR MEDS BY ELIG CLIN: HCPCS | Performed by: INTERNAL MEDICINE

## 2022-05-24 PROCEDURE — 1123F ACP DISCUSS/DSCN MKR DOCD: CPT | Performed by: INTERNAL MEDICINE

## 2022-05-24 PROCEDURE — G8417 CALC BMI ABV UP PARAM F/U: HCPCS | Performed by: INTERNAL MEDICINE

## 2022-05-24 PROCEDURE — G0439 PPPS, SUBSEQ VISIT: HCPCS | Performed by: INTERNAL MEDICINE

## 2022-05-24 PROCEDURE — 1036F TOBACCO NON-USER: CPT | Performed by: INTERNAL MEDICINE

## 2022-05-24 SDOH — ECONOMIC STABILITY: FOOD INSECURITY: WITHIN THE PAST 12 MONTHS, YOU WORRIED THAT YOUR FOOD WOULD RUN OUT BEFORE YOU GOT MONEY TO BUY MORE.: NEVER TRUE

## 2022-05-24 SDOH — ECONOMIC STABILITY: FOOD INSECURITY: WITHIN THE PAST 12 MONTHS, THE FOOD YOU BOUGHT JUST DIDN'T LAST AND YOU DIDN'T HAVE MONEY TO GET MORE.: NEVER TRUE

## 2022-05-24 ASSESSMENT — PATIENT HEALTH QUESTIONNAIRE - PHQ9
SUM OF ALL RESPONSES TO PHQ QUESTIONS 1-9: 0
SUM OF ALL RESPONSES TO PHQ9 QUESTIONS 1 & 2: 0
2. FEELING DOWN, DEPRESSED OR HOPELESS: 0
SUM OF ALL RESPONSES TO PHQ QUESTIONS 1-9: 0
SUM OF ALL RESPONSES TO PHQ QUESTIONS 1-9: 0
1. LITTLE INTEREST OR PLEASURE IN DOING THINGS: 0
SUM OF ALL RESPONSES TO PHQ QUESTIONS 1-9: 0
SUM OF ALL RESPONSES TO PHQ QUESTIONS 1-9: 0
2. FEELING DOWN, DEPRESSED OR HOPELESS: 0
SUM OF ALL RESPONSES TO PHQ QUESTIONS 1-9: 0
1. LITTLE INTEREST OR PLEASURE IN DOING THINGS: 0
SUM OF ALL RESPONSES TO PHQ QUESTIONS 1-9: 0
SUM OF ALL RESPONSES TO PHQ9 QUESTIONS 1 & 2: 0
SUM OF ALL RESPONSES TO PHQ QUESTIONS 1-9: 0

## 2022-05-24 ASSESSMENT — LIFESTYLE VARIABLES
HOW OFTEN DURING THE LAST YEAR HAVE YOU FOUND THAT YOU WERE NOT ABLE TO STOP DRINKING ONCE YOU HAD STARTED: 0
HOW OFTEN DO YOU HAVE A DRINK CONTAINING ALCOHOL: 4 OR MORE TIMES A WEEK
HOW MANY STANDARD DRINKS CONTAINING ALCOHOL DO YOU HAVE ON A TYPICAL DAY: 1 OR 2
HAVE YOU OR SOMEONE ELSE BEEN INJURED AS A RESULT OF YOUR DRINKING: 0
HOW OFTEN DURING THE LAST YEAR HAVE YOU BEEN UNABLE TO REMEMBER WHAT HAPPENED THE NIGHT BEFORE BECAUSE YOU HAD BEEN DRINKING: 0
HOW OFTEN DURING THE LAST YEAR HAVE YOU NEEDED AN ALCOHOLIC DRINK FIRST THING IN THE MORNING TO GET YOURSELF GOING AFTER A NIGHT OF HEAVY DRINKING: 0
HOW OFTEN DURING THE LAST YEAR HAVE YOU HAD A FEELING OF GUILT OR REMORSE AFTER DRINKING: 0
HOW OFTEN DURING THE LAST YEAR HAVE YOU FAILED TO DO WHAT WAS NORMALLY EXPECTED FROM YOU BECAUSE OF DRINKING: 0
HAS A RELATIVE, FRIEND, DOCTOR, OR ANOTHER HEALTH PROFESSIONAL EXPRESSED CONCERN ABOUT YOUR DRINKING OR SUGGESTED YOU CUT DOWN: 0

## 2022-05-24 ASSESSMENT — SOCIAL DETERMINANTS OF HEALTH (SDOH): HOW HARD IS IT FOR YOU TO PAY FOR THE VERY BASICS LIKE FOOD, HOUSING, MEDICAL CARE, AND HEATING?: NOT HARD AT ALL

## 2022-05-24 NOTE — PROGRESS NOTES
Chief Complaint   Patient presents with    Medicare AWV     thyroid surgery tomorrow       HPI: Patient is here today for Medicare annual wellness visit thyroid surgery scheduled for tomorrow she is doing okay she will be glad when her surgery is over still gets some a lot of choking in her throat choking swallowing points to the throat area no chest pressure or chest pain has not had any recent atrial fibs that she is aware of no fevers or chills or cough or congestion mood is good    Past Medical History:   Diagnosis Date    Arthritis     Heart palpitations     Hypercalcemia 8/7/2017    Hyperlipidemia     Hypertension     Impaired fasting glucose 8/7/2017    Lumbar spinal stenosis     Lung nodule, solitary 8/7/2017    Right upper lobe    Multinodular goiter 8/7/2017    Obstructive sleep apnea 10/11/2018    Paroxysmal atrial fibrillation ([address] 8/17/2018    Primary osteoarthritis involving multiple joints 8/7/2017    Wears glasses        Past Surgical History:   Procedure Laterality Date    BREAST BIOPSY  12/3/2007    stereotactic left, fibrocystic changes    BREAST BIOPSY  12/17/2007    stereotactic left, fibrocystic changes    CAPSULOTOMY, HAND      8/2018    CARDIAC CATHETERIZATION      negative    CATARACT REMOVAL Bilateral 2017    KNEE ARTHROPLASTY Right 7/22/2019    RIGHT PARTIAL KNEE REPLACEMENT performed by Kelly Carrizales MD at [address] [place] [address] Road HISTORY  2013    skin cancer spot on her nose removed    TUBAL LIGATION         Family History   Problem Relation Age of Onset    Hypertension Father        Social History     Socioeconomic History    Marital status:      Spouse name: Not on file    Number of children: Not on file    Years of education: Not on file    Highest education level: Not on file   Occupational History    Not on file   Tobacco Use    Smoking status: Former Smoker     Packs/day: 2.00     Years: 20.00     Pack years: 40.00     Types: Cigarettes Quit date: 26     Years since quittin.4    Smokeless tobacco: Never Used   Vaping Use    Vaping Use: Never used   Substance and Sexual Activity    Alcohol use: Yes     Comment: wine daily     Drug use: No    Sexual activity: Not on file   Other Topics Concern    Not on file   Social History Narrative    Not on file     Social Determinants of Health     Financial Resource Strain: Low Risk     Difficulty of Paying Living Expenses: Not hard at all   Food Insecurity: No Food Insecurity    Worried About [address] in the Last Year: Never true    [address] [facility] [address] in the Last Year: Never true   Transportation Needs:     Lack of Transportation (Medical): Not on file    Lack of Transportation (Non-Medical):  Not on file   Physical Activity: Sufficiently Active    Days of Exercise per Week: 3 days    Minutes of Exercise per Session: 60 min   Stress:     Feeling of Stress : Not on file   Social Connections:     Frequency of Communication with Friends and Family: Not on file    Frequency of Social Gatherings with Friends and Family: Not on file    Attends Synagogue Services: Not on file    Active Member of Clubs or Organizations: Not on file    Attends Club or Organization Meetings: Not on file    Marital Status: Not on file   Intimate Partner Violence:     Fear of Current or Ex-Partner: Not on file    Emotionally Abused: Not on file    Physically Abused: Not on file    Sexually Abused: Not on file   Housing Stability:     Unable to Pay for Housing in the Last Year: Not on file    Number of [place] in the Last Year: Not on file    Unstable Housing in the Last Year: Not on file       No Known Allergies    Current Outpatient Medications   Medication Sig Dispense Refill    rosuvastatin (CRESTOR) 10 MG tablet TAKE 1 TABLET DAILY 90 tablet 3    losartan (COZAAR) 100 MG tablet Take 1 tablet by mouth daily 90 tablet 3    bumetanide (BUMEX) 1 MG tablet Take 1 tablet by mouth daily 90 tablet 3    apixaban (ELIQUIS) 5 MG TABS tablet Take 1 tablet by mouth 2 times daily 180 tablet 3    potassium chloride (KLOR-CON M) 10 MEQ extended release tablet Take 1 tablet by mouth 2 times daily 180 tablet 3    cloNIDine (CATAPRES) 0.1 MG tablet Take 1 tablet by mouth 2 times daily 180 tablet 3    labetalol (NORMODYNE) 300 MG tablet Take 1 tablet by mouth 2 times daily 180 tablet 3    dilTIAZem (CARDIZEM CD) 360 MG extended release capsule Take 1 capsule by mouth daily 90 capsule 3    flecainide (TAMBOCOR) 50 MG tablet Take 1 tablet by mouth 2 times daily 180 tablet 3    fluticasone (FLONASE) 50 MCG/ACT nasal spray USE 2 SPRAYS IN EACH NOSTRIL DAILY AS NEEDED FOR RHINITIS OR ALLERGIES 48 g 3    famciclovir (FAMVIR) 500 MG tablet TAKE 3 TABLETS DAILY AS NEEDED FOR COLD SORES 90 tablet 0    CPAP Machine MISC by Does not apply route      Multiple Vitamins-Minerals (PRESERVISION AREDS 2+MULTI VIT PO) Take by mouth Indications: bid      FISH OIL Take 1,000 mg by mouth daily        No current facility-administered medications for this visit. Review of Systems   Constitutional: Negative for chills, fatigue and fever. HENT: Positive for trouble swallowing. Negative for congestion and sinus pressure. Eyes: Negative for discharge and redness. Respiratory: Negative for cough and shortness of breath. Cardiovascular: Negative for chest pain, palpitations and leg swelling. Gastrointestinal: Negative for abdominal distention and abdominal pain. Genitourinary: Negative for dysuria, frequency and urgency. Musculoskeletal: Positive for arthralgias and back pain. Skin: Negative for rash and wound. Neurological: Negative for dizziness, light-headedness and headaches. Psychiatric/Behavioral: Negative for dysphoric mood and sleep disturbance. The patient is not nervous/anxious.         /70   Pulse 66   Ht 5' 6\" (1.676 m)   Wt 225 lb (102.1 kg)   SpO2 97%   BMI 36.32 kg/m²   BP Readings from Last 7 Encounters:   05/24/22 130/70   11/15/21 138/76   05/13/21 130/70   01/13/21 (!) 146/80   05/07/20 130/82   11/04/19 124/66   07/23/19 (!) 164/79     Wt Readings from Last 7 Encounters:   05/24/22 225 lb (102.1 kg)   02/17/22 239 lb (108.4 kg)   11/15/21 231 lb (104.8 kg)   05/13/21 227 lb (103 kg)   05/07/20 220 lb (99.8 kg)   12/19/19 229 lb (103.9 kg)   11/04/19 226 lb (102.5 kg)     BMI Readings from Last 7 Encounters:   05/24/22 36.32 kg/m²   02/17/22 38.58 kg/m²   11/15/21 36.18 kg/m²   05/13/21 35.55 kg/m²   05/07/20 34.46 kg/m²   12/19/19 35.87 kg/m²   11/04/19 35.40 kg/m²     Resp Readings from Last 7 Encounters:   07/23/19 16   07/22/19 22   02/08/18 18   12/20/17 18   11/22/16 18       Physical Exam  Constitutional:       General: She is not in acute distress. Appearance: Normal appearance. She is well-developed. HENT:      Right Ear: External ear normal. Tympanic membrane is not injected. Left Ear: External ear normal. Tympanic membrane is not injected. Mouth/Throat:      Pharynx: No oropharyngeal exudate. Eyes:      General: No scleral icterus. Conjunctiva/sclera: Conjunctivae normal.   Neck:      Thyroid: No thyroid mass or thyromegaly. Vascular: No carotid bruit. Cardiovascular:      Rate and Rhythm: Normal rate and regular rhythm. Heart sounds: S1 normal and S2 normal. No murmur heard. No S3 or S4 sounds. Pulmonary:      Effort: Pulmonary effort is normal. No respiratory distress. Breath sounds: Normal breath sounds. No wheezing or rales. Chest:   Breasts:      Right: No supraclavicular adenopathy. Left: No supraclavicular adenopathy. Abdominal:      General: Bowel sounds are normal. There is no distension. Palpations: Abdomen is soft. There is no mass. Tenderness: There is no abdominal tenderness. Musculoskeletal:      Cervical back: Neck supple. Lymphadenopathy:      Cervical: No cervical adenopathy. Upper Body:      Right upper body: No supraclavicular adenopathy. Left upper body: No supraclavicular adenopathy. Skin:     Findings: No rash. Neurological:      Mental Status: She is alert and oriented to person, place, and time. Cranial Nerves: No cranial nerve deficit. Results for orders placed or performed in visit on 05/17/22   PTH, Intact   Result Value Ref Range    .5 (H) 15.0 - 65.0 pg/mL   Vitamin D 25 Hydroxy   Result Value Ref Range    Vit D, 25-Hydroxy 42.4 >=30 ng/mL   Lipid Panel   Result Value Ref Range    Cholesterol, Total 137 (L) 160 - 199 mg/dL    Triglycerides 133 0 - 149 mg/dL    HDL 54 (L) 65 - 121 mg/dL    LDL Calculated 56 <100 mg/dL   TSH without Reflex   Result Value Ref Range    TSH 1.060 0.270 - 4.200 uIU/mL   Comprehensive Metabolic Panel   Result Value Ref Range    Sodium 140 136 - 145 mmol/L    Potassium 4.3 3.5 - 5.0 mmol/L    Chloride 107 98 - 111 mmol/L    CO2 25 22 - 29 mmol/L    Anion Gap 8 7 - 19 mmol/L    Glucose 120 (H) 74 - 109 mg/dL    BUN 12 8 - 23 mg/dL    CREATININE 0.7 0.5 - 0.9 mg/dL    GFR Non-African American >60 >60    GFR African American >59 >59    Calcium 9.9 8.8 - 10.2 mg/dL    Total Protein 6.3 (L) 6.6 - 8.7 g/dL    Albumin 4.6 3.5 - 5.2 g/dL    Total Bilirubin 0.6 0.2 - 1.2 mg/dL    Alkaline Phosphatase 114 (H) 35 - 104 U/L    ALT 15 5 - 33 U/L    AST 12 5 - 32 U/L   CBC   Result Value Ref Range    WBC 6.3 4.8 - 10.8 K/uL    RBC 4.27 4.20 - 5.40 M/uL    Hemoglobin 12.5 12.0 - 16.0 g/dL    Hematocrit 39.3 37.0 - 47.0 %    MCV 92.0 81.0 - 99.0 fL    MCH 29.3 27.0 - 31.0 pg    MCHC 31.8 (L) 33.0 - 37.0 g/dL    RDW 13.0 11.5 - 14.5 %    Platelets 227 571 - 330 K/uL    MPV 11.8 9.4 - 12.3 fL   Hemoglobin A1C   Result Value Ref Range    Hemoglobin A1C 5.6 4.0 - 6.0 %       ASSESSMENT/ PLAN:  1. Medicare annual wellness visit, subsequent  Chart, medications, labs, vaccines reviewed. Keep up to date with routine care and follow up.   Call with any problems or complaints. Keep up to date with routine screening recomendations and vaccines. 2. Multinodular goiter  Thyroid surgery planned - having dysphagia related to thyromegally  - TSH; Future  - TSH; Future  - CBC with Auto Differential; Future  - Comprehensive Metabolic Panel; Future    3. Pure hypercholesterolemia    - Lipid Panel; Future    4. Hyperparathyroidism ([address]  Follow calcium less than 11.4 continue this management we did clarify that ENT is aware of this and will explore the parathyroids while doing her surgery and if there appears to be a parathyroid adenoma we will remove  - CBC; Future  - Comprehensive Metabolic Panel; Future  - TSH; Future  - PTH, Intact; Future    5. Impaired fasting glucose  Check hemoglobin A1c and follow  - Hemoglobin A1C; Future    6. Obstructive sleep apnea  Patient is very compliant with her CPAP wears it nightly benefits from its use    7. Paroxysmal atrial fibrillation (HCC)  Stable atrial fibs on Eliquis Cardizem CD    8. Primary osteoarthritis involving multiple joints  Continue to follow her arthritis periodic physical therapy    9. Primary hypertension  Overall blood pressure control is good continue with this medical regimen reviewed her meds. No longer taking Bumex remove that from her chart multiple medications good blood pressure control    10. Spinal stenosis of lumbar region, unspecified whether neurogenic claudication present  She has periodically gone to physical therapy we are glad to refer her there if she feels needed right now she is doing okay                      Medicare Annual Wellness Visit    Ketan Gale is here for Medicare AWV (thyroid surgery tomorrow)    Assessment & Plan   Medicare annual wellness visit, subsequent  Multinodular goiter  -     TSH; Future  -     TSH; Future  -     CBC with Auto Differential; Future  -     Comprehensive Metabolic Panel;  Future  Pure hypercholesterolemia  -     Lipid Panel; Future  Hyperparathyroidism ([facility]  -     CBC; Future  -     Comprehensive Metabolic Panel; Future  -     TSH; Future  -     PTH, Intact; Future  Impaired fasting glucose  -     Hemoglobin A1C; Future  Obstructive sleep apnea  Paroxysmal atrial fibrillation (HCC)  Primary osteoarthritis involving multiple joints  Primary hypertension  Spinal stenosis of lumbar region, unspecified whether neurogenic claudication present      Recommendations for Preventive Services Due: see orders and patient instructions/AVS.  Recommended screening schedule for the next 5-10 years is provided to the patient in written form: see Patient Instructions/AVS.     Return in about 6 weeks (around 7/5/2022) for ov. Subjective   See attached note    Patient's complete Health Risk Assessment and screening values have been reviewed and are found in Flowsheets. The following problems were reviewed today and where indicated follow up appointments were made and/or referrals ordered.     Positive Risk Factor Screenings with Interventions:    Fall Risk:  Do you feel unsteady or are you worried about falling? : (!) yes  2 or more falls in past year?: no  Fall with injury in past year?: no     Fall Risk Interventions:    · Caution against falling              Health Habits/Nutrition:     Physical Activity: Sufficiently Active    Days of Exercise per Week: 3 days    Minutes of Exercise per Session: 60 min     Have you lost any weight without trying in the past 3 months?: No  Body mass index: (!) 36.31  Have you seen the dentist within the past year?: Yes    Health Habits/Nutrition Interventions:  · Low carb diet    Hearing/Vision:  Do you or your family notice any trouble with your hearing that hasn't been managed with hearing aids?: No  Do you have difficulty driving, watching TV, or doing any of your daily activities because of your eyesight?: No  Have you had an eye exam within the past year?: (!) No  No exam data present    Hearing/Vision Interventions:  · Keep up to date with eye exams            Objective   Vitals:    05/24/22 0951   BP: 130/70   Pulse: 66   SpO2: 97%   Weight: 225 lb (102.1 kg)   Height: 5' 6\" (1.676 m)      Body mass index is 36.32 kg/m². No Known Allergies  Prior to Visit Medications    Medication Sig Taking?  Authorizing Provider   rosuvastatin (CRESTOR) 10 MG tablet TAKE 1 TABLET DAILY Yes Lore Perkins MD   losartan (COZAAR) 100 MG tablet Take 1 tablet by mouth daily Yes Lore Perkins MD   apixaban (ELIQUIS) 5 MG TABS tablet Take 1 tablet by mouth 2 times daily Yes Lore Perkins MD   potassium chloride (KLOR-CON M) 10 MEQ extended release tablet Take 1 tablet by mouth 2 times daily Yes Lore Perkins MD   cloNIDine (CATAPRES) 0.1 MG tablet Take 1 tablet by mouth 2 times daily Yes Lore Perkins MD   labetalol (NORMODYNE) 300 MG tablet Take 1 tablet by mouth 2 times daily Yes Lore Perkins MD   dilTIAZem (CARDIZEM CD) 360 MG extended release capsule Take 1 capsule by mouth daily Yes Lore Perkins MD   flecainide (TAMBOCOR) 50 MG tablet Take 1 tablet by mouth 2 times daily  Lore Perkins MD   bumetanide (BUMEX) 1 MG tablet Take 1 tablet by mouth daily  Lore Perkins MD   fluticasone (FLONASE) 50 MCG/ACT nasal spray USE 2 SPRAYS IN EACH NOSTRIL DAILY AS NEEDED FOR RHINITIS OR ALLERGIES  Lore Perkins MD   famciclovir (FAMVIR) 500 MG tablet TAKE 3 TABLETS DAILY AS NEEDED FOR COLD SORES  Lore Perkins MD   CPAP Machine MISC by Does not apply route  Historical Provider, MD   Multiple Vitamins-Minerals (PRESERVISION AREDS 2+MULTI VIT PO) Take by mouth Indications: bid  Historical Provider, MD   FISH OIL Take 1,000 mg by mouth daily   Historical Provider, MD Cao (Including outside providers/suppliers regularly involved in providing care):   Patient Care Team:  Lore Perkins MD as PCP - General (Internal Medicine)  Lore Perkins MD as PCP - [facility] Empaneled Provider     Reviewed and updated this visit:  Allergies Meds

## 2022-05-25 ENCOUNTER — ANESTHESIA (OUTPATIENT)
Dept: PERIOP | Facility: HOSPITAL | Age: 77
End: 2022-05-25

## 2022-05-25 ENCOUNTER — HOSPITAL ENCOUNTER (OUTPATIENT)
Facility: HOSPITAL | Age: 77
Discharge: HOME | End: 2022-05-27
Attending: OTOLARYNGOLOGY | Admitting: OTOLARYNGOLOGY

## 2022-05-25 ENCOUNTER — ANESTHESIA EVENT (OUTPATIENT)
Dept: PERIOP | Facility: HOSPITAL | Age: 77
End: 2022-05-25

## 2022-05-25 DIAGNOSIS — E89.2 STATUS POST PARATHYROIDECTOMY: ICD-10-CM

## 2022-05-25 DIAGNOSIS — E21.3 HYPERPARATHYROIDISM: ICD-10-CM

## 2022-05-25 DIAGNOSIS — E83.52 HYPERCALCEMIA: ICD-10-CM

## 2022-05-25 DIAGNOSIS — E89.0 STATUS POST TOTAL THYROIDECTOMY: Primary | ICD-10-CM

## 2022-05-25 LAB
PTH-INTACT SERPL-MCNC: 134.3 PG/ML (ref 15–65)
PTH-INTACT SERPL-SCNC: 21.5 PG/ML (ref 15–65)

## 2022-05-25 PROCEDURE — 25010000002 FENTANYL CITRATE (PF) 100 MCG/2ML SOLUTION: Performed by: NURSE ANESTHETIST, CERTIFIED REGISTERED

## 2022-05-25 PROCEDURE — A9270 NON-COVERED ITEM OR SERVICE: HCPCS | Performed by: OTOLARYNGOLOGY

## 2022-05-25 PROCEDURE — 25010000002 DROPERIDOL PER 5 MG: Performed by: NURSE ANESTHETIST, CERTIFIED REGISTERED

## 2022-05-25 PROCEDURE — 0 POTASSIUM CHLORIDE PER 2 MEQ: Performed by: OTOLARYNGOLOGY

## 2022-05-25 PROCEDURE — 83970 ASSAY OF PARATHORMONE: CPT | Performed by: OTOLARYNGOLOGY

## 2022-05-25 PROCEDURE — 63710000001 ROSUVASTATIN 10 MG TABLET: Performed by: OTOLARYNGOLOGY

## 2022-05-25 PROCEDURE — 88307 TISSUE EXAM BY PATHOLOGIST: CPT | Performed by: OTOLARYNGOLOGY

## 2022-05-25 PROCEDURE — 63710000001 CLONIDINE 0.1 MG TABLET: Performed by: OTOLARYNGOLOGY

## 2022-05-25 PROCEDURE — 60240 REMOVAL OF THYROID: CPT | Performed by: OTOLARYNGOLOGY

## 2022-05-25 PROCEDURE — 88311 DECALCIFY TISSUE: CPT | Performed by: OTOLARYNGOLOGY

## 2022-05-25 PROCEDURE — 83970 ASSAY OF PARATHORMONE: CPT | Performed by: EMERGENCY MEDICINE

## 2022-05-25 PROCEDURE — 63710000001 LOSARTAN 50 MG TABLET: Performed by: OTOLARYNGOLOGY

## 2022-05-25 PROCEDURE — 88331 PATH CONSLTJ SURG 1 BLK 1SPC: CPT | Performed by: PATHOLOGY

## 2022-05-25 PROCEDURE — 25010000002 ONDANSETRON PER 1 MG: Performed by: NURSE ANESTHETIST, CERTIFIED REGISTERED

## 2022-05-25 PROCEDURE — 25010000002 DEXAMETHASONE PER 1 MG: Performed by: NURSE ANESTHETIST, CERTIFIED REGISTERED

## 2022-05-25 PROCEDURE — 88305 TISSUE EXAM BY PATHOLOGIST: CPT | Performed by: OTOLARYNGOLOGY

## 2022-05-25 PROCEDURE — 63710000001 DILTIAZEM CD 240 MG CAPSULE SUSTAINED-RELEASE 24 HR: Performed by: OTOLARYNGOLOGY

## 2022-05-25 PROCEDURE — 63710000001 FLECAINIDE 50 MG TABLET: Performed by: OTOLARYNGOLOGY

## 2022-05-25 PROCEDURE — 63710000001 CALCIUM CARBONATE 500 MG CHEWABLE TABLET: Performed by: OTOLARYNGOLOGY

## 2022-05-25 PROCEDURE — 25010000002 PROPOFOL 10 MG/ML EMULSION: Performed by: NURSE ANESTHETIST, CERTIFIED REGISTERED

## 2022-05-25 PROCEDURE — 82310 ASSAY OF CALCIUM: CPT | Performed by: OTOLARYNGOLOGY

## 2022-05-25 PROCEDURE — 60500 EXPLORE PARATHYROID GLANDS: CPT | Performed by: OTOLARYNGOLOGY

## 2022-05-25 PROCEDURE — 63710000001 LABETALOL 200 MG TABLET: Performed by: OTOLARYNGOLOGY

## 2022-05-25 RX ORDER — SODIUM CHLORIDE 0.9 % (FLUSH) 0.9 %
3 SYRINGE (ML) INJECTION EVERY 12 HOURS SCHEDULED
Status: DISCONTINUED | OUTPATIENT
Start: 2022-05-25 | End: 2022-05-25 | Stop reason: HOSPADM

## 2022-05-25 RX ORDER — LIDOCAINE HYDROCHLORIDE 20 MG/ML
INJECTION, SOLUTION EPIDURAL; INFILTRATION; INTRACAUDAL; PERINEURAL AS NEEDED
Status: DISCONTINUED | OUTPATIENT
Start: 2022-05-25 | End: 2022-05-25 | Stop reason: SURG

## 2022-05-25 RX ORDER — FENTANYL CITRATE 50 UG/ML
25 INJECTION, SOLUTION INTRAMUSCULAR; INTRAVENOUS
Status: DISCONTINUED | OUTPATIENT
Start: 2022-05-25 | End: 2022-05-25 | Stop reason: HOSPADM

## 2022-05-25 RX ORDER — HYDROMORPHONE HYDROCHLORIDE 1 MG/ML
0.2 INJECTION, SOLUTION INTRAMUSCULAR; INTRAVENOUS; SUBCUTANEOUS
Status: DISCONTINUED | OUTPATIENT
Start: 2022-05-25 | End: 2022-05-25 | Stop reason: HOSPADM

## 2022-05-25 RX ORDER — PROPOFOL 10 MG/ML
VIAL (ML) INTRAVENOUS AS NEEDED
Status: DISCONTINUED | OUTPATIENT
Start: 2022-05-25 | End: 2022-05-25 | Stop reason: SURG

## 2022-05-25 RX ORDER — FLECAINIDE ACETATE 50 MG/1
50 TABLET ORAL 2 TIMES DAILY
Status: DISCONTINUED | OUTPATIENT
Start: 2022-05-25 | End: 2022-05-26

## 2022-05-25 RX ORDER — CLONIDINE HYDROCHLORIDE 0.1 MG/1
0.1 TABLET ORAL EVERY 12 HOURS SCHEDULED
Status: DISCONTINUED | OUTPATIENT
Start: 2022-05-25 | End: 2022-05-27 | Stop reason: HOSPADM

## 2022-05-25 RX ORDER — DEXAMETHASONE SODIUM PHOSPHATE 4 MG/ML
INJECTION, SOLUTION INTRA-ARTICULAR; INTRALESIONAL; INTRAMUSCULAR; INTRAVENOUS; SOFT TISSUE AS NEEDED
Status: DISCONTINUED | OUTPATIENT
Start: 2022-05-25 | End: 2022-05-25 | Stop reason: SURG

## 2022-05-25 RX ORDER — KETAMINE HCL IN NACL, ISO-OSM 100MG/10ML
SYRINGE (ML) INJECTION AS NEEDED
Status: DISCONTINUED | OUTPATIENT
Start: 2022-05-25 | End: 2022-05-25 | Stop reason: SURG

## 2022-05-25 RX ORDER — SODIUM CHLORIDE 9 MG/ML
INJECTION, SOLUTION INTRAVENOUS AS NEEDED
Status: DISCONTINUED | OUTPATIENT
Start: 2022-05-25 | End: 2022-05-25 | Stop reason: HOSPADM

## 2022-05-25 RX ORDER — SODIUM CHLORIDE 0.9 % (FLUSH) 0.9 %
3 SYRINGE (ML) INJECTION AS NEEDED
Status: DISCONTINUED | OUTPATIENT
Start: 2022-05-25 | End: 2022-05-25 | Stop reason: HOSPADM

## 2022-05-25 RX ORDER — SODIUM CHLORIDE, SODIUM LACTATE, POTASSIUM CHLORIDE, CALCIUM CHLORIDE 600; 310; 30; 20 MG/100ML; MG/100ML; MG/100ML; MG/100ML
1000 INJECTION, SOLUTION INTRAVENOUS CONTINUOUS
Status: DISCONTINUED | OUTPATIENT
Start: 2022-05-25 | End: 2022-05-25 | Stop reason: HOSPADM

## 2022-05-25 RX ORDER — EPHEDRINE SULFATE 50 MG/ML
INJECTION, SOLUTION INTRAVENOUS AS NEEDED
Status: DISCONTINUED | OUTPATIENT
Start: 2022-05-25 | End: 2022-05-25 | Stop reason: SURG

## 2022-05-25 RX ORDER — LIDOCAINE HYDROCHLORIDE 10 MG/ML
0.5 INJECTION, SOLUTION EPIDURAL; INFILTRATION; INTRACAUDAL; PERINEURAL ONCE AS NEEDED
Status: DISCONTINUED | OUTPATIENT
Start: 2022-05-25 | End: 2022-05-25 | Stop reason: HOSPADM

## 2022-05-25 RX ORDER — IBUPROFEN 200 MG
200 TABLET ORAL EVERY 4 HOURS PRN
Status: DISCONTINUED | OUTPATIENT
Start: 2022-05-25 | End: 2022-05-27 | Stop reason: HOSPADM

## 2022-05-25 RX ORDER — LIDOCAINE HYDROCHLORIDE AND EPINEPHRINE 10; 10 MG/ML; UG/ML
INJECTION, SOLUTION INFILTRATION; PERINEURAL AS NEEDED
Status: DISCONTINUED | OUTPATIENT
Start: 2022-05-25 | End: 2022-05-25 | Stop reason: HOSPADM

## 2022-05-25 RX ORDER — LABETALOL HYDROCHLORIDE 5 MG/ML
5 INJECTION, SOLUTION INTRAVENOUS
Status: DISCONTINUED | OUTPATIENT
Start: 2022-05-25 | End: 2022-05-25 | Stop reason: HOSPADM

## 2022-05-25 RX ORDER — SODIUM CHLORIDE AND POTASSIUM CHLORIDE 150; 450 MG/100ML; MG/100ML
75 INJECTION, SOLUTION INTRAVENOUS CONTINUOUS
Status: DISCONTINUED | OUTPATIENT
Start: 2022-05-25 | End: 2022-05-26

## 2022-05-25 RX ORDER — ONDANSETRON 2 MG/ML
4 INJECTION INTRAMUSCULAR; INTRAVENOUS
Status: DISCONTINUED | OUTPATIENT
Start: 2022-05-25 | End: 2022-05-25 | Stop reason: HOSPADM

## 2022-05-25 RX ORDER — FENTANYL CITRATE 50 UG/ML
INJECTION, SOLUTION INTRAMUSCULAR; INTRAVENOUS AS NEEDED
Status: DISCONTINUED | OUTPATIENT
Start: 2022-05-25 | End: 2022-05-25 | Stop reason: SURG

## 2022-05-25 RX ORDER — DILTIAZEM HYDROCHLORIDE 240 MG/1
240 CAPSULE, COATED, EXTENDED RELEASE ORAL NIGHTLY
Status: DISCONTINUED | OUTPATIENT
Start: 2022-05-25 | End: 2022-05-26

## 2022-05-25 RX ORDER — SODIUM CHLORIDE 0.9 % (FLUSH) 0.9 %
3-10 SYRINGE (ML) INJECTION AS NEEDED
Status: DISCONTINUED | OUTPATIENT
Start: 2022-05-25 | End: 2022-05-25 | Stop reason: HOSPADM

## 2022-05-25 RX ORDER — HYDROCODONE BITARTRATE AND ACETAMINOPHEN 5; 325 MG/1; MG/1
1 TABLET ORAL EVERY 4 HOURS PRN
Status: DISCONTINUED | OUTPATIENT
Start: 2022-05-25 | End: 2022-05-27 | Stop reason: HOSPADM

## 2022-05-25 RX ORDER — ONDANSETRON 2 MG/ML
4 INJECTION INTRAMUSCULAR; INTRAVENOUS ONCE AS NEEDED
Status: DISCONTINUED | OUTPATIENT
Start: 2022-05-25 | End: 2022-05-27 | Stop reason: HOSPADM

## 2022-05-25 RX ORDER — NALOXONE HCL 0.4 MG/ML
0.04 VIAL (ML) INJECTION AS NEEDED
Status: DISCONTINUED | OUTPATIENT
Start: 2022-05-25 | End: 2022-05-25 | Stop reason: HOSPADM

## 2022-05-25 RX ORDER — FLUTICASONE PROPIONATE 50 MCG
2 SPRAY, SUSPENSION (ML) NASAL DAILY PRN
Status: DISCONTINUED | OUTPATIENT
Start: 2022-05-25 | End: 2022-05-27 | Stop reason: HOSPADM

## 2022-05-25 RX ORDER — ROSUVASTATIN CALCIUM 10 MG/1
10 TABLET, COATED ORAL NIGHTLY
Status: DISCONTINUED | OUTPATIENT
Start: 2022-05-25 | End: 2022-05-27 | Stop reason: HOSPADM

## 2022-05-25 RX ORDER — DROPERIDOL 2.5 MG/ML
INJECTION, SOLUTION INTRAMUSCULAR; INTRAVENOUS AS NEEDED
Status: DISCONTINUED | OUTPATIENT
Start: 2022-05-25 | End: 2022-05-25 | Stop reason: SURG

## 2022-05-25 RX ORDER — DROPERIDOL 2.5 MG/ML
0.62 INJECTION, SOLUTION INTRAMUSCULAR; INTRAVENOUS ONCE AS NEEDED
Status: DISCONTINUED | OUTPATIENT
Start: 2022-05-25 | End: 2022-05-25 | Stop reason: HOSPADM

## 2022-05-25 RX ORDER — LEVOTHYROXINE SODIUM 0.1 MG/1
100 TABLET ORAL
Status: DISCONTINUED | OUTPATIENT
Start: 2022-05-26 | End: 2022-05-27 | Stop reason: HOSPADM

## 2022-05-25 RX ORDER — LABETALOL 200 MG/1
300 TABLET, FILM COATED ORAL EVERY 12 HOURS SCHEDULED
Status: DISCONTINUED | OUTPATIENT
Start: 2022-05-25 | End: 2022-05-27 | Stop reason: HOSPADM

## 2022-05-25 RX ORDER — FUROSEMIDE 40 MG/1
40 TABLET ORAL DAILY
Status: DISCONTINUED | OUTPATIENT
Start: 2022-05-26 | End: 2022-05-27 | Stop reason: HOSPADM

## 2022-05-25 RX ORDER — ONDANSETRON 2 MG/ML
INJECTION INTRAMUSCULAR; INTRAVENOUS AS NEEDED
Status: DISCONTINUED | OUTPATIENT
Start: 2022-05-25 | End: 2022-05-25 | Stop reason: SURG

## 2022-05-25 RX ORDER — OXYCODONE AND ACETAMINOPHEN 10; 325 MG/1; MG/1
1 TABLET ORAL ONCE AS NEEDED
Status: DISCONTINUED | OUTPATIENT
Start: 2022-05-25 | End: 2022-05-25 | Stop reason: HOSPADM

## 2022-05-25 RX ORDER — ACETAMINOPHEN 500 MG
1000 TABLET ORAL ONCE
Status: DISCONTINUED | OUTPATIENT
Start: 2022-05-25 | End: 2022-05-25 | Stop reason: HOSPADM

## 2022-05-25 RX ORDER — LOSARTAN POTASSIUM 50 MG/1
100 TABLET ORAL DAILY
Status: DISCONTINUED | OUTPATIENT
Start: 2022-05-25 | End: 2022-05-27 | Stop reason: HOSPADM

## 2022-05-25 RX ORDER — CALCIUM CARBONATE 200(500)MG
3 TABLET,CHEWABLE ORAL 3 TIMES DAILY
Status: DISCONTINUED | OUTPATIENT
Start: 2022-05-25 | End: 2022-05-26

## 2022-05-25 RX ORDER — IBUPROFEN 600 MG/1
600 TABLET ORAL ONCE AS NEEDED
Status: DISCONTINUED | OUTPATIENT
Start: 2022-05-25 | End: 2022-05-25 | Stop reason: HOSPADM

## 2022-05-25 RX ORDER — SODIUM CHLORIDE, SODIUM LACTATE, POTASSIUM CHLORIDE, CALCIUM CHLORIDE 600; 310; 30; 20 MG/100ML; MG/100ML; MG/100ML; MG/100ML
100 INJECTION, SOLUTION INTRAVENOUS CONTINUOUS
Status: DISCONTINUED | OUTPATIENT
Start: 2022-05-25 | End: 2022-05-25

## 2022-05-25 RX ORDER — SUCCINYLCHOLINE/SOD CL,ISO/PF 200MG/10ML
SYRINGE (ML) INTRAVENOUS AS NEEDED
Status: DISCONTINUED | OUTPATIENT
Start: 2022-05-25 | End: 2022-05-25 | Stop reason: SURG

## 2022-05-25 RX ORDER — FLUMAZENIL 0.1 MG/ML
0.2 INJECTION INTRAVENOUS AS NEEDED
Status: DISCONTINUED | OUTPATIENT
Start: 2022-05-25 | End: 2022-05-25 | Stop reason: HOSPADM

## 2022-05-25 RX ADMIN — EPHEDRINE SULFATE 25 MG: 50 INJECTION INTRAVENOUS at 14:59

## 2022-05-25 RX ADMIN — FENTANYL CITRATE 100 MCG: 50 INJECTION, SOLUTION INTRAMUSCULAR; INTRAVENOUS at 14:45

## 2022-05-25 RX ADMIN — ONDANSETRON 4 MG: 2 INJECTION INTRAMUSCULAR; INTRAVENOUS at 16:31

## 2022-05-25 RX ADMIN — GLYCOPYRROLATE 0.1 MG: 0.2 INJECTION INTRAMUSCULAR; INTRAVENOUS at 14:45

## 2022-05-25 RX ADMIN — Medication 100 MG: at 14:32

## 2022-05-25 RX ADMIN — DILTIAZEM HYDROCHLORIDE 240 MG: 240 CAPSULE, EXTENDED RELEASE ORAL at 21:01

## 2022-05-25 RX ADMIN — DROPERIDOL 0.62 MG: 2.5 INJECTION, SOLUTION INTRAMUSCULAR; INTRAVENOUS at 15:07

## 2022-05-25 RX ADMIN — DEXAMETHASONE SODIUM PHOSPHATE 8 MG: 4 INJECTION, SOLUTION INTRA-ARTICULAR; INTRALESIONAL; INTRAMUSCULAR; INTRAVENOUS; SOFT TISSUE at 14:45

## 2022-05-25 RX ADMIN — LABETALOL HYDROCHLORIDE 300 MG: 200 TABLET, FILM COATED ORAL at 21:01

## 2022-05-25 RX ADMIN — POTASSIUM CHLORIDE AND SODIUM CHLORIDE 75 ML/HR: 450; 150 INJECTION, SOLUTION INTRAVENOUS at 19:39

## 2022-05-25 RX ADMIN — SODIUM CHLORIDE, POTASSIUM CHLORIDE, SODIUM LACTATE AND CALCIUM CHLORIDE 1000 ML: 600; 310; 30; 20 INJECTION, SOLUTION INTRAVENOUS at 10:17

## 2022-05-25 RX ADMIN — DEXMEDETOMIDINE HYDROCHLORIDE 20 MCG: 4 INJECTION, SOLUTION INTRAVENOUS at 15:06

## 2022-05-25 RX ADMIN — CLONIDINE HYDROCHLORIDE 0.1 MG: 0.1 TABLET ORAL at 21:08

## 2022-05-25 RX ADMIN — LIDOCAINE HYDROCHLORIDE 100 MG: 20 INJECTION, SOLUTION EPIDURAL; INFILTRATION; INTRACAUDAL; PERINEURAL at 14:32

## 2022-05-25 RX ADMIN — ROSUVASTATIN CALCIUM 10 MG: 10 TABLET, FILM COATED ORAL at 21:01

## 2022-05-25 RX ADMIN — PROPOFOL 150 MG: 10 INJECTION, EMULSION INTRAVENOUS at 14:32

## 2022-05-25 RX ADMIN — FENTANYL CITRATE 50 MCG: 50 INJECTION, SOLUTION INTRAMUSCULAR; INTRAVENOUS at 15:17

## 2022-05-25 RX ADMIN — FLECAINIDE ACETATE 50 MG: 50 TABLET ORAL at 21:01

## 2022-05-25 RX ADMIN — Medication 50 MG: at 14:45

## 2022-05-25 RX ADMIN — PROPOFOL 50 MG: 10 INJECTION, EMULSION INTRAVENOUS at 15:20

## 2022-05-25 RX ADMIN — ANTACID TABLETS 3 TABLET: 500 TABLET, CHEWABLE ORAL at 21:01

## 2022-05-25 RX ADMIN — FENTANYL CITRATE 50 MCG: 50 INJECTION, SOLUTION INTRAMUSCULAR; INTRAVENOUS at 15:20

## 2022-05-25 RX ADMIN — LOSARTAN POTASSIUM 100 MG: 50 TABLET, FILM COATED ORAL at 19:41

## 2022-05-25 NOTE — ANESTHESIA PROCEDURE NOTES
Airway  Urgency: elective    Airway not difficult    General Information and Staff    Patient location during procedure: OR  CRNA/CAA: FATOUMATA Light CRNA    Indications and Patient Condition  Indications for airway management: airway protection    Preoxygenated: yes  MILS maintained throughout  Mask difficulty assessment: 1 - vent by mask    Final Airway Details  Final airway type: endotracheal airway      Successful airway: ETT  Cuffed: yes   Successful intubation technique: direct laryngoscopy  Facilitating devices/methods: intubating stylet  Endotracheal tube insertion site: oral  Blade: Fay  Blade size: 3  ETT size (mm): 7.0  Cormack-Lehane Classification: grade I - full view of glottis  Placement verified by: chest auscultation and capnometry   Inital cuff pressure (cm H2O): 24  Cuff volume (mL): 6  Measured from: gums  Number of attempts at approach: 1  Assessment: lips, teeth, and gum same as pre-op and atraumatic intubation

## 2022-05-25 NOTE — ANESTHESIA PREPROCEDURE EVALUATION
Anesthesia Evaluation     history of anesthetic complications: PONV  NPO Solid Status: > 8 hours  NPO Liquid Status: > 8 hours           Airway   Mallampati: I  TM distance: >3 FB  Neck ROM: full  No difficulty expected  Dental          Pulmonary    (+) sleep apnea on CPAP,   Cardiovascular   Exercise tolerance: poor (<4 METS)    (+) hypertension, dysrhythmias Atrial Fib, hyperlipidemia,     ROS comment: Echo 3/2022  · Left ventricular ejection fraction appears to be 66 - 70%. Left ventricular systolic function is normal.  · Left ventricular diastolic function is consistent with (grade II w/high LAP) pseudonormalization.  · Normal right ventricular cavity size and systolic function noted.  · Estimated right ventricular systolic pressure from tricuspid regurgitation is mildly elevated (35-45 mmHg).  · There is no significant (greater than mild) valvular dysfunction.        Neuro/Psych  (-) seizures, TIA, CVA  GI/Hepatic/Renal/Endo    (+) obesity,   thyroid problem (multinodular goiter)   (-) no renal disease, diabetes    Musculoskeletal     Abdominal    Substance History      OB/GYN          Other   arthritis,                      Anesthesia Plan    ASA 3     general     intravenous induction     Anesthetic plan, all risks, benefits, and alternatives have been provided, discussed and informed consent has been obtained with: patient.        CODE STATUS:

## 2022-05-26 LAB
CALCIUM SPEC-SCNC: 10.2 MG/DL (ref 8.6–10.5)
CALCIUM SPEC-SCNC: 8.9 MG/DL (ref 8.6–10.5)
CALCIUM SPEC-SCNC: 9.4 MG/DL (ref 8.6–10.5)
CALCIUM SPEC-SCNC: 9.5 MG/DL (ref 8.6–10.5)

## 2022-05-26 PROCEDURE — A9270 NON-COVERED ITEM OR SERVICE: HCPCS | Performed by: OTOLARYNGOLOGY

## 2022-05-26 PROCEDURE — 63710000001 LOSARTAN 50 MG TABLET: Performed by: OTOLARYNGOLOGY

## 2022-05-26 PROCEDURE — 63710000001 LABETALOL 200 MG TABLET: Performed by: OTOLARYNGOLOGY

## 2022-05-26 PROCEDURE — 63710000001 CALCIUM CARBONATE 500 MG CHEWABLE TABLET: Performed by: OTOLARYNGOLOGY

## 2022-05-26 PROCEDURE — A9270 NON-COVERED ITEM OR SERVICE: HCPCS | Performed by: EMERGENCY MEDICINE

## 2022-05-26 PROCEDURE — 63710000001 DILTIAZEM CD 120 MG CAPSULE SUSTAINED-RELEASE 24 HR: Performed by: EMERGENCY MEDICINE

## 2022-05-26 PROCEDURE — 63710000001 CLONIDINE 0.1 MG TABLET: Performed by: OTOLARYNGOLOGY

## 2022-05-26 PROCEDURE — 63710000001 LEVOTHYROXINE 100 MCG TABLET: Performed by: OTOLARYNGOLOGY

## 2022-05-26 PROCEDURE — 99024 POSTOP FOLLOW-UP VISIT: CPT | Performed by: OTOLARYNGOLOGY

## 2022-05-26 PROCEDURE — 82310 ASSAY OF CALCIUM: CPT | Performed by: OTOLARYNGOLOGY

## 2022-05-26 PROCEDURE — 99213 OFFICE O/P EST LOW 20 MIN: CPT | Performed by: INTERNAL MEDICINE

## 2022-05-26 PROCEDURE — 63710000001 DILTIAZEM CD 240 MG CAPSULE SUSTAINED-RELEASE 24 HR: Performed by: EMERGENCY MEDICINE

## 2022-05-26 PROCEDURE — 63710000001 FLECAINIDE 50 MG TABLET: Performed by: OTOLARYNGOLOGY

## 2022-05-26 PROCEDURE — 63710000001 ROSUVASTATIN 10 MG TABLET: Performed by: OTOLARYNGOLOGY

## 2022-05-26 PROCEDURE — 63710000001 FUROSEMIDE 40 MG TABLET: Performed by: OTOLARYNGOLOGY

## 2022-05-26 RX ADMIN — DILTIAZEM HYDROCHLORIDE 360 MG: 240 CAPSULE, EXTENDED RELEASE ORAL at 07:34

## 2022-05-26 RX ADMIN — LOSARTAN POTASSIUM 100 MG: 50 TABLET, FILM COATED ORAL at 08:27

## 2022-05-26 RX ADMIN — CLONIDINE HYDROCHLORIDE 0.1 MG: 0.1 TABLET ORAL at 08:30

## 2022-05-26 RX ADMIN — FUROSEMIDE 40 MG: 40 TABLET ORAL at 08:27

## 2022-05-26 RX ADMIN — ANTACID TABLETS 3 TABLET: 500 TABLET, CHEWABLE ORAL at 08:27

## 2022-05-26 RX ADMIN — LABETALOL HYDROCHLORIDE 300 MG: 200 TABLET, FILM COATED ORAL at 21:24

## 2022-05-26 RX ADMIN — CLONIDINE HYDROCHLORIDE 0.1 MG: 0.1 TABLET ORAL at 21:24

## 2022-05-26 RX ADMIN — LABETALOL HYDROCHLORIDE 300 MG: 200 TABLET, FILM COATED ORAL at 08:27

## 2022-05-26 RX ADMIN — FLECAINIDE ACETATE 50 MG: 50 TABLET ORAL at 06:57

## 2022-05-26 RX ADMIN — ROSUVASTATIN CALCIUM 10 MG: 10 TABLET, FILM COATED ORAL at 21:24

## 2022-05-26 RX ADMIN — LEVOTHYROXINE SODIUM 100 MCG: 100 TABLET ORAL at 08:28

## 2022-05-27 VITALS
SYSTOLIC BLOOD PRESSURE: 132 MMHG | DIASTOLIC BLOOD PRESSURE: 72 MMHG | WEIGHT: 228.18 LBS | HEIGHT: 66 IN | RESPIRATION RATE: 16 BRPM | OXYGEN SATURATION: 100 % | HEART RATE: 80 BPM | TEMPERATURE: 98 F | BODY MASS INDEX: 36.67 KG/M2

## 2022-05-27 PROBLEM — Z90.89 STATUS POST TOTAL THYROIDECTOMY: Status: ACTIVE | Noted: 2022-05-25

## 2022-05-27 PROBLEM — Z98.890 STATUS POST PARATHYROIDECTOMY: Status: ACTIVE | Noted: 2022-05-25

## 2022-05-27 PROBLEM — E21.3 HYPERPARATHYROIDISM: Status: RESOLVED | Noted: 2022-03-20 | Resolved: 2022-05-27

## 2022-05-27 PROBLEM — Z98.890 STATUS POST TOTAL THYROIDECTOMY: Status: ACTIVE | Noted: 2022-05-25

## 2022-05-27 PROBLEM — E04.2 NONTOXIC MULTINODULAR GOITER: Status: RESOLVED | Noted: 2022-03-20 | Resolved: 2022-05-27

## 2022-05-27 PROBLEM — E89.2 STATUS POST PARATHYROIDECTOMY (HCC): Status: ACTIVE | Noted: 2022-05-25

## 2022-05-27 PROBLEM — Z90.89 STATUS POST PARATHYROIDECTOMY: Status: ACTIVE | Noted: 2022-05-25

## 2022-05-27 PROBLEM — Z90.89 STATUS POST PARATHYROIDECTOMY: Status: ACTIVE | Noted: 2022-05-27

## 2022-05-27 PROBLEM — Z98.890 STATUS POST PARATHYROIDECTOMY: Status: ACTIVE | Noted: 2022-05-27

## 2022-05-27 PROBLEM — E01.0 THYROMEGALY: Status: RESOLVED | Noted: 2022-03-20 | Resolved: 2022-05-27

## 2022-05-27 PROBLEM — E83.52 HYPERCALCEMIA: Status: RESOLVED | Noted: 2022-03-20 | Resolved: 2022-05-27

## 2022-05-27 PROBLEM — E89.0 STATUS POST TOTAL THYROIDECTOMY: Status: ACTIVE | Noted: 2022-05-25

## 2022-05-27 PROBLEM — E89.2 STATUS POST PARATHYROIDECTOMY: Status: ACTIVE | Noted: 2022-05-27

## 2022-05-27 LAB
CALCIUM SPEC-SCNC: 8.5 MG/DL (ref 8.6–10.5)
CYTO UR: NORMAL
LAB AP CASE REPORT: NORMAL
LAB AP SYNOPTIC CHECKLIST: NORMAL
Lab: NORMAL
Lab: NORMAL
PATH REPORT.FINAL DX SPEC: NORMAL
PATH REPORT.GROSS SPEC: NORMAL

## 2022-05-27 PROCEDURE — 99024 POSTOP FOLLOW-UP VISIT: CPT | Performed by: OTOLARYNGOLOGY

## 2022-05-27 PROCEDURE — A9270 NON-COVERED ITEM OR SERVICE: HCPCS | Performed by: OTOLARYNGOLOGY

## 2022-05-27 PROCEDURE — A9270 NON-COVERED ITEM OR SERVICE: HCPCS | Performed by: EMERGENCY MEDICINE

## 2022-05-27 PROCEDURE — 63710000001 LABETALOL 200 MG TABLET: Performed by: OTOLARYNGOLOGY

## 2022-05-27 PROCEDURE — 63710000001 DILTIAZEM CD 120 MG CAPSULE SUSTAINED-RELEASE 24 HR: Performed by: EMERGENCY MEDICINE

## 2022-05-27 PROCEDURE — 63710000001 LOSARTAN 50 MG TABLET: Performed by: OTOLARYNGOLOGY

## 2022-05-27 PROCEDURE — 63710000001 LEVOTHYROXINE 100 MCG TABLET: Performed by: OTOLARYNGOLOGY

## 2022-05-27 PROCEDURE — 63710000001 FUROSEMIDE 40 MG TABLET: Performed by: OTOLARYNGOLOGY

## 2022-05-27 PROCEDURE — 99213 OFFICE O/P EST LOW 20 MIN: CPT | Performed by: INTERNAL MEDICINE

## 2022-05-27 PROCEDURE — 63710000001 CLONIDINE 0.1 MG TABLET: Performed by: OTOLARYNGOLOGY

## 2022-05-27 PROCEDURE — 82310 ASSAY OF CALCIUM: CPT | Performed by: OTOLARYNGOLOGY

## 2022-05-27 PROCEDURE — 63710000001 DILTIAZEM CD 240 MG CAPSULE SUSTAINED-RELEASE 24 HR: Performed by: EMERGENCY MEDICINE

## 2022-05-27 RX ORDER — ACETAMINOPHEN 325 MG/1
650 TABLET ORAL EVERY 4 HOURS PRN
COMMUNITY
Start: 2022-05-27 | End: 2022-07-19

## 2022-05-27 RX ORDER — LEVOTHYROXINE SODIUM 100 MCG
100 TABLET ORAL DAILY
Qty: 30 TABLET | Refills: 3 | Status: SHIPPED | OUTPATIENT
Start: 2022-05-27 | End: 2022-06-23

## 2022-05-27 RX ADMIN — FUROSEMIDE 40 MG: 40 TABLET ORAL at 09:03

## 2022-05-27 RX ADMIN — LABETALOL HYDROCHLORIDE 300 MG: 200 TABLET, FILM COATED ORAL at 09:03

## 2022-05-27 RX ADMIN — LOSARTAN POTASSIUM 100 MG: 50 TABLET, FILM COATED ORAL at 09:03

## 2022-05-27 RX ADMIN — CLONIDINE HYDROCHLORIDE 0.1 MG: 0.1 TABLET ORAL at 09:03

## 2022-05-27 RX ADMIN — LEVOTHYROXINE SODIUM 100 MCG: 100 TABLET ORAL at 09:03

## 2022-05-27 RX ADMIN — DILTIAZEM HYDROCHLORIDE 360 MG: 240 CAPSULE, EXTENDED RELEASE ORAL at 09:03

## 2022-05-29 ENCOUNTER — TELEPHONE (OUTPATIENT)
Dept: OTOLARYNGOLOGY | Facility: CLINIC | Age: 77
End: 2022-05-29

## 2022-05-29 ENCOUNTER — HOSPITAL ENCOUNTER (INPATIENT)
Facility: HOSPITAL | Age: 77
LOS: 3 days | Discharge: HOME | End: 2022-06-01
Attending: EMERGENCY MEDICINE | Admitting: INTERNAL MEDICINE

## 2022-05-29 DIAGNOSIS — Z78.9 DECREASED ACTIVITIES OF DAILY LIVING (ADL): ICD-10-CM

## 2022-05-29 DIAGNOSIS — I48.91 ATRIAL FIBRILLATION WITH RVR: Primary | ICD-10-CM

## 2022-05-29 DIAGNOSIS — E83.51 HYPOCALCEMIA: ICD-10-CM

## 2022-05-29 LAB
ALBUMIN SERPL-MCNC: 4.3 G/DL (ref 3.5–5.2)
ALBUMIN/GLOB SERPL: 1.7 G/DL
ALP SERPL-CCNC: 101 U/L (ref 39–117)
ALT SERPL W P-5'-P-CCNC: 13 U/L (ref 1–33)
ANION GAP SERPL CALCULATED.3IONS-SCNC: 16 MMOL/L (ref 5–15)
APTT PPP: 30.7 SECONDS (ref 24.1–35)
AST SERPL-CCNC: 11 U/L (ref 1–32)
BASOPHILS # BLD AUTO: 0.03 10*3/MM3 (ref 0–0.2)
BASOPHILS NFR BLD AUTO: 0.3 % (ref 0–1.5)
BILIRUB SERPL-MCNC: 0.4 MG/DL (ref 0–1.2)
BUN SERPL-MCNC: 15 MG/DL (ref 8–23)
BUN/CREAT SERPL: 15.5 (ref 7–25)
CALCIUM SPEC-SCNC: 7 MG/DL (ref 8.6–10.5)
CHLORIDE SERPL-SCNC: 100 MMOL/L (ref 98–107)
CO2 SERPL-SCNC: 25 MMOL/L (ref 22–29)
CREAT SERPL-MCNC: 0.97 MG/DL (ref 0.57–1)
DEPRECATED RDW RBC AUTO: 43.2 FL (ref 37–54)
EGFRCR SERPLBLD CKD-EPI 2021: 60.7 ML/MIN/1.73
EOSINOPHIL # BLD AUTO: 0.2 10*3/MM3 (ref 0–0.4)
EOSINOPHIL NFR BLD AUTO: 2 % (ref 0.3–6.2)
ERYTHROCYTE [DISTWIDTH] IN BLOOD BY AUTOMATED COUNT: 13.2 % (ref 12.3–15.4)
GLOBULIN UR ELPH-MCNC: 2.6 GM/DL
GLUCOSE SERPL-MCNC: 101 MG/DL (ref 65–99)
HCT VFR BLD AUTO: 41.5 % (ref 34–46.6)
HGB BLD-MCNC: 13.6 G/DL (ref 12–15.9)
IMM GRANULOCYTES # BLD AUTO: 0.03 10*3/MM3 (ref 0–0.05)
IMM GRANULOCYTES NFR BLD AUTO: 0.3 % (ref 0–0.5)
INR PPP: 1.24 (ref 0.91–1.09)
LYMPHOCYTES # BLD AUTO: 2.01 10*3/MM3 (ref 0.7–3.1)
LYMPHOCYTES NFR BLD AUTO: 19.9 % (ref 19.6–45.3)
MAGNESIUM SERPL-MCNC: 1.6 MG/DL (ref 1.6–2.4)
MCH RBC QN AUTO: 29.2 PG (ref 26.6–33)
MCHC RBC AUTO-ENTMCNC: 32.8 G/DL (ref 31.5–35.7)
MCV RBC AUTO: 89.2 FL (ref 79–97)
MONOCYTES # BLD AUTO: 0.95 10*3/MM3 (ref 0.1–0.9)
MONOCYTES NFR BLD AUTO: 9.4 % (ref 5–12)
NEUTROPHILS NFR BLD AUTO: 6.88 10*3/MM3 (ref 1.7–7)
NEUTROPHILS NFR BLD AUTO: 68.1 % (ref 42.7–76)
NRBC BLD AUTO-RTO: 0 /100 WBC (ref 0–0.2)
PHOSPHATE SERPL-MCNC: 5.6 MG/DL (ref 2.5–4.5)
PLATELET # BLD AUTO: 221 10*3/MM3 (ref 140–450)
PMV BLD AUTO: 10.8 FL (ref 6–12)
POTASSIUM SERPL-SCNC: 3.5 MMOL/L (ref 3.5–5.2)
PROT SERPL-MCNC: 6.9 G/DL (ref 6–8.5)
PROTHROMBIN TIME: 15.2 SECONDS (ref 11.9–14.6)
RBC # BLD AUTO: 4.65 10*6/MM3 (ref 3.77–5.28)
SARS-COV-2 RNA PNL SPEC NAA+PROBE: NOT DETECTED
SODIUM SERPL-SCNC: 141 MMOL/L (ref 136–145)
T4 FREE SERPL-MCNC: 0.96 NG/DL (ref 0.93–1.7)
TSH SERPL DL<=0.05 MIU/L-ACNC: 8.04 UIU/ML (ref 0.27–4.2)
WBC NRBC COR # BLD: 10.1 10*3/MM3 (ref 3.4–10.8)

## 2022-05-29 PROCEDURE — 84443 ASSAY THYROID STIM HORMONE: CPT | Performed by: EMERGENCY MEDICINE

## 2022-05-29 PROCEDURE — 84100 ASSAY OF PHOSPHORUS: CPT | Performed by: EMERGENCY MEDICINE

## 2022-05-29 PROCEDURE — 83735 ASSAY OF MAGNESIUM: CPT | Performed by: EMERGENCY MEDICINE

## 2022-05-29 PROCEDURE — 93010 ELECTROCARDIOGRAM REPORT: CPT | Performed by: EMERGENCY MEDICINE

## 2022-05-29 PROCEDURE — 85610 PROTHROMBIN TIME: CPT | Performed by: EMERGENCY MEDICINE

## 2022-05-29 PROCEDURE — 85730 THROMBOPLASTIN TIME PARTIAL: CPT | Performed by: EMERGENCY MEDICINE

## 2022-05-29 PROCEDURE — 93005 ELECTROCARDIOGRAM TRACING: CPT | Performed by: EMERGENCY MEDICINE

## 2022-05-29 PROCEDURE — 84439 ASSAY OF FREE THYROXINE: CPT | Performed by: EMERGENCY MEDICINE

## 2022-05-29 PROCEDURE — 80053 COMPREHEN METABOLIC PANEL: CPT | Performed by: EMERGENCY MEDICINE

## 2022-05-29 PROCEDURE — 99284 EMERGENCY DEPT VISIT MOD MDM: CPT

## 2022-05-29 PROCEDURE — 85025 COMPLETE CBC W/AUTO DIFF WBC: CPT | Performed by: EMERGENCY MEDICINE

## 2022-05-29 PROCEDURE — 87635 SARS-COV-2 COVID-19 AMP PRB: CPT | Performed by: EMERGENCY MEDICINE

## 2022-05-29 PROCEDURE — 93005 ELECTROCARDIOGRAM TRACING: CPT

## 2022-05-29 PROCEDURE — 25010000002 CALCIUM GLUCONATE PER 10 ML: Performed by: EMERGENCY MEDICINE

## 2022-05-29 RX ORDER — DILTIAZEM HCL IN NACL,ISO-OSM 125 MG/125
5-15 PLASTIC BAG, INJECTION (ML) INTRAVENOUS CONTINUOUS
Status: DISCONTINUED | OUTPATIENT
Start: 2022-05-29 | End: 2022-06-01 | Stop reason: HOSPADM

## 2022-05-29 RX ORDER — ROSUVASTATIN CALCIUM 10 MG/1
10 TABLET, COATED ORAL DAILY
Status: DISCONTINUED | OUTPATIENT
Start: 2022-05-30 | End: 2022-06-01 | Stop reason: HOSPADM

## 2022-05-29 RX ORDER — CLONIDINE HYDROCHLORIDE 0.1 MG/1
0.1 TABLET ORAL 2 TIMES DAILY
Status: DISCONTINUED | OUTPATIENT
Start: 2022-05-29 | End: 2022-06-01 | Stop reason: HOSPADM

## 2022-05-29 RX ORDER — SODIUM CHLORIDE 0.9 % (FLUSH) 0.9 %
10 SYRINGE (ML) INJECTION AS NEEDED
Status: DISCONTINUED | OUTPATIENT
Start: 2022-05-29 | End: 2022-06-01 | Stop reason: HOSPADM

## 2022-05-29 RX ORDER — LEVOTHYROXINE SODIUM 0.1 MG/1
100 TABLET ORAL
Status: DISCONTINUED | OUTPATIENT
Start: 2022-05-30 | End: 2022-06-01 | Stop reason: HOSPADM

## 2022-05-29 RX ORDER — ACETAMINOPHEN 325 MG/1
650 TABLET ORAL EVERY 4 HOURS PRN
Status: DISCONTINUED | OUTPATIENT
Start: 2022-05-29 | End: 2022-06-01 | Stop reason: HOSPADM

## 2022-05-29 RX ORDER — SODIUM CHLORIDE 9 MG/ML
30 INJECTION, SOLUTION INTRAVENOUS CONTINUOUS
Status: DISCONTINUED | OUTPATIENT
Start: 2022-05-29 | End: 2022-05-30

## 2022-05-29 RX ORDER — LABETALOL 200 MG/1
300 TABLET, FILM COATED ORAL EVERY 12 HOURS SCHEDULED
Status: DISCONTINUED | OUTPATIENT
Start: 2022-05-29 | End: 2022-05-30

## 2022-05-29 RX ORDER — LOSARTAN POTASSIUM 50 MG/1
100 TABLET ORAL DAILY
Status: DISCONTINUED | OUTPATIENT
Start: 2022-05-30 | End: 2022-06-01 | Stop reason: HOSPADM

## 2022-05-29 RX ORDER — CALCIUM GLUCONATE 94 MG/ML
1 INJECTION, SOLUTION INTRAVENOUS ONCE
Status: DISCONTINUED | OUTPATIENT
Start: 2022-05-29 | End: 2022-05-29 | Stop reason: SDUPTHER

## 2022-05-29 RX ORDER — SODIUM CHLORIDE 0.9 % (FLUSH) 0.9 %
10 SYRINGE (ML) INJECTION EVERY 12 HOURS SCHEDULED
Status: DISCONTINUED | OUTPATIENT
Start: 2022-05-29 | End: 2022-06-01 | Stop reason: HOSPADM

## 2022-05-29 RX ADMIN — CLONIDINE HYDROCHLORIDE 0.1 MG: 0.1 TABLET ORAL at 22:50

## 2022-05-29 RX ADMIN — Medication 5 MG/HR: at 18:19

## 2022-05-29 RX ADMIN — APIXABAN 5 MG: 5 TABLET, FILM COATED ORAL at 23:05

## 2022-05-29 RX ADMIN — CALCIUM GLUCONATE 1 G: 98 INJECTION, SOLUTION INTRAVENOUS at 19:25

## 2022-05-29 RX ADMIN — LABETALOL HYDROCHLORIDE 300 MG: 200 TABLET, FILM COATED ORAL at 23:05

## 2022-05-29 RX ADMIN — SODIUM CHLORIDE 30 ML/HR: 9 INJECTION, SOLUTION INTRAVENOUS at 22:50

## 2022-05-29 RX ADMIN — Medication 10 ML: at 23:06

## 2022-05-30 PROBLEM — E87.6 HYPOKALEMIA: Status: ACTIVE | Noted: 2022-05-30

## 2022-05-30 PROBLEM — E83.51 HYPOCALCEMIA: Status: ACTIVE | Noted: 2022-05-30

## 2022-05-30 LAB
25(OH)D3 SERPL-MCNC: 39.1 NG/ML (ref 30–100)
ANION GAP SERPL CALCULATED.3IONS-SCNC: 12 MMOL/L (ref 5–15)
ANION GAP SERPL CALCULATED.3IONS-SCNC: 13 MMOL/L (ref 5–15)
BUN SERPL-MCNC: 12 MG/DL (ref 8–23)
BUN SERPL-MCNC: 13 MG/DL (ref 8–23)
BUN/CREAT SERPL: 13.8 (ref 7–25)
BUN/CREAT SERPL: 14.6 (ref 7–25)
CALCIUM SPEC-SCNC: 6.7 MG/DL (ref 8.6–10.5)
CALCIUM SPEC-SCNC: 6.9 MG/DL (ref 8.6–10.5)
CALCIUM SPEC-SCNC: 7 MG/DL (ref 8.6–10.5)
CALCIUM SPEC-SCNC: 7 MG/DL (ref 8.6–10.5)
CHLORIDE SERPL-SCNC: 104 MMOL/L (ref 98–107)
CHLORIDE SERPL-SCNC: 104 MMOL/L (ref 98–107)
CO2 SERPL-SCNC: 25 MMOL/L (ref 22–29)
CO2 SERPL-SCNC: 26 MMOL/L (ref 22–29)
CREAT SERPL-MCNC: 0.82 MG/DL (ref 0.57–1)
CREAT SERPL-MCNC: 0.94 MG/DL (ref 0.57–1)
DEPRECATED RDW RBC AUTO: 43.8 FL (ref 37–54)
EGFRCR SERPLBLD CKD-EPI 2021: 63 ML/MIN/1.73
EGFRCR SERPLBLD CKD-EPI 2021: 74.2 ML/MIN/1.73
EOSINOPHIL # BLD MANUAL: 0.1 10*3/MM3 (ref 0–0.4)
EOSINOPHIL NFR BLD MANUAL: 1 % (ref 0.3–6.2)
ERYTHROCYTE [DISTWIDTH] IN BLOOD BY AUTOMATED COUNT: 13.2 % (ref 12.3–15.4)
GLUCOSE SERPL-MCNC: 121 MG/DL (ref 65–99)
GLUCOSE SERPL-MCNC: 133 MG/DL (ref 65–99)
HCT VFR BLD AUTO: 41.5 % (ref 34–46.6)
HGB BLD-MCNC: 13.2 G/DL (ref 12–15.9)
LYMPHOCYTES # BLD MANUAL: 1.44 10*3/MM3 (ref 0.7–3.1)
LYMPHOCYTES NFR BLD MANUAL: 8 % (ref 5–12)
MCH RBC QN AUTO: 28.7 PG (ref 26.6–33)
MCHC RBC AUTO-ENTMCNC: 31.8 G/DL (ref 31.5–35.7)
MCV RBC AUTO: 90.2 FL (ref 79–97)
MONOCYTES # BLD: 0.82 10*3/MM3 (ref 0.1–0.9)
NEUTROPHILS # BLD AUTO: 7.92 10*3/MM3 (ref 1.7–7)
NEUTROPHILS NFR BLD MANUAL: 76 % (ref 42.7–76)
NEUTS BAND NFR BLD MANUAL: 1 % (ref 0–5)
NT-PROBNP SERPL-MCNC: 1673 PG/ML (ref 0–1800)
PHOSPHATE SERPL-MCNC: 4.6 MG/DL (ref 2.5–4.5)
PLAT MORPH BLD: NORMAL
PLATELET # BLD AUTO: 209 10*3/MM3 (ref 140–450)
PMV BLD AUTO: 11.6 FL (ref 6–12)
POIKILOCYTOSIS BLD QL SMEAR: ABNORMAL
POTASSIUM SERPL-SCNC: 3.1 MMOL/L (ref 3.5–5.2)
POTASSIUM SERPL-SCNC: 3.5 MMOL/L (ref 3.5–5.2)
PTH-INTACT SERPL-MCNC: 38.1 PG/ML (ref 15–65)
QT INTERVAL: 318 MS
QTC INTERVAL: 478 MS
RBC # BLD AUTO: 4.6 10*6/MM3 (ref 3.77–5.28)
SODIUM SERPL-SCNC: 141 MMOL/L (ref 136–145)
SODIUM SERPL-SCNC: 143 MMOL/L (ref 136–145)
VARIANT LYMPHS NFR BLD MANUAL: 14 % (ref 19.6–45.3)
WBC MORPH BLD: NORMAL
WBC NRBC COR # BLD: 10.28 10*3/MM3 (ref 3.4–10.8)

## 2022-05-30 PROCEDURE — 84100 ASSAY OF PHOSPHORUS: CPT | Performed by: FAMILY MEDICINE

## 2022-05-30 PROCEDURE — 25010000002 CALCIUM GLUCONATE PER 10 ML: Performed by: OTOLARYNGOLOGY

## 2022-05-30 PROCEDURE — 80048 BASIC METABOLIC PNL TOTAL CA: CPT | Performed by: FAMILY MEDICINE

## 2022-05-30 PROCEDURE — 93005 ELECTROCARDIOGRAM TRACING: CPT | Performed by: INTERNAL MEDICINE

## 2022-05-30 PROCEDURE — 82306 VITAMIN D 25 HYDROXY: CPT | Performed by: FAMILY MEDICINE

## 2022-05-30 PROCEDURE — 82310 ASSAY OF CALCIUM: CPT | Performed by: FAMILY MEDICINE

## 2022-05-30 PROCEDURE — 83970 ASSAY OF PARATHORMONE: CPT | Performed by: OTOLARYNGOLOGY

## 2022-05-30 PROCEDURE — 25010000002 MAGNESIUM SULFATE 2 GM/50ML SOLUTION: Performed by: INTERNAL MEDICINE

## 2022-05-30 PROCEDURE — 85007 BL SMEAR W/DIFF WBC COUNT: CPT | Performed by: FAMILY MEDICINE

## 2022-05-30 PROCEDURE — 25010000002 CALCIUM GLUCONATE PER 10 ML: Performed by: NURSE PRACTITIONER

## 2022-05-30 PROCEDURE — 99221 1ST HOSP IP/OBS SF/LOW 40: CPT | Performed by: NURSE PRACTITIONER

## 2022-05-30 PROCEDURE — 93010 ELECTROCARDIOGRAM REPORT: CPT | Performed by: INTERNAL MEDICINE

## 2022-05-30 PROCEDURE — 85027 COMPLETE CBC AUTOMATED: CPT | Performed by: FAMILY MEDICINE

## 2022-05-30 PROCEDURE — 80048 BASIC METABOLIC PNL TOTAL CA: CPT | Performed by: NURSE PRACTITIONER

## 2022-05-30 PROCEDURE — 83880 ASSAY OF NATRIURETIC PEPTIDE: CPT | Performed by: INTERNAL MEDICINE

## 2022-05-30 PROCEDURE — 99222 1ST HOSP IP/OBS MODERATE 55: CPT | Performed by: INTERNAL MEDICINE

## 2022-05-30 RX ORDER — MAGNESIUM SULFATE HEPTAHYDRATE 40 MG/ML
2 INJECTION, SOLUTION INTRAVENOUS ONCE
Status: COMPLETED | OUTPATIENT
Start: 2022-05-30 | End: 2022-05-30

## 2022-05-30 RX ORDER — POTASSIUM CHLORIDE 750 MG/1
40 CAPSULE, EXTENDED RELEASE ORAL ONCE
Status: COMPLETED | OUTPATIENT
Start: 2022-05-30 | End: 2022-05-30

## 2022-05-30 RX ORDER — BUMETANIDE 1 MG/1
1 TABLET ORAL DAILY
COMMUNITY
End: 2022-06-01 | Stop reason: HOSPADM

## 2022-05-30 RX ORDER — LABETALOL 200 MG/1
400 TABLET, FILM COATED ORAL EVERY 12 HOURS SCHEDULED
Status: DISCONTINUED | OUTPATIENT
Start: 2022-05-30 | End: 2022-06-01 | Stop reason: HOSPADM

## 2022-05-30 RX ORDER — CALCIUM CARBONATE 200(500)MG
4 TABLET,CHEWABLE ORAL 4 TIMES DAILY
Status: DISCONTINUED | OUTPATIENT
Start: 2022-05-30 | End: 2022-06-01 | Stop reason: HOSPADM

## 2022-05-30 RX ADMIN — APIXABAN 5 MG: 5 TABLET, FILM COATED ORAL at 08:35

## 2022-05-30 RX ADMIN — CLONIDINE HYDROCHLORIDE 0.1 MG: 0.1 TABLET ORAL at 08:36

## 2022-05-30 RX ADMIN — Medication 10 ML: at 21:49

## 2022-05-30 RX ADMIN — LABETALOL HYDROCHLORIDE 300 MG: 200 TABLET, FILM COATED ORAL at 08:36

## 2022-05-30 RX ADMIN — CALCIUM GLUCONATE 4 G: 98 INJECTION, SOLUTION INTRAVENOUS at 21:48

## 2022-05-30 RX ADMIN — Medication 15 MG/HR: at 08:36

## 2022-05-30 RX ADMIN — LEVOTHYROXINE SODIUM 100 MCG: 100 TABLET ORAL at 05:45

## 2022-05-30 RX ADMIN — ANTACID TABLETS 4 TABLET: 500 TABLET, CHEWABLE ORAL at 21:47

## 2022-05-30 RX ADMIN — MAGNESIUM SULFATE HEPTAHYDRATE 2 G: 2 INJECTION, SOLUTION INTRAVENOUS at 11:26

## 2022-05-30 RX ADMIN — DILTIAZEM HYDROCHLORIDE 360 MG: 240 CAPSULE, EXTENDED RELEASE ORAL at 11:26

## 2022-05-30 RX ADMIN — CALCIUM GLUCONATE 1 G: 98 INJECTION, SOLUTION INTRAVENOUS at 16:39

## 2022-05-30 RX ADMIN — POTASSIUM CHLORIDE 40 MEQ: 10 CAPSULE, COATED, EXTENDED RELEASE ORAL at 11:30

## 2022-05-30 RX ADMIN — ROSUVASTATIN CALCIUM 10 MG: 10 TABLET, FILM COATED ORAL at 08:36

## 2022-05-30 RX ADMIN — LABETALOL HYDROCHLORIDE 400 MG: 200 TABLET, FILM COATED ORAL at 21:47

## 2022-05-30 RX ADMIN — LOSARTAN POTASSIUM 100 MG: 50 TABLET, FILM COATED ORAL at 08:35

## 2022-05-30 RX ADMIN — APIXABAN 5 MG: 5 TABLET, FILM COATED ORAL at 21:47

## 2022-05-30 RX ADMIN — Medication 15 MG/HR: at 01:21

## 2022-05-30 RX ADMIN — CLONIDINE HYDROCHLORIDE 0.1 MG: 0.1 TABLET ORAL at 21:47

## 2022-05-30 NOTE — TELEPHONE ENCOUNTER
"Patient is post op day 4 bilateral parathyroid exploration with right inferior parathyroidectomy. She called complaining of tingling to her hands and face and \"tightness\" in her chest at times. She states this started yesterday after taking her first dose of synthroid. She also complains of dizziness after taking medication. She was able to resolve symptoms by eating. She states today she had similar symptoms after taking synthroid.   She does report currently being in atrial fibrillation, but she feels as if her rate has been relatively well controlled.   Recent calcium 8.5 5/27/2022    Patient advised to go to er for work up due to current symptoms.   "

## 2022-05-31 ENCOUNTER — TELEPHONE (OUTPATIENT)
Dept: INTERNAL MEDICINE | Age: 77
End: 2022-05-31

## 2022-05-31 LAB
ANION GAP SERPL CALCULATED.3IONS-SCNC: 12 MMOL/L (ref 5–15)
BUN SERPL-MCNC: 8 MG/DL (ref 8–23)
BUN/CREAT SERPL: 11.1 (ref 7–25)
CALCIUM SPEC-SCNC: 7.7 MG/DL (ref 8.6–10.5)
CALCIUM SPEC-SCNC: 7.7 MG/DL (ref 8.6–10.5)
CALCIUM SPEC-SCNC: 8.2 MG/DL (ref 8.6–10.5)
CHLORIDE SERPL-SCNC: 106 MMOL/L (ref 98–107)
CO2 SERPL-SCNC: 24 MMOL/L (ref 22–29)
CREAT SERPL-MCNC: 0.72 MG/DL (ref 0.57–1)
EGFRCR SERPLBLD CKD-EPI 2021: 86.8 ML/MIN/1.73
GLUCOSE SERPL-MCNC: 122 MG/DL (ref 65–99)
POTASSIUM SERPL-SCNC: 3.6 MMOL/L (ref 3.5–5.2)
QT INTERVAL: 406 MS
QTC INTERVAL: 488 MS
SODIUM SERPL-SCNC: 142 MMOL/L (ref 136–145)

## 2022-05-31 PROCEDURE — 80048 BASIC METABOLIC PNL TOTAL CA: CPT | Performed by: NURSE PRACTITIONER

## 2022-05-31 PROCEDURE — 82310 ASSAY OF CALCIUM: CPT | Performed by: FAMILY MEDICINE

## 2022-05-31 PROCEDURE — 99231 SBSQ HOSP IP/OBS SF/LOW 25: CPT | Performed by: EMERGENCY MEDICINE

## 2022-05-31 PROCEDURE — 99233 SBSQ HOSP IP/OBS HIGH 50: CPT | Performed by: INTERNAL MEDICINE

## 2022-05-31 RX ORDER — DIGOXIN 125 MCG
125 TABLET ORAL
Status: DISCONTINUED | OUTPATIENT
Start: 2022-05-31 | End: 2022-06-01

## 2022-05-31 RX ADMIN — DILTIAZEM HYDROCHLORIDE 360 MG: 240 CAPSULE, EXTENDED RELEASE ORAL at 08:05

## 2022-05-31 RX ADMIN — LABETALOL HYDROCHLORIDE 400 MG: 200 TABLET, FILM COATED ORAL at 08:05

## 2022-05-31 RX ADMIN — LABETALOL HYDROCHLORIDE 400 MG: 200 TABLET, FILM COATED ORAL at 20:50

## 2022-05-31 RX ADMIN — APIXABAN 5 MG: 5 TABLET, FILM COATED ORAL at 08:05

## 2022-05-31 RX ADMIN — APIXABAN 5 MG: 5 TABLET, FILM COATED ORAL at 20:50

## 2022-05-31 RX ADMIN — ANTACID TABLETS 4 TABLET: 500 TABLET, CHEWABLE ORAL at 21:49

## 2022-05-31 RX ADMIN — LEVOTHYROXINE SODIUM 100 MCG: 100 TABLET ORAL at 05:56

## 2022-05-31 RX ADMIN — ANTACID TABLETS 4 TABLET: 500 TABLET, CHEWABLE ORAL at 13:08

## 2022-05-31 RX ADMIN — ANTACID TABLETS 4 TABLET: 500 TABLET, CHEWABLE ORAL at 17:42

## 2022-05-31 RX ADMIN — CLONIDINE HYDROCHLORIDE 0.1 MG: 0.1 TABLET ORAL at 08:06

## 2022-05-31 RX ADMIN — Medication 10 ML: at 20:51

## 2022-05-31 RX ADMIN — CLONIDINE HYDROCHLORIDE 0.1 MG: 0.1 TABLET ORAL at 20:50

## 2022-05-31 RX ADMIN — ROSUVASTATIN CALCIUM 10 MG: 10 TABLET, FILM COATED ORAL at 08:05

## 2022-05-31 RX ADMIN — ANTACID TABLETS 4 TABLET: 500 TABLET, CHEWABLE ORAL at 08:05

## 2022-05-31 RX ADMIN — LOSARTAN POTASSIUM 100 MG: 50 TABLET, FILM COATED ORAL at 08:06

## 2022-05-31 RX ADMIN — Medication 10 ML: at 08:06

## 2022-05-31 NOTE — TELEPHONE ENCOUNTER
[facility] Trans[facility] Initial Follow Up Call    Outreach made within 2 business days of discharge: Yes    Patient: Kaylee Julio   Patient : 1945 MRN: [id number]    Reason for Admission: Hyperparathyroidism, Hypercalcemia  Discharge Date: 2022      Discharge summary:   Bilateral parathyroid exploration with right inferior parathyroidectomy  Total thyroidectomy    Spoke with: Patient    Discharge department/facility: [facility]    TCM Interactive Patient Contact:  Was patient able to fill all prescriptions: Yes  Was patient instructed to bring all medications to the follow-up visit: Yes  Is patient taking all medications as directed in the discharge summary? Yes  Does patient understand their discharge instructions: Yes  Does patient have questions or concerns that need addressed prior to 7-14 day follow up office visit: no    Called the patient to do her TCM pt states she is back in the hospital. PT states her heart rate is abnormal and was instructed to go to the ER on . Pt states that she should get to go home today or tomorrow. She will let us know if she needs anything before the scheduled apt on 22.     Scheduled appointment with PCP within 7-14 days    Follow Up  Future Appointments   Date Time Provider Gabriel Marrufo   2022 10:00 AM Georgian Kussmaul, MD [facility]   2022  9:30 AM Georgian Kussmaul, MD [facility]   2022  9:30 AM Bel[place]ian Kussmaul, MD [facility] [address], [place]

## 2022-05-31 NOTE — ANESTHESIA POSTPROCEDURE EVALUATION
"Patient: Anne-Marie Hayes    Procedure Summary     Date: 05/25/22 Room / Location:  [facility] OR 03 /  [facility] OR    Anesthesia Start: 1429 Anesthesia Stop: 1654    Procedures:       Bilateral parathyroid exploration with right inferior parathyroidectomy (Right Neck)      Total thyroidectomy (Bilateral Neck) Diagnosis:       Hyperparathyroidism (HCC)      Hypercalcemia      Multinodular goiter      (Hyperparathyroidism (HCC) [E21.3])      (Hypercalcemia [E83.52])    Surgeons: Eric Olvera MD Provider: FATOUMATA Light CRNA    Anesthesia Type: general ASA Status: 3          Anesthesia Type: general    Vitals  Vitals Value Taken Time   /76 05/25/22 1745   Temp 97.8 °F (36.6 °C) 05/25/22 1745   Pulse 84 05/25/22 1745   Resp 18 05/25/22 1745   SpO2 96 % 05/25/22 1745           Post Anesthesia Care and Evaluation    Patient location during evaluation: PACU  Patient participation: complete - patient participated  Level of consciousness: awake and alert  Pain management: adequate  Airway patency: patent  Anesthetic complications: No anesthetic complications    Cardiovascular status: acceptable  Respiratory status: acceptable  Hydration status: acceptable    Comments: Blood pressure 132/72, pulse 80, temperature 98 °F (36.7 °C), temperature source Oral, resp. rate 16, height 168.5 cm (66.34\"), weight 104 kg (228 lb 2.8 oz), SpO2 100 %, not currently breastfeeding.    Pt discharged from PACU based on yosi score >8      "

## 2022-06-01 VITALS
BODY MASS INDEX: 36.48 KG/M2 | DIASTOLIC BLOOD PRESSURE: 98 MMHG | WEIGHT: 227.02 LBS | RESPIRATION RATE: 18 BRPM | OXYGEN SATURATION: 96 % | HEIGHT: 66 IN | SYSTOLIC BLOOD PRESSURE: 154 MMHG | HEART RATE: 51 BPM | TEMPERATURE: 97.9 F

## 2022-06-01 LAB — CALCIUM SPEC-SCNC: 8.6 MG/DL (ref 8.6–10.5)

## 2022-06-01 PROCEDURE — 82310 ASSAY OF CALCIUM: CPT | Performed by: OTOLARYNGOLOGY

## 2022-06-01 RX ORDER — LABETALOL 200 MG/1
400 TABLET, FILM COATED ORAL EVERY 12 HOURS SCHEDULED
Qty: 120 TABLET | Refills: 0 | Status: SHIPPED | OUTPATIENT
Start: 2022-06-01 | End: 2022-07-12

## 2022-06-01 RX ORDER — CALCIUM CARBONATE 200(500)MG
TABLET,CHEWABLE ORAL
Qty: 280 TABLET | Refills: 0 | COMMUNITY
Start: 2022-06-01 | End: 2022-06-29

## 2022-06-01 RX ORDER — LABETALOL 200 MG/1
400 TABLET, FILM COATED ORAL EVERY 12 HOURS SCHEDULED
Qty: 120 TABLET | Refills: 2 | Status: SHIPPED | OUTPATIENT
Start: 2022-06-01 | End: 2022-06-01 | Stop reason: SDUPTHER

## 2022-06-01 RX ORDER — CALCIUM CARBONATE 200(500)MG
TABLET,CHEWABLE ORAL
Qty: 248 TABLET | Refills: 0 | Status: SHIPPED | OUTPATIENT
Start: 2022-06-01 | End: 2022-06-01 | Stop reason: SDUPTHER

## 2022-06-01 RX ORDER — DILTIAZEM HYDROCHLORIDE 360 MG/1
360 CAPSULE, EXTENDED RELEASE ORAL
Qty: 30 CAPSULE | Refills: 0 | Status: SHIPPED | OUTPATIENT
Start: 2022-06-02 | End: 2022-06-20 | Stop reason: SDUPTHER

## 2022-06-01 RX ORDER — DILTIAZEM HYDROCHLORIDE 360 MG/1
360 CAPSULE, EXTENDED RELEASE ORAL
Qty: 30 CAPSULE | Refills: 2 | Status: SHIPPED | OUTPATIENT
Start: 2022-06-02 | End: 2022-06-01 | Stop reason: SDUPTHER

## 2022-06-01 RX ORDER — CALCIUM CARBONATE 200(500)MG
TABLET,CHEWABLE ORAL
Qty: 248 TABLET | Refills: 0 | Status: SHIPPED | OUTPATIENT
Start: 2022-06-01 | End: 2022-06-03

## 2022-06-01 RX ADMIN — ANTACID TABLETS 4 TABLET: 500 TABLET, CHEWABLE ORAL at 11:12

## 2022-06-01 RX ADMIN — ROSUVASTATIN CALCIUM 10 MG: 10 TABLET, FILM COATED ORAL at 08:10

## 2022-06-01 RX ADMIN — DILTIAZEM HYDROCHLORIDE 360 MG: 240 CAPSULE, EXTENDED RELEASE ORAL at 08:10

## 2022-06-01 RX ADMIN — LOSARTAN POTASSIUM 100 MG: 50 TABLET, FILM COATED ORAL at 08:10

## 2022-06-01 RX ADMIN — APIXABAN 5 MG: 5 TABLET, FILM COATED ORAL at 08:10

## 2022-06-01 RX ADMIN — LABETALOL HYDROCHLORIDE 400 MG: 200 TABLET, FILM COATED ORAL at 08:10

## 2022-06-01 RX ADMIN — LEVOTHYROXINE SODIUM 100 MCG: 100 TABLET ORAL at 05:51

## 2022-06-01 RX ADMIN — ANTACID TABLETS 4 TABLET: 500 TABLET, CHEWABLE ORAL at 08:09

## 2022-06-01 RX ADMIN — CLONIDINE HYDROCHLORIDE 0.1 MG: 0.1 TABLET ORAL at 08:10

## 2022-06-02 ENCOUNTER — HOME HEALTH ADMISSION (OUTPATIENT)
Dept: HOME HEALTH SERVICES | Facility: HOME HEALTHCARE | Age: 77
End: 2022-06-02

## 2022-06-03 ENCOUNTER — OFFICE VISIT (OUTPATIENT)
Dept: CARDIOLOGY | Facility: CLINIC | Age: 77
End: 2022-06-03

## 2022-06-03 VITALS
HEART RATE: 57 BPM | SYSTOLIC BLOOD PRESSURE: 160 MMHG | HEIGHT: 66 IN | DIASTOLIC BLOOD PRESSURE: 80 MMHG | WEIGHT: 227 LBS | BODY MASS INDEX: 36.48 KG/M2

## 2022-06-03 DIAGNOSIS — G47.33 OSA ON CPAP: ICD-10-CM

## 2022-06-03 DIAGNOSIS — Z99.89 OSA ON CPAP: ICD-10-CM

## 2022-06-03 DIAGNOSIS — I48.0 PAF (PAROXYSMAL ATRIAL FIBRILLATION): Primary | ICD-10-CM

## 2022-06-03 DIAGNOSIS — E78.2 MIXED HYPERLIPIDEMIA: ICD-10-CM

## 2022-06-03 DIAGNOSIS — Z79.01 CHRONIC ANTICOAGULATION: ICD-10-CM

## 2022-06-03 DIAGNOSIS — E66.01 SEVERE OBESITY (BMI 35.0-39.9) WITH COMORBIDITY: ICD-10-CM

## 2022-06-03 DIAGNOSIS — I10 ESSENTIAL HYPERTENSION: ICD-10-CM

## 2022-06-03 PROBLEM — I48.91 ATRIAL FIBRILLATION WITH RVR: Status: RESOLVED | Noted: 2022-05-29 | Resolved: 2022-06-03

## 2022-06-03 PROCEDURE — 99214 OFFICE O/P EST MOD 30 MIN: CPT | Performed by: INTERNAL MEDICINE

## 2022-06-03 PROCEDURE — 93000 ELECTROCARDIOGRAM COMPLETE: CPT | Performed by: INTERNAL MEDICINE

## 2022-06-03 NOTE — PROGRESS NOTES
Reason for Visit: cardiovascular follow up.    HPI:  Anne-Marie Hayes is a 76 y.o. female is here today for follow-up.  She is recently admitted with atrial fibrillation with RVR in May.  She reports she was briefly discharged and then later readmitted.  She also found to be hypokalemic.  She converted spontaneously back to sinus rhythm on 5/31.  She feels much better now but was still weak for a few days.  She did feel very dizzy last night.  She denies any chest pain, syncope, PND, or orthopnea.    Previous Cardiac Testing and Procedures:  - ROBEL (8/13/2018) Normal left ventricular systolic function, mild MR, mild to moderate TR, no evidence of left atrial appendage thrombus.  - Cardioversion (8/13/2018) Successful cardioversion from atrial fibrillation to sinus rhythm  - Cardioversion (4/20/2020) successful cardioversion from atrial fibrillation to sinus rhythm  - Lipid panel (1/5/2020) total cholesterol 133, HDL 53, LDL 54, triglycerides 132  - Lipid panel (11/8/2021) total cholesterol 127, HDL 51, LDL 51, triglycerides 126  - BMP (11/8/2021) creatinine 0.8, potassium 4.6, sodium 141  - proBNP (2/18/2022) 311, normal 0-1800  - Echo (3/21/2022) EF 66-70%, grade 2 diastolic dysfunction, normal RV size and function, RVSP 35-45 mmHg, no significant valve dysfunction  - Lipid panel (5/17/2022) total cholesterol 137, HDL 54, LDL 56, triglycerides 133  - BNP (5/30/2022) 1673, normal 0-1800    Patient Active Problem List   Diagnosis   • PAF (paroxysmal atrial fibrillation) (HCC)   • PRINCE on CPAP   • Essential hypertension   • Mixed hyperlipidemia   • Severe obesity (BMI 35.0-39.9) with comorbidity (HCC)   • Arthritis   • Hx of adenomatous colonic polyps   • Chronic anticoagulation   • Status post total thyroidectomy   • Status post right inferior parathyroidectomy (HCC)   • Hypocalcemia   • Hypokalemia       Social History     Tobacco Use   • Smoking status: Former Smoker     Years: 20.00     Quit date: 1983      Years since quittin.4   • Smokeless tobacco: Never Used   Vaping Use   • Vaping Use: Never used   Substance Use Topics   • Alcohol use: Yes     Comment: occ   • Drug use: No       Family History   Problem Relation Age of Onset   • Atrial fibrillation Mother    • Heart disease Father    • Breast cancer Neg Hx    • Ovarian cancer Neg Hx    • Uterine cancer Neg Hx    • Colon cancer Neg Hx    • Melanoma Neg Hx    • Colon polyps Neg Hx        The following portions of the patient's history were reviewed and updated as appropriate: allergies, current medications, past family history, past medical history, past social history, past surgical history and problem list.      Current Outpatient Medications:   •  acetaminophen (TYLENOL) 325 MG tablet, Take 2 tablets by mouth Every 4 (Four) Hours As Needed for Mild Pain ., Disp: , Rfl:   •  apixaban (ELIQUIS) 5 MG tablet tablet, Take 1 tablet by mouth Every 12 (Twelve) Hours., Disp: 60 tablet, Rfl: 0  •  calcium carbonate (Tums) 500 MG chewable tablet, Chew 4 tablets 4 (Four) Times a Day for 7 days, THEN 4 tablets 3 (Three) Times a Day for 7 days, THEN 4 tablets 2 (Two) Times a Day for 7 days, THEN 4 tablets Daily for 7 days., Disp: 280 tablet, Rfl: 0  •  CloNIDine (CATAPRES) 0.1 MG tablet, Take 0.1 mg by mouth 2 (Two) Times a Day., Disp: , Rfl:   •  dilTIAZem CD (CARDIZEM CD) 360 MG 24 hr capsule, Take 1 capsule by mouth Daily for 30 days., Disp: 30 capsule, Rfl: 0  •  famciclovir (FAMVIR) 500 MG tablet, Take 1,500 mg by mouth As Needed., Disp: , Rfl:   •  fluticasone (FLONASE) 50 MCG/ACT nasal spray, 2 sprays into the nostril(s) as directed by provider Daily As Needed for Rhinitis or Allergies., Disp: , Rfl:   •  furosemide (LASIX) 40 MG tablet, Take 1 tablet by mouth Daily., Disp: 90 tablet, Rfl: 3  •  KLOR-CON 10 MEQ CR tablet, Take 10 mEq by mouth 2 (Two) Times a Day., Disp: , Rfl:   •  labetalol (NORMODYNE) 200 MG tablet, Take 2 tablets by mouth Every 12 (Twelve) Hours  "for 30 days., Disp: 120 tablet, Rfl: 0  •  losartan (COZAAR) 100 MG tablet, Take 1 tablet by mouth Daily., Disp: , Rfl:   •  Multiple Vitamins-Minerals (PRESERVISION AREDS 2 PO), Take 1 tablet by mouth 2 (Two) Times a Day., Disp: , Rfl:   •  Omega-3 Fatty Acids (FISH OIL) 1000 MG capsule capsule, Take 1,000 mg by mouth Daily With Breakfast., Disp: , Rfl:   •  rosuvastatin (CRESTOR) 10 MG tablet, Take 1 tablet by mouth Daily., Disp: , Rfl:   •  Synthroid 100 MCG tablet, Take 1 tablet by mouth Daily., Disp: 30 tablet, Rfl: 3    Review of Systems   Constitutional: Positive for malaise/fatigue. Negative for chills and fever.   Cardiovascular: Negative for chest pain, dyspnea on exertion and paroxysmal nocturnal dyspnea.   Respiratory: Negative for cough and shortness of breath.    Skin: Negative for rash.   Gastrointestinal: Negative for abdominal pain and heartburn.   Neurological: Positive for dizziness. Negative for numbness.       Objective   /80   Pulse 57   Ht 167.6 cm (66\")   Wt 103 kg (227 lb)   BMI 36.64 kg/m²   Constitutional:       Appearance: Well-developed.   HENT:      Head: Normocephalic and atraumatic.   Pulmonary:      Effort: Pulmonary effort is normal.      Breath sounds: Normal breath sounds.   Cardiovascular:      Bradycardia present. Regular rhythm.      Murmurs: There is no murmur.      No gallop. No click.   Skin:     General: Skin is warm and dry.   Neurological:      Mental Status: Alert and oriented to person, place, and time.         ECG 12 Lead    Date/Time: 6/3/2022 10:55 AM  Performed by: Jovani Lovelace MD  Authorized by: Jovani Lovelace MD   Comparison: compared with previous ECG from 5/30/2022  Comparison to previous ECG: Sinus rhythm has replaced atrial fibrillation  Rhythm: sinus bradycardia  Other findings: low voltage              ICD-10-CM ICD-9-CM   1. PAF (paroxysmal atrial fibrillation) (HCC)  I48.0 427.31   2. Essential hypertension  I10 401.9   3. Mixed " hyperlipidemia  E78.2 272.2   4. Severe obesity (BMI 35.0-39.9) with comorbidity (HCC)  E66.01 278.01   5. PRINCE on CPAP  G47.33 327.23    Z99.89 V46.8   6. Chronic anticoagulation  Z79.01 V58.61         Assessment/Plan:  1. Paroxysmal atrial fibrillation: Recent admission in May for atrial fibrillation with RVR.  Spontaneously converted back to sinus rhythm.  She has history of cardioversion in 2018 and 2020.  Flecainide was discontinued during hospitalization.  Refer to EP for consideration of ablation.  Continue diltiazem and Eliquis.    2.  Essential hypertension:   Blood pressure is high today but previously has been well controlled.  Continue diltiazem, clonidine, labetalol, and losartan.  Encouraged her to monitor her at home as well.     3.  Mixed hyperlipidemia: Lipid panel from 5/17/2022 showed good control on rosuvastatin and omega-3 fish oil.     4.  Obesity: Class 2 Severe Obesity (BMI >=35 and <=39.9). Obesity-related health conditions include the following: obstructive sleep apnea, hypertension and dyslipidemias. Obesity is unchanged. BMI is is above average; BMI management plan is completed. We discussed portion control and increasing exercise.       5.  Obstructive sleep apnea: Continue CPAP.     6.  Chronic anticoagulation: Continue Eliquis.

## 2022-06-06 ENCOUNTER — HOME CARE VISIT (OUTPATIENT)
Dept: HOME HEALTH SERVICES | Facility: CLINIC | Age: 77
End: 2022-06-06

## 2022-06-06 PROCEDURE — G0299 HHS/HOSPICE OF RN EA 15 MIN: HCPCS

## 2022-06-07 ENCOUNTER — TELEPHONE (OUTPATIENT)
Dept: CARDIOLOGY | Facility: CLINIC | Age: 77
End: 2022-06-07

## 2022-06-07 VITALS
DIASTOLIC BLOOD PRESSURE: 74 MMHG | HEART RATE: 44 BPM | TEMPERATURE: 97.2 F | RESPIRATION RATE: 20 BRPM | OXYGEN SATURATION: 98 % | SYSTOLIC BLOOD PRESSURE: 146 MMHG

## 2022-06-07 NOTE — TELEPHONE ENCOUNTER
Berenice GOODWIN with [facility] left a message yesterday late afternoon stating patient's HR has been in the low 50's-40's, she has been experiencing dizziness/light headedness. She is wondering if she is on too much Labetalol, she currently takes 400 mg twice a day. Please advise.      [phone]

## 2022-06-07 NOTE — CASE COMMUNICATION
Good evening,  I admitted Ms. Anne-Marie Hayes to [facility] this afternoon.  Ms. Hayes indicated that her medications for Atrial Fibrillation and Hypertension were adjusted in the two recent hospitializations.  She complains of lightheadedness and Dizziness intermittently since she has been discharged home last week.  Upon assessment - Apical rate is 44 beats per minute, Blood pressure reading was 146/74.  Instruct ed/return demonstration how to calculate her radial pulse - and will record periodically throughout each day before administration of medications and stagger re-checks at different times during the day to obtain an idea of how medications are affecting blood pressure and heart rate, and also when feels lightheaded/dizzy.  Telephone call to after hours physician, spoke with Dr. Turk - he did not recommend any changes at this time, and to  continue to monitor, and he will notify you Dr. Lovelace.  If possible could we please obtain parameters for blood pressure and heart rate for Ms. Hayes?    Thank you,  Berenice Michelle APRN/RN  [facility]  [phone]

## 2022-06-07 NOTE — HOME HEALTH
"FOCUS OF CARE/SKILLED NEED: Medication management, heart rate in the 40's.  CP assessment, Atrial Fibrillation Management.  Nutrition Hydration    TEACHING/INTERVENTIONS: Pt/cg agreeable to homecare admission.  Patient states \"Yes to perform CPR if necessary\".  Admission agreement,  and safety plan reviewed and signed by the pt. Medication reconciliation completed and no issues found. Pt had no further questions regarding medication and/or plan of care.    PROGRESS TOWARD GOALS:Initiated    PHYSICIAN CONTACT: Yes, contact the after hours physician Dr. Turk Cardiologist on call for Dr. Lovelace office.  At this time, no changes due to Blood pressure.  He will notify the office and have them follow up in the morning.  Patient to contact me this evening to discuss her heart rate before taking her medication this evening.    At 2100 Patient called me and her heart rate is in the high 50's and she is taking her evening medications.    RECENT INSURANCE CHANGES?  No    RECENT FALLS?  Patient denies    RECENT MEDICATION CHANGES?  From the hospital yes    PLAN FOR NEXT VISIT:  Wednesday.  PRE-SCREENED FOR COVID? Yes, No s/s of COVID. No recent exposure.    Notes from visit:  Unable to sync chart prior to admission visit.    A few weeks ago, patient had scheduled surgery of partial parathyroid and thyroidectomy.  Due to having to be off of her medications for Afib, when she was discharged home, that first evening, patient states she didn't feel well heart racing, tingling sensations.  Went to the ER and readmitted due to Atrial Fibrillation.  Patient was discharged home that following Wednesday approximately June 1st.  Medications readjusted and heart is in regular rhythm.     Past medical history -  Atrial Fibrillation 2019  Thyroid hormones elevated - thyroid nodules, trouble swallowing and scheduled the surgery as above.  Hypothyroidism  Hypertension  1995    Past surgical:  Right Knee replacement  Tubal Ligation    Social " History:  Lives alone  Retired   No smoking, occasional glass of wine    ADL/IADL's  Independent - has family, friends and neighbors assisting if needed  Groceries - delivered currently    Appointment:    Thursday 6/9 at 11 am.  Jovani Lovelace MD, Cardioologist  - referred for ablation in the near future.    MANDO Bernal, ENT Wednesday June 22nd at 1315.    Medications  Tylenol prn 325 mg as needed - no pain at this time  Eliquis 5 mg bid  Cardizem increased 240 to 360  Labetolol 300 mg bid and increased to 400 mg bid.  Clonidine 0.1 mg ONE TABLET bid  Cardizem  mg one capsule by mouth daily  Famciclovir 500 mg one tablet as needed  Fish oil 1000 mg one day  Flonase 2 sprays into the nostrils PRN for allergic rhinitis  Lasix 40 mg once a day  Klor Con 10 meq one tablet by mouth BID   Losartan 100 mg tablets one tablet by mouth daily  Crestor 10 mg one tablet as bedtime  Synthroid 100 mcg one tablet alone in the am    Regular cardiac diet

## 2022-06-07 NOTE — TELEPHONE ENCOUNTER
Patient has been notified and verbalized understanding.    ----- Message from MANDO Acevedo sent at 6/7/2022  8:58 AM CDT -----  Regarding: FW: HR  I saw a note where they said that she was symptomatic with dizziness and lightheadedness..  We need to decrease her Labetalol to 200 mg BID for her heart rate. If her BP is high, please verify that she is taking clonidine 0.1 mg BID and not as needed..I may have to add a medication for BP. Thanks     ----- Message -----  From: Ana Somers RN  Sent: 6/6/2022   5:03 PM CDT  To: MANDO Acevedo  Subject: HR                                               Dr. Turk took a call from  r/t patient's BP/HR and asked me to forward it to you for f/u.  They reported slight elevation in BP and HR in the 40's.  It looks like she was in the hospital two weeks ago and had a f/u in office last Friday.

## 2022-06-08 ENCOUNTER — HOME CARE VISIT (OUTPATIENT)
Dept: HOME HEALTH SERVICES | Facility: CLINIC | Age: 77
End: 2022-06-08

## 2022-06-08 PROCEDURE — G0495 RN CARE TRAIN/EDU IN HH: HCPCS

## 2022-06-08 NOTE — HOME HEALTH
FOCUS OF CARE/SKILLED NEED: Medication management, patient is going to take her cardizem at 12 noon, to see   how affects the late afternoon and evening blood pressure and heart rate.  TEACHING/INTERVENTIONS: Symptoms of hypotension - and atrial fibrillation. blood pressure reading is before her morning medications    PROGRESS TOWARD GOALS:  ongoing progressing    PHYSICIAN CONTACT: Response from Dr. Lovelace.    INSURANCE CHANGES?  no changes    FALLS SINCE LAST VISIT? No falls    MEDICATION CHANGES SINCE LAST VISIT?  Yes, see MAR    PLAN FOR NEXT VISIT:  Continue to monitor    COVID SCREENING:  Denies symptoms or exposure

## 2022-06-09 ENCOUNTER — OFFICE VISIT (OUTPATIENT)
Dept: INTERNAL MEDICINE | Age: 77
End: 2022-06-09
Payer: MEDICARE

## 2022-06-09 VITALS
DIASTOLIC BLOOD PRESSURE: 76 MMHG | BODY MASS INDEX: 35.52 KG/M2 | SYSTOLIC BLOOD PRESSURE: 130 MMHG | HEIGHT: 66 IN | WEIGHT: 221 LBS | HEART RATE: 70 BPM | OXYGEN SATURATION: 98 %

## 2022-06-09 VITALS
HEIGHT: 67 IN | WEIGHT: 221 LBS | SYSTOLIC BLOOD PRESSURE: 164 MMHG | RESPIRATION RATE: 20 BRPM | TEMPERATURE: 97.3 F | DIASTOLIC BLOOD PRESSURE: 72 MMHG | OXYGEN SATURATION: 98 % | HEART RATE: 52 BPM | BODY MASS INDEX: 34.69 KG/M2

## 2022-06-09 DIAGNOSIS — Z09 HOSPITAL DISCHARGE FOLLOW-UP: Primary | ICD-10-CM

## 2022-06-09 DIAGNOSIS — E89.2 S/P PARATHYROIDECTOMY (HCC): ICD-10-CM

## 2022-06-09 DIAGNOSIS — E89.0 S/P COMPLETE THYROIDECTOMY: ICD-10-CM

## 2022-06-09 DIAGNOSIS — I48.0 PAROXYSMAL ATRIAL FIBRILLATION (HCC): ICD-10-CM

## 2022-06-09 DIAGNOSIS — E83.51 HYPOCALCEMIA: ICD-10-CM

## 2022-06-09 PROCEDURE — 1111F DSCHRG MED/CURRENT MED MERGE: CPT | Performed by: INTERNAL MEDICINE

## 2022-06-09 PROCEDURE — 99495 TRANSJ CARE MGMT MOD F2F 14D: CPT | Performed by: INTERNAL MEDICINE

## 2022-06-09 RX ORDER — LEVOTHYROXINE SODIUM 100 MCG
100 TABLET ORAL DAILY
COMMUNITY

## 2022-06-09 RX ORDER — LABETALOL 200 MG/1
200 TABLET, FILM COATED ORAL 2 TIMES DAILY
Qty: 180 TABLET | Refills: 3
Start: 2022-06-09

## 2022-06-09 RX ORDER — FUROSEMIDE 40 MG/1
40 TABLET ORAL DAILY
COMMUNITY

## 2022-06-09 ASSESSMENT — ENCOUNTER SYMPTOMS
TROUBLE SWALLOWING: 1
ABDOMINAL PAIN: 0
COUGH: 0
ABDOMINAL DISTENTION: 0
SINUS PRESSURE: 0
BACK PAIN: 1
SHORTNESS OF BREATH: 0
EYE DISCHARGE: 0
EYE REDNESS: 0

## 2022-06-09 NOTE — PROGRESS NOTES
Discharge summary reviewed- see chart. Interval history/Current status: In general the patient has improved but had a difficult postoperative course    Patient Active Problem List   Diagnosis    Diffuse cystic mastopathy    Hyperlipidemia    Hypertension    Primary osteoarthritis involving multiple joints    Impaired fasting glucose    Hypercalcemia    Multinodular goiter    Lung nodule, solitary    Lumbar spinal stenosis    Postmenopausal    Allergic rhinitis    [facility]par[facility] ([facility] [address]    Paroxysmal atrial fibrillation (HCC)    Obstructive sleep apnea    Primary osteoarthritis of right knee    Arthritis of knee    Arthritis    Atrial flutter with rapid ventricular response (HCC)    S/P complete thyroidectomy    S/P parathyroidectomy ([facility] [address]       Medications listed as ordered at the time of discharge from hospital     Medication List          Accurate as of June 9, 2022 11:59 PM. If you have any questions, ask your nurse or doctor.             CHANGE how you take these medications    labetalol 200 MG tablet  Commonly known as: NORMODYNE  Take 1 tablet by mouth 2 times daily  What changed:   · medication strength  · how much to take  Changed by: Gertrudis Amaral MD        CONTINUE taking these medications    apixaban 5 MG Tabs tablet  Commonly known as: Eliquis  Take 1 tablet by mouth 2 times daily     cloNIDine 0.1 MG tablet  Commonly known as: CATAPRES  Take 1 tablet by mouth 2 times daily     CPAP Machine Misc     dilTIAZem 360 MG extended release capsule  Commonly known as: Cardizem CD  Take 1 capsule by mouth daily     famciclovir 500 MG tablet  Commonly known as: FAMVIR  TAKE 3 TABLETS DAILY AS NEEDED FOR COLD SORES     FISH OIL     fluticasone 50 MCG/ACT nasal spray  Commonly known as: FLONASE  USE 2 SPRAYS IN EACH NOSTRIL DAILY AS NEEDED FOR RHINITIS OR ALLERGIES     furosemide 40 MG tablet  Commonly known as: LASIX     losartan 100 MG tablet  Commonly known as: COZAAR  Take 1 tablet by mouth daily     potassium chloride 10 MEQ extended release tablet  Commonly known as: KLOR-CON M  Take 1 tablet by mouth 2 times daily     PRESERVISION AREDS 2+MULTI VIT PO     rosuvastatin 10 MG tablet  Commonly known as: CRESTOR  TAKE 1 TABLET DAILY     Synthroid 100 MCG tablet  Generic drug: levothyroxine           Where to Get Your Medications      Information about where to get these medications is not yet available    Ask your nurse or doctor about these medications  · labetalol 200 MG tablet           Medications marked \"taking\" at this time  Outpatient Medications Marked as Taking for the 6/9/22 encounter (Office Visit) with Pauline Cox MD   Medication Sig Dispense Refill    SYNTHROID 100 MCG tablet Take 100 mcg by mouth Daily      furosemide (LASIX) 40 MG tablet Take 40 mg by mouth daily      labetalol (NORMODYNE) 200 MG tablet Take 1 tablet by mouth 2 times daily 180 tablet 3        Medications patient taking as of now reconciled against medications ordered at time of hospital discharge: yes  Some fatigue arthralgias back pain no recent chest pain or palpitation still a little trouble swallowing but improving    Objective:    /76   Pulse 70   Ht 5' 6\" (1.676 m)   Wt 221 lb (100.2 kg)   SpO2 98%   BMI 35.67 kg/m²   Incision healing well there is no erythema or drainage heart S1-S2 lungs clear extremities with arthritis changes mood is good    An electronic signature was used to authenticate this note.   --Pauline Cox MD

## 2022-06-09 NOTE — PATIENT INSTRUCTIONS
Personalized Preventive Plan for Greg Muniz - 5/24/2022  Medicare offers a range of preventive health benefits. Some of the tests and screenings are paid in full while other may be subject to a deductible, co-insurance, and/or copay. Some of these benefits include a comprehensive review of your medical history including lifestyle, illnesses that may run in your family, and various assessments and screenings as appropriate. After reviewing your medical record and screening and assessments performed today your provider may have ordered immunizations, labs, imaging, and/or referrals for you. A list of these orders (if applicable) as well as your Preventive Care list are included within your After Visit Summary for your review. Other Preventive Recommendations:    · A preventive eye exam performed by an eye specialist is recommended every 1-2 years to screen for glaucoma; cataracts, macular degeneration, and other eye disorders. · A preventive dental visit is recommended every 6 months. · Try to get at least 150 minutes of exercise per week or 10,000 steps per day on a pedometer . · Order or download the FREE \"Exercise & Physical Activity: Your Everyday Guide\" from [employer]. Call 8-449.900.6485 or search [employer] online. · You need 1983-2638 mg of calcium and 1325-5892 IU of vitamin D per day. It is possible to meet your calcium requirement with diet alone, but a vitamin D supplement is usually necessary to meet this goal.  · When exposed to the sun, use a sunscreen that protects against both UVA and UVB radiation with an SPF of 30 or greater. Reapply every 2 to 3 hours or after sweating, drying off with a towel, or swimming. · Always wear a seat belt when traveling in a car. Always wear a helmet when riding a bicycle or motorcycle.

## 2022-06-10 ENCOUNTER — HOME CARE VISIT (OUTPATIENT)
Dept: HOME HEALTH SERVICES | Facility: CLINIC | Age: 77
End: 2022-06-10

## 2022-06-10 VITALS
SYSTOLIC BLOOD PRESSURE: 156 MMHG | OXYGEN SATURATION: 99 % | TEMPERATURE: 98.1 F | HEART RATE: 56 BPM | RESPIRATION RATE: 18 BRPM | DIASTOLIC BLOOD PRESSURE: 76 MMHG

## 2022-06-10 PROCEDURE — G0299 HHS/HOSPICE OF RN EA 15 MIN: HCPCS

## 2022-06-10 NOTE — CASE COMMUNICATION
Golden Lovelace,  At Ms. Anne-Marie Hayes this morning, and she is doing very well - with the decrease in the Labetalol and she moved her Cardizem to 12 noon instead of taking with the remainder of her morning medications and her heart rate and blood pressure are much improved.  BP readins yesterday - 138/73, 134/74, 118/64 and Heart rate 60, 64.  Reviewed recent medication list that Ms. Hayes has prior to hospitialization , will the she stay off the [facility] at this time?  Thank you,  MANDO Goss  [facility]  [phone]-

## 2022-06-10 NOTE — HOME HEALTH
FOCUS OF CARE/SKILLED NEED:   Cardizem taking at 12 noon:  Blood pressure readings 138/73, 118/64, Heart rate in 60;s from 44-50's.  Much improved patient states feel much better.    TEACHING/INTERVENTIONS:   Instruction on medications, hydration nutrition, SN assessment. Ambulation safety assess for pain    PROGRESS TOWARD GOALS:  ongoing    PHYSICIAN CONTACT: Case communication sent to Dr. Lovelace - Cardiology - updating on recent heart rate and blood pressure readings.  Patient was curious if she would be taking Flecanide in the future.    INSURANCE CHANGES?  NO    FALLS SINCE LAST VISIT?  NO    MEDICATION CHANGES SINCE LAST VISIT?  No changes    PLAN FOR NEXT VISIT:  CP assessment, medication management    COVID SCREENING:  Patient denies exposure or symptoms

## 2022-06-15 ENCOUNTER — HOME CARE VISIT (OUTPATIENT)
Dept: HOME HEALTH SERVICES | Facility: HOME HEALTHCARE | Age: 77
End: 2022-06-15

## 2022-06-15 VITALS
DIASTOLIC BLOOD PRESSURE: 78 MMHG | HEART RATE: 60 BPM | RESPIRATION RATE: 18 BRPM | SYSTOLIC BLOOD PRESSURE: 132 MMHG | OXYGEN SATURATION: 98 % | TEMPERATURE: 98.3 F

## 2022-06-15 PROCEDURE — G0300 HHS/HOSPICE OF LPN EA 15 MIN: HCPCS

## 2022-06-15 NOTE — HOME HEALTH
FOCUS OF CARE/SKILLED NEED: HOME SAFETY/FALL PREVENTION, MEDICATION EDUCATION, PAIN MANAGEMENT, PREVENTATIVE SKIN CARE, Dysrhythmia home management, edema home management, psychosocial home management.    TEACHING/INTERVENTIONS: FALL PREVENTION, MEDICATION EDUCATION, PAIN MANAGEMENT, PREVENTATIVE SKIN CARE, dysrhythmia home management, edema home management, psychosocial home management    PROGRESS TOWARDS GOALS: Patient has had zero falls, denies pain, denies any open wounds, is managing her Afib with medication, no edema, deneis depression/anxiety.    RECENT FALLS: none    RECENT MEDICATION CHANGES: none    D/C PLAN:  DISCHARGE WITH GOALS MET    PHYSICIAN CONTACT:   none    INSURANCE CHANGES:  none    PRE-SCREEN FOR COVID 19: Denies any s/s of covid

## 2022-06-16 LAB
CALCIUM SERPL-MCNC: 9 MG/DL (ref 8.8–10.2)
TSH SERPL DL<=0.05 MIU/L-ACNC: 2.84 UIU/ML (ref 0.27–4.2)

## 2022-06-17 ENCOUNTER — HOME CARE VISIT (OUTPATIENT)
Dept: HOME HEALTH SERVICES | Facility: CLINIC | Age: 77
End: 2022-06-17

## 2022-06-17 PROCEDURE — G0493 RN CARE EA 15 MIN HH/HOSPICE: HCPCS

## 2022-06-17 PROCEDURE — G0495 RN CARE TRAIN/EDU IN HH: HCPCS

## 2022-06-17 PROCEDURE — G0299 HHS/HOSPICE OF RN EA 15 MIN: HCPCS

## 2022-06-17 NOTE — HOME HEALTH
FOCUS OF CARE/SKILLED NEED: Medication, heart rate and blood pressure readings evaluated    TEACHING/INTERVENTIONS: Patient heart rate and blood pressure are stable, heart rate is now in the 60's.    PROGRESS TOWARD GOALS:  Discussed discharge in one or two visits.    PHYSICIAN CONTACT: No    INSURANCE CHANGES?No    FALLS SINCE LAST VISIT?No    MEDICATION CHANGES SINCE LAST VISIT?  Steri strip fell off of wound     PLAN FOR NEXT VISIT:  Friday morning - 0800    COVID SCREENING:    ENT on Wednesday at 1315.  Cardiologist - [facility] in [place] for ablation.

## 2022-06-20 RX ORDER — DILTIAZEM HYDROCHLORIDE 360 MG/1
360 CAPSULE, EXTENDED RELEASE ORAL
Qty: 90 CAPSULE | Refills: 3 | Status: SHIPPED | OUTPATIENT
Start: 2022-06-20 | End: 2023-03-15

## 2022-06-22 ENCOUNTER — OFFICE VISIT (OUTPATIENT)
Dept: OTOLARYNGOLOGY | Facility: CLINIC | Age: 77
End: 2022-06-22

## 2022-06-22 VITALS
HEART RATE: 64 BPM | RESPIRATION RATE: 18 BRPM | OXYGEN SATURATION: 98 % | SYSTOLIC BLOOD PRESSURE: 124 MMHG | TEMPERATURE: 97.2 F | DIASTOLIC BLOOD PRESSURE: 66 MMHG

## 2022-06-22 DIAGNOSIS — E89.0 S/P TOTAL THYROIDECTOMY: Primary | ICD-10-CM

## 2022-06-22 PROCEDURE — 99024 POSTOP FOLLOW-UP VISIT: CPT | Performed by: EMERGENCY MEDICINE

## 2022-06-22 RX ORDER — LEVOTHYROXINE SODIUM 100 MCG
100 TABLET ORAL DAILY
Qty: 30 TABLET | Refills: 0 | COMMUNITY
Start: 2022-06-22 | End: 2022-06-23

## 2022-06-22 NOTE — PROGRESS NOTES
MANDO Dunlap  [facility]AR [facility] TH[facility]  [address], [address]  [facility] [address]  Fax [phone]  Phone [phone]      Visit Type: POST-OP   Chief Complaint   Patient presents with   • Post-op        HPI  Anne-Marie Hayes is a 76 y.o.  female who presents for follow up s/p Bilateral parathyroid exploration with right inferior parathyroidectomy - Right and Total thyroidectomy - Bilateral on 5/25/2022. The patient has had a relatively normal postoperative course and currently has no related complaints.    Past Medical History:   Diagnosis Date   • Arthritis    • Atrial fibrillation (HCC)    • Enlarged thyroid    • Hyperlipidemia    • Hypertension    • PONV (postoperative nausea and vomiting)    • Sleep apnea     Uses CPAP       Past Surgical History:   Procedure Laterality Date   • CARDIAC CATHETERIZATION  1987   • CARDIOVERSION      x2   • COLONOSCOPY N/A 05/18/2021    Procedure: COLONOSCOPY WITH ANESTHESIA;  Surgeon: Delio Leslie MD;  Location: [facility] ENDOSCOPY;  Service: Gastroenterology;  Laterality: N/A;  Pre: Hx Colon Polyps  Post: Colon Polyps, Diverticulosis  Dr. Ania Oseguera  CO2 Inflation Used   • COLONOSCOPY W/ POLYPECTOMY  03/31/2016    Tubular adenoma ascending colon repeat exam in 5 years   • PARATHYROIDECTOMY Right 5/25/2022    Procedure: Bilateral parathyroid exploration with right inferior parathyroidectomy;  Surgeon: Eric Olvera MD;  Location: [facility] OR;  Service: ENT;  Laterality: Right;   • REPLACEMENT TOTAL KNEE Right 07/2019   • THYROIDECTOMY Bilateral 5/25/2022    Procedure: Total thyroidectomy;  Surgeon: Eric Olvera MD;  Location: [facility] OR;  Service: ENT;  Laterality: Bilateral;   • TUBAL ABDOMINAL LIGATION         Family History: Her family history includes Atrial fibrillation in her mother; Heart disease in her father.     Social History: She  reports that she quit smoking  about 39 years ago. She quit after 20.00 years of use. She has never used smokeless tobacco. She reports current alcohol use. She reports that she does not use drugs.    Home Medications:  Multiple Vitamins-Minerals, acetaminophen, apixaban, calcium carbonate, cloNIDine, dilTIAZem CD, famciclovir, fish oil, fluticasone, furosemide, labetalol, levothyroxine, losartan, potassium chloride, and rosuvastatin    Allergies:  She has No Known Allergies.       Vital Signs:      ENT Physical Exam  Drawings            Result Review    RESULTS REVIEW    I have reviewed the patients old records in the chart.   The following results/records were reviewed:   SCANNED - LABS (06/17/2022 00:00) Calcium 9.0 TSH 2.84      Assessment & Plan    Diagnoses and all orders for this visit:    1. S/P total thyroidectomy (Primary)  -     TSH; Future    Other orders  -     Synthroid 100 MCG tablet; Take 1 tablet by mouth Daily.  Dispense: 30 tablet; Refill: 0            Resume preoperative activity and medications.       Redraw TSH in 3 months    Return in about 6 months (around 12/22/2022) for Follow up with MNADO Ibrahim.      MANDO Dunlap  06/22/22  13:30 CDT

## 2022-06-23 RX ORDER — LEVOTHYROXINE SODIUM 100 MCG
100 TABLET ORAL DAILY
Qty: 90 TABLET | Refills: 0 | Status: SHIPPED | OUTPATIENT
Start: 2022-06-23 | End: 2022-06-24 | Stop reason: SDUPTHER

## 2022-06-24 ENCOUNTER — HOME CARE VISIT (OUTPATIENT)
Dept: HOME HEALTH SERVICES | Facility: CLINIC | Age: 77
End: 2022-06-24

## 2022-06-24 VITALS
HEART RATE: 58 BPM | DIASTOLIC BLOOD PRESSURE: 58 MMHG | RESPIRATION RATE: 18 BRPM | SYSTOLIC BLOOD PRESSURE: 128 MMHG | OXYGEN SATURATION: 97 % | TEMPERATURE: 97.6 F

## 2022-06-24 PROCEDURE — G0495 RN CARE TRAIN/EDU IN HH: HCPCS

## 2022-06-24 RX ORDER — LEVOTHYROXINE SODIUM 0.1 MG/1
100 TABLET ORAL DAILY
Qty: 90 TABLET | Refills: 0 | Status: SHIPPED | OUTPATIENT
Start: 2022-06-24 | End: 2022-08-11 | Stop reason: SDUPTHER

## 2022-06-24 NOTE — HOME HEALTH
Contact the MD for any new or worsening shortness of breath, bleeding, fever, falls, S&S of infection, chest pain, and/or swelling.    Saw ENT on Wednesday this week - when swallows sometime has a lump.  Doesn't affect eating and drinking, takes smaller bites.    Reviewed medications with patient, no changes in thyroid medications.    Patient is agreeable to discharge goals have been met.    No falls    No symptoms or exposure of Covid.    Discharge instructions given to patient;  Call MD if changes in pulse and blood pressure readings, if heart rate begins to drop to low 50's and below, and if blood pressure begins to elevate.  If experience hypo or hyper thyroid symptoms and wrote out the symptoms.  Patient and I discussed knowing her limitations while she is recovering and in the future, with high temperature weather and working outside.  staying hydrated.  Keep follow up appointments.  Patient is very compliant    Patient verbalized understanding and agreed.

## 2022-06-25 PROBLEM — Z90.89 S/P COMPLETE THYROIDECTOMY: Status: ACTIVE | Noted: 2022-06-25

## 2022-06-25 PROBLEM — Z98.890 S/P PARATHYROIDECTOMY: Status: ACTIVE | Noted: 2022-06-25

## 2022-06-25 PROBLEM — E89.2 S/P PARATHYROIDECTOMY (HCC): Status: ACTIVE | Noted: 2022-06-25

## 2022-06-25 PROBLEM — E89.0 S/P COMPLETE THYROIDECTOMY: Status: ACTIVE | Noted: 2022-06-25

## 2022-06-25 PROBLEM — Z90.89 S/P PARATHYROIDECTOMY: Status: ACTIVE | Noted: 2022-06-25

## 2022-06-25 PROBLEM — Z98.890 S/P COMPLETE THYROIDECTOMY: Status: ACTIVE | Noted: 2022-06-25

## 2022-07-07 DIAGNOSIS — E04.2 MULTINODULAR GOITER: ICD-10-CM

## 2022-07-07 LAB
ALBUMIN SERPL-MCNC: 4.2 G/DL (ref 3.5–5.2)
ALP BLD-CCNC: 87 U/L (ref 35–104)
ALT SERPL-CCNC: 12 U/L (ref 5–33)
ANION GAP SERPL CALCULATED.3IONS-SCNC: 13 MMOL/L (ref 7–19)
AST SERPL-CCNC: 13 U/L (ref 5–32)
BASOPHILS ABSOLUTE: 0.1 K/UL (ref 0–0.2)
BASOPHILS RELATIVE PERCENT: 0.8 % (ref 0–1)
BILIRUB SERPL-MCNC: 0.5 MG/DL (ref 0.2–1.2)
BUN BLDV-MCNC: 12 MG/DL (ref 8–23)
CALCIUM SERPL-MCNC: 8.7 MG/DL (ref 8.8–10.2)
CHLORIDE BLD-SCNC: 105 MMOL/L (ref 98–111)
CO2: 26 MMOL/L (ref 22–29)
CREAT SERPL-MCNC: 0.7 MG/DL (ref 0.5–0.9)
EOSINOPHILS ABSOLUTE: 0.4 K/UL (ref 0–0.6)
EOSINOPHILS RELATIVE PERCENT: 5 % (ref 0–5)
GFR AFRICAN AMERICAN: >59
GFR NON-AFRICAN AMERICAN: >60
GLUCOSE BLD-MCNC: 107 MG/DL (ref 74–109)
HCT VFR BLD CALC: 40.1 % (ref 37–47)
HEMOGLOBIN: 12.3 G/DL (ref 12–16)
IMMATURE GRANULOCYTES #: 0 K/UL
LYMPHOCYTES ABSOLUTE: 1.7 K/UL (ref 1.1–4.5)
LYMPHOCYTES RELATIVE PERCENT: 22.6 % (ref 20–40)
MCH RBC QN AUTO: 28.6 PG (ref 27–31)
MCHC RBC AUTO-ENTMCNC: 30.7 G/DL (ref 33–37)
MCV RBC AUTO: 93.3 FL (ref 81–99)
MONOCYTES ABSOLUTE: 0.7 K/UL (ref 0–0.9)
MONOCYTES RELATIVE PERCENT: 9.3 % (ref 0–10)
NEUTROPHILS ABSOLUTE: 4.5 K/UL (ref 1.5–7.5)
NEUTROPHILS RELATIVE PERCENT: 61.8 % (ref 50–65)
PDW BLD-RTO: 13 % (ref 11.5–14.5)
PLATELET # BLD: 192 K/UL (ref 130–400)
PMV BLD AUTO: 11.6 FL (ref 9.4–12.3)
POTASSIUM SERPL-SCNC: 4.2 MMOL/L (ref 3.5–5)
RBC # BLD: 4.3 M/UL (ref 4.2–5.4)
SODIUM BLD-SCNC: 144 MMOL/L (ref 136–145)
TOTAL PROTEIN: 6.4 G/DL (ref 6.6–8.7)
TSH SERPL DL<=0.05 MIU/L-ACNC: 0.92 UIU/ML (ref 0.27–4.2)
WBC # BLD: 7.4 K/UL (ref 4.8–10.8)

## 2022-07-11 ENCOUNTER — TELEPHONE (OUTPATIENT)
Dept: CARDIOLOGY | Facility: CLINIC | Age: 77
End: 2022-07-11

## 2022-07-11 ENCOUNTER — APPOINTMENT (OUTPATIENT)
Dept: GENERAL RADIOLOGY | Facility: HOSPITAL | Age: 77
End: 2022-07-11

## 2022-07-11 ENCOUNTER — HOSPITAL ENCOUNTER (INPATIENT)
Facility: HOSPITAL | Age: 77
LOS: 1 days | Discharge: HOME | End: 2022-07-12
Attending: EMERGENCY MEDICINE | Admitting: FAMILY MEDICINE

## 2022-07-11 DIAGNOSIS — I10 ESSENTIAL HYPERTENSION: ICD-10-CM

## 2022-07-11 DIAGNOSIS — I48.91 ATRIAL FIBRILLATION WITH RVR: Primary | ICD-10-CM

## 2022-07-11 LAB
ALBUMIN SERPL-MCNC: 4.8 G/DL (ref 3.5–5.2)
ALBUMIN/GLOB SERPL: 2.2 G/DL
ALP SERPL-CCNC: 93 U/L (ref 39–117)
ALT SERPL W P-5'-P-CCNC: 14 U/L (ref 1–33)
ANION GAP SERPL CALCULATED.3IONS-SCNC: 13 MMOL/L (ref 5–15)
AST SERPL-CCNC: 14 U/L (ref 1–32)
BASOPHILS # BLD AUTO: 0.06 10*3/MM3 (ref 0–0.2)
BASOPHILS NFR BLD AUTO: 0.5 % (ref 0–1.5)
BILIRUB SERPL-MCNC: 0.5 MG/DL (ref 0–1.2)
BUN SERPL-MCNC: 10 MG/DL (ref 8–23)
BUN/CREAT SERPL: 13.5 (ref 7–25)
CALCIUM SPEC-SCNC: 9.1 MG/DL (ref 8.6–10.5)
CHLORIDE SERPL-SCNC: 107 MMOL/L (ref 98–107)
CO2 SERPL-SCNC: 24 MMOL/L (ref 22–29)
CREAT SERPL-MCNC: 0.74 MG/DL (ref 0.57–1)
DEPRECATED RDW RBC AUTO: 43 FL (ref 37–54)
EGFRCR SERPLBLD CKD-EPI 2021: 84 ML/MIN/1.73
EOSINOPHIL # BLD AUTO: 0.17 10*3/MM3 (ref 0–0.4)
EOSINOPHIL NFR BLD AUTO: 1.4 % (ref 0.3–6.2)
ERYTHROCYTE [DISTWIDTH] IN BLOOD BY AUTOMATED COUNT: 13 % (ref 12.3–15.4)
GLOBULIN UR ELPH-MCNC: 2.2 GM/DL
GLUCOSE SERPL-MCNC: 118 MG/DL (ref 65–99)
HCT VFR BLD AUTO: 43.5 % (ref 34–46.6)
HGB BLD-MCNC: 13.7 G/DL (ref 12–15.9)
HOLD SPECIMEN: NORMAL
HOLD SPECIMEN: NORMAL
IMM GRANULOCYTES # BLD AUTO: 0.05 10*3/MM3 (ref 0–0.05)
IMM GRANULOCYTES NFR BLD AUTO: 0.4 % (ref 0–0.5)
LYMPHOCYTES # BLD AUTO: 1.71 10*3/MM3 (ref 0.7–3.1)
LYMPHOCYTES NFR BLD AUTO: 14.3 % (ref 19.6–45.3)
MAGNESIUM SERPL-MCNC: 2.1 MG/DL (ref 1.6–2.4)
MCH RBC QN AUTO: 28.4 PG (ref 26.6–33)
MCHC RBC AUTO-ENTMCNC: 31.5 G/DL (ref 31.5–35.7)
MCV RBC AUTO: 90.2 FL (ref 79–97)
MONOCYTES # BLD AUTO: 1.11 10*3/MM3 (ref 0.1–0.9)
MONOCYTES NFR BLD AUTO: 9.3 % (ref 5–12)
NEUTROPHILS NFR BLD AUTO: 74.1 % (ref 42.7–76)
NEUTROPHILS NFR BLD AUTO: 8.85 10*3/MM3 (ref 1.7–7)
NRBC BLD AUTO-RTO: 0 /100 WBC (ref 0–0.2)
PLATELET # BLD AUTO: 235 10*3/MM3 (ref 140–450)
PMV BLD AUTO: 10.8 FL (ref 6–12)
POTASSIUM SERPL-SCNC: 3.9 MMOL/L (ref 3.5–5.2)
PROT SERPL-MCNC: 7 G/DL (ref 6–8.5)
RBC # BLD AUTO: 4.82 10*6/MM3 (ref 3.77–5.28)
SARS-COV-2 RNA PNL SPEC NAA+PROBE: NOT DETECTED
SODIUM SERPL-SCNC: 144 MMOL/L (ref 136–145)
TROPONIN T SERPL-MCNC: <0.01 NG/ML (ref 0–0.03)
TROPONIN T SERPL-MCNC: <0.01 NG/ML (ref 0–0.03)
WBC NRBC COR # BLD: 11.95 10*3/MM3 (ref 3.4–10.8)
WHOLE BLOOD HOLD COAG: NORMAL
WHOLE BLOOD HOLD SPECIMEN: NORMAL

## 2022-07-11 PROCEDURE — 96376 TX/PRO/DX INJ SAME DRUG ADON: CPT

## 2022-07-11 PROCEDURE — 99222 1ST HOSP IP/OBS MODERATE 55: CPT | Performed by: INTERNAL MEDICINE

## 2022-07-11 PROCEDURE — 25010000002 AMIODARONE IN DEXTROSE 5% 150-4.21 MG/100ML-% SOLUTION: Performed by: FAMILY MEDICINE

## 2022-07-11 PROCEDURE — 93005 ELECTROCARDIOGRAM TRACING: CPT | Performed by: EMERGENCY MEDICINE

## 2022-07-11 PROCEDURE — 93010 ELECTROCARDIOGRAM REPORT: CPT | Performed by: INTERNAL MEDICINE

## 2022-07-11 PROCEDURE — 96367 TX/PROPH/DG ADDL SEQ IV INF: CPT

## 2022-07-11 PROCEDURE — 93005 ELECTROCARDIOGRAM TRACING: CPT | Performed by: FAMILY MEDICINE

## 2022-07-11 PROCEDURE — 36415 COLL VENOUS BLD VENIPUNCTURE: CPT

## 2022-07-11 PROCEDURE — 93005 ELECTROCARDIOGRAM TRACING: CPT

## 2022-07-11 PROCEDURE — 84484 ASSAY OF TROPONIN QUANT: CPT | Performed by: EMERGENCY MEDICINE

## 2022-07-11 PROCEDURE — 71045 X-RAY EXAM CHEST 1 VIEW: CPT

## 2022-07-11 PROCEDURE — 85025 COMPLETE CBC W/AUTO DIFF WBC: CPT

## 2022-07-11 PROCEDURE — 96366 THER/PROPH/DIAG IV INF ADDON: CPT

## 2022-07-11 PROCEDURE — 96365 THER/PROPH/DIAG IV INF INIT: CPT

## 2022-07-11 PROCEDURE — 83735 ASSAY OF MAGNESIUM: CPT | Performed by: FAMILY MEDICINE

## 2022-07-11 PROCEDURE — 87635 SARS-COV-2 COVID-19 AMP PRB: CPT | Performed by: EMERGENCY MEDICINE

## 2022-07-11 PROCEDURE — 84484 ASSAY OF TROPONIN QUANT: CPT

## 2022-07-11 PROCEDURE — 80053 COMPREHEN METABOLIC PANEL: CPT

## 2022-07-11 PROCEDURE — 25010000002 MAGNESIUM SULFATE 2 GM/50ML SOLUTION: Performed by: PHYSICIAN ASSISTANT

## 2022-07-11 PROCEDURE — 99285 EMERGENCY DEPT VISIT HI MDM: CPT

## 2022-07-11 PROCEDURE — 96375 TX/PRO/DX INJ NEW DRUG ADDON: CPT

## 2022-07-11 PROCEDURE — 25010000002 AMIODARONE IN DEXTROSE 5% 360-4.14 MG/200ML-% SOLUTION: Performed by: FAMILY MEDICINE

## 2022-07-11 RX ORDER — SODIUM CHLORIDE 0.9 % (FLUSH) 0.9 %
10 SYRINGE (ML) INJECTION AS NEEDED
Status: DISCONTINUED | OUTPATIENT
Start: 2022-07-11 | End: 2022-07-12 | Stop reason: HOSPADM

## 2022-07-11 RX ORDER — DILTIAZEM HYDROCHLORIDE 180 MG/1
360 CAPSULE, COATED, EXTENDED RELEASE ORAL
Status: DISCONTINUED | OUTPATIENT
Start: 2022-07-12 | End: 2022-07-12 | Stop reason: HOSPADM

## 2022-07-11 RX ORDER — AMIODARONE HYDROCHLORIDE 200 MG/1
200 TABLET ORAL 2 TIMES DAILY WITH MEALS
Status: DISCONTINUED | OUTPATIENT
Start: 2022-07-13 | End: 2022-07-12 | Stop reason: HOSPADM

## 2022-07-11 RX ORDER — LEVOTHYROXINE SODIUM 0.1 MG/1
100 TABLET ORAL
Status: DISCONTINUED | OUTPATIENT
Start: 2022-07-12 | End: 2022-07-12 | Stop reason: HOSPADM

## 2022-07-11 RX ORDER — AMIODARONE HYDROCHLORIDE 200 MG/1
200 TABLET ORAL DAILY
Status: DISCONTINUED | OUTPATIENT
Start: 2022-08-06 | End: 2022-07-12 | Stop reason: HOSPADM

## 2022-07-11 RX ORDER — FLUTICASONE PROPIONATE 50 MCG
2 SPRAY, SUSPENSION (ML) NASAL DAILY PRN
Status: DISCONTINUED | OUTPATIENT
Start: 2022-07-11 | End: 2022-07-12 | Stop reason: HOSPADM

## 2022-07-11 RX ORDER — AMIODARONE HYDROCHLORIDE 200 MG/1
200 TABLET ORAL ONCE
Status: DISCONTINUED | OUTPATIENT
Start: 2022-07-12 | End: 2022-07-12 | Stop reason: HOSPADM

## 2022-07-11 RX ORDER — ACETAMINOPHEN 325 MG/1
650 TABLET ORAL EVERY 4 HOURS PRN
Status: DISCONTINUED | OUTPATIENT
Start: 2022-07-11 | End: 2022-07-12 | Stop reason: HOSPADM

## 2022-07-11 RX ORDER — DILTIAZEM HYDROCHLORIDE 5 MG/ML
10 INJECTION INTRAVENOUS ONCE
Status: COMPLETED | OUTPATIENT
Start: 2022-07-11 | End: 2022-07-11

## 2022-07-11 RX ORDER — DILTIAZEM HCL IN NACL,ISO-OSM 125 MG/125
5-15 PLASTIC BAG, INJECTION (ML) INTRAVENOUS
Status: DISCONTINUED | OUTPATIENT
Start: 2022-07-11 | End: 2022-07-12 | Stop reason: HOSPADM

## 2022-07-11 RX ORDER — FUROSEMIDE 40 MG/1
40 TABLET ORAL DAILY
Status: DISCONTINUED | OUTPATIENT
Start: 2022-07-12 | End: 2022-07-12 | Stop reason: HOSPADM

## 2022-07-11 RX ORDER — AMIODARONE HYDROCHLORIDE 200 MG/1
200 TABLET ORAL EVERY 12 HOURS
Status: DISCONTINUED | OUTPATIENT
Start: 2022-07-23 | End: 2022-07-12 | Stop reason: HOSPADM

## 2022-07-11 RX ORDER — POTASSIUM CHLORIDE 750 MG/1
20 CAPSULE, EXTENDED RELEASE ORAL DAILY
Status: DISCONTINUED | OUTPATIENT
Start: 2022-07-12 | End: 2022-07-12 | Stop reason: HOSPADM

## 2022-07-11 RX ORDER — LOSARTAN POTASSIUM 50 MG/1
100 TABLET ORAL DAILY
Status: DISCONTINUED | OUTPATIENT
Start: 2022-07-12 | End: 2022-07-12 | Stop reason: HOSPADM

## 2022-07-11 RX ORDER — LABETALOL 200 MG/1
200 TABLET, FILM COATED ORAL 2 TIMES DAILY
Status: DISCONTINUED | OUTPATIENT
Start: 2022-07-11 | End: 2022-07-12 | Stop reason: HOSPADM

## 2022-07-11 RX ORDER — ROSUVASTATIN CALCIUM 10 MG/1
10 TABLET, COATED ORAL DAILY
Status: DISCONTINUED | OUTPATIENT
Start: 2022-07-12 | End: 2022-07-12 | Stop reason: HOSPADM

## 2022-07-11 RX ORDER — MAGNESIUM SULFATE HEPTAHYDRATE 40 MG/ML
2 INJECTION, SOLUTION INTRAVENOUS ONCE
Status: COMPLETED | OUTPATIENT
Start: 2022-07-11 | End: 2022-07-11

## 2022-07-11 RX ORDER — CLONIDINE HYDROCHLORIDE 0.1 MG/1
0.1 TABLET ORAL EVERY 12 HOURS SCHEDULED
Status: DISCONTINUED | OUTPATIENT
Start: 2022-07-11 | End: 2022-07-12 | Stop reason: HOSPADM

## 2022-07-11 RX ORDER — ASPIRIN 81 MG/1
324 TABLET, CHEWABLE ORAL ONCE
Status: DISCONTINUED | OUTPATIENT
Start: 2022-07-11 | End: 2022-07-12 | Stop reason: HOSPADM

## 2022-07-11 RX ADMIN — DILTIAZEM HYDROCHLORIDE 10 MG: 5 INJECTION INTRAVENOUS at 16:12

## 2022-07-11 RX ADMIN — DILTIAZEM HYDROCHLORIDE 10 MG: 5 INJECTION INTRAVENOUS at 17:05

## 2022-07-11 RX ADMIN — CLONIDINE HYDROCHLORIDE 0.1 MG: 0.1 TABLET ORAL at 23:05

## 2022-07-11 RX ADMIN — AMIODARONE HYDROCHLORIDE 150 MG: 1.5 INJECTION, SOLUTION INTRAVENOUS at 18:44

## 2022-07-11 RX ADMIN — MAGNESIUM SULFATE HEPTAHYDRATE 2 G: 40 INJECTION, SOLUTION INTRAVENOUS at 17:24

## 2022-07-11 RX ADMIN — LABETALOL HYDROCHLORIDE 200 MG: 200 TABLET, FILM COATED ORAL at 23:05

## 2022-07-11 RX ADMIN — AMIODARONE HYDROCHLORIDE 1 MG/MIN: 1.8 INJECTION, SOLUTION INTRAVENOUS at 19:01

## 2022-07-11 RX ADMIN — Medication 12.5 MG/HR: at 16:10

## 2022-07-11 RX ADMIN — APIXABAN 5 MG: 5 TABLET, FILM COATED ORAL at 23:05

## 2022-07-11 NOTE — TELEPHONE ENCOUNTER
Received call from patient who states she is in Afib with  /106(wrist cuff). Patient does not want to go to ER because she states every time she has gone to ER she has converted to NSR on her own. Patient reports she has not taken her morning medications as of 0815, patient states she takes them at 0900. I have advise patient to take her morning medications and to return call at 0915 and if BP and HR are still elevated she is to go to nearest ER for further evaluation. Patient V/U.

## 2022-07-11 NOTE — ED PROVIDER NOTES
Subjective   History of Present Illness    Review of Systems    Past Medical History:   Diagnosis Date   • Arthritis    • Atrial fibrillation (HCC)    • Enlarged thyroid    • Hyperlipidemia    • Hypertension    • PONV (postoperative nausea and vomiting)    • Sleep apnea     Uses CPAP       No Known Allergies    Past Surgical History:   Procedure Laterality Date   • CARDIAC CATHETERIZATION     • CARDIOVERSION      x2   • COLONOSCOPY N/A 2021    Procedure: COLONOSCOPY WITH ANESTHESIA;  Surgeon: Delio Leslie MD;  Location: [facility] ENDOSCOPY;  Service: Gastroenterology;  Laterality: N/A;  Pre: Hx Colon Polyps  Post: Colon Polyps, Diverticulosis  Dr. Ania Oseguera  CO2 Inflation Used   • COLONOSCOPY W/ POLYPECTOMY  2016    Tubular adenoma ascending colon repeat exam in 5 years   • PARATHYROIDECTOMY Right 2022    Procedure: Bilateral parathyroid exploration with right inferior parathyroidectomy;  Surgeon: Eric Olvera MD;  Location: [facility] OR;  Service: ENT;  Laterality: Right;   • REPLACEMENT TOTAL KNEE Right 2019   • THYROIDECTOMY Bilateral 2022    Procedure: Total thyroidectomy;  Surgeon: Eric Olvera MD;  Location: [facility] OR;  Service: ENT;  Laterality: Bilateral;   • TUBAL ABDOMINAL LIGATION         Family History   Problem Relation Age of Onset   • Atrial fibrillation Mother    • Heart disease Father    • Breast cancer Neg Hx    • Ovarian cancer Neg Hx    • Uterine cancer Neg Hx    • Colon cancer Neg Hx    • Melanoma Neg Hx    • Colon polyps Neg Hx        Social History     Socioeconomic History   • Marital status:    Tobacco Use   • Smoking status: Former Smoker     Years: 20.00     Quit date:      Years since quittin.5   • Smokeless tobacco: Never Used   Vaping Use   • Vaping Use: Never used   Substance and Sexual Activity   • Alcohol use: Yes     Comment: occ   • Drug use: No   • Sexual activity: Defer           Objective   Physical  Exam    Procedures           ED Course  ED Course as of 07/11/22 1800   Mon Jul 11, 2022 1732 Patient has received diltiazem, magnesium, and amiodarone will be ordered as well.  I did speak with Dr. Mayers who was examined the patient (pt declined cardioversion) also Dr. Mitchell, hospitalist on-call who is gracious admit the patient under services. [TK]   1737 Case discussed with the patient at length she does not want to be cardioverted we will switch over to amiodarone for rate control [TS]      ED Course User Index  [TK] Antwan Bentley PA  [TS] Artem Mayers MD                                           [facility]    Final diagnoses:   Atrial fibrillation with RVR (HCC)   Essential hypertension       ED Disposition  ED Disposition     ED Disposition   Decision to Admit    Condition   --    Comment   Level of Care: Telemetry [5]   Diagnosis: Atrial fibrillation with RVR (HCC) [240826]   Admitting Physician: CHANELLE MITCHELL [[id number]]   Certification: I Certify That Inpatient Hospital Services Are Medically Necessary For Greater Than 2 Midnights               No follow-up provider specified.       Medication List      No changes were made to your prescriptions during this visit.          Artem Mayers MD  07/11/22 1800

## 2022-07-11 NOTE — H&P
[facility] Medicine Services  HISTORY AND PHYSICAL    Date of Admission: 7/11/2022  Primary Care Physician: Ania Culver MD    Subjective     Chief Complaint: Palpitations    History of Present Illness  Mrs. Hayes is a 76 year old lady who resides in [place], [place].  She has a history of HTN, HLD, and hypothyroidism.  These three chronic conditions are stable.  She also has a history of Atrial fibrillation.  She is chronically anticoagulated on Eliquis.  She follows with Dr. Culver for primary care support.      She presents to the ER with palpations.  This started at 1:30 AM this morning.  She denies CP or SOA.  In the ER, EKG reveals she is in a-fib RVR in the 150's.  She is hemodynamically stable with a SBP in the 150's.      She has received multiple doses of Cardizem and has not improved in terms of her HR.  She was started on a Cardizem drip at 15. Her HR has still not improved.          Review of Systems   Constitutional: Negative for fatigue and fever.   HENT: Negative for congestion and ear pain.    Eyes: Negative for pain and visual disturbance.   Respiratory: Negative for cough, shortness of breath and wheezing.    Cardiovascular: Positive for palpitations. Negative for chest pain.   Gastrointestinal: Negative for diarrhea, nausea and vomiting.   Endocrine: Negative for heat intolerance.   Genitourinary: Negative for dysuria and frequency.   Musculoskeletal: Negative for arthralgias and back pain.   Skin: Negative for rash and wound.   Neurological: Negative for dizziness and light-headedness.   Psychiatric/Behavioral: Negative for confusion. The patient is not nervous/anxious.    All other systems reviewed and are negative.       Otherwise complete ROS reviewed and negative except as mentioned in the HPI.    Past Medical History:   Past Medical History:   Diagnosis Date   • Arthritis    • Atrial fibrillation (HCC)    • Enlarged thyroid    • Hyperlipidemia    •  Hypertension    • PONV (postoperative nausea and vomiting)    • Sleep apnea     Uses CPAP     Past Surgical History:  Past Surgical History:   Procedure Laterality Date   • CARDIAC CATHETERIZATION  1987   • CARDIOVERSION      x2   • COLONOSCOPY N/A 05/18/2021    Procedure: COLONOSCOPY WITH ANESTHESIA;  Surgeon: Delio Leslie MD;  Location:  [facility] ENDOSCOPY;  Service: Gastroenterology;  Laterality: N/A;  Pre: Hx Colon Polyps  Post: Colon Polyps, Diverticulosis  Dr. Ania Oseguera  CO2 Inflation Used   • COLONOSCOPY W/ POLYPECTOMY  03/31/2016    Tubular adenoma ascending colon repeat exam in 5 years   • PARATHYROIDECTOMY Right 5/25/2022    Procedure: Bilateral parathyroid exploration with right inferior parathyroidectomy;  Surgeon: Eric Olvera MD;  Location:  [facility] OR;  Service: ENT;  Laterality: Right;   • REPLACEMENT TOTAL KNEE Right 07/2019   • THYROIDECTOMY Bilateral 5/25/2022    Procedure: Total thyroidectomy;  Surgeon: Eric Olvera MD;  Location:  [facility] OR;  Service: ENT;  Laterality: Bilateral;   • TUBAL ABDOMINAL LIGATION       Social History:  reports that she quit smoking about 39 years ago. She quit after 20.00 years of use. She has never used smokeless tobacco. She reports current alcohol use. She reports that she does not use drugs.    Family History: family history includes Atrial fibrillation in her mother; Heart disease in her father.       Allergies:  No Known Allergies    Medications:  Prior to Admission medications    Medication Sig Start Date End Date Taking? Authorizing Provider   acetaminophen (TYLENOL) 325 MG tablet Take 2 tablets by mouth Every 4 (Four) Hours As Needed for Mild Pain . 5/27/22  Yes Eric Olvera MD   apixaban (ELIQUIS) 5 MG tablet tablet Take 1 tablet by mouth Every 12 (Twelve) Hours. 8/9/18  Yes Mat Spencer APRN   CloNIDine (CATAPRES) 0.1 MG tablet Take 0.1 mg by mouth 2 (Two) Times a Day.   Yes Provider, MD Gilma Kim CD  "(CARDIZEM CD) 360 MG 24 hr capsule Take 1 capsule by mouth Daily for 30 days. 6/20/22 7/20/22 Yes Jovani Lovelace MD   famciclovir (FAMVIR) 500 MG tablet Take 1,500 mg by mouth As Needed. Used as needed, when a skin eruption occurs, 11/4/19  Yes Judy Patton MD   fluticasone (FLONASE) 50 MCG/ACT nasal spray 2 sprays into the nostril(s) as directed by provider Daily As Needed for Rhinitis or Allergies.   Yes Judy Patton MD   furosemide (LASIX) 40 MG tablet Take 1 tablet by mouth Daily. 5/20/22  Yes Jovani Lovelace MD   KLOR-CON 10 MEQ CR tablet Take 10 mEq by mouth 2 (Two) Times a Day. 3/22/17  Yes Judy Patton MD   labetalol (NORMODYNE) 200 MG tablet Take 2 tablets by mouth Every 12 (Twelve) Hours for 30 days.  Patient taking differently: Take 200 mg by mouth 2 (Two) Times a Day. one tablet bid a day changed starting on the PM dose on 6/7/22 6/1/22 7/1/22 Yes Rosario Colon APRN   levothyroxine (Synthroid) 100 MCG tablet Take 1 tablet by mouth Daily. 6/24/22  Yes Galina Osorio APRN   losartan (COZAAR) 100 MG tablet Take 1 tablet by mouth Daily. 3/7/22  Yes Judy Patton MD   Multiple Vitamins-Minerals (PRESERVISION AREDS 2 PO) Take 1 tablet by mouth 2 (Two) Times a Day.   Yes Judy Patton MD   Omega-3 Fatty Acids (FISH OIL) 1000 MG capsule capsule Take 1,000 mg by mouth Daily With Breakfast.   Yes Judy Patton MD   rosuvastatin (CRESTOR) 10 MG tablet Take 1 tablet by mouth Daily. 10/29/19  Yes Judy Patton MD     I have utilized all available immediate resources to obtain, update, and review the patient's current medications.    Objective     Vital Signs: BP (!) 140/111   Pulse (!) 151   Temp 98.2 °F (36.8 °C)   Resp 21   Ht 167.6 cm (66\")   Wt 100 kg (221 lb)   SpO2 97%   BMI 35.67 kg/m²   Physical Exam  Constitutional:       Appearance: Normal appearance. She is well-developed.   HENT:      Head: Normocephalic and atraumatic.      Right " Ear: Tympanic membrane, ear canal and external ear normal.      Left Ear: Tympanic membrane, ear canal and external ear normal.      Nose: Nose normal.      Mouth/Throat:      Mouth: Mucous membranes are moist.      Pharynx: Oropharynx is clear.   Eyes:      Extraocular Movements: Extraocular movements intact.      Conjunctiva/sclera: Conjunctivae normal.      Pupils: Pupils are equal, round, and reactive to light.   Cardiovascular:      Rate and Rhythm: Tachycardia present. Rhythm irregularly irregular.      Heart sounds: Normal heart sounds.   Pulmonary:      Effort: Pulmonary effort is normal.      Breath sounds: Normal breath sounds.   Abdominal:      General: Bowel sounds are normal. There is no distension.      Palpations: Abdomen is soft.      Tenderness: There is no abdominal tenderness.   Musculoskeletal:         General: Normal range of motion.      Cervical back: Normal range of motion and neck supple.   Skin:     General: Skin is warm and dry.   Neurological:      Mental Status: She is alert and oriented to person, place, and time.   Psychiatric:         Mood and Affect: Mood normal.         Speech: Speech normal.         Behavior: Behavior normal.         Thought Content: Thought content normal.         Judgment: Judgment normal.              Results Reviewed:  Lab Results (last 24 hours)     Procedure Component Value Units Date/Time    [place] Draw [[id number]] Collected: 07/11/22 1539    Specimen: Blood Updated: 07/11/22 1648    Narrative:      The following orders were created for panel order [place] Draw.  Procedure                               Abnormality         Status                     ---------                               -----------         ------                     Green Top (Gel)[[id number]]                                  Final result               Lavender Top[[id number]]                                     Final result               Red Top[[id number]]                                           Final result               Light Blue Top[[id number]]                                   Final result                 Please view results for these tests on the individual orders.    Green Top (Gel) [[id number]] Collected: 07/11/22 1539    Specimen: Blood Updated: 07/11/22 1648     Extra Tube Hold for add-ons.     Comment: Auto resulted.       Red Top [[id number]] Collected: 07/11/22 1539    Specimen: Blood Updated: 07/11/22 1648     Extra Tube Hold for add-ons.     Comment: Auto resulted.       Light Blue Top [[id number]] Collected: 07/11/22 1539    Specimen: Blood Updated: 07/11/22 1648     Extra Tube Hold for add-ons.     Comment: Auto resulted       Lavender Top [[id number]] Collected: 07/11/22 1539    Specimen: Blood Updated: 07/11/22 1648     Extra Tube hold for add-on     Comment: Auto resulted       Comprehensive Metabolic Panel [[id number]]  (Abnormal) Collected: 07/11/22 1539    Specimen: Blood Updated: 07/11/22 1612     Glucose 118 mg/dL      BUN 10 mg/dL      Creatinine 0.74 mg/dL      Sodium 144 mmol/L      Potassium 3.9 mmol/L      Chloride 107 mmol/L      CO2 24.0 mmol/L      Calcium 9.1 mg/dL      Total Protein 7.0 g/dL      Albumin 4.80 g/dL      ALT (SGPT) 14 U/L      AST (SGOT) 14 U/L      Alkaline Phosphatase 93 U/L      Total Bilirubin 0.5 mg/dL      Globulin 2.2 gm/dL      A/G Ratio 2.2 g/dL      BUN/Creatinine Ratio 13.5     Anion Gap 13.0 mmol/L      eGFR 84.0 mL/min/1.73      Comment: National Kidney Foundation and American Society of Nephrology (ASN) Task Force recommended calculation based on the Chronic Kidney Disease Epidemiology Collaboration (CKD-EPI) equation refit without adjustment for race.       Narrative:      GFR Normal >60  Chronic Kidney Disease <60  Kidney Failure <15      Troponin [[id number]]  (Normal) Collected: 07/11/22 1539    Specimen: Blood Updated: 07/11/22 1610     Troponin T <0.010 ng/mL     Narrative:      Troponin T Reference Range:  <= 0.03 ng/mL-   Negative for  AMI  >0.03 ng/mL-     Abnormal for myocardial necrosis.  Clinicians would have to utilize clinical acumen, EKG, Troponin and serial changes to determine if it is an Acute Myocardial Infarction or myocardial injury due to an underlying chronic condition.       Results may be falsely decreased if patient taking Biotin.      CBC & Differential [[id number]]  (Abnormal) Collected: 07/11/22 1539    Specimen: Blood Updated: 07/11/22 1552    Narrative:      The following orders were created for panel order CBC & Differential.  Procedure                               Abnormality         Status                     ---------                               -----------         ------                     CBC Auto Differential[[id number]]        Abnormal            Final result                 Please view results for these tests on the individual orders.    CBC Auto Differential [[id number]]  (Abnormal) Collected: 07/11/22 1539    Specimen: Blood Updated: 07/11/22 1552     WBC 11.95 10*3/mm3      RBC 4.82 10*6/mm3      Hemoglobin 13.7 g/dL      Hematocrit 43.5 %      MCV 90.2 fL      MCH 28.4 pg      MCHC 31.5 g/dL      RDW 13.0 %      RDW-SD 43.0 fl      MPV 10.8 fL      Platelets 235 10*3/mm3      Neutrophil % 74.1 %      Lymphocyte % 14.3 %      Monocyte % 9.3 %      Eosinophil % 1.4 %      Basophil % 0.5 %      Immature Grans % 0.4 %      Neutrophils, Absolute 8.85 10*3/mm3      Lymphocytes, Absolute 1.71 10*3/mm3      Monocytes, Absolute 1.11 10*3/mm3      Eosinophils, Absolute 0.17 10*3/mm3      Basophils, Absolute 0.06 10*3/mm3      Immature Grans, Absolute 0.05 10*3/mm3      nRBC 0.0 /100 WBC         Imaging Results (Last 24 Hours)     Procedure Component Value Units Date/Time    XR Chest 1 View [[id number]] Collected: 07/11/22 1601     Updated: 07/11/22 1604    Narrative:      EXAM/TECHNIQUE: XR CHEST 1 VW-     INDICATION: Chest pain     COMPARISON: 8/12/2018     FINDINGS:     Cardiac silhouette is within normal limits.  Calcified granuloma in the  LEFT lower lobe. No pleural effusion, pneumothorax, or focal  consolidation. No acute osseous finding.       Impression:         No acute findings.  This report was finalized on 07/11/2022 16:01 by Dr. Keaton Naylor MD.        I have personally reviewed and interpreted the radiology studies and ECG obtained at time of admission.     Assessment / Plan     Assessment:   Active Hospital Problems    Diagnosis    • Atrial fibrillation with RVR (HCC)      1.  A-fib with RVR  -Start Amiodarone bolus and drip  -Check Mg level  -Consult cardiology      2.  Post-surgical Hypothyroidism  -Synthroid    3.  HTN  -Labetalol  -Losartan    4.  HLD  -Statin    5.  PRINCE   -CPAP qhs          Code Status/Advanced Care Plan: full code    The patient's surrogate decision maker is her son.    I discussed my findings and recommendations with the patient.    Estimated length of stay is 2-3 days    The patient was seen and examined by me on 7/11/2022 at 1800    Electronically signed by Leonardo James MD, 07/11/22, 18:07 CDT.

## 2022-07-11 NOTE — ED PROVIDER NOTES
Subjective   History of Present Illness    Patient is a pleasant 76-year-old female chief complaint of recurrent atrial fibrillation.  The patient describes that she did history of proximal atrial fibrillation and is anticoagulated with Eliquis.  She has had previous cardioversion in the remote past.  Her last episode of A. fib was approximately 2 months ago after she had a thyroidectomy and converted to normal sinus rhythm on her own.  A couple hours ago, she felt her heart racing and she felt incredibly weak with it as she usually does when she is in A. fib.  Her heart rate has been approximately 150s.  She contacted her cardiologist and she was advised to take her Cardizem early.  Instead of taking her cardizem at noon, she had taken at 8:30 in the morning and has not helped since.  Because of this, she came to the ER to be further evaluated.  She denies any associated chest pain, pressure, tightness.  She does complain of shortness of breath and weakness.  She describes that she had laboratory data recently completed by her primary care provider in the past week.  She believes her electrolytes and thyroid panel were checked.  She denies any use of decongestants but does drink a small, about half glass, of wine daily.    Review of Systems   Constitutional: Positive for activity change and fatigue.   HENT: Negative.    Eyes: Negative.    Respiratory: Positive for shortness of breath. Negative for chest tightness.    Cardiovascular: Negative.    Gastrointestinal: Negative.    Genitourinary: Negative.    Musculoskeletal: Negative.    Skin: Negative.    Neurological: Negative.    Psychiatric/Behavioral: Negative.    All other systems reviewed and are negative.      Past Medical History:   Diagnosis Date   • Arthritis    • Atrial fibrillation (HCC)    • Enlarged thyroid    • Hyperlipidemia    • Hypertension    • PONV (postoperative nausea and vomiting)    • Sleep apnea     Uses CPAP       No Known Allergies    Past  Surgical History:   Procedure Laterality Date   • CARDIAC CATHETERIZATION     • CARDIOVERSION      x2   • COLONOSCOPY N/A 2021    Procedure: COLONOSCOPY WITH ANESTHESIA;  Surgeon: Delio Leslie MD;  Location:  [facility];  Service: Gastroenterology;  Laterality: N/A;  Pre: Hx Colon Polyps  Post: Colon Polyps, Diverticulosis  Dr. Ania Oseguera  CO2 Inflation Used   • COLONOSCOPY W/ POLYPECTOMY  2016    Tubular adenoma ascending colon repeat exam in 5 years   • PARATHYROIDECTOMY Right 2022    Procedure: Bilateral parathyroid exploration with right inferior parathyroidectomy;  Surgeon: Eric Olvera MD;  Location:  [facility];  Service: ENT;  Laterality: Right;   • REPLACEMENT TOTAL KNEE Right 2019   • THYROIDECTOMY Bilateral 2022    Procedure: Total thyroidectomy;  Surgeon: Eric Olvera MD;  Location:  [facility];  Service: ENT;  Laterality: Bilateral;   • TUBAL ABDOMINAL LIGATION         Family History   Problem Relation Age of Onset   • Atrial fibrillation Mother    • Heart disease Father    • Breast cancer Neg Hx    • Ovarian cancer Neg Hx    • Uterine cancer Neg Hx    • Colon cancer Neg Hx    • Melanoma Neg Hx    • Colon polyps Neg Hx        Social History     Socioeconomic History   • Marital status:    Tobacco Use   • Smoking status: Former Smoker     Years: 20.00     Quit date:      Years since quittin.5   • Smokeless tobacco: Never Used   Vaping Use   • Vaping Use: Never used   Substance and Sexual Activity   • Alcohol use: Yes     Comment: occ   • Drug use: No   • Sexual activity: Defer       Prior to Admission medications    Medication Sig Start Date End Date Taking? Authorizing Provider   acetaminophen (TYLENOL) 325 MG tablet Take 2 tablets by mouth Every 4 (Four) Hours As Needed for Mild Pain . 22   Eric Olvera MD   apixaban (ELIQUIS) 5 MG tablet tablet Take 1 tablet by mouth Every 12 (Twelve) Hours. 18   Mat Spencer  MANDO Almonte   CloNIDine (CATAPRES) 0.1 MG tablet Take 0.1 mg by mouth 2 (Two) Times a Day.    Judy Patton MD   dilTIAZem CD (CARDIZEM CD) 360 MG 24 hr capsule Take 1 capsule by mouth Daily for 30 days. 6/20/22 7/20/22  Jovani Lovelace MD   famciclovir (FAMVIR) 500 MG tablet Take 1,500 mg by mouth As Needed. Used as needed, when a skin eruption occurs, 11/4/19   Judy Patton MD   fluticasone (FLONASE) 50 MCG/ACT nasal spray 2 sprays into the nostril(s) as directed by provider Daily As Needed for Rhinitis or Allergies.    Judy Patton MD   furosemide (LASIX) 40 MG tablet Take 1 tablet by mouth Daily. 5/20/22   Jovani Lovelace MD   KLOR-CON 10 MEQ CR tablet Take 10 mEq by mouth 2 (Two) Times a Day. 3/22/17   Judy Patton MD   labetalol (NORMODYNE) 200 MG tablet Take 2 tablets by mouth Every 12 (Twelve) Hours for 30 days.  Patient taking differently: Take 200 mg by mouth 2 (Two) Times a Day. one tablet bid a day changed starting on the PM dose on 6/7/22 6/1/22 7/1/22  Rosario Colon APRN   levothyroxine (Synthroid) 100 MCG tablet Take 1 tablet by mouth Daily. 6/24/22   Galina Osorio APRN   losartan (COZAAR) 100 MG tablet Take 1 tablet by mouth Daily. 3/7/22   Judy Patton MD   Multiple Vitamins-Minerals (PRESERVISION AREDS 2 PO) Take 1 tablet by mouth 2 (Two) Times a Day.    Judy Patton MD   Omega-3 Fatty Acids (FISH OIL) 1000 MG capsule capsule Take 1,000 mg by mouth Daily With Breakfast.    Judy Patton MD   rosuvastatin (CRESTOR) 10 MG tablet Take 1 tablet by mouth Daily. 10/29/19   Judy Patton MD       Medications   sodium chloride 0.9 % flush 10 mL (has no administration in time range)   aspirin chewable tablet 324 mg (324 mg Oral Not Given 7/11/22 1614)   dilTIAZem (CARDIZEM) 125 mg in 125 mL 0.7% sodium chloride  infusion (10 mg/hr Intravenous Rate/Dose Change 7/11/22 0602)   magnesium sulfate 2g/50 mL (PREMIX) infusion (2 g  "Intravenous New Bag 7/11/22 1724)   amiodarone in dextrose 5% (NEXTERONE) loading dose 150mg/100mL (has no administration in time range)   dilTIAZem (CARDIZEM) injection 10 mg (10 mg Intravenous Given 7/11/22 1612)   dilTIAZem (CARDIZEM) injection 10 mg (10 mg Intravenous Given 7/11/22 1705)       BP (!) 140/111   Pulse (!) 151   Temp 98.2 °F (36.8 °C)   Resp 21   Ht 167.6 cm (66\")   Wt 100 kg (221 lb)   SpO2 97%   BMI 35.67 kg/m²       Objective   Physical Exam  Vitals and nursing note reviewed.   Constitutional:       General: She is not in acute distress.     Appearance: She is well-developed. She is not diaphoretic.   HENT:      Head: Normocephalic and atraumatic.   Eyes:      Conjunctiva/sclera: Conjunctivae normal.      Pupils: Pupils are equal, round, and reactive to light.   Neck:      Trachea: No tracheal deviation.   Cardiovascular:      Rate and Rhythm: Normal rate. Rhythm irregularly irregular.      Heart sounds: Normal heart sounds. No murmur heard.  Pulmonary:      Effort: Pulmonary effort is normal.      Breath sounds: Normal breath sounds.   Abdominal:      General: Bowel sounds are normal. There is no distension.      Palpations: Abdomen is soft. There is no mass.      Tenderness: There is no abdominal tenderness. There is no guarding or rebound.   Musculoskeletal:         General: Normal range of motion.      Cervical back: Normal range of motion and neck supple.   Skin:     General: Skin is warm and dry.      Capillary Refill: Capillary refill takes less than 2 seconds.   Neurological:      General: No focal deficit present.      Mental Status: She is alert and oriented to person, place, and time.   Psychiatric:         Mood and Affect: Mood normal.         Behavior: Behavior normal.         Thought Content: Thought content normal.         Judgment: Judgment normal.         Procedures         Lab Results (last 24 hours)     Procedure Component Value Units Date/Time    CBC & Differential " [[id number]]  (Abnormal) Collected: 07/11/22 1539    Specimen: Blood Updated: 07/11/22 1552    Narrative:      The following orders were created for panel order CBC & Differential.  Procedure                               Abnormality         Status                     ---------                               -----------         ------                     CBC Auto Differential[[id number]]        Abnormal            Final result                 Please view results for these tests on the individual orders.    Comprehensive Metabolic Panel [[id number]]  (Abnormal) Collected: 07/11/22 1539    Specimen: Blood Updated: 07/11/22 1612     Glucose 118 mg/dL      BUN 10 mg/dL      Creatinine 0.74 mg/dL      Sodium 144 mmol/L      Potassium 3.9 mmol/L      Chloride 107 mmol/L      CO2 24.0 mmol/L      Calcium 9.1 mg/dL      Total Protein 7.0 g/dL      Albumin 4.80 g/dL      ALT (SGPT) 14 U/L      AST (SGOT) 14 U/L      Alkaline Phosphatase 93 U/L      Total Bilirubin 0.5 mg/dL      Globulin 2.2 gm/dL      A/G Ratio 2.2 g/dL      BUN/Creatinine Ratio 13.5     Anion Gap 13.0 mmol/L      eGFR 84.0 mL/min/1.73      Comment: National Kidney Foundation and American Society of Nephrology (ASN) Task Force recommended calculation based on the Chronic Kidney Disease Epidemiology Collaboration (CKD-EPI) equation refit without adjustment for race.       Narrative:      GFR Normal >60  Chronic Kidney Disease <60  Kidney Failure <15      Troponin [[id number]]  (Normal) Collected: 07/11/22 1539    Specimen: Blood Updated: 07/11/22 1610     Troponin T <0.010 ng/mL     Narrative:      Troponin T Reference Range:  <= 0.03 ng/mL-   Negative for AMI  >0.03 ng/mL-     Abnormal for myocardial necrosis.  Clinicians would have to utilize clinical acumen, EKG, Troponin and serial changes to determine if it is an Acute Myocardial Infarction or myocardial injury due to an underlying chronic condition.       Results may be falsely decreased if patient taking  Biotin.      CBC Auto Differential [[id number]]  (Abnormal) Collected: 07/11/22 1539    Specimen: Blood Updated: 07/11/22 1552     WBC 11.95 10*3/mm3      RBC 4.82 10*6/mm3      Hemoglobin 13.7 g/dL      Hematocrit 43.5 %      MCV 90.2 fL      MCH 28.4 pg      MCHC 31.5 g/dL      RDW 13.0 %      RDW-SD 43.0 fl      MPV 10.8 fL      Platelets 235 10*3/mm3      Neutrophil % 74.1 %      Lymphocyte % 14.3 %      Monocyte % 9.3 %      Eosinophil % 1.4 %      Basophil % 0.5 %      Immature Grans % 0.4 %      Neutrophils, Absolute 8.85 10*3/mm3      Lymphocytes, Absolute 1.71 10*3/mm3      Monocytes, Absolute 1.11 10*3/mm3      Eosinophils, Absolute 0.17 10*3/mm3      Basophils, Absolute 0.06 10*3/mm3      Immature Grans, Absolute 0.05 10*3/mm3      nRBC 0.0 /100 WBC           XR Chest 1 View    Result Date: 7/11/2022  Narrative: EXAM/TECHNIQUE: XR CHEST 1 VW-  INDICATION: Chest pain  COMPARISON: 8/12/2018  FINDINGS:  Cardiac silhouette is within normal limits. Calcified granuloma in the LEFT lower lobe. No pleural effusion, pneumothorax, or focal consolidation. No acute osseous finding.      Impression:  No acute findings. This report was finalized on 07/11/2022 16:01 by Dr. Keaton Naylor MD.      ED Course  ED Course as of 07/11/22 1741   Mon Jul 11, 2022 1732 Patient has received diltiazem, magnesium, and amiodarone will be ordered as well.  I did speak with Dr. Mayers who was examined the patient (pt declined cardioversion) also Dr. James, hospitalist on-call who is gracious admit the patient under services. [TK]   1737 Case discussed with the patient at length she does not want to be cardioverted we will switch over to amiodarone for rate control [TS]      ED Course User Index  [TK] Antwan Bentley PA  [TS] Artem Mayers MD          [facility]    Final diagnoses:   Atrial fibrillation with RVR (HCC)   Essential hypertension           Antwan Bentley PA  07/11/22 1741

## 2022-07-11 NOTE — TELEPHONE ENCOUNTER
Call returned to pt. Her rate is still at 140 bpm. She is on her way to [facility]. Appt is made to see MANDO Mariscal tomorrow in the office at 10 am. I told her that I will call her in the morning.

## 2022-07-12 ENCOUNTER — ANESTHESIA (OUTPATIENT)
Dept: CARDIOLOGY | Facility: HOSPITAL | Age: 77
End: 2022-07-12

## 2022-07-12 ENCOUNTER — APPOINTMENT (OUTPATIENT)
Dept: CARDIOLOGY | Facility: HOSPITAL | Age: 77
End: 2022-07-12

## 2022-07-12 ENCOUNTER — ANESTHESIA EVENT (OUTPATIENT)
Dept: CARDIOLOGY | Facility: HOSPITAL | Age: 77
End: 2022-07-12

## 2022-07-12 ENCOUNTER — TELEPHONE (OUTPATIENT)
Dept: INTERNAL MEDICINE | Age: 77
End: 2022-07-12

## 2022-07-12 VITALS
BODY MASS INDEX: 35.52 KG/M2 | SYSTOLIC BLOOD PRESSURE: 127 MMHG | OXYGEN SATURATION: 98 % | DIASTOLIC BLOOD PRESSURE: 79 MMHG | TEMPERATURE: 98.2 F | HEIGHT: 66 IN | HEART RATE: 66 BPM | WEIGHT: 221 LBS | RESPIRATION RATE: 16 BRPM

## 2022-07-12 VITALS — HEART RATE: 63 BPM | OXYGEN SATURATION: 97 % | SYSTOLIC BLOOD PRESSURE: 107 MMHG | DIASTOLIC BLOOD PRESSURE: 72 MMHG

## 2022-07-12 LAB
ANION GAP SERPL CALCULATED.3IONS-SCNC: 12 MMOL/L (ref 5–15)
BUN SERPL-MCNC: 11 MG/DL (ref 8–23)
BUN/CREAT SERPL: 16.7 (ref 7–25)
CALCIUM SPEC-SCNC: 8.9 MG/DL (ref 8.6–10.5)
CHLORIDE SERPL-SCNC: 107 MMOL/L (ref 98–107)
CO2 SERPL-SCNC: 25 MMOL/L (ref 22–29)
CREAT SERPL-MCNC: 0.66 MG/DL (ref 0.57–1)
EGFRCR SERPLBLD CKD-EPI 2021: 91 ML/MIN/1.73
GLUCOSE SERPL-MCNC: 134 MG/DL (ref 65–99)
POTASSIUM SERPL-SCNC: 3.6 MMOL/L (ref 3.5–5.2)
QT INTERVAL: 286 MS
QT INTERVAL: 352 MS
QT INTERVAL: 404 MS
QTC INTERVAL: 448 MS
QTC INTERVAL: 453 MS
QTC INTERVAL: 469 MS
SODIUM SERPL-SCNC: 144 MMOL/L (ref 136–145)

## 2022-07-12 PROCEDURE — 93010 ELECTROCARDIOGRAM REPORT: CPT | Performed by: INTERNAL MEDICINE

## 2022-07-12 PROCEDURE — 25010000002 AMIODARONE IN DEXTROSE 5% 360-4.14 MG/200ML-% SOLUTION: Performed by: FAMILY MEDICINE

## 2022-07-12 PROCEDURE — 96375 TX/PRO/DX INJ NEW DRUG ADDON: CPT

## 2022-07-12 PROCEDURE — 80048 BASIC METABOLIC PNL TOTAL CA: CPT | Performed by: FAMILY MEDICINE

## 2022-07-12 PROCEDURE — 25010000002 PROPOFOL 10 MG/ML EMULSION: Performed by: NURSE ANESTHETIST, CERTIFIED REGISTERED

## 2022-07-12 PROCEDURE — 93005 ELECTROCARDIOGRAM TRACING: CPT | Performed by: FAMILY MEDICINE

## 2022-07-12 PROCEDURE — 92960 CARDIOVERSION ELECTRIC EXT: CPT | Performed by: INTERNAL MEDICINE

## 2022-07-12 PROCEDURE — 99233 SBSQ HOSP IP/OBS HIGH 50: CPT | Performed by: INTERNAL MEDICINE

## 2022-07-12 PROCEDURE — 25010000002 DIGOXIN PER 500 MCG: Performed by: FAMILY MEDICINE

## 2022-07-12 PROCEDURE — 96366 THER/PROPH/DIAG IV INF ADDON: CPT

## 2022-07-12 PROCEDURE — 5A2204Z RESTORATION OF CARDIAC RHYTHM, SINGLE: ICD-10-PCS | Performed by: INTERNAL MEDICINE

## 2022-07-12 PROCEDURE — 92960 CARDIOVERSION ELECTRIC EXT: CPT

## 2022-07-12 RX ORDER — LIDOCAINE HYDROCHLORIDE 20 MG/ML
INJECTION, SOLUTION EPIDURAL; INFILTRATION; INTRACAUDAL; PERINEURAL AS NEEDED
Status: DISCONTINUED | OUTPATIENT
Start: 2022-07-12 | End: 2022-07-12 | Stop reason: SURG

## 2022-07-12 RX ORDER — PROPOFOL 10 MG/ML
VIAL (ML) INTRAVENOUS AS NEEDED
Status: DISCONTINUED | OUTPATIENT
Start: 2022-07-12 | End: 2022-07-12 | Stop reason: SURG

## 2022-07-12 RX ORDER — DIGOXIN 0.25 MG/ML
250 INJECTION INTRAMUSCULAR; INTRAVENOUS ONCE
Status: COMPLETED | OUTPATIENT
Start: 2022-07-12 | End: 2022-07-12

## 2022-07-12 RX ORDER — LABETALOL 200 MG/1
200 TABLET, FILM COATED ORAL 2 TIMES DAILY
COMMUNITY
End: 2022-07-14 | Stop reason: SDUPTHER

## 2022-07-12 RX ADMIN — ROSUVASTATIN CALCIUM 10 MG: 10 TABLET, FILM COATED ORAL at 12:22

## 2022-07-12 RX ADMIN — PROPOFOL 70 MG: 10 INJECTION, EMULSION INTRAVENOUS at 15:09

## 2022-07-12 RX ADMIN — LEVOTHYROXINE SODIUM 100 MCG: 100 TABLET ORAL at 06:27

## 2022-07-12 RX ADMIN — APIXABAN 5 MG: 5 TABLET, FILM COATED ORAL at 12:22

## 2022-07-12 RX ADMIN — DIGOXIN 250 MCG: 0.25 INJECTION INTRAMUSCULAR; INTRAVENOUS at 06:05

## 2022-07-12 RX ADMIN — ROSUVASTATIN CALCIUM 10 MG: 10 TABLET, FILM COATED ORAL at 03:24

## 2022-07-12 RX ADMIN — DILTIAZEM HYDROCHLORIDE 360 MG: 180 CAPSULE, COATED, EXTENDED RELEASE ORAL at 12:22

## 2022-07-12 RX ADMIN — POTASSIUM CHLORIDE 20 MEQ: 10 CAPSULE, COATED, EXTENDED RELEASE ORAL at 12:22

## 2022-07-12 RX ADMIN — LOSARTAN POTASSIUM 100 MG: 50 TABLET, FILM COATED ORAL at 12:22

## 2022-07-12 RX ADMIN — Medication 15 MG/HR: at 11:25

## 2022-07-12 RX ADMIN — CLONIDINE HYDROCHLORIDE 0.1 MG: 0.1 TABLET ORAL at 12:22

## 2022-07-12 RX ADMIN — LABETALOL HYDROCHLORIDE 200 MG: 200 TABLET, FILM COATED ORAL at 12:22

## 2022-07-12 RX ADMIN — LIDOCAINE HYDROCHLORIDE 80 MG: 20 INJECTION, SOLUTION EPIDURAL; INFILTRATION; INTRACAUDAL; PERINEURAL at 15:09

## 2022-07-12 RX ADMIN — Medication 7.5 MG/HR: at 01:00

## 2022-07-12 RX ADMIN — AMIODARONE HYDROCHLORIDE 0.5 MG/MIN: 1.8 INJECTION, SOLUTION INTRAVENOUS at 01:04

## 2022-07-12 NOTE — PLAN OF CARE
Goal Outcome Evaluation: Patient was admitted from ER with A-fib RVR. Patient states that she has a history of A-fib RVR and has been cardioverted 2 times previously. Patient will be cardioverted this shift. Patient safety to be maintained this shift. Patient is currently on Cardizem and Amiodarone.  Will continue to monitor and report abnormal to provider

## 2022-07-12 NOTE — PROGRESS NOTES
LOS: 1 day   Patient Care Team:  Ania Culver MD as PCP - General  Ania Culver MD as PCP - Family Medicine  Adrián Carlos MD as Consulting Physician (Pulmonary Disease)  Delio Leslie MD as Consulting Physician (Gastroenterology)    Chief Complaint: Shortness of breath and rapid atrial fibrillation     Subjective    Anne-Marie Hayes is a 76 y.o. female who is being seen in follow-up.  Overnight continues to be in atrial fibrillation  No chest pain  No palpitation  No presyncope  No syncope  Orthopnea  No paroxysmal nocturnal dyspnea  Hemodynamically stable  No other new issues or events  Saturating normally    Telemetry: Atrial fibrillation with rapid ventricular rates    Review of Systems   Constitutional: No chills   Has fatigue   No fever.   HENT: Negative.    Eyes: Negative.    Respiratory: Negative for cough,   No chest wall soreness,   Shortness of breath,   no wheezing, no stridor.    Cardiovascular: As above  Gastrointestinal: Negative for abdominal distention,  No abdominal pain,   No blood in stool,   No constipation,   No diarrhea,   No nausea   No vomiting.   Endocrine: Negative.    Genitourinary: Negative for difficulty urinating, dysuria, flank pain and hematuria.   Musculoskeletal: Negative.    Skin: Negative for rash and wound.   Allergic/Immunologic: Negative.    Neurological: Negative for dizziness, syncope, weakness,   No light-headedness  No  headaches.   Hematological: Does not bruise/bleed easily.   Psychiatric/Behavioral: Negative for agitation or behavioral problems,   No confusion,   the patient is  nervous/anxious.       History:   Past Medical History:   Diagnosis Date   • Arthritis    • Atrial fibrillation (HCC)    • Enlarged thyroid    • Hyperlipidemia    • Hypertension    • PONV (postoperative nausea and vomiting)    • Sleep apnea     Uses CPAP     Past Surgical History:   Procedure Laterality Date   • CARDIAC CATHETERIZATION  1987   • CARDIOVERSION      x2   •  COLONOSCOPY N/A 2021    Procedure: COLONOSCOPY WITH ANESTHESIA;  Surgeon: Delio Leslie MD;  Location: [facility] ENDOSCOPY;  Service: Gastroenterology;  Laterality: N/A;  Pre: Hx Colon Polyps  Post: Colon Polyps, Diverticulosis  Dr. Ania Oseguera  CO2 Inflation Used   • COLONOSCOPY W/ POLYPECTOMY  2016    Tubular adenoma ascending colon repeat exam in 5 years   • PARATHYROIDECTOMY Right 2022    Procedure: Bilateral parathyroid exploration with right inferior parathyroidectomy;  Surgeon: Eric Olvera MD;  Location: [facility] OR;  Service: ENT;  Laterality: Right;   • REPLACEMENT TOTAL KNEE Right 2019   • THYROIDECTOMY Bilateral 2022    Procedure: Total thyroidectomy;  Surgeon: Eric Olvera MD;  Location: [facility] OR;  Service: ENT;  Laterality: Bilateral;   • TUBAL ABDOMINAL LIGATION       Social History     Socioeconomic History   • Marital status:    Tobacco Use   • Smoking status: Former Smoker     Years: 20.00     Quit date:      Years since quittin.5   • Smokeless tobacco: Never Used   Vaping Use   • Vaping Use: Never used   Substance and Sexual Activity   • Alcohol use: Yes     Comment: occ   • Drug use: No   • Sexual activity: Defer     Family History   Problem Relation Age of Onset   • Atrial fibrillation Mother    • Heart disease Father    • Breast cancer Neg Hx    • Ovarian cancer Neg Hx    • Uterine cancer Neg Hx    • Colon cancer Neg Hx    • Melanoma Neg Hx    • Colon polyps Neg Hx        Labs:  WBC WBC   Date Value Ref Range Status   2022 11.95 (H) 3.40 - 10.80 10*3/mm3 Final      HGB Hemoglobin   Date Value Ref Range Status   2022 13.7 12.0 - 15.9 g/dL Final      HCT Hematocrit   Date Value Ref Range Status   2022 43.5 34.0 - 46.6 % Final      Platelets Platelets   Date Value Ref Range Status   2022 235 140 - 450 10*3/mm3 Final      MCV MCV   Date Value Ref Range Status   2022 90.2 79.0 - 97.0 fL Final        Results  from last 7 days   Lab Units 07/11/22  1539 07/07/22  0913   SODIUM mmol/L 144 144   POTASSIUM mmol/L 3.9 4.2   CHLORIDE mmol/L 107 105   TOTAL CO2 mmol/L  --  26   CO2 mmol/L 24.0  --    BUN mg/dL 10 12   CREATININE mg/dL 0.74 0.7   CALCIUM mg/dL 9.1 8.7*   BILIRUBIN mg/dL 0.5 0.5   ALK PHOS U/L 93 87   ALT (SGPT) U/L 14 12   AST (SGOT) U/L 14 13   GLUCOSE mg/dL 118* 107     Lab Results   Component Value Date    TROPONINI <0.012 08/12/2018    TROPONINT <0.010 07/11/2022     PT/INR:  No results found for: PROTIME/No results found for: INR    Imaging Results (Last 72 Hours)     Procedure Component Value Units Date/Time    XR Chest 1 View [[id number]] Collected: 07/11/22 1601     Updated: 07/11/22 1604    Narrative:      EXAM/TECHNIQUE: XR CHEST 1 VW-     INDICATION: Chest pain     COMPARISON: 8/12/2018     FINDINGS:     Cardiac silhouette is within normal limits. Calcified granuloma in the  LEFT lower lobe. No pleural effusion, pneumothorax, or focal  consolidation. No acute osseous finding.       Impression:         No acute findings.  This report was finalized on 07/11/2022 16:01 by Dr. Keaton Naylor MD.          Objective     No Known Allergies    Medication Review: Performed  Current Facility-Administered Medications   Medication Dose Route Frequency Provider Last Rate Last Admin   • acetaminophen (TYLENOL) tablet 650 mg  650 mg Oral Q4H PRN Leonardo James MD       • amiodarone 360 mg in 200 mL D5W infusion  0.5 mg/min Intravenous Continuous Leonardo James MD 16.67 mL/hr at 07/12/22 0104 0.5 mg/min at 07/12/22 0104    Followed by   • amiodarone (PACERONE) tablet 200 mg  200 mg Oral Once Leonardo James MD        Followed by   • [START ON 7/13/2022] amiodarone (PACERONE) tablet 200 mg  200 mg Oral BID With Meals Leonardo James MD        Followed by   • [START ON 7/23/2022] amiodarone (PACERONE) tablet 200 mg  200 mg Oral Q12H Leonardo James MD        Followed by   • [START ON  8/6/2022] amiodarone (PACERONE) tablet 200 mg  200 mg Oral Daily Leonardo James MD       • apixaban (ELIQUIS) tablet 5 mg  5 mg Oral Q12H Leonardo James MD   5 mg at 07/11/22 2305   • aspirin chewable tablet 324 mg  324 mg Oral Once Leonardo James MD       • cloNIDine (CATAPRES) tablet 0.1 mg  0.1 mg Oral Q12H Leonardo James MD   0.1 mg at 07/11/22 2305   • dilTIAZem (CARDIZEM) 125 mg in 125 mL 0.7% sodium chloride  infusion  5-15 mg/hr Intravenous Titrated Leonardo James MD 15 mL/hr at 07/12/22 0444 15 mg/hr at 07/12/22 0444   • dilTIAZem CD (CARDIZEM CD) 24 hr capsule 360 mg  360 mg Oral Q24H Leonardo James MD       • fluticasone (FLONASE) 50 MCG/ACT nasal spray 2 spray  2 spray Nasal Daily PRN Leonardo James MD       • furosemide (LASIX) tablet 40 mg  40 mg Oral Daily Leonardo James MD       • labetalol (NORMODYNE) tablet 200 mg  200 mg Oral BID Leonardo James MD   200 mg at 07/11/22 2305   • levothyroxine (SYNTHROID, LEVOTHROID) tablet 100 mcg  100 mcg Oral Q AM Leonardo James MD   100 mcg at 07/12/22 0627   • losartan (COZAAR) tablet 100 mg  100 mg Oral Daily Leonardo James MD       • potassium chloride (MICRO-K) CR capsule 20 mEq  20 mEq Oral Daily Leonardo James MD       • rosuvastatin (CRESTOR) tablet 10 mg  10 mg Oral Daily Leonardo James MD   10 mg at 07/12/22 0324   • sodium chloride 0.9 % flush 10 mL  10 mL Intravenous PRN Leonardo James MD         Current Outpatient Medications   Medication Sig Dispense Refill   • acetaminophen (TYLENOL) 325 MG tablet Take 2 tablets by mouth Every 4 (Four) Hours As Needed for Mild Pain .     • apixaban (ELIQUIS) 5 MG tablet tablet Take 1 tablet by mouth Every 12 (Twelve) Hours. 60 tablet 0   • CloNIDine (CATAPRES) 0.1 MG tablet Take 0.1 mg by mouth 2 (Two) Times a Day.     • dilTIAZem CD (CARDIZEM CD) 360 MG 24 hr capsule Take 1 capsule by mouth Daily for 30  "days. 90 capsule 3   • fluticasone (FLONASE) 50 MCG/ACT nasal spray 2 sprays into the nostril(s) as directed by provider Daily As Needed for Rhinitis or Allergies.     • furosemide (LASIX) 40 MG tablet Take 1 tablet by mouth Daily. 90 tablet 3   • KLOR-CON 10 MEQ CR tablet Take 10 mEq by mouth 2 (Two) Times a Day.     • labetalol (NORMODYNE) 200 MG tablet Take 2 tablets by mouth Every 12 (Twelve) Hours for 30 days. (Patient taking differently: Take 200 mg by mouth 2 (Two) Times a Day. one tablet bid a day changed starting on the PM dose on 6/7/22) 120 tablet 0   • levothyroxine (Synthroid) 100 MCG tablet Take 1 tablet by mouth Daily. 90 tablet 0   • losartan (COZAAR) 100 MG tablet Take 1 tablet by mouth Daily.     • Multiple Vitamins-Minerals (PRESERVISION AREDS 2 PO) Take 1 tablet by mouth 2 (Two) Times a Day.     • Omega-3 Fatty Acids (FISH OIL) 1000 MG capsule capsule Take 1,000 mg by mouth Daily With Breakfast.     • rosuvastatin (CRESTOR) 10 MG tablet Take 1 tablet by mouth Daily.     • famciclovir (FAMVIR) 500 MG tablet Take 1,500 mg by mouth As Needed. Used as needed, when a skin eruption occurs,         Vital Sign Min/Max for last 24 hours  Temp  Min: 98.2 °F (36.8 °C)  Max: 98.2 °F (36.8 °C)   BP  Min: 112/57  Max: 173/102   Pulse  Min: 60  Max: 154   Resp  Min: 14  Max: 21   SpO2  Min: 93 %  Max: 99 %   No data recorded   Weight  Min: 100 kg (221 lb)  Max: 100 kg (221 lb)     Flowsheet Rows    Flowsheet Row First Filed Value   Admission Height 167.6 cm (66\") Documented at 07/11/2022 1539   Admission Weight 100 kg (221 lb) Documented at 07/11/2022 1539          Results for orders placed in visit on 02/18/22    Adult Transthoracic Echo Complete W/ Cont if Necessary Per Protocol    Interpretation Summary  · Left ventricular ejection fraction appears to be 66 - 70%. Left ventricular systolic function is normal.  · Left ventricular diastolic function is consistent with (grade II w/high LAP) " pseudonormalization.  · Normal right ventricular cavity size and systolic function noted.  · Estimated right ventricular systolic pressure from tricuspid regurgitation is mildly elevated (35-45 mmHg).  · There is no significant (greater than mild) valvular dysfunction.      Physical Exam:    General Appearance: Awake, alert, in no acute distress  Eyes: Pupils equal and reactive    Ears: Appear intact with no abnormalities noted  Nose: Nares normal, no drainage  Neck: supple, trachea midline, no carotid bruit and no JVD  Back: no kyphosis present,    Lungs: respirations regular, respirations even and respirations unlabored  Heart: normal S1, S2, tachycardic, irregular   abdomen: normal bowel sounds, no tenderness   Skin: no bleeding, bruising or rash  Extremities: no cyanosis  Psychiatric/Behavioral: Negative for agitation, behavioral problems, confusion, the patient does  appear to be nervous/anxious.       Results Review:   I reviewed the patient's new clinical results.  I reviewed the patient's new imaging results and agree with the interpretation.  I reviewed the patient's other test results and agree with the interpretation  I personally viewed and interpreted the patient's EKG/Telemetry data    Discussed with patient  Updated patient regarding any new or relevant abnormalities on review of records or any new findings on physical exam.   Mentioned to patient about purpose of visit and desirable health short and long term goals and objectives.     Reviewed available prior notes, consults, prior visits, laboratory findings, radiology and cardiology relevant reports.   Updated chart as applicable.   I have reviewed the patient's medical history in detail and updated the computerized patient record as relevant.          Assessment & Plan       Atrial fibrillation with RVR (HCC)  Mild pulmonary hypertension  BMI of 36  Essential hypertension  Hyperlipidemia  Acquired hypothyroidism     Plan     Keep n.p.o.  Plans for  DC cardioversion today  Continue on rate control agents  Patient on Cardizem  Also received IV amiodarone bolus and infusion  Ventricular rates are under much better control  Is on uninterrupted anticoagulation with Eliquis 5 mg p.o. twice daily for more than 30 days  Discussed with patient nursing and family  Monitor for any overt or covert signs of bleeding  Patient to be on telemetry  Short-term follow-up with cardiology after discharge within 2 weeks  Nicola Quinonez MD  07/12/22  08:42 CDT    EMR Dragon/Transcription was used to dictate part of this note

## 2022-07-12 NOTE — CASE MANAGEMENT/SOCIAL WORK
Discharge Planning Assessment  [facility]     Patient Name: Anne-Marie Hayes  MRN: [id number]  Today's Date: 7/12/2022    Admit Date: 7/11/2022     Discharge Needs Assessment     Row Name 07/12/22 0948       Living Environment    People in Home alone    Current Living Arrangements home    Primary Care Provided by self    Provides Primary Care For no one    Family Caregiver if Needed child(ольга), adult;other (see comments)  friends    Family Caregiver Names son / Koko Abad    Quality of Family Relationships helpful;involved;supportive    Able to Return to Prior Arrangements yes       Resource/Environmental Concerns    Resource/Environmental Concerns none       Transition Planning    Patient/Family Anticipates Transition to home    Patient/Family Anticipated Services at Transition none    Transportation Anticipated family or friend will provide       Discharge Needs Assessment    Readmission Within the Last 30 Days no previous admission in last 30 days    Equipment Currently Used at Home none    Concerns to be Addressed denies needs/concerns at this time    Patient's Choice of Community Agency(s) Patient lives alone, has family and friends involved. No DME at home.  PCP is Ania Culver MD.  Denies any needs at this time. CM/SS will continue to follow.               Discharge Plan    No documentation.               Continued Care and Services - Admitted Since 7/11/2022    Coordination has not been started for this encounter.          Demographic Summary    No documentation.                Functional Status    No documentation.                Psychosocial    No documentation.                Abuse/Neglect    No documentation.                Legal    No documentation.                Substance Abuse    No documentation.                Patient Forms    No documentation.                   Vale Tinsley RN

## 2022-07-12 NOTE — DISCHARGE SUMMARY
[facility] Medicine Services  DISCHARGE SUMMARY       Date of Admission: 7/11/2022  Date of Discharge:  7/12/2022  Primary Care Physician: Ania Culver MD    Presenting Problem/Chief Complaint:  Palpitations    Final Discharge Diagnoses:  Active Hospital Problems    Diagnosis    • **Atrial fibrillation with RVR (HCC)    • Status post right inferior parathyroidectomy (HCC)    • Status post total thyroidectomy    • Chronic anticoagulation    • Essential hypertension    • Mixed hyperlipidemia    • PRINCE on CPAP    • Severe obesity (BMI 35.0-39.9) with comorbidity (HCC)        Consults: Dr. Quinonez with cardiology.    Procedures Performed:     Cardioversion external and cardiology department per Dr. Quinonez.    Pertinent Test Results:   Results for orders placed in visit on 02/18/22    Adult Transthoracic Echo Complete W/ Cont if Necessary Per Protocol    Interpretation Summary  · Left ventricular ejection fraction appears to be 66 - 70%. Left ventricular systolic function is normal.  · Left ventricular diastolic function is consistent with (grade II w/high LAP) pseudonormalization.  · Normal right ventricular cavity size and systolic function noted.  · Estimated right ventricular systolic pressure from tricuspid regurgitation is mildly elevated (35-45 mmHg).  · There is no significant (greater than mild) valvular dysfunction.      Imaging Results (All)     Procedure Component Value Units Date/Time    XR Chest 1 View [[id number]] Collected: 07/11/22 1601     Updated: 07/11/22 1604    Narrative:      EXAM/TECHNIQUE: XR CHEST 1 VW-     INDICATION: Chest pain     COMPARISON: 8/12/2018     FINDINGS:     Cardiac silhouette is within normal limits. Calcified granuloma in the  LEFT lower lobe. No pleural effusion, pneumothorax, or focal  consolidation. No acute osseous finding.       Impression:         No acute findings.  This report was finalized on 07/11/2022 16:01 by Dr. Keaton Naylor MD.           LAB RESULTS:      Lab 07/11/22  1539 07/07/22  0913   WBC 11.95* 7.4   HEMOGLOBIN 13.7 12.3   HEMATOCRIT 43.5 40.1   PLATELETS 235 192   NEUTROS ABS 8.85* 4.5   IMMATURE GRANS (ABS) 0.05 0.0   LYMPHS ABS 1.71 1.7   MONOS ABS 1.11* 0.70   EOS ABS 0.17 0.40   MCV 90.2 93.3         Lab 07/12/22  1150 07/11/22  1539 07/07/22  0913   SODIUM 144 144 144   POTASSIUM 3.6 3.9 4.2   CHLORIDE 107 107 105   TOTAL CO2  --   --  26   CO2 25.0 24.0  --    ANION GAP 12.0 13.0 13   BUN 11 10 12   CREATININE 0.66 0.74 0.7   EGFR 91.0 84.0  --    GLUCOSE 134* 118* 107   CALCIUM 8.9 9.1 8.7*   MAGNESIUM  --  2.1  --    TSH  --   --  0.916         Lab 07/11/22  1539 07/07/22  0913   TOTAL PROTEIN 7.0 6.4*   ALBUMIN 4.80 4.2   GLOBULIN 2.2  --    ALT (SGPT) 14 12   AST (SGOT) 14 13   BILIRUBIN 0.5 0.5   ALK PHOS 93 87         Lab 07/11/22  2017 07/11/22  1539   TROPONIN T <0.010 <0.010                 Brief Urine Lab Results     None        Microbiology Results (last 10 days)     Procedure Component Value - Date/Time    COVID PRE-OP / PRE-PROCEDURE SCREENING ORDER (NO ISOLATION) - Swab, Nasal Cavity [[id number]]  (Normal) Collected: 07/11/22 2216    Lab Status: Final result Specimen: Swab from Nasal Cavity Updated: 07/11/22 2352    Narrative:      The following orders were created for panel order COVID PRE-OP / PRE-PROCEDURE SCREENING ORDER (NO ISOLATION) - Swab, Nasal Cavity.  Procedure                               Abnormality         Status                     ---------                               -----------         ------                     COVID-19,[employer] IN-JAS[facility]...[[id number]]  Normal              Final result                 Please view results for these tests on the individual orders.    COVID-19,[employer] IN-HOUSE,Nasal Swab No Transport Media 3-4 HR TAT - Swab, Nasal Cavity [[id number]]  (Normal) Collected: 07/11/22 8481    Lab Status: Final result Specimen: Swab from Nasal Cavity Updated: 07/11/22 3758     COVID19 Not  Detected    Narrative:      Fact sheet for providers: https://www.fda.gov/media/003459/download     Fact sheet for patients: https://www.fda.gov/media/944935/download    Test performed by PCR.    Consider negative results in combination with clinical observations, patient history, and epidemiological information.        Chief Complaint on Day of Discharge: No acute complaints.    Hospital Course:  The patient is a 76 y.o. female who presented to [facility] with palpitations.  She has a past medical history significant for atrial fibrillation chronically anticoagulated with Eliquis, hypertension, hyperlipidemia, hypothyroidism and total thyroidectomy with partial parathyroidectomy in May 2022 with Dr. Lopez.  She follows with Dr. Ania Andujar for her primary care and Dr. Lovelace with cardiology.  On 7/11 she awoke at 1:30 AM with palpitations.  She felt weak and like her heart was racing as she has felt in the past when she is in atrial fibrillation.  Contacted her cardiologist and was advised to take her Cardizem early.  She presented to the ED for further evaluation.  Denies any associated chest pain, pressure, tightness.  In the emergency department, EKG reveals atrial fibrillation with rapid ventricular response in the 150s.  She is hemodynamically stable with a systolic blood pressure in the 150s.  She received multiple doses of Cardizem without improvement in her heart rate.  He was started on amiodarone bolus and infusion.  Cardiology consulted.  She was admitted to the hospitalist service for further evaluation and management.    He was seen in consultation by Dr. Quinonez with cardiology.  She has continued on anticoagulation with Eliquis twice daily uninterrupted for 30 days.  Dr. Quinonez recommended DC cardioversion today.  She is okay for discharge from cardiology standpoint with close follow-up in 2 weeks.    She is medically stable and appropriate for discharge home today.  She is to follow-up with  "Dr. Culver in 1 week.    Condition on Discharge:  Medically stable.    Physical Exam on Discharge:  /80 (BP Location: Right arm, Patient Position: Lying)   Pulse 63   Temp 98.2 °F (36.8 °C) (Tympanic)   Resp 18   Ht 167.6 cm (66\")   Wt 100 kg (221 lb)   SpO2 96%   BMI 35.67 kg/m²   Physical Exam  Vitals reviewed.   Constitutional:       General: She is not in acute distress.     Appearance: She is obese. She is not toxic-appearing.      Comments: Up in bed.  No acute distress.  Tolerating room air.   HENT:      Head: Normocephalic and atraumatic.      Mouth/Throat:      Mouth: Mucous membranes are moist.      Pharynx: Oropharynx is clear.   Eyes:      Extraocular Movements: Extraocular movements intact.      Conjunctiva/sclera: Conjunctivae normal.      Pupils: Pupils are equal, round, and reactive to light.   Cardiovascular:      Pulses: Normal pulses.   Pulmonary:      Effort: Pulmonary effort is normal. No respiratory distress.      Breath sounds: Normal breath sounds. No wheezing.   Abdominal:      General: Bowel sounds are normal. There is no distension.      Palpations: Abdomen is soft.      Tenderness: There is no abdominal tenderness.   Musculoskeletal:         General: No swelling or tenderness. Normal range of motion.      Cervical back: Normal range of motion and neck supple. No muscular tenderness.   Skin:     General: Skin is warm and dry.      Findings: No erythema or rash.   Neurological:      General: No focal deficit present.      Mental Status: She is alert and oriented to person, place, and time.      Cranial Nerves: No cranial nerve deficit.      Motor: No weakness.   Psychiatric:         Mood and Affect: Mood normal.         Behavior: Behavior normal.       Discharge Disposition:  home    Discharge Medications:     Discharge Medications      Continue These Medications      Instructions Start Date   acetaminophen 325 MG tablet  Commonly known as: TYLENOL   650 mg, Oral, Every 4 Hours " PRN      apixaban 5 MG tablet tablet  Commonly known as: ELIQUIS   5 mg, Oral, Every 12 Hours Scheduled      cloNIDine 0.1 MG tablet  Commonly known as: CATAPRES   0.1 mg, Oral, 2 Times Daily      dilTIAZem  MG 24 hr capsule  Commonly known as: CARDIZEM CD   360 mg, Oral, Every 24 Hours Scheduled      famciclovir 500 MG tablet  Commonly known as: FAMVIR   1,500 mg, Oral, Daily PRN, Used as needed, when a skin eruption occurs,      fish oil 1000 MG capsule capsule   1,000 mg, Oral, Daily With Breakfast      fluticasone 50 MCG/ACT nasal spray  Commonly known as: FLONASE   2 sprays, Nasal, Daily PRN      furosemide 40 MG tablet  Commonly known as: LASIX   40 mg, Oral, Daily      KLOR-CON 10 MEQ CR tablet  Generic drug: potassium chloride   10 mEq, Oral, 2 Times Daily      labetalol 200 MG tablet  Commonly known as: NORMODYNE   200 mg, Oral, 2 Times Daily      levothyroxine 100 MCG tablet  Commonly known as: Synthroid   100 mcg, Oral, Daily      losartan 100 MG tablet  Commonly known as: COZAAR   1 tablet, Oral, Daily      PRESERVISION AREDS 2 PO   1 tablet, Oral, 2 Times Daily      rosuvastatin 10 MG tablet  Commonly known as: CRESTOR   1 tablet, Oral, Daily             Discharge Diet:   Diet Instructions     Diet: Regular, Cardiac      Discharge Diet:  Regular  Cardiac             Activity at Discharge:   Activity Instructions     Activity as Tolerated            Discharge Care Plan/Instructions:   1.  Seek evaluation for worsening symptoms.    Follow-up Appointments:   1.  Follow-up with Dr. Mar in 1 week.  2.  Follow-up with cardiology (Dr. Lovelace) in 2 weeks.  Future Appointments   Date Time Provider Department Center   11/21/2022  8:30 AM Jovani Lovelace MD [facility] CD PAD PAD   12/23/2022  9:30 AM Galina Osorio APRN [facility] ENT P[facility] P[facility]       Test Results Pending at Discharge: none.    Electronically signed by MANDO Rush, 07/12/22, 15:08 CDT.    Time: 35 minutes.

## 2022-07-12 NOTE — TELEPHONE ENCOUNTER
----- Message from Yaa Nguyen sent at 7/11/2022 10:04 AM CDT -----  Subject: Appointment Request    Reason for Call: Established Patient Appointment needed: Routine Existing   Condition Follow Up    QUESTIONS    Reason for appointment request? Available appointments did not meet   patient need     Additional Information for Provider? Pt is back in a fib and needed to   cancel her appt for 7/12/22.  She needs to r/s but next appt is not until   Oct. Please call pt to reschedule.  ---------------------------------------------------------------------------  --------------  [id number] [employer]  [phone]; OK to leave message on voicemail  ---------------------------------------------------------------------------  --------------  SCRIPT ANSWERS  COVID Screen: Alex Spaulding

## 2022-07-12 NOTE — CONSULTS
LOS: 0 days   Patient Care Team:  Ania Culver MD as PCP - General  Ania Culver MD as PCP - Family Medicine  Adrián Carlos MD as Consulting Physician (Pulmonary Disease)  Delio Leslie MD as Consulting Physician (Gastroenterology)    Chief Complaint: Palpitations and shortness of breath     Subjective    Anne-Marie Hayes is a 76 y.o. female who is being seen in consultation.  Patient has persistent atrial fibrillation  She has had recent thyroid and parathyroid surgery approximately 6 weeks ago  Patient continues on anticoagulation with Eliquis 5 mg p.o. twice daily uninterrupted for more than 30 days  Since this morning started having rapid palpitation  Feels weak  Has some shortness of breath  Denies chest pain  No presyncope  No syncope  No orthopnea  No paroxysmal nocturnal dyspnea  Hemodynamically stable  In ER received and drip of Cardizem up to 15 mg/h  Due to persistent increase in ventricular rates received IV amiodarone  Subsequently rates improved  Patient when I saw her was in atrial fibrillation though EKG as below shows atrial flutter with 2-1 conduction  Family member by bedside  No bleeding issues  No falls  No anticipated endoscopic or surgical procedures  EKG as below      Telemetry: Atrial fibrillation/flutter    Review of Systems   Constitutional: No chills   Has fatigue   No fever.   HENT: Negative.    Eyes: Negative.    Respiratory: Negative for cough,   No chest wall soreness,   Shortness of breath,   no wheezing, no stridor.    Cardiovascular: As above  Gastrointestinal: Negative for abdominal distention,  No abdominal pain,   No blood in stool,   No constipation,   No diarrhea,   No nausea   No vomiting.   Endocrine: Negative.    Genitourinary: Negative for difficulty urinating, dysuria, flank pain and hematuria.   Musculoskeletal: Negative.    Skin: Negative for rash and wound.   Allergic/Immunologic: Negative.    Neurological: Negative for dizziness, syncope,  weakness,   No light-headedness  No  headaches.   Hematological: Does not bruise/bleed easily.   Psychiatric/Behavioral: Negative for agitation or behavioral problems,   No confusion,   the patient is  nervous/anxious.       History:   Past Medical History:   Diagnosis Date   • Arthritis    • Atrial fibrillation (HCC)    • Enlarged thyroid    • Hyperlipidemia    • Hypertension    • PONV (postoperative nausea and vomiting)    • Sleep apnea     Uses CPAP     Past Surgical History:   Procedure Laterality Date   • CARDIAC CATHETERIZATION     • CARDIOVERSION      x2   • COLONOSCOPY N/A 2021    Procedure: COLONOSCOPY WITH ANESTHESIA;  Surgeon: Delio Leslie MD;  Location:  [facility];  Service: Gastroenterology;  Laterality: N/A;  Pre: Hx Colon Polyps  Post: Colon Polyps, Diverticulosis  Dr. Ania Oseguera  CO2 Inflation Used   • COLONOSCOPY W/ POLYPECTOMY  2016    Tubular adenoma ascending colon repeat exam in 5 years   • PARATHYROIDECTOMY Right 2022    Procedure: Bilateral parathyroid exploration with right inferior parathyroidectomy;  Surgeon: Eric Olvera MD;  Location:  [facility];  Service: ENT;  Laterality: Right;   • REPLACEMENT TOTAL KNEE Right 2019   • THYROIDECTOMY Bilateral 2022    Procedure: Total thyroidectomy;  Surgeon: Eric Olvera MD;  Location:  [facility];  Service: ENT;  Laterality: Bilateral;   • TUBAL ABDOMINAL LIGATION       Social History     Socioeconomic History   • Marital status:    Tobacco Use   • Smoking status: Former Smoker     Years: 20.00     Quit date:      Years since quittin.5   • Smokeless tobacco: Never Used   Vaping Use   • Vaping Use: Never used   Substance and Sexual Activity   • Alcohol use: Yes     Comment: occ   • Drug use: No   • Sexual activity: Defer     Family History   Problem Relation Age of Onset   • Atrial fibrillation Mother    • Heart disease Father    • Breast cancer Neg Hx    • Ovarian cancer Neg Hx     • Uterine cancer Neg Hx    • Colon cancer Neg Hx    • Melanoma Neg Hx    • Colon polyps Neg Hx        Labs:  WBC WBC   Date Value Ref Range Status   07/11/2022 11.95 (H) 3.40 - 10.80 10*3/mm3 Final      HGB Hemoglobin   Date Value Ref Range Status   07/11/2022 13.7 12.0 - 15.9 g/dL Final      HCT Hematocrit   Date Value Ref Range Status   07/11/2022 43.5 34.0 - 46.6 % Final      Platelets Platelets   Date Value Ref Range Status   07/11/2022 235 140 - 450 10*3/mm3 Final      MCV MCV   Date Value Ref Range Status   07/11/2022 90.2 79.0 - 97.0 fL Final        Results from last 7 days   Lab Units 07/11/22  1539 07/07/22  0913   SODIUM mmol/L 144 144   POTASSIUM mmol/L 3.9 4.2   CHLORIDE mmol/L 107 105   TOTAL CO2 mmol/L  --  26   CO2 mmol/L 24.0  --    BUN mg/dL 10 12   CREATININE mg/dL 0.74 0.7   CALCIUM mg/dL 9.1 8.7*   BILIRUBIN mg/dL 0.5 0.5   ALK PHOS U/L 93 87   ALT (SGPT) U/L 14 12   AST (SGOT) U/L 14 13   GLUCOSE mg/dL 118* 107     Lab Results   Component Value Date    TROPONINI <0.012 08/12/2018    TROPONINT <0.010 07/11/2022     PT/INR:  No results found for: PROTIME/No results found for: INR    Imaging Results (Last 72 Hours)     Procedure Component Value Units Date/Time    XR Chest 1 View [[id number]] Collected: 07/11/22 1601     Updated: 07/11/22 1604    Narrative:      EXAM/TECHNIQUE: XR CHEST 1 VW-     INDICATION: Chest pain     COMPARISON: 8/12/2018     FINDINGS:     Cardiac silhouette is within normal limits. Calcified granuloma in the  LEFT lower lobe. No pleural effusion, pneumothorax, or focal  consolidation. No acute osseous finding.       Impression:         No acute findings.  This report was finalized on 07/11/2022 16:01 by Dr. Keaton Naylor MD.          Objective     No Known Allergies    Medication Review: Performed  Current Facility-Administered Medications   Medication Dose Route Frequency Provider Last Rate Last Admin   • amiodarone 360 mg in 200 mL D5W infusion  1 mg/min Intravenous  Continuous Leonardo James MD 33.3 mL/hr at 07/11/22 1901 1 mg/min at 07/11/22 1901    Followed by   • [START ON 7/12/2022] amiodarone 360 mg in 200 mL D5W infusion  0.5 mg/min Intravenous Continuous Leonardo James MD        Followed by   • [START ON 7/12/2022] amiodarone (PACERONE) tablet 200 mg  200 mg Oral Once Leonardo James MD        Followed by   • [START ON 7/13/2022] amiodarone (PACERONE) tablet 200 mg  200 mg Oral BID With Meals Leonardo James MD        Followed by   • [START ON 7/23/2022] amiodarone (PACERONE) tablet 200 mg  200 mg Oral Q12H Leonardo James MD        Followed by   • [START ON 8/6/2022] amiodarone (PACERONE) tablet 200 mg  200 mg Oral Daily Leonardo James MD       • aspirin chewable tablet 324 mg  324 mg Oral Once Artem Mayers MD       • dilTIAZem (CARDIZEM) 125 mg in 125 mL 0.7% sodium chloride  infusion  5-15 mg/hr Intravenous Titrated Antwan Bentley PA 10 mL/hr at 07/11/22 1629 10 mg/hr at 07/11/22 1629   • sodium chloride 0.9 % flush 10 mL  10 mL Intravenous PRN Artem Mayers MD         Current Outpatient Medications   Medication Sig Dispense Refill   • acetaminophen (TYLENOL) 325 MG tablet Take 2 tablets by mouth Every 4 (Four) Hours As Needed for Mild Pain .     • apixaban (ELIQUIS) 5 MG tablet tablet Take 1 tablet by mouth Every 12 (Twelve) Hours. 60 tablet 0   • CloNIDine (CATAPRES) 0.1 MG tablet Take 0.1 mg by mouth 2 (Two) Times a Day.     • dilTIAZem CD (CARDIZEM CD) 360 MG 24 hr capsule Take 1 capsule by mouth Daily for 30 days. 90 capsule 3   • famciclovir (FAMVIR) 500 MG tablet Take 1,500 mg by mouth As Needed. Used as needed, when a skin eruption occurs,     • fluticasone (FLONASE) 50 MCG/ACT nasal spray 2 sprays into the nostril(s) as directed by provider Daily As Needed for Rhinitis or Allergies.     • furosemide (LASIX) 40 MG tablet Take 1 tablet by mouth Daily. 90 tablet 3   • KLOR-CON 10 MEQ CR tablet Take 10  "mEq by mouth 2 (Two) Times a Day.     • labetalol (NORMODYNE) 200 MG tablet Take 2 tablets by mouth Every 12 (Twelve) Hours for 30 days. (Patient taking differently: Take 200 mg by mouth 2 (Two) Times a Day. one tablet bid a day changed starting on the PM dose on 6/7/22) 120 tablet 0   • levothyroxine (Synthroid) 100 MCG tablet Take 1 tablet by mouth Daily. 90 tablet 0   • losartan (COZAAR) 100 MG tablet Take 1 tablet by mouth Daily.     • Multiple Vitamins-Minerals (PRESERVISION AREDS 2 PO) Take 1 tablet by mouth 2 (Two) Times a Day.     • Omega-3 Fatty Acids (FISH OIL) 1000 MG capsule capsule Take 1,000 mg by mouth Daily With Breakfast.     • rosuvastatin (CRESTOR) 10 MG tablet Take 1 tablet by mouth Daily.         Vital Sign Min/Max for last 24 hours  Temp  Min: 98.2 °F (36.8 °C)  Max: 98.2 °F (36.8 °C)   BP  Min: 140/111  Max: 173/102   Pulse  Min: 147  Max: 151   Resp  Min: 21  Max: 21   SpO2  Min: 97 %  Max: 97 %   No data recorded   Weight  Min: 100 kg (221 lb)  Max: 100 kg (221 lb)     Flowsheet Rows    Flowsheet Row First Filed Value   Admission Height 167.6 cm (66\") Documented at 07/11/2022 1539   Admission Weight 100 kg (221 lb) Documented at 07/11/2022 1539          Results for orders placed in visit on 02/18/22    Adult Transthoracic Echo Complete W/ Cont if Necessary Per Protocol    Interpretation Summary  · Left ventricular ejection fraction appears to be 66 - 70%. Left ventricular systolic function is normal.  · Left ventricular diastolic function is consistent with (grade II w/high LAP) pseudonormalization.  · Normal right ventricular cavity size and systolic function noted.  · Estimated right ventricular systolic pressure from tricuspid regurgitation is mildly elevated (35-45 mmHg).  · There is no significant (greater than mild) valvular dysfunction.      Physical Exam:    General Appearance: Awake, alert, in no acute distress  Eyes: Pupils equal and reactive    Ears: Appear intact with no " abnormalities noted  Nose: Nares normal, no drainage  Neck: supple, trachea midline, no carotid bruit and no JVD  Back: no kyphosis present,    Lungs: respirations regular, respirations even and respirations unlabored  Heart: normal S1, S2, tachycardic  2/6 systolic murmur left sternal border   abdomen: normal bowel sounds, no tenderness   Skin: no bleeding, bruising or rash  Extremities: no cyanosis  Psychiatric/Behavioral: Negative for agitation, behavioral problems, confusion, the patient does  appear to be nervous/anxious.       Results Review:   I reviewed the patient's new clinical results.  I reviewed the patient's new imaging results and agree with the interpretation.  I reviewed the patient's other test results and agree with the interpretation  I personally viewed and interpreted the patient's EKG/Telemetry data    Discussed with patient  Updated patient regarding any new or relevant abnormalities on review of records or any new findings on physical exam.   Mentioned to patient about purpose of visit and desirable health short and long term goals and objectives.     Reviewed available prior notes, consults, prior visits, laboratory findings, radiology and cardiology relevant reports.   Updated chart as applicable.   I have reviewed the patient's medical history in detail and updated the computerized patient record as relevant.          Assessment & Plan       Atrial fibrillation with RVR (HCC)  Mild pulmonary hypertension  BMI of 36  Essential hypertension  Hyperlipidemia  Acquired hypothyroidism    Plan    Continue on rate control agents  Patient on Cardizem  Also received IV amiodarone bolus and infusion  Ventricular rates are under much better control  Is on uninterrupted anticoagulation with Eliquis 5 mg p.o. twice daily for more than 30 days  Keep n.p.o. after midnight  If persistent atrial fibrillation consider DC cardioversion tomorrow  Discussed with patient nursing and family  Monitor for any  overt or covert signs of bleeding  Patient to be on telemetry  Short-term follow-up with cardiology after discharge within 2 weeks         Nicola Quinonez MD  07/11/22  19:25 CDT    EMR Dragon/Transcription was used to dictate part of this note

## 2022-07-12 NOTE — ANESTHESIA POSTPROCEDURE EVALUATION
"Patient: Anne-Marie Hayes    Procedure Summary     Date: 07/12/22 Room / Location: [facility] CATH LAB    Anesthesia Start: 1506 Anesthesia Stop: 1515    Procedure: CARDIOVERSION EXTERNAL IN CARDIOLOGY DEPARTMENT Diagnosis: (af)    Scheduled Providers: Nicola Quinonez MD Provider: Eric Ludwig CRNA    Anesthesia Type: MAC ASA Status: 3          Anesthesia Type: MAC    Vitals  Vitals Value Taken Time   /72 07/12/22 1511   Temp     Pulse 63 07/12/22 1515   Resp     SpO2 97 % 07/12/22 1515           Post Anesthesia Care and Evaluation    Patient location during evaluation: PACU  Patient participation: complete - patient participated  Level of consciousness: awake and alert  Pain management: adequate    Airway patency: patent  Anesthetic complications: No anesthetic complications    Cardiovascular status: acceptable  Respiratory status: acceptable  Hydration status: acceptable    Comments: Blood pressure 114/80, pulse 63, temperature 98.2 °F (36.8 °C), temperature source Tympanic, resp. rate 18, height 167.6 cm (66\"), weight 100 kg (221 lb), SpO2 96 %, not currently breastfeeding.    Pt discharged from PACU based on yosi score >8      "

## 2022-07-12 NOTE — ANESTHESIA PREPROCEDURE EVALUATION
Anesthesia Evaluation     history of anesthetic complications: PONV  NPO Solid Status: > 8 hours  NPO Liquid Status: > 8 hours           Airway   Mallampati: I  TM distance: >3 FB  Neck ROM: full  No difficulty expected  Dental          Pulmonary    (+) sleep apnea on CPAP,   Cardiovascular   Exercise tolerance: poor (<4 METS)    (+) hypertension, dysrhythmias Atrial Fib, hyperlipidemia,     ROS comment: Echo 3/2022  · Left ventricular ejection fraction appears to be 66 - 70%. Left ventricular systolic function is normal.  · Left ventricular diastolic function is consistent with (grade II w/high LAP) pseudonormalization.  · Normal right ventricular cavity size and systolic function noted.  · Estimated right ventricular systolic pressure from tricuspid regurgitation is mildly elevated (35-45 mmHg).  · There is no significant (greater than mild) valvular dysfunction.        Neuro/Psych  (-) seizures, TIA, CVA  GI/Hepatic/Renal/Endo    (+) obesity,   thyroid problem (multinodular goiter)   (-) no renal disease, diabetes    Musculoskeletal     Abdominal    Substance History      OB/GYN          Other   arthritis,                        Anesthesia Plan    ASA 3     MAC     intravenous induction     Anesthetic plan, risks, benefits, and alternatives have been provided, discussed and informed consent has been obtained with: patient.        CODE STATUS:

## 2022-07-13 ENCOUNTER — TELEPHONE (OUTPATIENT)
Dept: INTERNAL MEDICINE | Age: 77
End: 2022-07-13

## 2022-07-13 NOTE — TELEPHONE ENCOUNTER
[facility] Initial Follow Up Call    Outreach made within 2 business days of discharge: Yes    Patient: Megan Oliveira   Patient : 1945 MRN: [id number]    Reason for Admission: Afib    Discharge Date: 2022      Discharge Diagnoses: Active Hospital Problems   Diagnosis    **Atrial fibrillation with RVR (HCC)    Status post right inferior par[facility]roidectomy ([facility] [address]    Status post total thyroidectomy    Chronic anticoagulation    Essential hypertension    Mixed hyperlipidemia    JENNYFER on CPAP    Severe obesity (BMI 35.0-39. 9) with comorbidity ([facility]       Spoke with: Patient    Discharge department/facility: [address] [place] [address] Interactive Patient Contact:  Was patient able to fill all prescriptions: Yes  Was patient instructed to bring all medications to the follow-up visit: Yes  Is patient taking all medications as directed in the discharge summary? Yes  Does patient understand their discharge instructions: Yes  Does patient have questions or concerns that need addressed prior to 7-14 day follow up office visit: no    Spoke to the pt she is doing better since getting home. Pt states she spent the whole time in the ER they did not have a bed to put her in. She said they did the Cardioversion yesterday afternoon because they gave her several medication and she still was not coming back into rhythm. She said today she is very tired. She said that she does not think she needs anything at this time but will let us know before her follow up if she does.      Scheduled appointment with PCP within 7-14 days    Follow Up  Future Appointments   Date Time Provider Gabriel Marrufo   2022 11:30 AM MD ERVIN Bello   2022  9:30 AM MD ERVIN Bello MA

## 2022-07-14 RX ORDER — LABETALOL 200 MG/1
200 TABLET, FILM COATED ORAL 2 TIMES DAILY
Qty: 180 TABLET | Refills: 3 | Status: SHIPPED | OUTPATIENT
Start: 2022-07-14

## 2022-07-19 ENCOUNTER — OFFICE VISIT (OUTPATIENT)
Dept: INTERNAL MEDICINE | Age: 77
End: 2022-07-19
Payer: MEDICARE

## 2022-07-19 ENCOUNTER — OFFICE VISIT (OUTPATIENT)
Dept: CARDIOLOGY | Facility: CLINIC | Age: 77
End: 2022-07-19

## 2022-07-19 VITALS
HEIGHT: 66 IN | BODY MASS INDEX: 35.68 KG/M2 | WEIGHT: 222 LBS | HEART RATE: 75 BPM | OXYGEN SATURATION: 97 % | SYSTOLIC BLOOD PRESSURE: 164 MMHG | DIASTOLIC BLOOD PRESSURE: 74 MMHG

## 2022-07-19 VITALS
HEART RATE: 72 BPM | BODY MASS INDEX: 35.84 KG/M2 | WEIGHT: 223 LBS | SYSTOLIC BLOOD PRESSURE: 120 MMHG | DIASTOLIC BLOOD PRESSURE: 62 MMHG | OXYGEN SATURATION: 97 % | HEIGHT: 66 IN

## 2022-07-19 DIAGNOSIS — E55.9 VITAMIN D DEFICIENCY: ICD-10-CM

## 2022-07-19 DIAGNOSIS — Z79.01 CHRONIC ANTICOAGULATION: ICD-10-CM

## 2022-07-19 DIAGNOSIS — E83.51 HYPOCALCEMIA: ICD-10-CM

## 2022-07-19 DIAGNOSIS — E89.0 S/P COMPLETE THYROIDECTOMY: ICD-10-CM

## 2022-07-19 DIAGNOSIS — I10 ESSENTIAL HYPERTENSION: ICD-10-CM

## 2022-07-19 DIAGNOSIS — I48.0 PAF (PAROXYSMAL ATRIAL FIBRILLATION): Primary | ICD-10-CM

## 2022-07-19 DIAGNOSIS — R73.01 IMPAIRED FASTING GLUCOSE: ICD-10-CM

## 2022-07-19 DIAGNOSIS — G47.33 OSA ON CPAP: ICD-10-CM

## 2022-07-19 DIAGNOSIS — E78.2 MIXED HYPERLIPIDEMIA: ICD-10-CM

## 2022-07-19 DIAGNOSIS — E89.2 S/P PARATHYROIDECTOMY (HCC): ICD-10-CM

## 2022-07-19 DIAGNOSIS — Z09 HOSPITAL DISCHARGE FOLLOW-UP: ICD-10-CM

## 2022-07-19 DIAGNOSIS — Z99.89 OSA ON CPAP: ICD-10-CM

## 2022-07-19 DIAGNOSIS — I48.91 ATRIAL FIBRILLATION WITH RVR (HCC): Primary | ICD-10-CM

## 2022-07-19 DIAGNOSIS — E66.01 SEVERE OBESITY (BMI 35.0-39.9) WITH COMORBIDITY: ICD-10-CM

## 2022-07-19 PROCEDURE — 99495 TRANSJ CARE MGMT MOD F2F 14D: CPT | Performed by: INTERNAL MEDICINE

## 2022-07-19 PROCEDURE — 99214 OFFICE O/P EST MOD 30 MIN: CPT | Performed by: NURSE PRACTITIONER

## 2022-07-19 PROCEDURE — 93000 ELECTROCARDIOGRAM COMPLETE: CPT | Performed by: NURSE PRACTITIONER

## 2022-07-19 PROCEDURE — 1111F DSCHRG MED/CURRENT MED MERGE: CPT | Performed by: INTERNAL MEDICINE

## 2022-07-19 NOTE — PROGRESS NOTES
Subjective:     Encounter Date: 07/19/2022      Patient ID: Anne-Marie Hayes is a 76 y.o. female with paroxysmal atrial fibrillation, chronic anticoagulation, hypertension, hyperlipidemia, hypothyroidism s/p total thyroidectomy with partial parathyroidectomy in May 2022 with Dr Lopez, obstructive sleep apnea and obesity.    Chief Complaint: hospital follow up  Atrial Fibrillation  Presents for follow-up visit. Symptoms include palpitations (brief and resolve on their own). Symptoms are negative for bradycardia, chest pain, dizziness, hemodynamic instability, hypertension, hypotension, shortness of breath, syncope, tachycardia and weakness. The symptoms have been stable. Past medical history includes atrial fibrillation. There are no medication compliance problems.   Hypertension  This is a chronic problem. The current episode started more than 1 year ago. The problem is uncontrolled. Associated symptoms include malaise/fatigue (improving ) and palpitations (brief and resolve on their own). Pertinent negatives include no chest pain, orthopnea, peripheral edema, PND or shortness of breath.     Patient presents today for management of atrial fibrillation. Patient reports that she woke up about 0130 on 7/11/2022 to her heart racing. She tried to lie down and sleep. When she got up that am it was still elevated with heart rate in 140's. She reports shortness of breath, fatigue and palpitations. She called the office and was advised to take her am meds and if no improvement go to ER for further evaluation. Patient was recently admitted to [facility] ER for atrial fibrillation with RVR. She presented to the ER complaining of palpitations, heart racing and weakness. EKG in ER revealed atrial fibrillation with rates in the 150's. She received cardizem with no improvement so she was started on amiodarone bolus and infusion. She was seen by Dr Quinonez and underwent a cardioversion as she has been anticoagulated with no  interruption for 30 days.   She reports that she has been monitoring her blood pressure since discharge and it has remained controlled. She brings BP log to office today; has been running 120-130/70-80's at home. She reports that she has continued to feel fatigued and tired. She reports that yesterday she was able to do some laundry and chores around the house she states that she was tired afterwards. She denies any chest pain. She reports that she feels brief episodes of fluttering that are short lived and resolve if she sits and rests. She reports dyspnea with minimal exertion of walking from the parking lot to the office this am. She denies any leg swelling, orthopnea or PND. She reports that she recently turned up her CPAP machine herself and has been doing better. She has an appointment with ORESTES Linares on 7/21/2022.  Patient is on eliquis with no missed doses and denies any bleeding issues. Patient follows with Dr Culver as PCP    Previous Cardiac Testing and Procedures:  - ROBEL (8/13/2018) Normal left ventricular systolic function, mild MR, mild to moderate TR, no evidence of left atrial appendage thrombus.  - Cardioversion (8/13/2018) Successful cardioversion from atrial fibrillation to sinus rhythm  - Cardioversion (4/20/2020) successful cardioversion from atrial fibrillation to sinus rhythm  - Lipid panel (1/5/2020) total cholesterol 133, HDL 53, LDL 54, triglycerides 132  - Lipid panel (11/8/2021) total cholesterol 127, HDL 51, LDL 51, triglycerides 126  - BMP (11/8/2021) creatinine 0.8, potassium 4.6, sodium 141  - proBNP (2/18/2022) 311, normal 0-1800  - Echo (3/21/2022) EF 66-70%, grade 2 diastolic dysfunction, normal RV size and function, RVSP 35-45 mmHg, no significant valve dysfunction  - Lipid panel (5/17/2022) total cholesterol 137, HDL 54, LDL 56, triglycerides 133  - BNP (5/30/2022) 1673, normal 0-1800    The following portions of the patient's history were reviewed and updated as  appropriate: allergies, current medications, past family history, past medical history, past social history, past surgical history and problem list.    No Known Allergies    Current Outpatient Medications:   •  apixaban (ELIQUIS) 5 MG tablet tablet, Take 1 tablet by mouth Every 12 (Twelve) Hours., Disp: 60 tablet, Rfl: 0  •  CloNIDine (CATAPRES) 0.1 MG tablet, Take 0.1 mg by mouth 2 (Two) Times a Day., Disp: , Rfl:   •  dilTIAZem CD (CARDIZEM CD) 360 MG 24 hr capsule, Take 1 capsule by mouth Daily for 30 days., Disp: 90 capsule, Rfl: 3  •  famciclovir (FAMVIR) 500 MG tablet, Take 1,500 mg by mouth Daily As Needed (cold sores). Used as needed, when a skin eruption occurs,, Disp: , Rfl:   •  fluticasone (FLONASE) 50 MCG/ACT nasal spray, 2 sprays into the nostril(s) as directed by provider Daily As Needed for Rhinitis or Allergies., Disp: , Rfl:   •  furosemide (LASIX) 40 MG tablet, Take 1 tablet by mouth Daily., Disp: 90 tablet, Rfl: 3  •  KLOR-CON 10 MEQ CR tablet, Take 10 mEq by mouth 2 (Two) Times a Day., Disp: , Rfl:   •  labetalol (NORMODYNE) 200 MG tablet, Take 1 tablet by mouth 2 (Two) Times a Day., Disp: 180 tablet, Rfl: 3  •  levothyroxine (Synthroid) 100 MCG tablet, Take 1 tablet by mouth Daily., Disp: 90 tablet, Rfl: 0  •  losartan (COZAAR) 100 MG tablet, Take 1 tablet by mouth Daily., Disp: , Rfl:   •  Multiple Vitamins-Minerals (PRESERVISION AREDS 2 PO), Take 1 tablet by mouth 2 (Two) Times a Day., Disp: , Rfl:   •  Omega-3 Fatty Acids (FISH OIL) 1000 MG capsule capsule, Take 1,000 mg by mouth Daily With Breakfast., Disp: , Rfl:   •  rosuvastatin (CRESTOR) 10 MG tablet, Take 1 tablet by mouth Daily., Disp: , Rfl:   Past Medical History:   Diagnosis Date   • Arthritis    • Atrial fibrillation (HCC)    • Enlarged thyroid    • Hyperlipidemia    • Hypertension    • PONV (postoperative nausea and vomiting)    • Sleep apnea     Uses CPAP     Social History     Socioeconomic History   • Marital status:     Tobacco Use   • Smoking status: Former Smoker     Years: 20.00     Quit date:      Years since quittin.5   • Smokeless tobacco: Never Used   Vaping Use   • Vaping Use: Never used   Substance and Sexual Activity   • Alcohol use: Not Currently     Comment: occ   • Drug use: No   • Sexual activity: Defer       Review of Systems   Constitutional: Positive for fatigue and malaise/fatigue (improving ).   HENT: Positive for nosebleeds.    Cardiovascular: Positive for palpitations (brief and resolve on their own). Negative for chest pain, dyspnea on exertion, irregular heartbeat, leg swelling, near-syncope, orthopnea, paroxysmal nocturnal dyspnea and syncope.   Respiratory: Negative for shortness of breath.    Hematologic/Lymphatic: Bruises/bleeds easily.   Genitourinary: Positive for hematuria.   Neurological: Negative for dizziness and weakness.   All other systems reviewed and are negative.         Objective:     Vitals reviewed.   Constitutional:       General: Not in acute distress.     Appearance: Normal appearance. Well-developed.   Eyes:      Pupils: Pupils are equal, round, and reactive to light.   HENT:      Head: Normocephalic and atraumatic.      Nose: Nose normal.   Neck:      Vascular: No carotid bruit.   Pulmonary:      Effort: Pulmonary effort is normal. No respiratory distress.      Breath sounds: Normal breath sounds. No wheezing. No rales.   Cardiovascular:      Normal rate. Regular rhythm.      Murmurs: There is no murmur.   Edema:     Peripheral edema absent.   Abdominal:      General: There is no distension.      Palpations: Abdomen is soft.   Musculoskeletal: Normal range of motion.      Cervical back: Normal range of motion and neck supple. Skin:     General: Skin is warm.      Findings: No erythema or rash.   Neurological:      General: No focal deficit present.      Mental Status: Alert and oriented to person, place, and time.   Psychiatric:         Attention and Perception: Attention  "normal.         Mood and Affect: Mood normal.         Speech: Speech normal.         Behavior: Behavior normal.         Thought Content: Thought content normal.         Judgment: Judgment normal.         /74   Pulse 75   Ht 167.6 cm (66\")   Wt 101 kg (222 lb)   SpO2 97%   BMI 35.83 kg/m²       ECG 12 Lead    Date/Time: 7/19/2022 8:22 AM  Performed by: Steven Elizondo APRN  Authorized by: Steven Elizondo APRN   Comparison: compared with previous ECG from 7/12/2022  Similar to previous ECG  Rhythm: sinus rhythm  Rate: normal  BPM: 75              Lab Review:       Cardioversion 7/12/2022:  Interpretation Summary  · Post cardioversion the patient displayed a sinus rhythm.  · The cardioversion was successful.     Lab Results   Component Value Date    CHOL 150 08/07/2018    CHLPL 137 (L) 05/17/2022    TRIG 133 05/17/2022    HDL 54 (L) 05/17/2022    LDL 56 05/17/2022     I have personally reviewed hospitalization notes, cardioversion, and past office notes prior to patients visit  Assessment:          Diagnosis Plan   1. PAF (paroxysmal atrial fibrillation) (HCC)     2. Chronic anticoagulation     3. Essential hypertension     4. Mixed hyperlipidemia     5. PRINCE on CPAP     6. Severe obesity (BMI 35.0-39.9) with comorbidity (HCC)            Plan:       1. Paroxysmal atrial fibrillation: s/p cardioversion 7/12/2022. EKG today shows NSR rate of 75. Patient reporting some lingering fatigue and dypsnea. Echo 3/2022 showed LVEF 66-70%, grade II diastolic dysfunction and no valvular disease. Patient is euvolemic on examination today. Patient was previously on Flecainide which was not restarted during previous admission due to anticoagulation being held prior to surgery. Patient has an appointment with Dr Lopez on 7/21/2022. We discussed having her evaluated by EP and then deciding to restart Flecainide or if patient would be a candidate for ablation. Continue cardizem.     2. Chronic anticoagulation: patient is " on eliquis with no missed doses and denies any bleeding issues.    3. Hypertension: Elevated in office. Patient brought BP log that shows better control since hospital discharge. Continue current medications. Recommend to continue to monitor at home and notify office if consistently running >140/90.    4. Hyperlipidemia: Lipid panel from 5/2022 demonstrates good control. LDL 56. Continue omega-3 fish oil    5. Obstructive sleep apnea: Patient reports that her last sleep study was 4 years ago. She recently titrated her machine on her own and reports better results. Encouraged her to discuss with PCP this afternoon.     6. Obesity: Class 2 Severe Obesity (BMI >=35 and <=39.9). Obesity-related health conditions include the following: obstructive sleep apnea and hypertension. Obesity is unchanged. BMI is is above average; BMI management plan is completed. We discussed portion control and increasing exercise.      Current outpatient and discharge medications have been reconciled for the patient.  Reviewed by: MANDO Acevedo    Patient is to follow up in 2 months or sooner if needed

## 2022-07-19 NOTE — PROGRESS NOTES
Post-Discharge Transitional Care Management Progress Note      Camila Gill   YOB: 1945    Date of Office Visit:  7/19/2022  Date of Hospital Admission: 7/11/2022  Date of Hospital Discharge: 7/12/2022    Care management risk score Rising risk (score 2-5) and Complex Care (Scores >=6): 1     Non face to face  following discharge, date last encounter closed (first attempt may have been earlier): 7/13/2022 10:25 AM 7/13/2022 10:25 AM    Call initiated 2 business days of discharge: Yes    ASSESSMENT/PLAN:   Atrial fibrillation with RVR (HCC)  S/P parathyroidectomy (HCC)  Impaired fasting glucose  -     Hemoglobin A1C; Future  S/P complete thyroidectomy  -     TSH; Future  Vitamin D deficiency  -     Vitamin D 25 Hydroxy; Future  Hypocalcemia  -     CBC; Future  -     Comprehensive Metabolic Panel; Future  Hospital discharge follow-up  -     AR DISCHARGE MEDS RECONCILED W/ CURRENT OUTPATIENT MED LIST  Patient is in sinus rhythm on exam today we agree with cardiology to suggest going back on flecainide until she sees EP she was really doing well with flecainide until she had a parathyroidectomy she became quite hypocalcemic and it sounds like she was temporarily off of flecainide for the surgery. When she goes into atrial for but is very rapid and difficult to get to slow down. I think it is best to go back on flecainide until she sees the EP specialist later this week. Also we recommend she take a calcium supplement plus D6 100 mg daily she is developed hypocalcemia after her surgery it looks like it is normalizing but still 0.1 below the normal  Medical Decision Making: moderate complexity  No follow-ups on file. On this date 7/19/2022 I have spent extended time reviewing previous notes, test results and face to face with the patient discussing the diagnosis and importance of compliance with the treatment plan as well as documenting on the day of the visit.      Subjective:   HPI: Follow up of Hospital problems/diagnosis(es): Patient is here today to follow-up for fibrillation with rapid ventricular response patient has gone in and out of atrial fibs 3 times recently she had done well for a year and a half with flecainide she had thyroid surgery and parathyroid ectomy developed hypocalcemia and also with that type follicular was told to hold for surgery. She has had 2 further episodes since then. She is tired fatigued. Inpatient course: Discharge summary reviewed- see chart. Interval history/Current status: Patient is tired but otherwise doing okay    Patient Active Problem List   Diagnosis    Diffuse cystic mastopathy    Hyperlipidemia    Hypertension    Primary osteoarthritis involving multiple joints    Impaired fasting glucose    Hypercalcemia    Multinodular goiter    Lung nodule, solitary    Lumbar spinal stenosis    Postmenopausal    Allergic rhinitis    Hyperparathyroidism (HCC)    Paroxysmal atrial fibrillation (HCC)    Obstructive sleep apnea    Primary osteoarthritis of right knee    Arthritis of knee    Arthritis    Atrial flutter with rapid ventricular response (HCC)    S/P complete thyroidectomy    S/P parathyroidectomy ([facility] [address]       Medications listed as ordered at the time of discharge from hospital     Medication List            Accurate as of July 19, 2022 11:59 PM. If you have any questions, ask your nurse or doctor.                 CONTINUE taking these medications      apixaban 5 MG Tabs tablet  Commonly known as: Eliquis  Take 1 tablet by mouth 2 times daily     cloNIDine 0.1 MG tablet  Commonly known as: CATAPRES  Take 1 tablet by mouth 2 times daily     CPAP Machine Misc     dilTIAZem 360 MG extended release capsule  Commonly known as: Cardizem CD  Take 1 capsule by mouth daily     famciclovir 500 MG tablet  Commonly known as: FAMVIR  TAKE 3 TABLETS DAILY AS NEEDED FOR COLD SORES     FISH OIL     fluticasone 50 MCG/ACT nasal spray  Commonly known as: FLONASE  USE 2 SPRAYS IN EACH NOSTRIL DAILY AS NEEDED FOR RHINITIS OR ALLERGIES     furosemide 40 MG tablet  Commonly known as: LASIX     labetalol 200 MG tablet  Commonly known as: NORMODYNE  Take 1 tablet by mouth 2 times daily     losartan 100 MG tablet  Commonly known as: COZAAR  Take 1 tablet by mouth daily     potassium chloride 10 MEQ extended release tablet  Commonly known as: KLOR-CON M  Take 1 tablet by mouth 2 times daily     PRESERVISION AREDS 2+MULTI VIT PO     rosuvastatin 10 MG tablet  Commonly known as: CRESTOR  TAKE 1 TABLET DAILY     Synthroid 100 MCG tablet  Generic drug: levothyroxine                Medications marked \"taking\" at this time  No outpatient medications have been marked as taking for the 7/19/22 encounter (Office Visit) with Tacos Irving MD.        Medications patient taking as of now reconciled against medications ordered at time of hospital discharge: Yes    A comprehensive review of systems was negative except for what was noted in the HPI. And patient has fatigue arthralgias some dyspnea discouraged. Objective:    /62   Pulse 72   Ht 5' 6\" (1.676 m)   Wt 223 lb (101.2 kg)   SpO2 97%   BMI 35.99 kg/m²   Neck is supple sclera anicteric heart S1 is 2 lungs clear extremities with some arthritis changes trace edema mood is tired    An electronic signature was used to authenticate this note.   --Tacos Irving MD

## 2022-07-20 LAB
MAXIMAL PREDICTED HEART RATE: 144 BPM
STRESS TARGET HR: 122 BPM

## 2022-08-11 RX ORDER — LEVOTHYROXINE SODIUM 0.1 MG/1
100 TABLET ORAL DAILY
Qty: 90 TABLET | Refills: 3 | Status: SHIPPED | OUTPATIENT
Start: 2022-08-11

## 2022-09-20 ENCOUNTER — LAB (OUTPATIENT)
Dept: LAB | Facility: HOSPITAL | Age: 77
End: 2022-09-20

## 2022-09-20 DIAGNOSIS — E89.0 STATUS POST TOTAL THYROIDECTOMY: ICD-10-CM

## 2022-09-20 DIAGNOSIS — E89.2 STATUS POST PARATHYROIDECTOMY: ICD-10-CM

## 2022-09-20 LAB
CALCIUM SPEC-SCNC: 8.8 MG/DL (ref 8.6–10.5)
TSH SERPL DL<=0.05 MIU/L-ACNC: 1.19 UIU/ML (ref 0.27–4.2)

## 2022-09-20 PROCEDURE — 36415 COLL VENOUS BLD VENIPUNCTURE: CPT

## 2022-09-20 PROCEDURE — 84443 ASSAY THYROID STIM HORMONE: CPT

## 2022-09-20 PROCEDURE — 82310 ASSAY OF CALCIUM: CPT

## 2022-09-24 ENCOUNTER — TELEPHONE (OUTPATIENT)
Dept: OTOLARYNGOLOGY | Facility: CLINIC | Age: 77
End: 2022-09-24

## 2022-09-24 NOTE — TELEPHONE ENCOUNTER
[facility] received a fax that requires your attention. The document has been indexed to the patient’s chart for your review.      Reason for sending: REFILL REQUEST    Documents Description: REFILL REQUEST_[email or web address]    Name of Sender: [employer]    Date Indexed: 09/21/22    Notes (if needed):

## 2022-10-04 ENCOUNTER — NURSE ONLY (OUTPATIENT)
Dept: INTERNAL MEDICINE | Age: 77
End: 2022-10-04
Payer: MEDICARE

## 2022-10-04 DIAGNOSIS — Z23 FLU VACCINE NEED: Primary | ICD-10-CM

## 2022-10-04 PROCEDURE — G0008 ADMIN INFLUENZA VIRUS VAC: HCPCS | Performed by: INTERNAL MEDICINE

## 2022-10-04 PROCEDURE — 90694 VACC AIIV4 NO PRSRV 0.5ML IM: CPT | Performed by: INTERNAL MEDICINE

## 2022-10-04 PROCEDURE — 99999 PR OFFICE/OUTPT VISIT,PROCEDURE ONLY: CPT | Performed by: INTERNAL MEDICINE

## 2022-10-05 ENCOUNTER — OFFICE VISIT (OUTPATIENT)
Dept: CARDIOLOGY | Facility: CLINIC | Age: 77
End: 2022-10-05

## 2022-10-05 VITALS
BODY MASS INDEX: 36.64 KG/M2 | HEIGHT: 66 IN | SYSTOLIC BLOOD PRESSURE: 122 MMHG | WEIGHT: 228 LBS | OXYGEN SATURATION: 100 % | HEART RATE: 65 BPM | DIASTOLIC BLOOD PRESSURE: 80 MMHG

## 2022-10-05 DIAGNOSIS — Z99.89 OSA ON CPAP: ICD-10-CM

## 2022-10-05 DIAGNOSIS — I10 ESSENTIAL HYPERTENSION: ICD-10-CM

## 2022-10-05 DIAGNOSIS — E66.01 SEVERE OBESITY (BMI 35.0-39.9) WITH COMORBIDITY: ICD-10-CM

## 2022-10-05 DIAGNOSIS — I48.0 PAF (PAROXYSMAL ATRIAL FIBRILLATION): Primary | ICD-10-CM

## 2022-10-05 DIAGNOSIS — E78.2 MIXED HYPERLIPIDEMIA: ICD-10-CM

## 2022-10-05 DIAGNOSIS — Z79.01 CHRONIC ANTICOAGULATION: ICD-10-CM

## 2022-10-05 DIAGNOSIS — G47.33 OSA ON CPAP: ICD-10-CM

## 2022-10-05 PROBLEM — I48.91 ATRIAL FIBRILLATION WITH RVR (HCC): Status: RESOLVED | Noted: 2022-07-11 | Resolved: 2022-10-05

## 2022-10-05 PROCEDURE — 99214 OFFICE O/P EST MOD 30 MIN: CPT | Performed by: INTERNAL MEDICINE

## 2022-10-05 PROCEDURE — 93000 ELECTROCARDIOGRAM COMPLETE: CPT | Performed by: INTERNAL MEDICINE

## 2022-10-05 RX ORDER — FLECAINIDE ACETATE 50 MG/1
50 TABLET ORAL 2 TIMES DAILY
COMMUNITY
Start: 2022-08-11 | End: 2023-03-14

## 2022-10-05 NOTE — PROGRESS NOTES
Reason for Visit: cardiovascular follow up.    HPI:  Anne-Marie Hayes is a 77 y.o. female is here today for follow-up.  Saw Dr. Lopez of  on 7/25/2022 and the decision was made to proceed with ablation.  She had this done on 8/9/2022.  She has not had any evidence of recurrence since then.  She walks for exercise and does it in short spurts.  She can do well as long as she takes her time.  She is also active doing yard work.  Her blood pressure has been well controlled.    Previous Cardiac Testing and Procedures:  - ROBEL (8/13/2018) Normal left ventricular systolic function, mild MR, mild to moderate TR, no evidence of left atrial appendage thrombus.  - Cardioversion (8/13/2018) Successful cardioversion from atrial fibrillation to sinus rhythm  - Cardioversion (4/20/2020) successful cardioversion from atrial fibrillation to sinus rhythm  - Lipid panel (1/5/2020) total cholesterol 133, HDL 53, LDL 54, triglycerides 132  - Lipid panel (11/8/2021) total cholesterol 127, HDL 51, LDL 51, triglycerides 126  - BMP (11/8/2021) creatinine 0.8, potassium 4.6, sodium 141  - proBNP (2/18/2022) 311, normal 0-1800  - Echo (3/21/2022) EF 66-70%, grade 2 diastolic dysfunction, normal RV size and function, RVSP 35-45 mmHg, no significant valve dysfunction  - Lipid panel (5/17/2022) total cholesterol 137, HDL 54, LDL 56, triglycerides 133  - BNP (5/30/2022) 1673, normal 0-1800  - Atrial fibrillation ablation (8/9/2022) by Dr Lopez at [facility]    Patient Active Problem List   Diagnosis   • PAF (paroxysmal atrial fibrillation) ([facility])   • PRINCE on CPAP   • Essential hypertension   • Mixed hyperlipidemia   • Severe obesity (BMI 35.0-39.9) with comorbidity (HCC)   • Arthritis   • Hx of adenomatous colonic polyps   • Chronic anticoagulation   • Status post total thyroidectomy   • Status post right inferior parathyroidectomy (HCC)   • Hypocalcemia   • Hypokalemia       Social History     Tobacco Use   • Smoking status: Former  Smoker     Years: 20.00     Quit date:      Years since quittin.7   • Smokeless tobacco: Never Used   Vaping Use   • Vaping Use: Never used   Substance Use Topics   • Alcohol use: Not Currently     Comment: occ   • Drug use: No       Family History   Problem Relation Age of Onset   • Atrial fibrillation Mother    • Heart disease Father    • Breast cancer Neg Hx    • Ovarian cancer Neg Hx    • Uterine cancer Neg Hx    • Colon cancer Neg Hx    • Melanoma Neg Hx    • Colon polyps Neg Hx        The following portions of the patient's history were reviewed and updated as appropriate: allergies, current medications, past family history, past medical history, past social history, past surgical history and problem list.      Current Outpatient Medications:   •  apixaban (ELIQUIS) 5 MG tablet tablet, Take 1 tablet by mouth Every 12 (Twelve) Hours., Disp: 60 tablet, Rfl: 0  •  CloNIDine (CATAPRES) 0.1 MG tablet, Take 0.1 mg by mouth 2 (Two) Times a Day., Disp: , Rfl:   •  famciclovir (FAMVIR) 500 MG tablet, Take 1,500 mg by mouth Daily As Needed (cold sores). Used as needed, when a skin eruption occurs,, Disp: , Rfl:   •  flecainide (TAMBOCOR) 50 MG tablet, Take 50 mg by mouth 2 (Two) Times a Day., Disp: , Rfl:   •  fluticasone (FLONASE) 50 MCG/ACT nasal spray, 2 sprays into the nostril(s) as directed by provider Daily As Needed for Rhinitis or Allergies., Disp: , Rfl:   •  furosemide (LASIX) 40 MG tablet, Take 1 tablet by mouth Daily., Disp: 90 tablet, Rfl: 3  •  KLOR-CON 10 MEQ CR tablet, Take 10 mEq by mouth 2 (Two) Times a Day., Disp: , Rfl:   •  labetalol (NORMODYNE) 200 MG tablet, Take 1 tablet by mouth 2 (Two) Times a Day., Disp: 180 tablet, Rfl: 3  •  levothyroxine (Synthroid) 100 MCG tablet, Take 1 tablet by mouth Daily., Disp: 90 tablet, Rfl: 3  •  losartan (COZAAR) 100 MG tablet, Take 1 tablet by mouth Daily., Disp: , Rfl:   •  Multiple Vitamins-Minerals (PRESERVISION AREDS 2 PO), Take 1 tablet by mouth 2  "(Two) Times a Day., Disp: , Rfl:   •  Omega-3 Fatty Acids (FISH OIL) 1000 MG capsule capsule, Take 1,000 mg by mouth Daily With Breakfast., Disp: , Rfl:   •  rosuvastatin (CRESTOR) 10 MG tablet, Take 1 tablet by mouth Daily., Disp: , Rfl:   •  dilTIAZem CD (CARDIZEM CD) 360 MG 24 hr capsule, Take 1 capsule by mouth Daily for 30 days., Disp: 90 capsule, Rfl: 3    Review of Systems   Constitutional: Negative for chills and fever.   Cardiovascular: Positive for dyspnea on exertion. Negative for chest pain and paroxysmal nocturnal dyspnea.   Respiratory: Positive for shortness of breath. Negative for cough.    Skin: Negative for rash.   Musculoskeletal: Positive for arthritis and back pain.   Gastrointestinal: Negative for abdominal pain and heartburn.   Neurological: Negative for dizziness and numbness.       Objective   /80 (BP Location: Left arm, Patient Position: Sitting, Cuff Size: Adult)   Pulse 65   Ht 167.6 cm (65.98\")   Wt 103 kg (228 lb)   SpO2 100%   BMI 36.82 kg/m²   Constitutional:       Appearance: Well-developed. Obese.   HENT:      Head: Normocephalic and atraumatic.   Pulmonary:      Effort: Pulmonary effort is normal.      Breath sounds: Normal breath sounds.   Cardiovascular:      Normal rate. Regular rhythm.      Murmurs: There is no murmur.      No gallop. No click.   Skin:     General: Skin is warm and dry.   Neurological:      Mental Status: Alert and oriented to person, place, and time.         ECG 12 Lead    Date/Time: 10/5/2022 8:51 AM  Performed by: Jovani Lovelace MD  Authorized by: Jovani Lovelace MD   Comparison: compared with previous ECG from 7/19/2022  Similar to previous ECG  Rhythm: sinus rhythm  Rate: normal    Clinical impression: normal ECG              ICD-10-CM ICD-9-CM   1. PAF (paroxysmal atrial fibrillation) (HCC)  I48.0 427.31   2. Essential hypertension  I10 401.9   3. Mixed hyperlipidemia  E78.2 272.2   4. Severe obesity (BMI 35.0-39.9) with comorbidity (HCC)  " E66.01 278.01   5. PRINCE on CPAP  G47.33 327.23    Z99.89 V46.8   6. Chronic anticoagulation  Z79.01 V58.61         Assessment/Plan:  1.  Paroxysmal atrial fibrillation:  Status post atrial fibrillation ablation with Dr. Lopez on 8/9/2022 [facility].  Normal sinus rhythm on EKG today.  No evidence of recurrence since ablation.  Continue diltiazem, flecainide, and Eliquis.       2.  Essential hypertension: Blood pressures well controlled today.  Continue diltiazem, clonidine, labetalol, and losartan.     3.  Mixed hyperlipidemia: Good control on lipid panel from 5/17/2022.  Continue omega-3 fish oil and rosuvastatin.     4.  Obesity: Class 2 Severe Obesity (BMI >=35 and <=39.9). Obesity-related health conditions include the following: obstructive sleep apnea, hypertension and dyslipidemias. Obesity is improving with lifestyle modifications. BMI is is above average; BMI management plan is completed. We discussed portion control and increasing exercise.       5.  Obstructive sleep apnea: Compliant with CPAP.     6.  Chronic anticoagulation: With Eliquis.

## 2022-11-23 DIAGNOSIS — J30.9 ALLERGIC RHINITIS, UNSPECIFIED SEASONALITY, UNSPECIFIED TRIGGER: ICD-10-CM

## 2022-11-23 RX ORDER — FLUTICASONE PROPIONATE 50 MCG
SPRAY, SUSPENSION (ML) NASAL
Qty: 48 G | Refills: 3 | Status: SHIPPED | OUTPATIENT
Start: 2022-11-23

## 2022-11-30 DIAGNOSIS — I10 ESSENTIAL HYPERTENSION: ICD-10-CM

## 2022-11-30 DIAGNOSIS — I48.0 PAROXYSMAL ATRIAL FIBRILLATION (HCC): ICD-10-CM

## 2022-11-30 RX ORDER — CLONIDINE HYDROCHLORIDE 0.1 MG/1
TABLET ORAL
Qty: 180 TABLET | Refills: 3 | Status: SHIPPED | OUTPATIENT
Start: 2022-11-30

## 2022-11-30 RX ORDER — APIXABAN 5 MG/1
TABLET, FILM COATED ORAL
Qty: 180 TABLET | Refills: 3 | Status: SHIPPED | OUTPATIENT
Start: 2022-11-30

## 2022-11-30 RX ORDER — POTASSIUM CHLORIDE 750 MG/1
TABLET, EXTENDED RELEASE ORAL
Qty: 180 TABLET | Refills: 3 | Status: SHIPPED | OUTPATIENT
Start: 2022-11-30

## 2022-12-01 DIAGNOSIS — E21.3 HYPERPARATHYROIDISM (HCC): ICD-10-CM

## 2022-12-01 DIAGNOSIS — E04.2 MULTINODULAR GOITER: ICD-10-CM

## 2022-12-01 DIAGNOSIS — E55.9 VITAMIN D DEFICIENCY: ICD-10-CM

## 2022-12-01 DIAGNOSIS — R73.01 IMPAIRED FASTING GLUCOSE: ICD-10-CM

## 2022-12-01 DIAGNOSIS — E78.00 PURE HYPERCHOLESTEROLEMIA: ICD-10-CM

## 2022-12-01 LAB
ALBUMIN SERPL-MCNC: 4.5 G/DL (ref 3.5–5.2)
ALP BLD-CCNC: 85 U/L (ref 35–104)
ALT SERPL-CCNC: 14 U/L (ref 5–33)
ANION GAP SERPL CALCULATED.3IONS-SCNC: 12 MMOL/L (ref 7–19)
AST SERPL-CCNC: 13 U/L (ref 5–32)
BILIRUB SERPL-MCNC: 0.5 MG/DL (ref 0.2–1.2)
BUN BLDV-MCNC: 13 MG/DL (ref 8–23)
CALCIUM SERPL-MCNC: 9.3 MG/DL (ref 8.8–10.2)
CHLORIDE BLD-SCNC: 103 MMOL/L (ref 98–111)
CHOLESTEROL, TOTAL: 143 MG/DL (ref 160–199)
CO2: 28 MMOL/L (ref 22–29)
CREAT SERPL-MCNC: 0.9 MG/DL (ref 0.5–0.9)
GFR SERPL CREATININE-BSD FRML MDRD: >60 ML/MIN/{1.73_M2}
GLUCOSE BLD-MCNC: 108 MG/DL (ref 74–109)
HBA1C MFR BLD: 5.6 % (ref 4–6)
HCT VFR BLD CALC: 41.3 % (ref 37–47)
HDLC SERPL-MCNC: 58 MG/DL (ref 65–121)
HEMOGLOBIN: 12.8 G/DL (ref 12–16)
LDL CHOLESTEROL CALCULATED: 58 MG/DL
MCH RBC QN AUTO: 28.3 PG (ref 27–31)
MCHC RBC AUTO-ENTMCNC: 31 G/DL (ref 33–37)
MCV RBC AUTO: 91.4 FL (ref 81–99)
PARATHYROID HORMONE INTACT: 82.4 PG/ML (ref 15–65)
PDW BLD-RTO: 13.5 % (ref 11.5–14.5)
PLATELET # BLD: 212 K/UL (ref 130–400)
PMV BLD AUTO: 11.2 FL (ref 9.4–12.3)
POTASSIUM SERPL-SCNC: 4.5 MMOL/L (ref 3.5–5)
RBC # BLD: 4.52 M/UL (ref 4.2–5.4)
SODIUM BLD-SCNC: 143 MMOL/L (ref 136–145)
TOTAL PROTEIN: 6.3 G/DL (ref 6.6–8.7)
TRIGL SERPL-MCNC: 136 MG/DL (ref 0–149)
TSH SERPL DL<=0.05 MIU/L-ACNC: 2.28 UIU/ML (ref 0.27–4.2)
VITAMIN D 25-HYDROXY: 52.7 NG/ML
WBC # BLD: 7.5 K/UL (ref 4.8–10.8)

## 2022-12-06 ENCOUNTER — OFFICE VISIT (OUTPATIENT)
Dept: INTERNAL MEDICINE | Age: 77
End: 2022-12-06
Payer: MEDICARE

## 2022-12-06 VITALS
SYSTOLIC BLOOD PRESSURE: 124 MMHG | BODY MASS INDEX: 36.8 KG/M2 | DIASTOLIC BLOOD PRESSURE: 72 MMHG | OXYGEN SATURATION: 96 % | WEIGHT: 229 LBS | HEART RATE: 66 BPM | HEIGHT: 66 IN

## 2022-12-06 DIAGNOSIS — I10 PRIMARY HYPERTENSION: ICD-10-CM

## 2022-12-06 DIAGNOSIS — Z23 NEED FOR PROPHYLACTIC VACCINATION AND INOCULATION AGAINST VARICELLA: ICD-10-CM

## 2022-12-06 DIAGNOSIS — Z78.0 POSTMENOPAUSAL: ICD-10-CM

## 2022-12-06 DIAGNOSIS — E89.2 S/P PARATHYROIDECTOMY (HCC): ICD-10-CM

## 2022-12-06 DIAGNOSIS — M48.061 SPINAL STENOSIS OF LUMBAR REGION, UNSPECIFIED WHETHER NEUROGENIC CLAUDICATION PRESENT: ICD-10-CM

## 2022-12-06 DIAGNOSIS — E89.0 S/P COMPLETE THYROIDECTOMY: ICD-10-CM

## 2022-12-06 DIAGNOSIS — M15.9 PRIMARY OSTEOARTHRITIS INVOLVING MULTIPLE JOINTS: ICD-10-CM

## 2022-12-06 DIAGNOSIS — E78.00 PURE HYPERCHOLESTEROLEMIA: ICD-10-CM

## 2022-12-06 DIAGNOSIS — G47.33 OBSTRUCTIVE SLEEP APNEA: ICD-10-CM

## 2022-12-06 DIAGNOSIS — R73.01 IMPAIRED FASTING GLUCOSE: ICD-10-CM

## 2022-12-06 DIAGNOSIS — E55.9 VITAMIN D DEFICIENCY: ICD-10-CM

## 2022-12-06 DIAGNOSIS — I48.0 PAROXYSMAL ATRIAL FIBRILLATION (HCC): Primary | ICD-10-CM

## 2022-12-06 PROCEDURE — G8484 FLU IMMUNIZE NO ADMIN: HCPCS | Performed by: INTERNAL MEDICINE

## 2022-12-06 PROCEDURE — 1036F TOBACCO NON-USER: CPT | Performed by: INTERNAL MEDICINE

## 2022-12-06 PROCEDURE — 99214 OFFICE O/P EST MOD 30 MIN: CPT | Performed by: INTERNAL MEDICINE

## 2022-12-06 PROCEDURE — 1123F ACP DISCUSS/DSCN MKR DOCD: CPT | Performed by: INTERNAL MEDICINE

## 2022-12-06 PROCEDURE — 1090F PRES/ABSN URINE INCON ASSESS: CPT | Performed by: INTERNAL MEDICINE

## 2022-12-06 PROCEDURE — G8427 DOCREV CUR MEDS BY ELIG CLIN: HCPCS | Performed by: INTERNAL MEDICINE

## 2022-12-06 PROCEDURE — G8399 PT W/DXA RESULTS DOCUMENT: HCPCS | Performed by: INTERNAL MEDICINE

## 2022-12-06 PROCEDURE — 3078F DIAST BP <80 MM HG: CPT | Performed by: INTERNAL MEDICINE

## 2022-12-06 PROCEDURE — G8417 CALC BMI ABV UP PARAM F/U: HCPCS | Performed by: INTERNAL MEDICINE

## 2022-12-06 PROCEDURE — 3074F SYST BP LT 130 MM HG: CPT | Performed by: INTERNAL MEDICINE

## 2022-12-06 NOTE — PROGRESS NOTES
Chief Complaint   Patient presents with    Follow-up    6 Month Follow-Up     A.fib       HPI: Pt is here today for f/u atrial fib and other medical issues. She had cardiac ablation and has done very well. She feels well. Occaisional flutter. No prolonged tachycardia. Walking more than used to.       Past Medical History:   Diagnosis Date    Arthritis     Heart palpitations     Hypercalcemia 2017    Hyperlipidemia     Hypertension     Impaired fasting glucose 2017    Lumbar spinal stenosis     Lung nodule, solitary 2017    Right upper lobe    Multinodular goiter 2017    Obstructive sleep apnea 10/11/2018    Paroxysmal atrial fibrillation ([address] 2018    Primary osteoarthritis involving multiple joints 2017    Wears glasses        Past Surgical History:   Procedure Laterality Date    BREAST BIOPSY  12/3/2007    stereotactic left, fibrocystic changes    BREAST BIOPSY  2007    stereotactic left, fibrocystic changes    CAPSULOTOMY, HAND      2018    CARDIAC CATHETERIZATION      negative    CATARACT REMOVAL Bilateral 2017    KNEE ARTHROPLASTY Right 2019    RIGHT PARTIAL KNEE REPLACEMENT performed by Kelley Whitaker MD at [facility]      skin cancer spot on her nose removed    TUBAL LIGATION         Family History   Problem Relation Age of Onset    Hypertension Father        Social History     Socioeconomic History    Marital status:      Spouse name: Not on file    Number of children: Not on file    Years of education: Not on file    Highest education level: Not on file   Occupational History    Not on file   Tobacco Use    Smoking status: Former     Packs/day: 2.00     Years: 20.00     Pack years: 40.00     Types: Cigarettes     Quit date: 26     Years since quittin.9    Smokeless tobacco: Never   Vaping Use    Vaping Use: Never used   Substance and Sexual Activity    Alcohol use: Yes     Comment: wine daily     Drug use: No    Sexual activity: Not on file   Other Topics Concern    Not on file   Social History Narrative    Not on file     Social Determinants of Health     Financial Resource Strain: Low Risk     Difficulty of Paying Living Expenses: Not hard at all   Food Insecurity: No Food Insecurity    Worried About Running Out of Food in the Last Year: Never true    Ran Out of Food in the Last Year: Never true   Transportation Needs: Not on file   Physical Activity: Sufficiently Active    Days of Exercise per Week: 3 days    Minutes of Exercise per Session: 60 min   Stress: Not on file   Social Connections: Not on file   Intimate Partner Violence: Not on file   Housing Stability: Not on file       No Known Allergies    Current Outpatient Medications   Medication Sig Dispense Refill    zoster recombinant adjuvanted vaccine (SHINGRIX) 50 MCG/0.5ML SUSR injection 50 MCG IM then repeat 2-6 months.  0.5 mL 1    cloNIDine (CATAPRES) 0.1 MG tablet TAKE 1 TABLET TWICE A  tablet 3    potassium chloride (KLOR-CON M) 10 MEQ extended release tablet TAKE 1 TABLET TWICE A  tablet 3    ELIQUIS 5 MG TABS tablet TAKE 1 TABLET TWICE A  tablet 3    fluticasone (FLONASE) 50 MCG/ACT nasal spray USE 2 SPRAYS IN EACH NOSTRIL DAILY AS NEEDED FOR RHINITIS OR ALLERGIES 48 g 3    SYNTHROID 100 MCG tablet Take 100 mcg by mouth Daily      furosemide (LASIX) 40 MG tablet Take 40 mg by mouth daily      labetalol (NORMODYNE) 200 MG tablet Take 1 tablet by mouth 2 times daily 180 tablet 3    rosuvastatin (CRESTOR) 10 MG tablet TAKE 1 TABLET DAILY 90 tablet 3    losartan (COZAAR) 100 MG tablet Take 1 tablet by mouth daily 90 tablet 3    famciclovir (FAMVIR) 500 MG tablet TAKE 3 TABLETS DAILY AS NEEDED FOR COLD SORES 90 tablet 0    CPAP Machine MISC by Does not apply route      dilTIAZem (CARDIZEM CD) 360 MG extended release capsule Take 1 capsule by mouth daily 90 capsule 3    Multiple Vitamins-Minerals (PRESERVISION AREDS 2+MULTI VIT PO) Take by mouth Indications: bid      FISH OIL Take 1,000 mg by mouth daily        No current facility-administered medications for this visit. Review of Systems    /72   Pulse 66   Ht 5' 6\" (1.676 m)   Wt 229 lb (103.9 kg)   SpO2 96%   BMI 36.96 kg/m²   BP Readings from Last 7 Encounters:   12/06/22 124/72   07/19/22 120/62   06/09/22 130/76   05/24/22 130/70   11/15/21 138/76   05/13/21 130/70   01/13/21 (!) 146/80     Wt Readings from Last 7 Encounters:   12/06/22 229 lb (103.9 kg)   07/19/22 223 lb (101.2 kg)   06/09/22 221 lb (100.2 kg)   05/24/22 225 lb (102.1 kg)   02/17/22 239 lb (108.4 kg)   11/15/21 231 lb (104.8 kg)   05/13/21 227 lb (103 kg)     BMI Readings from Last 7 Encounters:   12/06/22 36.96 kg/m²   07/19/22 35.99 kg/m²   06/09/22 35.67 kg/m²   05/24/22 36.32 kg/m²   02/17/22 38.58 kg/m²   11/15/21 36.18 kg/m²   05/13/21 35.55 kg/m²     Resp Readings from Last 7 Encounters:   07/23/19 16   07/22/19 22   02/08/18 18   12/20/17 18   11/22/16 18       Physical Exam  Constitutional:       General: She is not in acute distress. HENT:      Head: Normocephalic. Eyes:      General: No scleral icterus. Cardiovascular:      Heart sounds: Normal heart sounds. Pulmonary:      Breath sounds: Normal breath sounds. Musculoskeletal:      Cervical back: Neck supple. Lymphadenopathy:      Cervical: No cervical adenopathy. Skin:     Findings: No rash.    Psychiatric:         Mood and Affect: Mood normal.       Results for orders placed or performed in visit on 12/01/22   PTH, Intact   Result Value Ref Range    PTH 82.4 (H) 15.0 - 65.0 pg/mL   Lipid Panel   Result Value Ref Range    Cholesterol, Total 143 (L) 160 - 199 mg/dL    Triglycerides 136 0 - 149 mg/dL    HDL 58 (L) 65 - 121 mg/dL    LDL Calculated 58 <100 mg/dL   TSH   Result Value Ref Range    TSH 2.280 0.270 - 4.200 uIU/mL   Hemoglobin A1C   Result Value Ref Range    Hemoglobin A1C 5.6 4.0 - 6.0 %   Comprehensive Metabolic Panel   Result Value Ref Range    Sodium 143 136 - 145 mmol/L    Potassium 4.5 3.5 - 5.0 mmol/L    Chloride 103 98 - 111 mmol/L    CO2 28 22 - 29 mmol/L    Anion Gap 12 7 - 19 mmol/L    Glucose 108 74 - 109 mg/dL    BUN 13 8 - 23 mg/dL    Creatinine 0.9 0.5 - 0.9 mg/dL    Est, Glom Filt Rate >60 >60    Calcium 9.3 8.8 - 10.2 mg/dL    Total Protein 6.3 (L) 6.6 - 8.7 g/dL    Albumin 4.5 3.5 - 5.2 g/dL    Total Bilirubin 0.5 0.2 - 1.2 mg/dL    Alkaline Phosphatase 85 35 - 104 U/L    ALT 14 5 - 33 U/L    AST 13 5 - 32 U/L   CBC   Result Value Ref Range    WBC 7.5 4.8 - 10.8 K/uL    RBC 4.52 4.20 - 5.40 M/uL    Hemoglobin 12.8 12.0 - 16.0 g/dL    Hematocrit 41.3 37.0 - 47.0 %    MCV 91.4 81.0 - 99.0 fL    MCH 28.3 27.0 - 31.0 pg    MCHC 31.0 (L) 33.0 - 37.0 g/dL    RDW 13.5 11.5 - 14.5 %    Platelets 029 891 - 852 K/uL    MPV 11.2 9.4 - 12.3 fL   Vitamin D 25 Hydroxy   Result Value Ref Range    Vit D, 25-Hydroxy 52.7 >=30 ng/mL       ASSESSMENT/ PLAN:  1. Paroxysmal atrial fibrillation [facility])  Patient did very well with her ablation we are glad she is feeling better continue current meds keep follow-up with Dr. Gilmar Rendon in the summer. 2. Need for prophylactic vaccination and inoculation against varicella    - zoster recombinant adjuvanted vaccine (SHINGRIX) 50 MCG/0.5ML SUSR injection; 50 MCG IM then repeat 2-6 months. Dispense: 0.5 mL; Refill: 1    3. Pure hypercholesterolemia  Continue with current therapy Crestor 10 mg doing well  - CBC; Future  - Comprehensive Metabolic Panel; Future    4. Postmenopausal    - DEXA BONE DENSITY 2 SITES; Future    5. S/P complete thyroidectomy    - TSH; Future    6. S/P parathyroidectomy ([address]  Parathyroid hormone coming down since her surgery her calcium actually was low after surgery so we added calcium calcium level normal today  - PTH, Intact; Future    7. Primary hypertension  Good blood pressure control  - Lipid Panel; Future    8.  Spinal stenosis of lumbar region, unspecified whether neurogenic claudication present  Physical therapy helped her back let us know if issues    9. Primary osteoarthritis involving multiple joints  Currently managing more active and feeling better at current    10. Obstructive sleep apnea  Patient has obstructive sleep apnea wears a CPAP nightly wears it approximately 8 hours per night and does benefit from its use and is compliant. 11. Vitamin D deficiency    - Vitamin D 25 Hydroxy; Future    12. Impaired fasting glucose    - Hemoglobin A1C; Future    Chart, medications, labs, vaccines reviewed. Keep up to date with routine care and follow up. Call with any problems or complaints. Keep up to date with routine screening recomendations and vaccines.

## 2023-01-04 DIAGNOSIS — B00.1 COLD SORE: ICD-10-CM

## 2023-01-04 RX ORDER — FAMCICLOVIR 500 MG/1
TABLET, FILM COATED ORAL
Qty: 90 TABLET | Refills: 11 | Status: SHIPPED | OUTPATIENT
Start: 2023-01-04

## 2023-02-01 DIAGNOSIS — I10 ESSENTIAL HYPERTENSION: ICD-10-CM

## 2023-02-01 DIAGNOSIS — I48.0 PAROXYSMAL ATRIAL FIBRILLATION (HCC): ICD-10-CM

## 2023-02-01 RX ORDER — ROSUVASTATIN CALCIUM 10 MG/1
TABLET, COATED ORAL
Qty: 90 TABLET | Refills: 3 | Status: SHIPPED | OUTPATIENT
Start: 2023-02-01

## 2023-02-01 RX ORDER — LOSARTAN POTASSIUM 100 MG/1
100 TABLET ORAL DAILY
Qty: 90 TABLET | Refills: 3 | Status: SHIPPED | OUTPATIENT
Start: 2023-02-01

## 2023-02-20 ENCOUNTER — HOSPITAL ENCOUNTER (OUTPATIENT)
Dept: WOMENS IMAGING | Age: 78
Discharge: HOME | End: 2023-02-20
Payer: MEDICARE

## 2023-02-20 DIAGNOSIS — Z78.0 POSTMENOPAUSAL: ICD-10-CM

## 2023-02-20 DIAGNOSIS — Z12.31 VISIT FOR SCREENING MAMMOGRAM: ICD-10-CM

## 2023-02-20 PROCEDURE — 77067 SCR MAMMO BI INCL CAD: CPT | Performed by: RADIOLOGY

## 2023-02-20 PROCEDURE — 77080 DXA BONE DENSITY AXIAL: CPT | Performed by: RADIOLOGY

## 2023-02-20 PROCEDURE — 77080 DXA BONE DENSITY AXIAL: CPT

## 2023-02-20 PROCEDURE — 77067 SCR MAMMO BI INCL CAD: CPT

## 2023-02-27 ENCOUNTER — OFFICE VISIT (OUTPATIENT)
Dept: SURGERY | Age: 78
End: 2023-02-27

## 2023-02-27 VITALS
OXYGEN SATURATION: 96 % | WEIGHT: 231.8 LBS | TEMPERATURE: 98.6 F | BODY MASS INDEX: 37.25 KG/M2 | HEART RATE: 71 BPM | HEIGHT: 66 IN

## 2023-02-27 DIAGNOSIS — Z12.31 VISIT FOR SCREENING MAMMOGRAM: Primary | ICD-10-CM

## 2023-03-03 NOTE — PROGRESS NOTES
HPI:  Kim Barakat is in for follow-up breast check. She has not noticed any changes in her breasts. Her imaging was reviewed and is noted below. 2/21/2023 8:50 AM   DE DIGITAL SCREEN W OR WO CAD BILATERAL   SCREENING MAMMOGRAPHY:  Today's examination consists of 2-D/3-D combo   standard craniocaudal and oblique views of both breasts. Comparison is   made  with all prior mammograms since November 16, 2015 . Breast   parenchyma is heterogeneously dense. The parenchymal pattern is stable   bilaterally with some scattered benign calcifications. A biopsy marker   and a biopsy scar marker noted of the left breast. There is a stable   small oval low-density mass in the far posterior right breast. No   worrisome change is seen in either breast compared to prior studies. There is no suspicious mammographic finding seen in either breast. The   mammograms were evaluated using computer aided detection. Impression   Impression:   Bilateral Breasts, BI-RADS 2, benign. Recommendation is annual   screening mammography. BREAST EXAM:  On examination, she has fibrocystic changes throughout both breasts, no dominant masses, no skin or nipple changes and no axillary adenopathy. I see nothing suspicious for breast cancer. ASSESSMENT:      ICD-10-CM    1. Visit for screening mammogram  Z12.31                     PLAN:  I will plan to see her back in 1 year for physical exam and mammograms. She will contact me if anything significant changes.

## 2023-03-14 ENCOUNTER — HOSPITAL ENCOUNTER (OUTPATIENT)
Facility: HOSPITAL | Age: 78
Setting detail: OBSERVATION
Discharge: HOME | End: 2023-03-15
Attending: FAMILY MEDICINE | Admitting: FAMILY MEDICINE
Payer: MEDICARE

## 2023-03-14 ENCOUNTER — OFFICE VISIT (OUTPATIENT)
Dept: CARDIOLOGY | Facility: CLINIC | Age: 78
End: 2023-03-14
Payer: MEDICARE

## 2023-03-14 VITALS
OXYGEN SATURATION: 98 % | RESPIRATION RATE: 18 BRPM | DIASTOLIC BLOOD PRESSURE: 82 MMHG | SYSTOLIC BLOOD PRESSURE: 135 MMHG | BODY MASS INDEX: 37.99 KG/M2 | HEIGHT: 65 IN | WEIGHT: 228 LBS | HEART RATE: 103 BPM

## 2023-03-14 DIAGNOSIS — E66.09 CLASS 2 OBESITY DUE TO EXCESS CALORIES WITHOUT SERIOUS COMORBIDITY WITH BODY MASS INDEX (BMI) OF 37.0 TO 37.9 IN ADULT: ICD-10-CM

## 2023-03-14 DIAGNOSIS — Z99.89 OSA ON CPAP: ICD-10-CM

## 2023-03-14 DIAGNOSIS — I10 ESSENTIAL HYPERTENSION: ICD-10-CM

## 2023-03-14 DIAGNOSIS — I48.91 ATRIAL FIBRILLATION WITH RVR: Primary | ICD-10-CM

## 2023-03-14 DIAGNOSIS — E78.2 MIXED HYPERLIPIDEMIA: ICD-10-CM

## 2023-03-14 DIAGNOSIS — I48.0 PAF (PAROXYSMAL ATRIAL FIBRILLATION): Primary | ICD-10-CM

## 2023-03-14 DIAGNOSIS — G47.33 OSA ON CPAP: ICD-10-CM

## 2023-03-14 DIAGNOSIS — Z79.01 CHRONIC ANTICOAGULATION: ICD-10-CM

## 2023-03-14 LAB
ALBUMIN SERPL-MCNC: 4.7 G/DL (ref 3.5–5.2)
ALBUMIN/GLOB SERPL: 2 G/DL
ALP SERPL-CCNC: 80 U/L (ref 39–117)
ALT SERPL W P-5'-P-CCNC: 13 U/L (ref 1–33)
ANION GAP SERPL CALCULATED.3IONS-SCNC: 15 MMOL/L (ref 5–15)
APTT PPP: 32.9 SECONDS (ref 24.1–35)
AST SERPL-CCNC: 14 U/L (ref 1–32)
BASOPHILS # BLD AUTO: 0.04 10*3/MM3 (ref 0–0.2)
BASOPHILS NFR BLD AUTO: 0.3 % (ref 0–1.5)
BILIRUB SERPL-MCNC: 0.4 MG/DL (ref 0–1.2)
BUN SERPL-MCNC: 12 MG/DL (ref 8–23)
BUN/CREAT SERPL: 18.5 (ref 7–25)
CALCIUM SPEC-SCNC: 9.2 MG/DL (ref 8.6–10.5)
CHLORIDE SERPL-SCNC: 102 MMOL/L (ref 98–107)
CO2 SERPL-SCNC: 23 MMOL/L (ref 22–29)
CREAT SERPL-MCNC: 0.65 MG/DL (ref 0.57–1)
DEPRECATED RDW RBC AUTO: 43.8 FL (ref 37–54)
EGFRCR SERPLBLD CKD-EPI 2021: 90.8 ML/MIN/1.73
EOSINOPHIL # BLD AUTO: 0.1 10*3/MM3 (ref 0–0.4)
EOSINOPHIL NFR BLD AUTO: 0.8 % (ref 0.3–6.2)
ERYTHROCYTE [DISTWIDTH] IN BLOOD BY AUTOMATED COUNT: 13.4 % (ref 12.3–15.4)
GLOBULIN UR ELPH-MCNC: 2.3 GM/DL
GLUCOSE SERPL-MCNC: 120 MG/DL (ref 65–99)
HCT VFR BLD AUTO: 44.2 % (ref 34–46.6)
HGB BLD-MCNC: 14.1 G/DL (ref 12–15.9)
IMM GRANULOCYTES # BLD AUTO: 0.05 10*3/MM3 (ref 0–0.05)
IMM GRANULOCYTES NFR BLD AUTO: 0.4 % (ref 0–0.5)
INR PPP: 1.17 (ref 0.91–1.09)
LYMPHOCYTES # BLD AUTO: 1.48 10*3/MM3 (ref 0.7–3.1)
LYMPHOCYTES NFR BLD AUTO: 12.5 % (ref 19.6–45.3)
MAGNESIUM SERPL-MCNC: 2 MG/DL (ref 1.6–2.4)
MCH RBC QN AUTO: 28.3 PG (ref 26.6–33)
MCHC RBC AUTO-ENTMCNC: 31.9 G/DL (ref 31.5–35.7)
MCV RBC AUTO: 88.6 FL (ref 79–97)
MONOCYTES # BLD AUTO: 0.82 10*3/MM3 (ref 0.1–0.9)
MONOCYTES NFR BLD AUTO: 6.9 % (ref 5–12)
NEUTROPHILS NFR BLD AUTO: 79.1 % (ref 42.7–76)
NEUTROPHILS NFR BLD AUTO: 9.39 10*3/MM3 (ref 1.7–7)
NRBC BLD AUTO-RTO: 0 /100 WBC (ref 0–0.2)
PLATELET # BLD AUTO: 241 10*3/MM3 (ref 140–450)
PMV BLD AUTO: 10.9 FL (ref 6–12)
POTASSIUM SERPL-SCNC: 3.6 MMOL/L (ref 3.5–5.2)
PROT SERPL-MCNC: 7 G/DL (ref 6–8.5)
PROTHROMBIN TIME: 15 SECONDS (ref 11.8–14.8)
RBC # BLD AUTO: 4.99 10*6/MM3 (ref 3.77–5.28)
SODIUM SERPL-SCNC: 140 MMOL/L (ref 136–145)
TROPONIN T SERPL HS-MCNC: 10 NG/L
WBC NRBC COR # BLD: 11.88 10*3/MM3 (ref 3.4–10.8)

## 2023-03-14 PROCEDURE — 83735 ASSAY OF MAGNESIUM: CPT | Performed by: FAMILY MEDICINE

## 2023-03-14 PROCEDURE — 84484 ASSAY OF TROPONIN QUANT: CPT | Performed by: FAMILY MEDICINE

## 2023-03-14 PROCEDURE — 93005 ELECTROCARDIOGRAM TRACING: CPT

## 2023-03-14 PROCEDURE — 85610 PROTHROMBIN TIME: CPT | Performed by: FAMILY MEDICINE

## 2023-03-14 PROCEDURE — 85025 COMPLETE CBC W/AUTO DIFF WBC: CPT | Performed by: FAMILY MEDICINE

## 2023-03-14 PROCEDURE — G0378 HOSPITAL OBSERVATION PER HR: HCPCS

## 2023-03-14 PROCEDURE — 93010 ELECTROCARDIOGRAM REPORT: CPT | Performed by: INTERNAL MEDICINE

## 2023-03-14 PROCEDURE — 3075F SYST BP GE 130 - 139MM HG: CPT | Performed by: NURSE PRACTITIONER

## 2023-03-14 PROCEDURE — 99284 EMERGENCY DEPT VISIT MOD MDM: CPT

## 2023-03-14 PROCEDURE — 93005 ELECTROCARDIOGRAM TRACING: CPT | Performed by: FAMILY MEDICINE

## 2023-03-14 PROCEDURE — 80053 COMPREHEN METABOLIC PANEL: CPT | Performed by: FAMILY MEDICINE

## 2023-03-14 PROCEDURE — 99214 OFFICE O/P EST MOD 30 MIN: CPT | Performed by: NURSE PRACTITIONER

## 2023-03-14 PROCEDURE — 96376 TX/PRO/DX INJ SAME DRUG ADON: CPT

## 2023-03-14 PROCEDURE — 3079F DIAST BP 80-89 MM HG: CPT | Performed by: NURSE PRACTITIONER

## 2023-03-14 PROCEDURE — 93000 ELECTROCARDIOGRAM COMPLETE: CPT | Performed by: NURSE PRACTITIONER

## 2023-03-14 PROCEDURE — 85730 THROMBOPLASTIN TIME PARTIAL: CPT | Performed by: FAMILY MEDICINE

## 2023-03-14 PROCEDURE — 96374 THER/PROPH/DIAG INJ IV PUSH: CPT

## 2023-03-14 RX ORDER — FLECAINIDE ACETATE 50 MG/1
50 TABLET ORAL 2 TIMES DAILY
Qty: 60 TABLET | Refills: 11 | Status: SHIPPED | OUTPATIENT
Start: 2023-03-14

## 2023-03-14 RX ORDER — SODIUM CHLORIDE 0.9 % (FLUSH) 0.9 %
10 SYRINGE (ML) INJECTION AS NEEDED
Status: DISCONTINUED | OUTPATIENT
Start: 2023-03-14 | End: 2023-03-14 | Stop reason: SDUPTHER

## 2023-03-14 RX ORDER — SODIUM CHLORIDE 9 MG/ML
40 INJECTION, SOLUTION INTRAVENOUS AS NEEDED
Status: DISCONTINUED | OUTPATIENT
Start: 2023-03-14 | End: 2023-03-15 | Stop reason: HOSPADM

## 2023-03-14 RX ORDER — SODIUM CHLORIDE 0.9 % (FLUSH) 0.9 %
10 SYRINGE (ML) INJECTION AS NEEDED
Status: DISCONTINUED | OUTPATIENT
Start: 2023-03-14 | End: 2023-03-15 | Stop reason: HOSPADM

## 2023-03-14 RX ORDER — ALBUTEROL SULFATE 2.5 MG/3ML
2.5 SOLUTION RESPIRATORY (INHALATION) EVERY 4 HOURS PRN
Status: DISCONTINUED | OUTPATIENT
Start: 2023-03-14 | End: 2023-03-15 | Stop reason: HOSPADM

## 2023-03-14 RX ORDER — ROSUVASTATIN CALCIUM 10 MG/1
10 TABLET, COATED ORAL DAILY
Status: DISCONTINUED | OUTPATIENT
Start: 2023-03-15 | End: 2023-03-15 | Stop reason: HOSPADM

## 2023-03-14 RX ORDER — LOSARTAN POTASSIUM 50 MG/1
100 TABLET ORAL DAILY
Status: DISCONTINUED | OUTPATIENT
Start: 2023-03-15 | End: 2023-03-15 | Stop reason: HOSPADM

## 2023-03-14 RX ORDER — DILTIAZEM HYDROCHLORIDE 5 MG/ML
INJECTION INTRAVENOUS
Status: COMPLETED
Start: 2023-03-14 | End: 2023-03-14

## 2023-03-14 RX ORDER — LEVOTHYROXINE SODIUM 0.1 MG/1
100 TABLET ORAL
Status: DISCONTINUED | OUTPATIENT
Start: 2023-03-15 | End: 2023-03-15 | Stop reason: HOSPADM

## 2023-03-14 RX ORDER — FLECAINIDE ACETATE 50 MG/1
50 TABLET ORAL 2 TIMES DAILY
Status: DISCONTINUED | OUTPATIENT
Start: 2023-03-14 | End: 2023-03-15 | Stop reason: HOSPADM

## 2023-03-14 RX ORDER — LABETALOL 200 MG/1
200 TABLET, FILM COATED ORAL 2 TIMES DAILY
Status: DISCONTINUED | OUTPATIENT
Start: 2023-03-14 | End: 2023-03-15 | Stop reason: HOSPADM

## 2023-03-14 RX ORDER — DILTIAZEM HYDROCHLORIDE 5 MG/ML
10 INJECTION INTRAVENOUS ONCE
Status: COMPLETED | OUTPATIENT
Start: 2023-03-14 | End: 2023-03-14

## 2023-03-14 RX ORDER — SODIUM CHLORIDE 0.9 % (FLUSH) 0.9 %
10 SYRINGE (ML) INJECTION EVERY 12 HOURS SCHEDULED
Status: DISCONTINUED | OUTPATIENT
Start: 2023-03-14 | End: 2023-03-15 | Stop reason: HOSPADM

## 2023-03-14 RX ORDER — CLONIDINE HYDROCHLORIDE 0.1 MG/1
0.1 TABLET ORAL EVERY 12 HOURS SCHEDULED
Status: DISCONTINUED | OUTPATIENT
Start: 2023-03-14 | End: 2023-03-15

## 2023-03-14 RX ORDER — DILTIAZEM HCL 90 MG
90 TABLET ORAL EVERY 6 HOURS SCHEDULED
Status: DISCONTINUED | OUTPATIENT
Start: 2023-03-14 | End: 2023-03-15 | Stop reason: HOSPADM

## 2023-03-14 RX ADMIN — Medication 10 ML: at 20:19

## 2023-03-14 RX ADMIN — DILTIAZEM HYDROCHLORIDE 10 MG: 5 INJECTION INTRAVENOUS at 16:09

## 2023-03-14 RX ADMIN — APIXABAN 5 MG: 5 TABLET, FILM COATED ORAL at 20:16

## 2023-03-14 RX ADMIN — DILTIAZEM HYDROCHLORIDE 10 MG: 5 INJECTION INTRAVENOUS at 18:26

## 2023-03-14 RX ADMIN — FLECAINIDE ACETATE 50 MG: 50 TABLET ORAL at 21:40

## 2023-03-14 RX ADMIN — CLONIDINE HYDROCHLORIDE 0.1 MG: 0.1 TABLET ORAL at 20:16

## 2023-03-14 RX ADMIN — DILTIAZEM HYDROCHLORIDE 90 MG: 90 TABLET, FILM COATED ORAL at 20:16

## 2023-03-14 RX ADMIN — LABETALOL HYDROCHLORIDE 200 MG: 200 TABLET, FILM COATED ORAL at 20:16

## 2023-03-14 NOTE — PROGRESS NOTES
Subjective:     Encounter Date: 03/14/2023      Patient ID: Anne-Marie Hayes is a 77 y.o. female with paroxysmal atrial fibrillation, chronic anticoagulation, hypertension, hyperlipidemia, hypothyroidism s/p total thyroidectomy with partial parathyroidectomy in May 2022 with Dr Lopez, obstructive sleep apnea and obesity.    Chief Complaint: heart rate  Atrial Fibrillation  Presents for follow-up visit. Symptoms include palpitations (since last night at 11pm. ), shortness of breath and tachycardia. Symptoms are negative for bradycardia, chest pain, dizziness, hemodynamic instability, hypertension, hypotension, syncope and weakness. The symptoms have been worsening. Past medical history includes atrial fibrillation. There are no medication compliance problems.   Hypertension  This is a chronic problem. The current episode started more than 1 year ago. The problem is uncontrolled. Associated symptoms include malaise/fatigue, palpitations (since last night at 11pm. ) and shortness of breath. Pertinent negatives include no chest pain, orthopnea, peripheral edema or PND. Risk factors for coronary artery disease include dyslipidemia and obesity. Current antihypertension treatment includes calcium channel blockers and beta blockers.     Patient presents today for management of atrial fibrillation. Patient reports that she started to feel like her heart was racing and noted her heart rate to be in the 80's which is elevated for her. She reports that she noticed this about 11 pm last night. She reports more shortness of breath and dyspnea on exertion. She denies any dizziness or near syncope. She denies any chest pain. She reports that she has checked her blood pressure and 165/109 before medications this am and BP was then coming down. She denies any missed doses of Eliquis. She reports that she was on flecainide previously and this was stopped at the time of her ablation. Patient follows with Dr Culver as PCP.        Previous Cardiac Testing and Procedures:  - ROBEL (8/13/2018) Normal left ventricular systolic function, mild MR, mild to moderate TR, no evidence of left atrial appendage thrombus.  - Cardioversion (8/13/2018) Successful cardioversion from atrial fibrillation to sinus rhythm  - Cardioversion (4/20/2020) successful cardioversion from atrial fibrillation to sinus rhythm  - Lipid panel (1/5/2020) total cholesterol 133, HDL 53, LDL 54, triglycerides 132  - Lipid panel (11/8/2021) total cholesterol 127, HDL 51, LDL 51, triglycerides 126  - BMP (11/8/2021) creatinine 0.8, potassium 4.6, sodium 141  - proBNP (2/18/2022) 311, normal 0-1800  - Echo (3/21/2022) EF 66-70%, grade 2 diastolic dysfunction, normal RV size and function, RVSP 35-45 mmHg, no significant valve dysfunction  - Lipid panel (5/17/2022) total cholesterol 137, HDL 54, LDL 56, triglycerides 133  - BNP (5/30/2022) 1673, normal 0-1800  - Atrial fibrillation ablation (8/9/2022) by Dr Lopez at [facility]    The following portions of the patient's history were reviewed and updated as appropriate: allergies, current medications, past family history, past medical history, past social history, past surgical history and problem list.    No Known Allergies    Current Outpatient Medications:   •  apixaban (ELIQUIS) 5 MG tablet tablet, Take 1 tablet by mouth Every 12 (Twelve) Hours., Disp: 60 tablet, Rfl: 0  •  CloNIDine (CATAPRES) 0.1 MG tablet, Take 1 tablet by mouth 2 (Two) Times a Day., Disp: , Rfl:   •  famciclovir (FAMVIR) 500 MG tablet, Take 3 tablets by mouth Daily As Needed (cold sores). Used as needed, when a skin eruption occurs,, Disp: , Rfl:   •  fluticasone (FLONASE) 50 MCG/ACT nasal spray, 2 sprays into the nostril(s) as directed by provider Daily As Needed for Rhinitis or Allergies., Disp: , Rfl:   •  furosemide (LASIX) 40 MG tablet, Take 1 tablet by mouth Daily., Disp: 90 tablet, Rfl: 3  •  KLOR-CON 10 MEQ CR tablet, Take 1 tablet by mouth 2  (Two) Times a Day., Disp: , Rfl:   •  labetalol (NORMODYNE) 200 MG tablet, Take 1 tablet by mouth 2 (Two) Times a Day., Disp: 180 tablet, Rfl: 3  •  levothyroxine (Synthroid) 100 MCG tablet, Take 1 tablet by mouth Daily., Disp: 90 tablet, Rfl: 3  •  losartan (COZAAR) 100 MG tablet, Take 1 tablet by mouth Daily., Disp: , Rfl:   •  Multiple Vitamins-Minerals (PRESERVISION AREDS 2 PO), Take 1 tablet by mouth 2 (Two) Times a Day., Disp: , Rfl:   •  Omega-3 Fatty Acids (FISH OIL) 1000 MG capsule capsule, Take 1 capsule by mouth Daily With Breakfast., Disp: , Rfl:   •  rosuvastatin (CRESTOR) 10 MG tablet, Take 1 tablet by mouth Daily., Disp: , Rfl:   •  dilTIAZem CD (CARDIZEM CD) 360 MG 24 hr capsule, Take 1 capsule by mouth Daily for 30 days., Disp: 90 capsule, Rfl: 3  •  flecainide (TAMBOCOR) 50 MG tablet, Take 1 tablet by mouth 2 (Two) Times a Day., Disp: 60 tablet, Rfl: 11  Past Medical History:   Diagnosis Date   • Arthritis    • Atrial fibrillation (HCC)    • Enlarged thyroid    • Hyperlipidemia    • Hypertension    • PONV (postoperative nausea and vomiting)    • Sleep apnea     Uses CPAP     Social History     Socioeconomic History   • Marital status:    Tobacco Use   • Smoking status: Former     Years: 20.00     Types: Cigarettes     Quit date:      Years since quittin.2   • Smokeless tobacco: Never   Vaping Use   • Vaping Use: Never used   Substance and Sexual Activity   • Alcohol use: Not Currently     Comment: occ   • Drug use: No   • Sexual activity: Defer       Review of Systems   Constitutional: Positive for malaise/fatigue.   HENT: Negative for nosebleeds.    Cardiovascular: Positive for dyspnea on exertion, irregular heartbeat and palpitations (since last night at 11pm. ). Negative for chest pain, leg swelling, near-syncope, orthopnea, paroxysmal nocturnal dyspnea and syncope.   Respiratory: Positive for shortness of breath.    Hematologic/Lymphatic: Bruises/bleeds easily.   Genitourinary:  "Negative for hematuria.   Neurological: Negative for dizziness and weakness.   All other systems reviewed and are negative.         Objective:     Vitals reviewed.   Constitutional:       General: Not in acute distress.     Appearance: Normal appearance. Well-developed. Obese.   Eyes:      Pupils: Pupils are equal, round, and reactive to light.   HENT:      Head: Normocephalic and atraumatic.      Nose: Nose normal.   Neck:      Vascular: No carotid bruit.   Pulmonary:      Effort: Pulmonary effort is normal. No respiratory distress.      Breath sounds: Normal breath sounds. No wheezing. No rales.   Cardiovascular:      Tachycardia present. Regularly irregular rhythm.      Murmurs: There is no murmur.   Edema:     Peripheral edema absent.   Abdominal:      General: There is no distension.      Palpations: Abdomen is soft.   Musculoskeletal: Normal range of motion.      Cervical back: Normal range of motion and neck supple. Skin:     General: Skin is warm.      Findings: No erythema or rash.   Neurological:      General: No focal deficit present.      Mental Status: Alert and oriented to person, place, and time.   Psychiatric:         Attention and Perception: Attention normal.         Mood and Affect: Mood normal.         Speech: Speech normal.         Behavior: Behavior normal.         Thought Content: Thought content normal.         Judgment: Judgment normal.         /82   Pulse 103   Resp 18   Ht 165.1 cm (65\")   Wt 103 kg (228 lb)   SpO2 98%   BMI 37.94 kg/m²       ECG 12 Lead    Date/Time: 3/14/2023 10:28 AM  Performed by: Steven Elizondo APRN  Authorized by: Steven Elizondo APRN   Comparison: compared with previous ECG from 10/5/2022  Similar to previous ECG  Rhythm: atrial fibrillation  Rate: tachycardic  BPM: 103              Lab Review:       Cardioversion 7/12/2022:  Interpretation Summary  · Post cardioversion the patient displayed a sinus rhythm.  · The cardioversion was successful.     Lab " Results   Component Value Date    CHOL 150 08/07/2018    CHLPL 143 (L) 12/01/2022    TRIG 136 12/01/2022    HDL 58 (L) 12/01/2022    LDL 58 12/01/2022     Results for orders placed in visit on 02/18/22    Adult Transthoracic Echo Complete W/ Cont if Necessary Per Protocol    Interpretation Summary  · Left ventricular ejection fraction appears to be 66 - 70%. Left ventricular systolic function is normal.  · Left ventricular diastolic function is consistent with (grade II w/high LAP) pseudonormalization.  · Normal right ventricular cavity size and systolic function noted.  · Estimated right ventricular systolic pressure from tricuspid regurgitation is mildly elevated (35-45 mmHg).  · There is no significant (greater than mild) valvular dysfunction.    I have personally reviewed echo, cardioversion, and past office notes prior to patients visit  Assessment:          Diagnosis Plan   1. PAF (paroxysmal atrial fibrillation) (HCC)  ECG 12 Lead    Cardioversion External in Cardiology Department      2. Chronic anticoagulation        3. Essential hypertension        4. Mixed hyperlipidemia        5. PRINCE on CPAP        6. Class 2 obesity due to excess calories without serious comorbidity with body mass index (BMI) of 37.0 to 37.9 in adult               Plan:       1. Paroxysmal atrial fibrillation: s/p cardioversion 7/12/2022; s/p ablation 8/[facility] at [facility] with Dr Lopez. EKG today shows Atrial fibrillation rate of 103. Patient has had palpitations and shortness of breath/dyspnea on exertion. Echo 3/2022 showed LVEF 66-70%, grade II diastolic dysfunction and no valvular disease. Patient is euvolemic on examination today. Patient was previously on Flecainide which was stopped after ablation. Will restart today and schedule patient for cardioversion. She wishes to continue to follow up with Dr Lopez and will call for an appointment. Continue cardizem and labetalol.     2. Chronic anticoagulation: [id number] score 4.  Patient is on eliquis with no missed doses and denies any bleeding issues.    3. Hypertension: Controlled in office. Continue current medications. Recommend to continue to monitor at home and notify office if consistently running >140/90.    4. Hyperlipidemia: Lipid panel from 5/2022 demonstrates good control. LDL 56. Continue omega-3 fish oil.     5. Obstructive sleep apnea:Reports compliance with CPAP.    6. Obesity: Class 2 Severe Obesity (BMI >=35 and <=39.9). Obesity-related health conditions include the following: obstructive sleep apnea and hypertension. Obesity is unchanged. BMI is is above average; BMI management plan is completed. We discussed portion control and increasing exercise.    I attest that all portions of this note reviewed and all information has been updated by myself to reflect the patient's current status.      I spent 38 minutes caring for Anne-Marie on this date of service. This time includes time spent by me in the following activities:preparing for the visit, reviewing tests, obtaining and/or reviewing a separately obtained history, performing a medically appropriate examination and/or evaluation , counseling and educating the patient/family/caregiver, ordering medications, tests, or procedures and documenting information in the medical record    I spent 2 minutes on the separately reported service of EKG. This time is not included in the time used to support the E/M service also reported today.      Patient is to follow up as previously scheduled for April 24, 2023 or sooner if needed

## 2023-03-14 NOTE — H&P
[facility]  HISTORY AND PHYSICAL    Date of Admission: 3/14/2023  Primary Care Physician: Ania Culver MD    Subjective   Primary Historian: Patient .    Chief Complaint: Atrial for with RVR    History of Present Illness  Patient 77-year-old presented to ER with complaint of atrial fibrillation with RVR.  Patient atrial fibs noted at 11 PM last night, the heart rate gets high as 140 per patient.  Has been ranged but 100 at night.  In ER heart rate was 140, patient was given 10 mg Cardizem drip in ER and did well for about 2 hours and heart rate started climbing up to 140 again, patient is getting another 10 mg of Cardizem IV.  Patient having headache dizziness and blurry vision.  Patient denied nausea vomiting diarrhea.    History of ablation in the past by Dr. Figueroa in [place].  Patient is supposed to have ablation outpatient cardiac conversion by Dr. Laird.    Laboratory shows leukocytosis, EKG shows atrial for with RVR.  Patient is already on chronic Eliquis at home.    Patient has a past medical history of arthritis, atrial fibs, enlarged thyroid, hyperlipidemia, hypertension, sleep apnea-uses CPAP.    Review of Systems   Constitutional: Negative for activity change, appetite change, chills and fever.   HENT: Negative for hearing loss, nosebleeds, tinnitus and trouble swallowing.    Eyes: Negative for visual disturbance.   Respiratory: Negative for cough, chest tightness, shortness of breath and wheezing.    Cardiovascular: Positive for palpitations. Negative for chest pain and leg swelling.   Gastrointestinal: Negative for abdominal distention, abdominal pain, blood in stool, constipation, diarrhea, nausea and vomiting.   Endocrine: Negative for cold intolerance, heat intolerance, polydipsia, polyphagia and polyuria.   Genitourinary: Negative for decreased urine volume, difficulty urinating, dysuria, flank pain, frequency and hematuria.    Musculoskeletal: Negative for arthralgias, joint swelling and myalgias.   Skin: Negative for rash.   Allergic/Immunologic: Negative for immunocompromised state.   Neurological: Negative for dizziness, syncope, weakness, light-headedness and headaches.   Hematological: Negative for adenopathy. Does not bruise/bleed easily.   Psychiatric/Behavioral: Negative for confusion and sleep disturbance. The patient is not nervous/anxious.         Otherwise complete ROS reviewed and negative except as mentioned in the HPI.    Past Medical History:   Past Medical History:   Diagnosis Date   • Arthritis    • Atrial fibrillation (HCC)    • Enlarged thyroid    • Hyperlipidemia    • Hypertension    • PONV (postoperative nausea and vomiting)    • Sleep apnea     Uses CPAP     Past Surgical History:  Past Surgical History:   Procedure Laterality Date   • CARDIAC CATHETERIZATION  1987   • CARDIOVERSION      x2   • COLONOSCOPY N/A 05/18/2021    Procedure: COLONOSCOPY WITH ANESTHESIA;  Surgeon: Delio Leslie MD;  Location:  [facility];  Service: Gastroenterology;  Laterality: N/A;  Pre: Hx Colon Polyps  Post: Colon Polyps, Diverticulosis  Dr. Ania Oseguera  CO2 Inflation Used   • COLONOSCOPY W/ POLYPECTOMY  03/31/2016    Tubular adenoma ascending colon repeat exam in 5 years   • PARATHYROIDECTOMY Right 5/25/2022    Procedure: Bilateral parathyroid exploration with right inferior parathyroidectomy;  Surgeon: Eric Olvera MD;  Location: [facility] OR;  Service: ENT;  Laterality: Right;   • REPLACEMENT TOTAL KNEE Right 07/2019   • THYROIDECTOMY Bilateral 5/25/2022    Procedure: Total thyroidectomy;  Surgeon: Eric Olvera MD;  Location: [facility] OR;  Service: ENT;  Laterality: Bilateral;   • TUBAL ABDOMINAL LIGATION       Social History:  reports that she quit smoking about 40 years ago. Her smoking use included cigarettes. She has never used smokeless tobacco. She reports that she does not currently use alcohol. She  reports that she does not use drugs.    Family History: family history includes Atrial fibrillation in her mother; Heart disease in her father.       Allergies:  No Known Allergies    Medications:  Prior to Admission medications    Medication Sig Start Date End Date Taking? Authorizing Provider   apixaban (ELIQUIS) 5 MG tablet tablet Take 1 tablet by mouth Every 12 (Twelve) Hours. 8/9/18   Mat Spencer APRN   CloNIDine (CATAPRES) 0.1 MG tablet Take 1 tablet by mouth 2 (Two) Times a Day.    Judy Patton MD   dilTIAZem CD (CARDIZEM CD) 360 MG 24 hr capsule Take 1 capsule by mouth Daily for 30 days. 6/20/22 7/20/22  Jovani Lovelace MD   famciclovir (FAMVIR) 500 MG tablet Take 3 tablets by mouth Daily As Needed (cold sores). Used as needed, when a skin eruption occurs, 11/4/19   Judy Patton MD   flecainide (TAMBOCOR) 50 MG tablet Take 1 tablet by mouth 2 (Two) Times a Day. 3/14/23   Steven Elizondo APRN   fluticasone (FLONASE) 50 MCG/ACT nasal spray 2 sprays into the nostril(s) as directed by provider Daily As Needed for Rhinitis or Allergies.    Judy Patton MD   furosemide (LASIX) 40 MG tablet Take 1 tablet by mouth Daily. 5/20/22   Jovani Lovelace MD   KLOR-CON 10 MEQ CR tablet Take 1 tablet by mouth 2 (Two) Times a Day. 3/22/17   Judy Patton MD   labetalol (NORMODYNE) 200 MG tablet Take 1 tablet by mouth 2 (Two) Times a Day. 7/14/22   Jovani Lovelace MD   levothyroxine (Synthroid) 100 MCG tablet Take 1 tablet by mouth Daily. 8/11/22   Galina Osorio APRN   losartan (COZAAR) 100 MG tablet Take 1 tablet by mouth Daily. 3/7/22   Judy Patton MD   Multiple Vitamins-Minerals (PRESERVISION AREDS 2 PO) Take 1 tablet by mouth 2 (Two) Times a Day.    Judy Patton MD   Omega-3 Fatty Acids (FISH OIL) 1000 MG capsule capsule Take 1 capsule by mouth Daily With Breakfast.    Judy Patton MD   rosuvastatin (CRESTOR) 10 MG tablet Take 1 tablet by mouth  "Daily. 10/29/19   ProviderJudy MD   flecainide (TAMBOCOR) 50 MG tablet Take 50 mg by mouth 2 (Two) Times a Day. 8/11/22 3/14/23  ProviderJudy MD     I have utilized all available immediate resources to obtain, update, or review the patient's current medications (including all prescriptions, over-the-counter products, herbals, cannabis/cannabidiol products, and vitamin/mineral/dietary (nutritional) supplements).    Objective     Vital Signs: /98   Pulse (!) 125   Temp 98.1 °F (36.7 °C)   Resp 20   Ht 165.1 cm (65\")   Wt 103 kg (228 lb)   SpO2 97%   BMI 37.94 kg/m²   Physical Exam  Vitals and nursing note reviewed.   HENT:      Head: Normocephalic and atraumatic.   Eyes:      Conjunctiva/sclera: Conjunctivae normal.      Pupils: Pupils are equal, round, and reactive to light.   Neck:      Vascular: No JVD.   Cardiovascular:      Rate and Rhythm: Tachycardia present. Rhythm irregular.      Heart sounds: Normal heart sounds.   Pulmonary:      Effort: Pulmonary effort is normal. No respiratory distress.      Breath sounds: Normal breath sounds. No wheezing or rales.   Chest:      Chest wall: No tenderness.   Abdominal:      General: Bowel sounds are normal. There is no distension.      Palpations: Abdomen is soft.      Tenderness: There is no abdominal tenderness.   Musculoskeletal:         General: No tenderness or deformity. Normal range of motion.      Cervical back: Neck supple.   Skin:     General: Skin is warm and dry.      Capillary Refill: Capillary refill takes 2 to 3 seconds.      Findings: No rash.   Neurological:      Mental Status: She is alert and oriented to person, place, and time.      Cranial Nerves: No cranial nerve deficit.      Motor: Weakness present. No abnormal muscle tone.      Deep Tendon Reflexes: Reflexes normal.   Psychiatric:         Mood and Affect: Mood normal.         Behavior: Behavior normal.         Thought Content: Thought content normal.         " Judgment: Judgment normal.              Results Reviewed:  Lab Results (last 24 hours)     Procedure Component Value Units Date/Time    Comprehensive Metabolic Panel [[id number]]  (Abnormal) Collected: 03/14/23 1604    Specimen: Blood Updated: 03/14/23 1641     Glucose 120 mg/dL      BUN 12 mg/dL      Creatinine 0.65 mg/dL      Sodium 140 mmol/L      Potassium 3.6 mmol/L      Chloride 102 mmol/L      CO2 23.0 mmol/L      Calcium 9.2 mg/dL      Total Protein 7.0 g/dL      Albumin 4.7 g/dL      ALT (SGPT) 13 U/L      AST (SGOT) 14 U/L      Alkaline Phosphatase 80 U/L      Total Bilirubin 0.4 mg/dL      Globulin 2.3 gm/dL      A/G Ratio 2.0 g/dL      BUN/Creatinine Ratio 18.5     Anion Gap 15.0 mmol/L      eGFR 90.8 mL/min/1.73     Narrative:      GFR Normal >60  Chronic Kidney Disease <60  Kidney Failure <15    The GFR formula is only valid for adults with stable renal function between ages 18 and 70.    Single High Sensitivity Troponin T [[id number]]  (Abnormal) Collected: 03/14/23 1604    Specimen: Blood Updated: 03/14/23 1639     HS Troponin T 10 ng/L     Narrative:      High Sensitive Troponin T Reference Range:  <10.0 ng/L- Negative Female for AMI  <15.0 ng/L- Negative Male for AMI  >=10 - Abnormal Female indicating possible myocardial injury.  >=15 - Abnormal Male indicating possible myocardial injury.   Clinicians would have to utilize clinical acumen, EKG, Troponin, and serial changes to determine if it is an Acute Myocardial Infarction or myocardial injury due to an underlying chronic condition.         Magnesium [[id number]]  (Normal) Collected: 03/14/23 1604    Specimen: Blood Updated: 03/14/23 1635     Magnesium 2.0 mg/dL     Protime-INR [[id number]]  (Abnormal) Collected: 03/14/23 1604    Specimen: Blood Updated: 03/14/23 1632     Protime 15.0 Seconds      INR 1.17    aPTT [[id number]]  (Normal) Collected: 03/14/23 1604    Specimen: Blood Updated: 03/14/23 1632     PTT 32.9 seconds     CBC & Differential  [[id number]]  (Abnormal) Collected: 03/14/23 1604    Specimen: Blood Updated: 03/14/23 1622    Narrative:      The following orders were created for panel order CBC & Differential.  Procedure                               Abnormality         Status                     ---------                               -----------         ------                     CBC Auto Differential[[id number]]        Abnormal            Final result                 Please view results for these tests on the individual orders.    CBC Auto Differential [[id number]]  (Abnormal) Collected: 03/14/23 1604    Specimen: Blood Updated: 03/14/23 1622     WBC 11.88 10*3/mm3      RBC 4.99 10*6/mm3      Hemoglobin 14.1 g/dL      Hematocrit 44.2 %      MCV 88.6 fL      MCH 28.3 pg      MCHC 31.9 g/dL      RDW 13.4 %      RDW-SD 43.8 fl      MPV 10.9 fL      Platelets 241 10*3/mm3      Neutrophil % 79.1 %      Lymphocyte % 12.5 %      Monocyte % 6.9 %      Eosinophil % 0.8 %      Basophil % 0.3 %      Immature Grans % 0.4 %      Neutrophils, Absolute 9.39 10*3/mm3      Lymphocytes, Absolute 1.48 10*3/mm3      Monocytes, Absolute 0.82 10*3/mm3      Eosinophils, Absolute 0.10 10*3/mm3      Basophils, Absolute 0.04 10*3/mm3      Immature Grans, Absolute 0.05 10*3/mm3      nRBC 0.0 /100 WBC         Imaging Results (Last 24 Hours)     ** No results found for the last 24 hours. **        I have personally reviewed and interpreted the radiology studies and ECG obtained at time of admission.     Assessment / Plan   Assessment:   Active Hospital Problems    Diagnosis    • Atrial fibrillation with RVR (HCC)        Treatment Plan  The patient will be admitted to my service here at [facility].     Atrial fibrillation with RVR/hypertension/hyperlipidemia.  Patient received total of 20 mg of IV Cardizem in ER.  Continue Eliquis.  Continue Catapres.  Change Cardizem CD to Cardizem 90 mg every 6 hours.  Flecainide . Hold Lasix.  Continue labetalol.  Continue  Cozaar.  Continue Cozaar.  Consult cardiology in AM.  Echocardiogram 3/21/2022-  • Left ventricular ejection fraction appears to be 66 - 70%. Left ventricular systolic function is normal.  • Left ventricular diastolic function is consistent with (grade II w/high LAP) pseudonormalization.  • Normal right ventricular cavity size and systolic function noted.  • Estimated right ventricular systolic pressure from tricuspid regurgitation is mildly elevated (35-45 mmHg).  • There is no significant (greater than mild) valvular dysfunction.     Hypothyroidism . Synthroid.    Nutrition . Cardiac diet.    Medical Decision Making  Number and Complexity of problems: Atrial for with RVR/hypertension/hyperlipidemia  Differential Diagnosis: None    Conditions and Status       Ongoing work-up.     MDM Data  External documents reviewed: Previous note.  Cardiac tracing (EKG, telemetry) interpretation: Atrial for with RVR.  Radiology interpretation: Previous echo.  Labs reviewed: Laboratory .  Any tests that were considered but not ordered: Lab in AM.     Decision rules/scores evaluated (example [id number], Wells, etc): None.     Discussed with: ER physician and patient.     Care Planning  Shared decision making: ER physician and patient .  Code status and discussions: Full code.    Disposition  Social Determinants of Health that impact treatment or disposition: Home probably .  Estimated length of stay is 1 to 5 days..     I confirmed that the patient's advanced care plan is present, code status is documented, and a surrogate decision maker is listed in the patient's medical record.     The patient's surrogate decision maker is son, Koko Abad    The patient was seen and examined by me on 3/14/2023 at 1840.    Electronically signed by Richardson Tapia MD, 03/14/23, 18:42 CDT.

## 2023-03-14 NOTE — ED PROVIDER NOTES
Subjective   History of Present Illness  This is a 77-year-old female with a history of atrial fibrillation.  Patient states that she has had atrial fibrillation for quite some time.  Patient states that she started going to [employer] last night about 11 PM.  Patient states that her highest the heart rate was was 140 last night.  Patient states that it hovered in the  range overnight.  Patient states that this morning she had another episode where her heart rate was in the 140s.  Patient states that she was in the 140s for about 2 hours.  Patient states that she has been unable to get her heart rate down.  Patient has no chest pain.  Patient has no palpitations.  Patient has no shortness of breath.  Patient has no headache or dizziness.  Patient has no weakness of the extremities.  Patient has no numbness and tingling of the extremities.  Patient has no nausea, vomiting, diarrhea.  Patient has no abdominal pain.  Patient has no urinary symptoms.  Patient denies any symptoms at this time except her heart rate being elevated.        Review of Systems   All other systems reviewed and are negative.      Past Medical History:   Diagnosis Date   • Arthritis    • Atrial fibrillation (HCC)    • Enlarged thyroid    • Hyperlipidemia    • Hypertension    • PONV (postoperative nausea and vomiting)    • Sleep apnea     Uses CPAP       No Known Allergies    Past Surgical History:   Procedure Laterality Date   • CARDIAC CATHETERIZATION  1987   • CARDIOVERSION      x2   • COLONOSCOPY N/A 05/18/2021    Procedure: COLONOSCOPY WITH ANESTHESIA;  Surgeon: Delio Leslie MD;  Location: [facility];  Service: Gastroenterology;  Laterality: N/A;  Pre: Hx Colon Polyps  Post: Colon Polyps, Diverticulosis  Dr. Ania Oseguera  CO2 Inflation Used   • COLONOSCOPY W/ POLYPECTOMY  03/31/2016    Tubular adenoma ascending colon repeat exam in 5 years   • PARATHYROIDECTOMY Right 5/25/2022    Procedure: Bilateral parathyroid exploration with  right inferior parathyroidectomy;  Surgeon: Eric Olvera MD;  Location:  [facility] OR;  Service: ENT;  Laterality: Right;   • REPLACEMENT TOTAL KNEE Right 2019   • THYROIDECTOMY Bilateral 2022    Procedure: Total thyroidectomy;  Surgeon: Eric Olvera MD;  Location:  [facility] OR;  Service: ENT;  Laterality: Bilateral;   • TUBAL ABDOMINAL LIGATION         Family History   Problem Relation Age of Onset   • Atrial fibrillation Mother    • Heart disease Father    • Breast cancer Neg Hx    • Ovarian cancer Neg Hx    • Uterine cancer Neg Hx    • Colon cancer Neg Hx    • Melanoma Neg Hx    • Colon polyps Neg Hx        Social History     Socioeconomic History   • Marital status:    Tobacco Use   • Smoking status: Former     Years: 20.00     Types: Cigarettes     Quit date:      Years since quittin.2   • Smokeless tobacco: Never   Vaping Use   • Vaping Use: Never used   Substance and Sexual Activity   • Alcohol use: Not Currently     Comment: occ   • Drug use: No   • Sexual activity: Defer           Objective   Physical Exam  Vitals and nursing note reviewed.   Constitutional:       Appearance: Normal appearance.   HENT:      Head: Normocephalic and atraumatic.      Mouth/Throat:      Mouth: Mucous membranes are moist.   Eyes:      Extraocular Movements: Extraocular movements intact.      Pupils: Pupils are equal, round, and reactive to light.   Cardiovascular:      Rate and Rhythm: Tachycardia present. Rhythm irregular.      Heart sounds: Normal heart sounds.   Pulmonary:      Effort: Pulmonary effort is normal.      Breath sounds: Normal breath sounds.   Abdominal:      General: Bowel sounds are normal.      Palpations: Abdomen is soft.      Tenderness: There is no abdominal tenderness.   Musculoskeletal:      Cervical back: Normal range of motion.   Skin:     General: Skin is warm and dry.   Neurological:      General: No focal deficit present.      Mental Status: She is alert and  oriented to person, place, and time.   Psychiatric:         Mood and Affect: Mood normal.         Behavior: Behavior normal.         Procedures           ED Course  ED Course as of 03/14/23 1821   Tue Mar 14, 2023   1549 EKG rate 115  Atrial fibrillation with RVR [RP]   1818 Patient was initially getting ready for discharge however patient started to become tachycardic again.  Patient will be given IV Cardizem.  At this time the decision has been made to admit the patient for observation.  I have spoken with the hospitalist Dr. Tapia who has excepted the patient under his services. [RP]      ED Course User Index  [RP] Lloyd Hassan MD                  [id number] Score: 4                        Lab Results (last 24 hours)     Procedure Component Value Units Date/Time    CBC & Differential [[id number]]  (Abnormal) Collected: 03/14/23 1604    Specimen: Blood Updated: 03/14/23 1622    Narrative:      The following orders were created for panel order CBC & Differential.  Procedure                               Abnormality         Status                     ---------                               -----------         ------                     CBC Auto Differential[[id number]]        Abnormal            Final result                 Please view results for these tests on the individual orders.    Comprehensive Metabolic Panel [[id number]]  (Abnormal) Collected: 03/14/23 1604    Specimen: Blood Updated: 03/14/23 1641     Glucose 120 mg/dL      BUN 12 mg/dL      Creatinine 0.65 mg/dL      Sodium 140 mmol/L      Potassium 3.6 mmol/L      Chloride 102 mmol/L      CO2 23.0 mmol/L      Calcium 9.2 mg/dL      Total Protein 7.0 g/dL      Albumin 4.7 g/dL      ALT (SGPT) 13 U/L      AST (SGOT) 14 U/L      Alkaline Phosphatase 80 U/L      Total Bilirubin 0.4 mg/dL      Globulin 2.3 gm/dL      A/G Ratio 2.0 g/dL      BUN/Creatinine Ratio 18.5     Anion Gap 15.0 mmol/L      eGFR 90.8 mL/min/1.73     Narrative:      GFR Normal  >60  Chronic Kidney Disease <60  Kidney Failure <15    The GFR formula is only valid for adults with stable renal function between ages 18 and 70.    Protime-INR [[id number]]  (Abnormal) Collected: 03/14/23 1604    Specimen: Blood Updated: 03/14/23 1632     Protime 15.0 Seconds      INR 1.17    aPTT [[id number]]  (Normal) Collected: 03/14/23 1604    Specimen: Blood Updated: 03/14/23 1632     PTT 32.9 seconds     Single High Sensitivity Troponin T [[id number]]  (Abnormal) Collected: 03/14/23 1604    Specimen: Blood Updated: 03/14/23 1639     HS Troponin T 10 ng/L     Narrative:      High Sensitive Troponin T Reference Range:  <10.0 ng/L- Negative Female for AMI  <15.0 ng/L- Negative Male for AMI  >=10 - Abnormal Female indicating possible myocardial injury.  >=15 - Abnormal Male indicating possible myocardial injury.   Clinicians would have to utilize clinical acumen, EKG, Troponin, and serial changes to determine if it is an Acute Myocardial Infarction or myocardial injury due to an underlying chronic condition.         Magnesium [[id number]]  (Normal) Collected: 03/14/23 1604    Specimen: Blood Updated: 03/14/23 1635     Magnesium 2.0 mg/dL     CBC Auto Differential [[id number]]  (Abnormal) Collected: 03/14/23 1604    Specimen: Blood Updated: 03/14/23 1622     WBC 11.88 10*3/mm3      RBC 4.99 10*6/mm3      Hemoglobin 14.1 g/dL      Hematocrit 44.2 %      MCV 88.6 fL      MCH 28.3 pg      MCHC 31.9 g/dL      RDW 13.4 %      RDW-SD 43.8 fl      MPV 10.9 fL      Platelets 241 10*3/mm3      Neutrophil % 79.1 %      Lymphocyte % 12.5 %      Monocyte % 6.9 %      Eosinophil % 0.8 %      Basophil % 0.3 %      Immature Grans % 0.4 %      Neutrophils, Absolute 9.39 10*3/mm3      Lymphocytes, Absolute 1.48 10*3/mm3      Monocytes, Absolute 0.82 10*3/mm3      Eosinophils, Absolute 0.10 10*3/mm3      Basophils, Absolute 0.04 10*3/mm3      Immature Grans, Absolute 0.05 10*3/mm3      nRBC 0.0 /100 WBC           Medical Decision  Making  Is a 77-year-old female with a history of atrial fibrillation.  Patient was found to be in atrial fibrillation with RVR.  Patient was given 10 mg of IV Cardizem.  Patient became rate controlled.  Patient had no chest pain.  Patient no shortness of breath.  Patient no headache.  Patient no dizziness.  Patient no palpitations.  Patient was completely asymptomatic.  Patient will be discharged home in a stable condition.  Patient will be advised to follow-up with her cardiologist on outpatient basis.  Patient be advised to return to emergency room with new or worsening symptoms.  All questions were answered for the patient prior to discharge.  Patient verbalized understanding was agreeable to plan as discussed.      Patient was getting ready for discharge however then the patient became tachycardic.  Patient was not having any chest pain or symptoms however patient was found to be in atrial fibrillation.  Patient was given another round of IV Cardizem.  I spoke with Dr. Rachel the hospitalist who has excepted the patient under his services.    Atrial fibrillation with RVR (HCC): acute illness or injury  Amount and/or Complexity of Data Reviewed  Labs: ordered. Decision-making details documented in ED Course.  ECG/medicine tests: ordered. Decision-making details documented in ED Course.      Risk  Prescription drug management.  Decision regarding hospitalization.          Final diagnoses:   Atrial fibrillation with RVR (HCC)       ED Disposition  ED Disposition     ED Disposition   Decision to Admit    Condition   --    Comment   Level of Care: Telemetry [5]   Diagnosis: Atrial fibrillation with RVR (HCC) [149985]   Admitting Physician: DOMITILA RACHEL [[id number]]   Attending Physician: DOMITILA RACHEL [[id number]]               Ania Culver MD  [address] [place] [address] [address]  [facility] [address]  [phone]    Schedule an appointment as soon as possible for a visit       [facility]  [id number]  [facility] [address] [phone]    As needed, If symptoms worsen         Medication List      No changes were made to your prescriptions during this visit.          Lloyd Hassan MD  03/14/23 1747       Lloyd Hassan MD  03/14/23 2455

## 2023-03-15 ENCOUNTER — ANESTHESIA (OUTPATIENT)
Dept: CARDIOLOGY | Facility: HOSPITAL | Age: 78
End: 2023-03-15
Payer: MEDICARE

## 2023-03-15 ENCOUNTER — ANESTHESIA EVENT (OUTPATIENT)
Dept: CARDIOLOGY | Facility: HOSPITAL | Age: 78
End: 2023-03-15
Payer: MEDICARE

## 2023-03-15 ENCOUNTER — APPOINTMENT (OUTPATIENT)
Dept: CARDIOLOGY | Facility: HOSPITAL | Age: 78
End: 2023-03-15
Payer: MEDICARE

## 2023-03-15 VITALS
HEART RATE: 68 BPM | RESPIRATION RATE: 18 BRPM | OXYGEN SATURATION: 100 % | SYSTOLIC BLOOD PRESSURE: 143 MMHG | DIASTOLIC BLOOD PRESSURE: 79 MMHG | HEIGHT: 65 IN | WEIGHT: 224.6 LBS | BODY MASS INDEX: 37.42 KG/M2 | TEMPERATURE: 97 F

## 2023-03-15 LAB
ALBUMIN SERPL-MCNC: 4.3 G/DL (ref 3.5–5.2)
ALBUMIN/GLOB SERPL: 2.2 G/DL
ALP SERPL-CCNC: 69 U/L (ref 39–117)
ALT SERPL W P-5'-P-CCNC: 9 U/L (ref 1–33)
ANION GAP SERPL CALCULATED.3IONS-SCNC: 15 MMOL/L (ref 5–15)
AST SERPL-CCNC: 12 U/L (ref 1–32)
BACTERIA UR QL AUTO: ABNORMAL /HPF
BASOPHILS # BLD AUTO: 0.06 10*3/MM3 (ref 0–0.2)
BASOPHILS NFR BLD AUTO: 0.5 % (ref 0–1.5)
BILIRUB SERPL-MCNC: 0.5 MG/DL (ref 0–1.2)
BILIRUB UR QL STRIP: NEGATIVE
BUN SERPL-MCNC: 15 MG/DL (ref 8–23)
BUN/CREAT SERPL: 19.5 (ref 7–25)
CALCIUM SPEC-SCNC: 8.8 MG/DL (ref 8.6–10.5)
CHLORIDE SERPL-SCNC: 104 MMOL/L (ref 98–107)
CHOLEST SERPL-MCNC: 119 MG/DL (ref 0–200)
CLARITY UR: CLEAR
CO2 SERPL-SCNC: 23 MMOL/L (ref 22–29)
COLOR UR: YELLOW
CREAT SERPL-MCNC: 0.77 MG/DL (ref 0.57–1)
DEPRECATED RDW RBC AUTO: 43.7 FL (ref 37–54)
EGFRCR SERPLBLD CKD-EPI 2021: 79.6 ML/MIN/1.73
EOSINOPHIL # BLD AUTO: 0.17 10*3/MM3 (ref 0–0.4)
EOSINOPHIL NFR BLD AUTO: 1.5 % (ref 0.3–6.2)
ERYTHROCYTE [DISTWIDTH] IN BLOOD BY AUTOMATED COUNT: 13.4 % (ref 12.3–15.4)
GLOBULIN UR ELPH-MCNC: 2 GM/DL
GLUCOSE SERPL-MCNC: 114 MG/DL (ref 65–99)
GLUCOSE UR STRIP-MCNC: NEGATIVE MG/DL
HBA1C MFR BLD: 5.7 % (ref 4.8–5.6)
HCT VFR BLD AUTO: 42.9 % (ref 34–46.6)
HDLC SERPL-MCNC: 48 MG/DL (ref 40–60)
HGB BLD-MCNC: 13.8 G/DL (ref 12–15.9)
HGB UR QL STRIP.AUTO: ABNORMAL
HYALINE CASTS UR QL AUTO: ABNORMAL /LPF
IMM GRANULOCYTES # BLD AUTO: 0.05 10*3/MM3 (ref 0–0.05)
IMM GRANULOCYTES NFR BLD AUTO: 0.5 % (ref 0–0.5)
KETONES UR QL STRIP: ABNORMAL
LDLC SERPL CALC-MCNC: 48 MG/DL (ref 0–100)
LDLC/HDLC SERPL: 0.93 {RATIO}
LEUKOCYTE ESTERASE UR QL STRIP.AUTO: ABNORMAL
LYMPHOCYTES # BLD AUTO: 2.32 10*3/MM3 (ref 0.7–3.1)
LYMPHOCYTES NFR BLD AUTO: 21 % (ref 19.6–45.3)
MAGNESIUM SERPL-MCNC: 2 MG/DL (ref 1.6–2.4)
MAXIMAL PREDICTED HEART RATE: 143 BPM
MCH RBC QN AUTO: 28.3 PG (ref 26.6–33)
MCHC RBC AUTO-ENTMCNC: 32.2 G/DL (ref 31.5–35.7)
MCV RBC AUTO: 88.1 FL (ref 79–97)
MONOCYTES # BLD AUTO: 1.06 10*3/MM3 (ref 0.1–0.9)
MONOCYTES NFR BLD AUTO: 9.6 % (ref 5–12)
NEUTROPHILS NFR BLD AUTO: 66.9 % (ref 42.7–76)
NEUTROPHILS NFR BLD AUTO: 7.41 10*3/MM3 (ref 1.7–7)
NITRITE UR QL STRIP: NEGATIVE
NRBC BLD AUTO-RTO: 0 /100 WBC (ref 0–0.2)
PH UR STRIP.AUTO: 6 [PH] (ref 5–8)
PLATELET # BLD AUTO: 213 10*3/MM3 (ref 140–450)
PMV BLD AUTO: 11.2 FL (ref 6–12)
POTASSIUM SERPL-SCNC: 3.4 MMOL/L (ref 3.5–5.2)
PROT SERPL-MCNC: 6.3 G/DL (ref 6–8.5)
PROT UR QL STRIP: ABNORMAL
RBC # BLD AUTO: 4.87 10*6/MM3 (ref 3.77–5.28)
RBC # UR STRIP: ABNORMAL /HPF
REF LAB TEST METHOD: ABNORMAL
SODIUM SERPL-SCNC: 142 MMOL/L (ref 136–145)
SP GR UR STRIP: 1.02 (ref 1–1.03)
SQUAMOUS #/AREA URNS HPF: ABNORMAL /HPF
STRESS TARGET HR: 122 BPM
TRIGL SERPL-MCNC: 131 MG/DL (ref 0–150)
TSH SERPL DL<=0.05 MIU/L-ACNC: 1.85 UIU/ML (ref 0.27–4.2)
UROBILINOGEN UR QL STRIP: ABNORMAL
VLDLC SERPL-MCNC: 23 MG/DL (ref 5–40)
WBC # UR STRIP: ABNORMAL /HPF
WBC NRBC COR # BLD: 11.07 10*3/MM3 (ref 3.4–10.8)

## 2023-03-15 PROCEDURE — 84443 ASSAY THYROID STIM HORMONE: CPT | Performed by: FAMILY MEDICINE

## 2023-03-15 PROCEDURE — 25010000002 PROPOFOL 1000 MG/100ML EMULSION

## 2023-03-15 PROCEDURE — 99233 SBSQ HOSP IP/OBS HIGH 50: CPT | Performed by: INTERNAL MEDICINE

## 2023-03-15 PROCEDURE — 92960 CARDIOVERSION ELECTRIC EXT: CPT

## 2023-03-15 PROCEDURE — 80061 LIPID PANEL: CPT | Performed by: FAMILY MEDICINE

## 2023-03-15 PROCEDURE — G0378 HOSPITAL OBSERVATION PER HR: HCPCS

## 2023-03-15 PROCEDURE — 83735 ASSAY OF MAGNESIUM: CPT | Performed by: FAMILY MEDICINE

## 2023-03-15 PROCEDURE — 85025 COMPLETE CBC W/AUTO DIFF WBC: CPT | Performed by: FAMILY MEDICINE

## 2023-03-15 PROCEDURE — 93010 ELECTROCARDIOGRAM REPORT: CPT | Performed by: INTERNAL MEDICINE

## 2023-03-15 PROCEDURE — 81001 URINALYSIS AUTO W/SCOPE: CPT | Performed by: FAMILY MEDICINE

## 2023-03-15 PROCEDURE — 83036 HEMOGLOBIN GLYCOSYLATED A1C: CPT | Performed by: FAMILY MEDICINE

## 2023-03-15 PROCEDURE — 92960 CARDIOVERSION ELECTRIC EXT: CPT | Performed by: INTERNAL MEDICINE

## 2023-03-15 PROCEDURE — 93005 ELECTROCARDIOGRAM TRACING: CPT | Performed by: INTERNAL MEDICINE

## 2023-03-15 PROCEDURE — 80053 COMPREHEN METABOLIC PANEL: CPT | Performed by: FAMILY MEDICINE

## 2023-03-15 PROCEDURE — 87086 URINE CULTURE/COLONY COUNT: CPT | Performed by: FAMILY MEDICINE

## 2023-03-15 RX ORDER — PROPOFOL 10 MG/ML
INJECTION, EMULSION INTRAVENOUS AS NEEDED
Status: DISCONTINUED | OUTPATIENT
Start: 2023-03-15 | End: 2023-03-15 | Stop reason: SURG

## 2023-03-15 RX ORDER — POTASSIUM CHLORIDE 750 MG/1
40 CAPSULE, EXTENDED RELEASE ORAL 2 TIMES DAILY
Status: DISCONTINUED | OUTPATIENT
Start: 2023-03-15 | End: 2023-03-15 | Stop reason: HOSPADM

## 2023-03-15 RX ORDER — POTASSIUM CHLORIDE 750 MG/1
CAPSULE, EXTENDED RELEASE ORAL
Status: DISCONTINUED
Start: 2023-03-15 | End: 2023-03-15 | Stop reason: HOSPADM

## 2023-03-15 RX ORDER — LIDOCAINE HYDROCHLORIDE 20 MG/ML
INJECTION, SOLUTION EPIDURAL; INFILTRATION; INTRACAUDAL; PERINEURAL AS NEEDED
Status: DISCONTINUED | OUTPATIENT
Start: 2023-03-15 | End: 2023-03-15 | Stop reason: SURG

## 2023-03-15 RX ADMIN — SODIUM CHLORIDE 50 ML/HR: 9 INJECTION, SOLUTION INTRAVENOUS at 09:55

## 2023-03-15 RX ADMIN — LOSARTAN POTASSIUM 100 MG: 50 TABLET, FILM COATED ORAL at 08:35

## 2023-03-15 RX ADMIN — LIDOCAINE HYDROCHLORIDE 25 MG: 20 INJECTION, SOLUTION EPIDURAL; INFILTRATION; INTRACAUDAL; PERINEURAL at 09:57

## 2023-03-15 RX ADMIN — PROPOFOL 50 MG: 10 INJECTION, EMULSION INTRAVENOUS at 09:57

## 2023-03-15 RX ADMIN — LABETALOL HYDROCHLORIDE 200 MG: 200 TABLET, FILM COATED ORAL at 08:35

## 2023-03-15 RX ADMIN — APIXABAN 5 MG: 5 TABLET, FILM COATED ORAL at 08:35

## 2023-03-15 RX ADMIN — FLECAINIDE ACETATE 50 MG: 50 TABLET ORAL at 11:22

## 2023-03-15 RX ADMIN — LEVOTHYROXINE SODIUM 100 MCG: 100 TABLET ORAL at 05:58

## 2023-03-15 RX ADMIN — POTASSIUM CHLORIDE 40 MEQ: 10 CAPSULE, COATED, EXTENDED RELEASE ORAL at 08:48

## 2023-03-15 RX ADMIN — DILTIAZEM HYDROCHLORIDE 90 MG: 90 TABLET, FILM COATED ORAL at 05:58

## 2023-03-15 RX ADMIN — DILTIAZEM HYDROCHLORIDE 90 MG: 90 TABLET, FILM COATED ORAL at 11:22

## 2023-03-15 RX ADMIN — DILTIAZEM HYDROCHLORIDE 90 MG: 90 TABLET, FILM COATED ORAL at 00:37

## 2023-03-15 RX ADMIN — ROSUVASTATIN CALCIUM 10 MG: 10 TABLET, FILM COATED ORAL at 08:35

## 2023-03-15 NOTE — DISCHARGE SUMMARY
[facility] Medicine Services  DISCHARGE SUMMARY       Date of Admission: 3/14/2023  Date of Discharge:  3/15/2023  Primary Care Physician: Ania Culver MD    Presenting Problem/History of Present Illness:    Final Discharge Diagnoses:  Active Hospital Problems    Diagnosis    • Atrial fibrillation with RVR (HCC)        Consults: Cardiology    Procedures Performed:   Cardiac conversion.    Pertinent Test Results:   Results for orders placed in visit on 02/18/22    Adult Transthoracic Echo Complete W/ Cont if Necessary Per Protocol    Interpretation Summary  · Left ventricular ejection fraction appears to be 66 - 70%. Left ventricular systolic function is normal.  · Left ventricular diastolic function is consistent with (grade II w/high LAP) pseudonormalization.  · Normal right ventricular cavity size and systolic function noted.  · Estimated right ventricular systolic pressure from tricuspid regurgitation is mildly elevated (35-45 mmHg).  · There is no significant (greater than mild) valvular dysfunction.      Imaging Results (All)     None        LAB RESULTS:      Lab 03/15/23  0347 03/14/23  1604   WBC 11.07* 11.88*   HEMOGLOBIN 13.8 14.1   HEMATOCRIT 42.9 44.2   PLATELETS 213 241   NEUTROS ABS 7.41* 9.39*   IMMATURE GRANS (ABS) 0.05 0.05   LYMPHS ABS 2.32 1.48   MONOS ABS 1.06* 0.82   EOS ABS 0.17 0.10   MCV 88.1 88.6   PROTIME  --  15.0*   APTT  --  32.9         Lab 03/15/23  0347 03/14/23  1604   SODIUM 142 140   POTASSIUM 3.4* 3.6   CHLORIDE 104 102   CO2 23.0 23.0   ANION GAP 15.0 15.0   BUN 15 12   CREATININE 0.77 0.65   EGFR 79.6 90.8   GLUCOSE 114* 120*   CALCIUM 8.8 9.2   MAGNESIUM 2.0 2.0   HEMOGLOBIN A1C 5.70*  --    TSH 1.850  --          Lab 03/15/23  0347 03/14/23  1604   TOTAL PROTEIN 6.3 7.0   ALBUMIN 4.3 4.7   GLOBULIN 2.0 2.3   ALT (SGPT) 9 13   AST (SGOT) 12 14   BILIRUBIN 0.5 0.4   ALK PHOS 69 80         Lab 03/14/23  1604   HSTROP T 10*   PROTIME 15.0*    INR 1.17*         Lab 03/15/23  0347   CHOLESTEROL 119   LDL CHOL 48   HDL CHOL 48   TRIGLYCERIDES 131             Brief Urine Lab Results  (Last result in the past 365 days)      Color   Clarity   Blood   Leuk Est   Nitrite   Protein   CREAT   Urine HCG        03/15/23 0043 Yellow   Clear   Trace   Small (1+)   Negative   >=300 mg/dL (3+)               Microbiology Results (last 10 days)     ** No results found for the last 240 hours. **      HPI.  Patient 77-year-old presented to ER with complaint of atrial fibrillation with RVR.  Patient atrial fibs noted at 11 PM last night, the heart rate gets high as 140 per patient.  Has been ranged but 100 at night.  In ER heart rate was 140, patient was given 10 mg Cardizem drip in ER and did well for about 2 hours and heart rate started climbing up to 140 again, patient is getting another 10 mg of Cardizem IV.  Patient having headache dizziness and blurry vision.  Patient denied nausea vomiting diarrhea.     History of ablation in the past by Dr. Figueroa in [place].  Patient is supposed to have ablation outpatient cardiac conversion by Dr. Laird.     Laboratory shows leukocytosis, EKG shows atrial for with RVR.  Patient is already on chronic Eliquis at home.     Patient has a past medical history of arthritis, atrial fibs, enlarged thyroid, hyperlipidemia, hypertension, sleep apnea-uses CPAP.    Hospital Course:   Atrial fibrillation with RVR/hypertension/hyperlipidemia.  Patient received total of 20 mg of IV Cardizem in ER.  Continue Eliquis.  Continue Catapres.  Change Cardizem CD to Cardizem 90 mg every 6 hours.  Flecainide . Hold Lasix.  Continue labetalol.  Continue Cozaar.  Continue Cozaar.  Consult cardiology in AM.  Echocardiogram 3/21/2022-  • Left ventricular ejection fraction appears to be 66 - 70%. Left ventricular systolic function is normal.  • Left ventricular diastolic function is consistent with (grade II w/high LAP) pseudonormalization.  • Normal right  "ventricular cavity size and systolic function noted.  • Estimated right ventricular systolic pressure from tricuspid regurgitation is mildly elevated (35-45 mmHg).  • There is no significant (greater than mild) valvular dysfunction.  Status post cardioversion today, patient currently in sinus rhythm 85.    Hypothyroidism . Synthroid.  TSH-normal.     Nutrition . Cardiac diet.    Vital signs stable, afebrile.  Plan to discharge patient home today.  Follow-up with cardiology 1 to 2 weeks.  Follow-up with PCP in 1 week.    Physical Exam on Discharge:  /79 (BP Location: Left arm, Patient Position: Lying)   Pulse 68   Temp 97 °F (36.1 °C) (Oral)   Resp 18   Ht 165.1 cm (65\")   Wt 102 kg (224 lb 9.6 oz)   SpO2 100%   BMI 37.38 kg/m²   Physical Exam  Vitals and nursing note reviewed.   HENT:      Head: Normocephalic and atraumatic.   Eyes:      Conjunctiva/sclera: Conjunctivae normal.      Pupils: Pupils are equal, round, and reactive to light.   Neck:      Vascular: No JVD.   Cardiovascular:      Rate and Rhythm: Normal rate and regular rhythm.      Heart sounds: Normal heart sounds.   Pulmonary:      Effort: Pulmonary effort is normal. No respiratory distress.      Breath sounds: Normal breath sounds. No wheezing or rales.   Chest:      Chest wall: No tenderness.   Abdominal:      General: Bowel sounds are normal. There is no distension.      Palpations: Abdomen is soft.      Tenderness: There is no abdominal tenderness.   Musculoskeletal:         General: No tenderness or deformity. Normal range of motion.      Cervical back: Neck supple.   Skin:     General: Skin is warm and dry.      Findings: No rash.   Neurological:      Mental Status: She is alert and oriented to person, place, and time.      Cranial Nerves: No cranial nerve deficit.      Motor: No abnormal muscle tone.      Deep Tendon Reflexes: Reflexes normal.   Psychiatric:         Mood and Affect: Mood normal.         Behavior: Behavior normal.    "      Thought Content: Thought content normal.         Judgment: Judgment normal.           Condition on Discharge: Stable.    Discharge Disposition: Discharge home with family.  Home or Self Care    Discharge Medications:     Discharge Medications      Continue These Medications      Instructions Start Date   apixaban 5 MG tablet tablet  Commonly known as: ELIQUIS   5 mg, Oral, Every 12 Hours Scheduled      cloNIDine 0.1 MG tablet  Commonly known as: CATAPRES   0.1 mg, Oral, 2 Times Daily      dilTIAZem  MG 24 hr capsule  Commonly known as: CARDIZEM CD   360 mg, Oral, Every 24 Hours Scheduled      famciclovir 500 MG tablet  Commonly known as: FAMVIR   1,500 mg, Oral, Daily PRN, Used as needed, when a skin eruption occurs,      fish oil 1000 MG capsule capsule   1,000 mg, Oral, Daily With Breakfast      flecainide 50 MG tablet  Commonly known as: TAMBOCOR   50 mg, Oral, 2 Times Daily      fluticasone 50 MCG/ACT nasal spray  Commonly known as: FLONASE   2 sprays, Nasal, Daily PRN      furosemide 40 MG tablet  Commonly known as: LASIX   40 mg, Oral, Daily      KLOR-CON 10 MEQ CR tablet  Generic drug: potassium chloride   10 mEq, Oral, 2 Times Daily      labetalol 200 MG tablet  Commonly known as: NORMODYNE   200 mg, Oral, 2 Times Daily      levothyroxine 100 MCG tablet  Commonly known as: Synthroid   100 mcg, Oral, Daily      losartan 100 MG tablet  Commonly known as: COZAAR   1 tablet, Oral, Daily      PRESERVISION AREDS 2 PO   1 tablet, Oral, 2 Times Daily      rosuvastatin 10 MG tablet  Commonly known as: CRESTOR   1 tablet, Oral, Daily             Discharge Diet:   Diet Instructions     Advance Diet As Tolerated -Target Diet: Cardiac diet      Target Diet: Cardiac diet          Activity at Discharge:   Activity Instructions     Activity as Tolerated            Follow-up Appointments:   Future Appointments   Date Time Provider Department Center   3/21/2023  8:30 AM [facility] CATH LAB 3  [facility]   4/24/2023   1:30 PM Jovani Lovelace MD [facility]   Follow-up PCP 1 week.  Follow cardiology outpatient.    Electronically signed by Richardson Tapia MD, 03/15/23, 13:32 CDT.    Time: Greater than 30 minutes.

## 2023-03-15 NOTE — ANESTHESIA PREPROCEDURE EVALUATION
Anesthesia Evaluation     Patient summary reviewed and Nursing notes reviewed   history of anesthetic complications: PONV  NPO Solid Status: > 8 hours  NPO Liquid Status: > 8 hours           Airway   Mallampati: II  TM distance: >3 FB  Neck ROM: full  No difficulty expected  Dental - normal exam         Pulmonary - normal exam    breath sounds clear to auscultation  (+) sleep apnea on CPAP,   Cardiovascular   Exercise tolerance: poor (<4 METS)    Rhythm: regular  Rate: normal    (+) hypertension, dysrhythmias Atrial Fib, hyperlipidemia,     ROS comment: Echo 3/2022  · Left ventricular ejection fraction appears to be 66 - 70%. Left ventricular systolic function is normal.  · Left ventricular diastolic function is consistent with (grade II w/high LAP) pseudonormalization.  · Normal right ventricular cavity size and systolic function noted.  · Estimated right ventricular systolic pressure from tricuspid regurgitation is mildly elevated (35-45 mmHg).  · There is no significant (greater than mild) valvular dysfunction.        Neuro/Psych  (-) seizures, TIA, CVA  GI/Hepatic/Renal/Endo    (+) obesity, morbid obesity,  thyroid problem (multinodular goiter)   (-) no renal disease, diabetes    Musculoskeletal     Abdominal    Substance History      OB/GYN          Other   arthritis,                      Anesthesia Plan    ASA 3     MAC     intravenous induction     Anesthetic plan, risks, benefits, and alternatives have been provided, discussed and informed consent has been obtained with: patient.        CODE STATUS:

## 2023-03-15 NOTE — PROGRESS NOTES
Assessment tool to be used for patients with existing breathing treatments ordered by hospitalist                               Respiratory Therapist Driven Protocol - RT to Assess and Treat Algorithm    Item 0 Points 1 Point 2 Points 3 Points 4 Points Subtotal   Mental Status Alert, orientated, cooperative Lethargic, follows commands Confused, not following commands Obtunded or Somnolent Comatose 0   Respiratory Pattern Regular RR  8-16 breaths/minute Increased RR  18-25 breaths/minute Dyspnea on exertion, irregular RR  26-30/minute Shortness of breath,  RR 31-35/minute Accessory muscle use, severe SOB  RR > 35/minute 0   Breath Sounds Clear Decreased unilaterally Decreased bilaterally Basilar crackles Wheezing and/or rhonchi 0   Cough Strong, spontaneous non-productive Strong productive Weak, non-productive Weak productive or weak with rhonchi Absent or may require suctioning 0   Pulmonary Status Nonsmoker or no previous history > 1 year quit < 1 PPD  < 1 year quit >  or = 1 PPD Diagnosed pulmonary disease (severe or chronic) Severe or chronic pulmonary disease with exacerbation 1   Surgical Status None General surgery (non-abdominal or non-thoracic) Lower abdominal Thoracic or upper abdominal Thoracic with pulmonary disease 0   Chest X-ray Clear Chronic changes Infiltrates, atelectasis or pleural effusion Infiltrates > 1 lobe Diffuse infiltrates and atelectasis and/or effusions NA   Activity level Ambulatory Ambulatory with assistance Non-ambulatory Paraplegic Quadriplegic 0                     Total Score 1   Score    Drug Therapy Frequency  20 or >    Q4 Duoneb & Q3 Albuterol PRN 15 - 19     Q6 Duoneb & Q4 Albuterol PRN 10 - 14    QID Duoneb & Q4 Albuterol PRN 5 - 9    TID Duoneb & Q6 Albuterol PRN 0 - 4    Q4 PRN Duoneb or Q4 PRN Albuterol    Incentive Spirometry - Initial RT instruct    Lung Expansion Therapy (PEP) Bronchopulmonary Hygiene (CPT)   Q4 & PRN - Severe atelectasis, poor  oxygenation Q4 - copious secretions, dyspnea, unable to sleep, mucus plugging   QID - High risk for persistent atelectasis, existence of atelectasis QID & Q4 PRN - Moderate secretion production   TID - At risk for developing atelectasis TID - small amounts of secretions with poor cough   BID - prevention of atelectasis BID - unable to breathe deeply and cough spontaneously   *RT Protocol patients will be re-assessed/re-evaluated every 48 hours.    *Patients who are home nebulizer treatments will be protocoled to no less than their home regimen and will remain     on their home regimen with re-evaluations as needed with changes in patient condition.    RT Comments/Recommendations: No recommendations at this time.

## 2023-03-15 NOTE — CONSULTS
"[facility] HEART GROUP CONSULT NOTE    Referring Provider: Dr. Richardson Tapia    Reason for Consultation: \"atrial for with RVR, so to have ablation outpatient. Patient requested to see cardiology. Patient follow with Dr. Laird outpatient.\"    Chief Complaint   Patient presents with   • Atrial Fibrillation       Subjective .     History of present illness:  Anne-Marie Hayes is a 77 y.o. female with a known PMH significant for paroxysmal atrial fibrillation on chronic anticoagulation with previous A-fib ablation in August 2022 by her electrophysiologist, Dr. Neil Lopez, hypertension, hyperlipidemia, hypothyroidism status post total thyroidectomy with partial parathyroidectomy, obstructive sleep apnea, and obesity presented to the emergency department with complaints of palpitations and sustained tachycardia.  She was seen in the office by her primary cardiologist's nurse practitioner yesterday, 3/14/2023.  During that visit she complained of recent onset of palpitations consistent with symptoms which previously correlated to paroxysmal atrial fibrillation.  ECG demonstrated atrial fibrillation.  She was started on antiarrhythmic therapy with flecainide.  She had previously been on flecainide however this was stopped after her ablation in August 2022 by her EP doctor.  She had continued use of Eliquis and was set up for an outpatient cardioversion next week.  Given her sustained tachycardia at home she presented to the emergency department for further evaluation after seeking advice from her EP office.    She is currently awake lying in bed.  She is stable at this time it is reported intermittent palpitations through the night.  She has heart rates that are well controlled currently however has had several episodes where her heart rates have gotten up into the 140-160 range.  She reports worsening shortness of breath, palpitations with the tachycardia.  Symptoms are similar to what she has had in the " past.  This is what prompted her to be evaluated in our office yesterday.  However, after arriving home yesterday she had several hours of persistent tachycardia that was not improving despite use of her home medications.    She denies any chest pain, chest pressure.  She denies any lightheadedness, dizziness, syncope.  Her lab work has been stable.  She is eager for expedited cardioversion.  She denies any missed dose of her anticoagulation, Eliquis.    History  Past Medical History:   Diagnosis Date   • Arthritis    • Atrial fibrillation (HCC)    • Enlarged thyroid    • Hyperlipidemia    • Hypertension    • PONV (postoperative nausea and vomiting)    • Sleep apnea     Uses CPAP   ,   Past Surgical History:   Procedure Laterality Date   • CARDIAC CATHETERIZATION  1987   • CARDIOVERSION      x2   • COLONOSCOPY N/A 05/18/2021    Procedure: COLONOSCOPY WITH ANESTHESIA;  Surgeon: Delio Leslie MD;  Location:  [facility];  Service: Gastroenterology;  Laterality: N/A;  Pre: Hx Colon Polyps  Post: Colon Polyps, Diverticulosis  Dr. Ania Oseguera  CO2 Inflation Used   • COLONOSCOPY W/ POLYPECTOMY  03/31/2016    Tubular adenoma ascending colon repeat exam in 5 years   • PARATHYROIDECTOMY Right 5/25/2022    Procedure: Bilateral parathyroid exploration with right inferior parathyroidectomy;  Surgeon: Eric Olvera MD;  Location:  [facility] OR;  Service: ENT;  Laterality: Right;   • REPLACEMENT TOTAL KNEE Right 07/2019   • THYROIDECTOMY Bilateral 5/25/2022    Procedure: Total thyroidectomy;  Surgeon: Eric Olvera MD;  Location:  [facility] OR;  Service: ENT;  Laterality: Bilateral;   • TUBAL ABDOMINAL LIGATION     ,   Family History   Problem Relation Age of Onset   • Atrial fibrillation Mother    • Heart disease Father    • Breast cancer Neg Hx    • Ovarian cancer Neg Hx    • Uterine cancer Neg Hx    • Colon cancer Neg Hx    • Melanoma Neg Hx    • Colon polyps Neg Hx    ,   Social History     Tobacco Use   •  Smoking status: Former     Years: 20.00     Types: Cigarettes     Quit date:      Years since quittin.2   • Smokeless tobacco: Never   Vaping Use   • Vaping Use: Never used   Substance Use Topics   • Alcohol use: Yes     Alcohol/week: 5.0 standard drinks     Types: 5 Glasses of wine per week     Comment: about 1/2 glass of wine a night   • Drug use: No   ,     Medications  Current Facility-Administered Medications   Medication Dose Route Frequency Provider Last Rate Last Admin   • albuterol (PROVENTIL) nebulizer solution 0.083% 2.5 mg/3mL  2.5 mg Nebulization Q4H PRN Richardson Tapia MD       • apixaban (ELIQUIS) tablet 5 mg  5 mg Oral Q12H Richardson Tapia MD   5 mg at 23   • dilTIAZem (CARDIZEM) tablet 90 mg  90 mg Oral Q6H Richardson Tapia MD   90 mg at 03/15/23 0558   • flecainide (TAMBOCOR) tablet 50 mg  50 mg Oral BID Richardson Tapia MD   50 mg at 23   • labetalol (NORMODYNE) tablet 200 mg  200 mg Oral BID Richardson Tapia MD   200 mg at 23   • levothyroxine (SYNTHROID, LEVOTHROID) tablet 100 mcg  100 mcg Oral Q AM Richardson Tapia MD   100 mcg at 03/15/23 0558   • losartan (COZAAR) tablet 100 mg  100 mg Oral Daily Richardson Tapia MD       • potassium chloride (MICRO-K) CR capsule 40 mEq  40 mEq Oral BID Richardson Tapia MD       • rosuvastatin (CRESTOR) tablet 10 mg  10 mg Oral Daily Richardson Tapia MD       • sodium chloride 0.9 % flush 10 mL  10 mL Intravenous Q12H Richardson Tapia MD   10 mL at 23   • sodium chloride 0.9 % flush 10 mL  10 mL Intravenous PRN Richardson Tapia MD       • sodium chloride 0.9 % infusion 40 mL  40 mL Intravenous PRN Richardson Tapia MD           Allergies:  Patient has no known allergies.    Review of Systems  Review of Systems   Constitutional: Negative for diaphoresis, fever and malaise/fatigue.   Cardiovascular: Positive for dyspnea on exertion, irregular heartbeat and palpitations. Negative for chest pain, leg swelling,  "near-syncope, orthopnea, paroxysmal nocturnal dyspnea and syncope.   Respiratory: Positive for shortness of breath. Negative for cough and wheezing.    Hematologic/Lymphatic: Negative for bleeding problem. Bruises/bleeds easily.   Gastrointestinal: Negative for bloating, abdominal pain, nausea and vomiting.   Neurological: Negative for dizziness, focal weakness, headaches, light-headedness, loss of balance, numbness and weakness.   Psychiatric/Behavioral: Negative for altered mental status.       Objective     Physical Exam:  Patient Vitals for the past 24 hrs:   BP Temp Temp src Pulse Resp SpO2 Height Weight   03/15/23 0748 121/88 97.5 °F (36.4 °C) Oral (!) 146 18 97 % -- --   03/15/23 0559 -- -- -- -- -- -- -- 102 kg (224 lb 9.6 oz)   03/15/23 0406 133/70 98.2 °F (36.8 °C) Oral 100 18 99 % -- --   03/15/23 0035 136/75 98.2 °F (36.8 °C) Oral 71 16 95 % -- --   03/14/23 2315 106/62 98.6 °F (37 °C) Oral 71 18 97 % -- --   03/14/23 1851 159/85 98.2 °F (36.8 °C) Oral 99 18 96 % 165.1 cm (65\") 99.7 kg (219 lb 12.8 oz)   03/14/23 1535 161/98 98.1 °F (36.7 °C) -- (!) 125 20 97 % 165.1 cm (65\") 103 kg (228 lb)     Vitals reviewed.   Constitutional:       General: Awake.      Appearance: Normal and healthy appearance. Well-developed, well-groomed and not in distress. Obese.   HENT:      Head: Normocephalic and atraumatic.   Pulmonary:      Effort: Pulmonary effort is normal.      Breath sounds: Normal breath sounds. No wheezing. No rales.   Cardiovascular:      Tachycardia present. Irregularly irregular rhythm.   Edema:     Peripheral edema absent.   Musculoskeletal:      Cervical back: Normal range of motion and neck supple. Skin:     General: Skin is warm and dry.   Neurological:      Mental Status: Alert, oriented to person, place, and time and oriented to person, place and time.   Psychiatric:         Attention and Perception: Attention normal.         Mood and Affect: Mood normal.         Speech: Speech normal.         " Behavior: Behavior normal. Behavior is cooperative.         Thought Content: Thought content normal.         Cognition and Memory: Cognition normal.         Judgment: Judgment normal.       Results Review:   I reviewed the patient's new clinical results.    Lab Results   Component Value Date    WBC 11.07 (H) 03/15/2023    HGB 13.8 03/15/2023    HCT 42.9 03/15/2023    MCV 88.1 03/15/2023     03/15/2023     Lab Results   Component Value Date    TSH 1.850 03/15/2023     Lab Results   Component Value Date    GLUCOSE 114 (H) 03/15/2023    BUN 15 03/15/2023    CREATININE 0.77 03/15/2023    EGFR 79.6 03/15/2023    BCR 19.5 03/15/2023    K 3.4 (L) 03/15/2023    CO2 23.0 03/15/2023    CALCIUM 8.8 03/15/2023    ALBUMIN 4.3 03/15/2023    BILITOT 0.5 03/15/2023    AST 12 03/15/2023    ALT 9 03/15/2023     Lab Results   Component Value Date    CHOL 119 03/15/2023    CHLPL 143 (L) 12/01/2022    TRIG 131 03/15/2023    HDL 48 03/15/2023    LDL 48 03/15/2023     Lab Results   Component Value Date    HGBA1C 5.70 (H) 03/15/2023       Results for orders placed in visit on 02/18/22    Adult Transthoracic Echo Complete W/ Cont if Necessary Per Protocol    Interpretation Summary  · Left ventricular ejection fraction appears to be 66 - 70%. Left ventricular systolic function is normal.  · Left ventricular diastolic function is consistent with (grade II w/high LAP) pseudonormalization.  · Normal right ventricular cavity size and systolic function noted.  · Estimated right ventricular systolic pressure from tricuspid regurgitation is mildly elevated (35-45 mmHg).  · There is no significant (greater than mild) valvular dysfunction.        Assessment     1.  Atrial fibrillation with rapid ventricular response: Chronically anticoagulated with Eliquis recently seen in the outpatient setting by MANDO Acevedo and set up for outpatient cardioversion.  The patient had persistent tachycardia after her office visit yesterday and  presented to the emergency department.  She was also started on antiarrhythmic therapy yesterday with flecainide, medication she had been on previously.  She also has outpatient follow-up plans to see her EP doctor, Dr. Lopez. She has required DCCV in the past in 2018 and 2020.She had an ablation in 2022.     2.  Chronic anticoagulation: The patient is on Eliquis 5 mg twice daily for stroke risk reduction in the setting of paroxysmal atrial fibrillation.  She has maintained this prescription without any missed doses.  She received her dose of Eliquis last evening and has not had any missed doses during the admission process.    3.  Hypokalemia: Mildly hypokalemic on labs this a.m.  Oral replacement has been ordered.    4.  Essential hypertension: Stable.  Well-controlled on home medicines.    5.  Hyperlipidemia: Managed on statin therapy outpatient    6.  Obesity: BMI 37.38    7.  Obstructive sleep apnea: Managed on CPAP therapy    Plan     - NPO  - Plans for cardioversion today with Dr. Tavares  - Continue home medications, including recently started flecainide.  - May be able to be discharged home later today.    Electronically signed by MANDO Mckay, 03/15/23, 8:27 AM CDT.      Please note this cardiology consultation note is the result of a face to face consultation with the patient, in addition to reviewing medical records at length by myself, MANDO Mckay.       Time: 40 minutes

## 2023-03-15 NOTE — PLAN OF CARE
Goal Outcome Evaluation:  Plan of Care Reviewed With: patient        Progress: improving  Outcome Evaluation: Aflutter  on tele. By 2200 heart rate control established. Pt expressed some anxiety at the beginning of shift about her medication plan but later calmed down as her heart rate decreased. Pt was started on flecainide this shift. Urine sample collected, see results.Pt had home CPAP machine brought in and seemed to rest between care

## 2023-03-16 ENCOUNTER — TELEPHONE (OUTPATIENT)
Dept: INTERNAL MEDICINE | Age: 78
End: 2023-03-16

## 2023-03-16 LAB — BACTERIA SPEC AEROBE CULT: NO GROWTH

## 2023-03-16 NOTE — TELEPHONE ENCOUNTER
Ethel 45 Transitions Initial Follow Up Call    Outreach made within 2 business days of discharge: Yes    Patient: Tobi Lofton   Patient : 1945 MRN: [id number]    Reason for Admission: Afib    Discharge Date: 03/15/2023      Discharge Diagnoses: Active Hospital Problems   Diagnosis    Atrial fibrillation with RVR      Spoke with: Patient    Discharge department/facility: [facility]    TCM Interactive Patient Contact:  Was patient able to fill all prescriptions: Yes  Was patient instructed to bring all medications to the follow-up visit: Yes  Is patient taking all medications as directed in the discharge summary? Yes  Does patient understand their discharge instructions: Yes  Does patient have questions or concerns that need addressed prior to 7-14 day follow up office visit: no    Spoke to the patient she said she is doing much better. She was put back on flecainide. She is eating and drinking fine. So far no more Afib and she will follow up with Cardiology in April.       Scheduled appointment with PCP within 7-14 days    Follow Up  Future Appointments   Date Time Provider Gabriel Marrufo   3/21/2023  4:00 PM MD ERVIN Asher   2023  9:30 AM MD ERVIN Asher MA

## 2023-03-17 LAB
QT INTERVAL: 338 MS
QT INTERVAL: 398 MS
QT INTERVAL: 416 MS
QTC INTERVAL: 467 MS
QTC INTERVAL: 468 MS
QTC INTERVAL: 484 MS

## 2023-03-20 SDOH — ECONOMIC STABILITY: TRANSPORTATION INSECURITY
IN THE PAST 12 MONTHS, HAS LACK OF TRANSPORTATION KEPT YOU FROM MEETINGS, WORK, OR FROM GETTING THINGS NEEDED FOR DAILY LIVING?: NO

## 2023-03-20 SDOH — ECONOMIC STABILITY: INCOME INSECURITY: HOW HARD IS IT FOR YOU TO PAY FOR THE VERY BASICS LIKE FOOD, HOUSING, MEDICAL CARE, AND HEATING?: SOMEWHAT HARD

## 2023-03-20 SDOH — ECONOMIC STABILITY: FOOD INSECURITY: WITHIN THE PAST 12 MONTHS, THE FOOD YOU BOUGHT JUST DIDN'T LAST AND YOU DIDN'T HAVE MONEY TO GET MORE.: NEVER TRUE

## 2023-03-20 SDOH — ECONOMIC STABILITY: HOUSING INSECURITY
IN THE LAST 12 MONTHS, WAS THERE A TIME WHEN YOU DID NOT HAVE A STEADY PLACE TO SLEEP OR SLEPT IN A SHELTER (INCLUDING NOW)?: NO

## 2023-03-20 SDOH — ECONOMIC STABILITY: FOOD INSECURITY: WITHIN THE PAST 12 MONTHS, YOU WORRIED THAT YOUR FOOD WOULD RUN OUT BEFORE YOU GOT MONEY TO BUY MORE.: NEVER TRUE

## 2023-03-21 ENCOUNTER — OFFICE VISIT (OUTPATIENT)
Dept: INTERNAL MEDICINE | Age: 78
End: 2023-03-21

## 2023-03-21 VITALS
DIASTOLIC BLOOD PRESSURE: 90 MMHG | HEART RATE: 68 BPM | SYSTOLIC BLOOD PRESSURE: 150 MMHG | WEIGHT: 222 LBS | BODY MASS INDEX: 35.83 KG/M2 | OXYGEN SATURATION: 96 %

## 2023-03-21 DIAGNOSIS — I48.0 PAROXYSMAL ATRIAL FIBRILLATION (HCC): Primary | ICD-10-CM

## 2023-03-21 DIAGNOSIS — Z09 HOSPITAL DISCHARGE FOLLOW-UP: ICD-10-CM

## 2023-03-21 RX ORDER — FLECAINIDE ACETATE 50 MG/1
50 TABLET ORAL 2 TIMES DAILY
Qty: 180 TABLET | Refills: 3 | Status: SHIPPED | OUTPATIENT
Start: 2023-03-21

## 2023-03-21 RX ORDER — FLECAINIDE ACETATE 50 MG/1
50 TABLET ORAL 2 TIMES DAILY
COMMUNITY
End: 2023-03-21 | Stop reason: SDUPTHER

## 2023-03-21 ASSESSMENT — PATIENT HEALTH QUESTIONNAIRE - PHQ9
SUM OF ALL RESPONSES TO PHQ QUESTIONS 1-9: 0
SUM OF ALL RESPONSES TO PHQ QUESTIONS 1-9: 0
SUM OF ALL RESPONSES TO PHQ9 QUESTIONS 1 & 2: 0
SUM OF ALL RESPONSES TO PHQ QUESTIONS 1-9: 0
2. FEELING DOWN, DEPRESSED OR HOPELESS: 0
SUM OF ALL RESPONSES TO PHQ QUESTIONS 1-9: 0
1. LITTLE INTEREST OR PLEASURE IN DOING THINGS: 0

## 2023-03-21 NOTE — PROGRESS NOTES
Post-Discharge Transitional Care Follow Up      Gab Esparza   YOB: 1945    Date of Office Visit:  3/21/2023  Date of Hospital Admission: 7/22/19  Date of Hospital Discharge: 7/23/19  Readmission Risk Score(high >=14%. Medium >=10%):No data recorded    Care management risk score Rising risk (score 2-5) and Complex Care (Scores >=6): No Risk Score On File     Non face to face  following discharge, date last encounter closed (first attempt may have been earlier): 03/16/2023     Call initiated 2 business days of discharge: Yes     Below is the assessment and plan developed based on review of pertinent history, physical exam, labs, studies, and medications. Paroxysmal atrial fibrillation (HCC)  -     flecainide (TAMBOCOR) 50 MG tablet; Take 1 tablet by mouth 2 times daily, Disp-180 tablet, R-3Normal  -     External Referral To Cardiology  I am going to refer her to Dr. Nabor Alex as she saw EP that no longer is in [place] in past.  Continue this plan of care - call if issues. Medical Decision Making: moderate complexity  No follow-ups on file. Subjective:   HPI today for moderate level TCM follow-up of recent hospitalization for recurrent atrial for with rapid ventricular response. Medications were adjusted she was discharged home we reviewed her records from the hospital she has several questions and needs referral to EP. Inpatient course: Discharge summary reviewed- see chart. Interval history/Current status: She is feeling ok and doing ok. She is compliant with meds and with cpap.      Patient Active Problem List   Diagnosis    Diffuse cystic mastopathy    Hyperlipidemia    Hypertension    Primary osteoarthritis involving multiple joints    Impaired fasting glucose    Hypercalcemia    Multinodular goiter    Lung nodule, solitary    Lumbar spinal stenosis    Postmenopausal    Allergic rhinitis    Hyperparathyroidism ([facility] [address]    Paroxysmal atrial fibrillation (HCC)    Obstructive sleep

## 2023-04-07 ENCOUNTER — OFFICE VISIT (OUTPATIENT)
Dept: INTERNAL MEDICINE | Age: 78
End: 2023-04-07
Payer: MEDICARE

## 2023-04-07 VITALS
HEIGHT: 66 IN | OXYGEN SATURATION: 95 % | SYSTOLIC BLOOD PRESSURE: 138 MMHG | DIASTOLIC BLOOD PRESSURE: 70 MMHG | WEIGHT: 219 LBS | BODY MASS INDEX: 35.2 KG/M2 | RESPIRATION RATE: 18 BRPM | HEART RATE: 72 BPM

## 2023-04-07 DIAGNOSIS — I48.0 PAROXYSMAL ATRIAL FIBRILLATION (HCC): Primary | ICD-10-CM

## 2023-04-07 DIAGNOSIS — E78.00 PURE HYPERCHOLESTEROLEMIA: ICD-10-CM

## 2023-04-07 DIAGNOSIS — I10 PRIMARY HYPERTENSION: ICD-10-CM

## 2023-04-07 DIAGNOSIS — E87.6 HYPOKALEMIA: ICD-10-CM

## 2023-04-07 PROCEDURE — 1090F PRES/ABSN URINE INCON ASSESS: CPT | Performed by: INTERNAL MEDICINE

## 2023-04-07 PROCEDURE — 3078F DIAST BP <80 MM HG: CPT | Performed by: INTERNAL MEDICINE

## 2023-04-07 PROCEDURE — 3075F SYST BP GE 130 - 139MM HG: CPT | Performed by: INTERNAL MEDICINE

## 2023-04-07 PROCEDURE — G8399 PT W/DXA RESULTS DOCUMENT: HCPCS | Performed by: INTERNAL MEDICINE

## 2023-04-07 PROCEDURE — 1123F ACP DISCUSS/DSCN MKR DOCD: CPT | Performed by: INTERNAL MEDICINE

## 2023-04-07 PROCEDURE — 1036F TOBACCO NON-USER: CPT | Performed by: INTERNAL MEDICINE

## 2023-04-07 PROCEDURE — 99214 OFFICE O/P EST MOD 30 MIN: CPT | Performed by: INTERNAL MEDICINE

## 2023-04-07 PROCEDURE — G8417 CALC BMI ABV UP PARAM F/U: HCPCS | Performed by: INTERNAL MEDICINE

## 2023-04-07 PROCEDURE — G8427 DOCREV CUR MEDS BY ELIG CLIN: HCPCS | Performed by: INTERNAL MEDICINE

## 2023-04-07 RX ORDER — LABETALOL 300 MG/1
300 TABLET, FILM COATED ORAL 2 TIMES DAILY
Qty: 14 TABLET | Refills: 0 | Status: SHIPPED | OUTPATIENT
Start: 2023-04-07 | End: 2023-04-07 | Stop reason: SDUPTHER

## 2023-04-07 RX ORDER — LABETALOL 300 MG/1
300 TABLET, FILM COATED ORAL 2 TIMES DAILY
Qty: 180 TABLET | Refills: 1 | Status: SHIPPED | OUTPATIENT
Start: 2023-04-07 | End: 2023-10-04

## 2023-04-07 RX ORDER — POTASSIUM CHLORIDE 750 MG/1
10 TABLET, EXTENDED RELEASE ORAL 3 TIMES DAILY
Qty: 270 TABLET | Refills: 3 | Status: SHIPPED | OUTPATIENT
Start: 2023-04-07

## 2023-04-07 RX ORDER — HYDROCHLOROTHIAZIDE 12.5 MG/1
12.5 CAPSULE, GELATIN COATED ORAL EVERY MORNING
Qty: 30 CAPSULE | Refills: 1 | Status: SHIPPED | OUTPATIENT
Start: 2023-04-07

## 2023-04-07 RX ORDER — LABETALOL 200 MG/1
200 TABLET, FILM COATED ORAL 2 TIMES DAILY
Qty: 180 TABLET | Refills: 3 | Status: CANCELLED | OUTPATIENT
Start: 2023-04-07

## 2023-04-07 NOTE — PROGRESS NOTES
for orders placed or performed in visit on 12/01/22   PTH, Intact   Result Value Ref Range    PTH 82.4 (H) 15.0 - 65.0 pg/mL   Lipid Panel   Result Value Ref Range    Cholesterol, Total 143 (L) 160 - 199 mg/dL    Triglycerides 136 0 - 149 mg/dL    HDL 58 (L) 65 - 121 mg/dL    LDL Calculated 58 <100 mg/dL   TSH   Result Value Ref Range    TSH 2.280 0.270 - 4.200 uIU/mL   Hemoglobin A1C   Result Value Ref Range    Hemoglobin A1C 5.6 4.0 - 6.0 %   Comprehensive Metabolic Panel   Result Value Ref Range    Sodium 143 136 - 145 mmol/L    Potassium 4.5 3.5 - 5.0 mmol/L    Chloride 103 98 - 111 mmol/L    CO2 28 22 - 29 mmol/L    Anion Gap 12 7 - 19 mmol/L    Glucose 108 74 - 109 mg/dL    BUN 13 8 - 23 mg/dL    Creatinine 0.9 0.5 - 0.9 mg/dL    Est, Glom Filt Rate >60 >60    Calcium 9.3 8.8 - 10.2 mg/dL    Total Protein 6.3 (L) 6.6 - 8.7 g/dL    Albumin 4.5 3.5 - 5.2 g/dL    Total Bilirubin 0.5 0.2 - 1.2 mg/dL    Alkaline Phosphatase 85 35 - 104 U/L    ALT 14 5 - 33 U/L    AST 13 5 - 32 U/L   CBC   Result Value Ref Range    WBC 7.5 4.8 - 10.8 K/uL    RBC 4.52 4.20 - 5.40 M/uL    Hemoglobin 12.8 12.0 - 16.0 g/dL    Hematocrit 41.3 37.0 - 47.0 %    MCV 91.4 81.0 - 99.0 fL    MCH 28.3 27.0 - 31.0 pg    MCHC 31.0 (L) 33.0 - 37.0 g/dL    RDW 13.5 11.5 - 14.5 %    Platelets 354 204 - 321 K/uL    MPV 11.2 9.4 - 12.3 fL   Vitamin D 25 Hydroxy   Result Value Ref Range    Vit D, 25-Hydroxy 52.7 >=30 ng/mL       ASSESSMENT/ PLAN:  1. Paroxysmal atrial fibrillation [facility])  Patient has a history of paroxysmal atrial fibs she has had ablation in the past and was doing very well until recent episode. She is scheduled to see EP in a month or so. Right now she is doing well she watches her heart rate closely.   She is on Cardizem  mg and flecainide 50 twice daily she is also on Eliquis 5 mg twice daily continue to monitor closely her potassium has been in the normal range on the low end of normal when she was admitted the day after

## 2023-04-10 RX ORDER — FUROSEMIDE 40 MG/1
40 TABLET ORAL DAILY
Qty: 90 TABLET | Refills: 3 | Status: SHIPPED | OUTPATIENT
Start: 2023-04-10

## 2023-04-18 DIAGNOSIS — I10 PRIMARY HYPERTENSION: ICD-10-CM

## 2023-04-18 RX ORDER — HYDROCHLOROTHIAZIDE 12.5 MG/1
12.5 CAPSULE, GELATIN COATED ORAL EVERY MORNING
Qty: 90 CAPSULE | Refills: 3 | Status: SHIPPED | OUTPATIENT
Start: 2023-04-18 | End: 2023-04-18

## 2023-04-19 ENCOUNTER — TELEPHONE (OUTPATIENT)
Dept: OTOLARYNGOLOGY | Facility: CLINIC | Age: 78
End: 2023-04-19

## 2023-04-19 NOTE — TELEPHONE ENCOUNTER
[facility] received a fax that requires your attention. The document has been indexed to the patient’s chart for your review.          Reason for sending: PROVIDER REVIEW        Documents Description: [employer]-4.18.23        Name of Sender: [employer]        Date Indexed: 4.18.23

## 2023-04-21 ENCOUNTER — TELEPHONE (OUTPATIENT)
Dept: INTERNAL MEDICINE | Age: 78
End: 2023-04-21

## 2023-04-24 ENCOUNTER — OFFICE VISIT (OUTPATIENT)
Dept: CARDIOLOGY | Facility: CLINIC | Age: 78
End: 2023-04-24
Payer: MEDICARE

## 2023-04-24 VITALS
DIASTOLIC BLOOD PRESSURE: 70 MMHG | OXYGEN SATURATION: 98 % | SYSTOLIC BLOOD PRESSURE: 128 MMHG | WEIGHT: 220 LBS | HEART RATE: 61 BPM | HEIGHT: 65 IN | BODY MASS INDEX: 36.65 KG/M2

## 2023-04-24 DIAGNOSIS — I10 ESSENTIAL HYPERTENSION: ICD-10-CM

## 2023-04-24 DIAGNOSIS — G47.33 OSA ON CPAP: ICD-10-CM

## 2023-04-24 DIAGNOSIS — Z99.89 OSA ON CPAP: ICD-10-CM

## 2023-04-24 DIAGNOSIS — E78.2 MIXED HYPERLIPIDEMIA: ICD-10-CM

## 2023-04-24 DIAGNOSIS — I48.0 PAF (PAROXYSMAL ATRIAL FIBRILLATION): Primary | ICD-10-CM

## 2023-04-24 DIAGNOSIS — Z79.01 CHRONIC ANTICOAGULATION: ICD-10-CM

## 2023-04-24 DIAGNOSIS — E66.01 SEVERE OBESITY (BMI 35.0-39.9) WITH COMORBIDITY: ICD-10-CM

## 2023-04-24 PROBLEM — I48.91 ATRIAL FIBRILLATION WITH RVR: Status: RESOLVED | Noted: 2023-03-14 | Resolved: 2023-04-24

## 2023-04-24 RX ORDER — KRILL/OM-3/DHA/EPA/PHOSPHO/AST 500MG-86MG
CAPSULE ORAL
COMMUNITY

## 2023-04-24 RX ORDER — LOSARTAN POTASSIUM AND HYDROCHLOROTHIAZIDE 25; 100 MG/1; MG/1
1 TABLET ORAL DAILY
COMMUNITY

## 2023-04-24 RX ORDER — MAGNESIUM OXIDE 400 MG/1
400 TABLET ORAL DAILY
COMMUNITY

## 2023-04-24 NOTE — LETTER
April 24, 2023     Ania Culver MD  [address] [place] [address] [address]  [place] [address]    Patient: Anne-Marie Hayes   YOB: 1945   Date of Visit: 4/24/2023       Dear Ania Culver MD    Anne-Marie Hayes was in my office today. Below is a copy of my note.    If you have questions, please do not hesitate to call me. I look forward to following Anne-Marie along with you.         Sincerely,        Jovani Lovelace MD        CC: No Recipients      Reason for Visit: cardiovascular follow up.    HPI:  Anne-Marie Hayes is a 77 y.o. female is here today for follow-up.  She was recently admitted with atrial fibrillation with RVR in March.  She has not missed any doses of her anticoagulation and underwent successful cardioversion on 3/15/2023.  She has an upcoming appointment to see Dr Carr.  She does not like being in atrial fibrillation and wants to do what ever possible to maintain sinus rhythm.  She has been trying to walk more, exercise, and lose weight.  She is compliant with CPAP.      Previous Cardiac Testing and Procedures:  - ROBEL (8/13/2018) Normal left ventricular systolic function, mild MR, mild to moderate TR, no evidence of left atrial appendage thrombus.  - Cardioversion (8/13/2018) Successful cardioversion from atrial fibrillation to sinus rhythm  - Cardioversion (4/20/2020) successful cardioversion from atrial fibrillation to sinus rhythm  - Echo (3/21/2022) EF 66-70%, grade 2 diastolic dysfunction, normal RV size and function, RVSP 35-45 mmHg, no significant valve dysfunction  - Atrial fibrillation ablation (8/9/2022) by Dr Lopez at [facility]      Lab data:  - Lipid panel (1/5/2020) total cholesterol 133, HDL 53, LDL 54, triglycerides 132  - Lipid panel (11/8/2021) total cholesterol 127, HDL 51, LDL 51, triglycerides 126  - BMP (11/8/2021) creatinine 0.8, potassium 4.6, sodium 141  - proBNP (2/18/2022) 311, normal 0-1800  - Lipid panel (5/17/2022) total cholesterol 137, HDL 54, LDL  56, triglycerides 133  - BNP (2022) 1673, normal 0-1800  - TSH (3/15/2023) 1.85  - BMP (3/15/2023) creatinine 0.77, potassium 3.4, sodium 142  -Lipid panel (3/15/2023) total cholesterol 119, HDL 48, LDL 48, triglycerides 131    Patient Active Problem List   Diagnosis   • PAF (paroxysmal atrial fibrillation)   • PRINCE on CPAP   • Essential hypertension   • Mixed hyperlipidemia   • Severe obesity (BMI 35.0-39.9) with comorbidity (HCC)   • Arthritis   • Hx of adenomatous colonic polyps   • Chronic anticoagulation   • Status post total thyroidectomy   • Status post right inferior parathyroidectomy (HCC)   • Hypocalcemia   • Hypokalemia       Social History     Tobacco Use   • Smoking status: Former     Years: .00     Types: Cigarettes     Quit date:      Years since quittin.3   • Smokeless tobacco: Never   Vaping Use   • Vaping Use: Never used   Substance Use Topics   • Alcohol use: Yes     Alcohol/week: 5.0 standard drinks     Types: 5 Glasses of wine per week     Comment: about 1/2 glass of wine a night   • Drug use: No       Family History   Problem Relation Age of Onset   • Atrial fibrillation Mother    • Heart disease Father    • Breast cancer Neg Hx    • Ovarian cancer Neg Hx    • Uterine cancer Neg Hx    • Colon cancer Neg Hx    • Melanoma Neg Hx    • Colon polyps Neg Hx        The following portions of the patient's history were reviewed and updated as appropriate: allergies, current medications, past family history, past medical history, past social history, past surgical history and problem list.      Current Outpatient Medications:   •  apixaban (ELIQUIS) 5 MG tablet tablet, Take 1 tablet by mouth Every 12 (Twelve) Hours., Disp: 60 tablet, Rfl: 0  •  Calcium Carbonate (CALTRATE 600 PO), Take  by mouth., Disp: , Rfl:   •  CloNIDine (CATAPRES) 0.1 MG tablet, Take 1 tablet by mouth 2 (Two) Times a Day., Disp: , Rfl:   •  dilTIAZem CD (CARDIZEM CD) 360 MG 24 hr capsule, Take 1 capsule by mouth Daily  for 30 days., Disp: 90 capsule, Rfl: 3  •  famciclovir (FAMVIR) 500 MG tablet, Take 3 tablets by mouth Daily As Needed (cold sores). Used as needed, when a skin eruption occurs,, Disp: , Rfl:   •  flecainide (TAMBOCOR) 50 MG tablet, Take 1 tablet by mouth 2 (Two) Times a Day., Disp: 60 tablet, Rfl: 11  •  fluticasone (FLONASE) 50 MCG/ACT nasal spray, 2 sprays into the nostril(s) as directed by provider Daily As Needed for Rhinitis or Allergies., Disp: , Rfl:   •  furosemide (LASIX) 40 MG tablet, Take 1 tablet by mouth Daily., Disp: 90 tablet, Rfl: 3  •  KLOR-CON 10 MEQ CR tablet, Take 1 tablet by mouth 2 (Two) Times a Day., Disp: , Rfl:   •  Krill Oil 500 MG capsule, Take  by mouth., Disp: , Rfl:   •  labetalol (NORMODYNE) 200 MG tablet, Take 1 tablet by mouth 2 (Two) Times a Day. (Patient taking differently: Take 1.5 tablets by mouth 2 (Two) Times a Day.), Disp: 180 tablet, Rfl: 3  •  levothyroxine (Synthroid) 100 MCG tablet, Take 1 tablet by mouth Daily., Disp: 90 tablet, Rfl: 3  •  losartan-hydrochlorothiazide (HYZAAR) 100-25 MG per tablet, Take 1 tablet by mouth Daily., Disp: , Rfl:   •  magnesium oxide (MAG-OX) 400 MG tablet, Take 1 tablet by mouth Daily., Disp: , Rfl:   •  Multiple Vitamins-Minerals (PRESERVISION AREDS 2 PO), Take 1 tablet by mouth 2 (Two) Times a Day., Disp: , Rfl:   •  Omega-3 Fatty Acids (FISH OIL) 1000 MG capsule capsule, Take 1 capsule by mouth Daily With Breakfast., Disp: , Rfl:   •  rosuvastatin (CRESTOR) 10 MG tablet, Take 1 tablet by mouth Daily., Disp: , Rfl:     Review of Systems   Constitutional: Negative for chills and fever.   Cardiovascular: Negative for chest pain and paroxysmal nocturnal dyspnea.   Respiratory: Negative for cough and shortness of breath.    Skin: Negative for rash.   Gastrointestinal: Negative for abdominal pain and heartburn.   Neurological: Negative for dizziness and numbness.       Objective    /70 (BP Location: Left arm, Patient Position: Sitting,  "Cuff Size: Adult)   Pulse 61   Ht 165.1 cm (65\")   Wt 99.8 kg (220 lb)   SpO2 98%   BMI 36.61 kg/m²   Constitutional:       Appearance: Well-developed.   HENT:      Head: Normocephalic and atraumatic.   Pulmonary:      Effort: Pulmonary effort is normal.      Breath sounds: Normal breath sounds.   Cardiovascular:      Normal rate. Regular rhythm.      Murmurs: There is no murmur.      No gallop. No click.   Skin:     General: Skin is warm and dry.   Neurological:      Mental Status: Alert and oriented to person, place, and time.         ECG 12 Lead    Date/Time: 4/24/2023 1:36 PM  Performed by: Jovani Lovelace MD  Authorized by: Jovani Lovelace MD   Comparison: compared with previous ECG from 3/15/2023  Comparison to previous ECG: RBBB  Rhythm: sinus rhythm  Rate: normal  Conduction: right bundle branch block and 1st degree AV block  T inversion: V1 and V2                ICD-10-CM ICD-9-CM   1. PAF (paroxysmal atrial fibrillation)  I48.0 427.31   2. Essential hypertension  I10 401.9   3. Mixed hyperlipidemia  E78.2 272.2   4. Severe obesity (BMI 35.0-39.9) with comorbidity (HCC)  E66.01 278.01   5. PRINCE on CPAP  G47.33 327.23    Z99.89 V46.8   6. Chronic anticoagulation  Z79.01 V58.61         Assessment/Plan:  1.  Paroxysmal atrial fibrillation: Ablation with Dr. Lopez on 8/9/2022 at [place].  Status post recent cardioversion on 3/15/2023.  Patient appears significantly symptomatic when she is in atrial fibrillation.  Upcoming appointment with Dr Carr of EP.  Continue diltiazem, flecainide, and Eliquis.       2.  Essential hypertension: Blood pressure is better controlled.  Continue diltiazem, losartan/HCTZ, labetalol, and clonidine.     3.  Mixed hyperlipidemia: Lipid panel from 3/15/2023 shows good control on rosuvastatin and omega-3 fish oil.     4.  Obesity:  Class 2 Severe Obesity (BMI >=35 and <=39.9). Obesity-related health conditions include the following: hypertension and dyslipidemias. Obesity " is improving with lifestyle modifications. BMI is is above average; BMI management plan is completed. We discussed portion control and increasing exercise.       5.  Obstructive sleep apnea: Continue CPAP.     6.  Chronic anticoagulation: Continue Eliquis.

## 2023-04-24 NOTE — PROGRESS NOTES
Reason for Visit: cardiovascular follow up.    HPI:  Anne-Marie Hayes is a 77 y.o. female is here today for follow-up.  She was recently admitted with atrial fibrillation with RVR in March.  She has not missed any doses of her anticoagulation and underwent successful cardioversion on 3/15/2023.  She has an upcoming appointment to see Dr Carr.  She does not like being in atrial fibrillation and wants to do what ever possible to maintain sinus rhythm.  She has been trying to walk more, exercise, and lose weight.  She is compliant with CPAP.      Previous Cardiac Testing and Procedures:  - ROBEL (8/13/2018) Normal left ventricular systolic function, mild MR, mild to moderate TR, no evidence of left atrial appendage thrombus.  - Cardioversion (8/13/2018) Successful cardioversion from atrial fibrillation to sinus rhythm  - Cardioversion (4/20/2020) successful cardioversion from atrial fibrillation to sinus rhythm  - Echo (3/21/2022) EF 66-70%, grade 2 diastolic dysfunction, normal RV size and function, RVSP 35-45 mmHg, no significant valve dysfunction  - Atrial fibrillation ablation (8/9/2022) by Dr Lopez at [facility]      Lab data:  - Lipid panel (1/5/2020) total cholesterol 133, HDL 53, LDL 54, triglycerides 132  - Lipid panel (11/8/2021) total cholesterol 127, HDL 51, LDL 51, triglycerides 126  - BMP (11/8/2021) creatinine 0.8, potassium 4.6, sodium 141  - proBNP (2/18/2022) 311, normal 0-1800  - Lipid panel (5/17/2022) total cholesterol 137, HDL 54, LDL 56, triglycerides 133  - BNP (5/30/2022) 1673, normal 0-1800  - TSH (3/15/2023) 1.85  - BMP (3/15/2023) creatinine 0.77, potassium 3.4, sodium 142  - Lipid panel (3/15/2023) total cholesterol 119, HDL 48, LDL 48, triglycerides 131    Patient Active Problem List   Diagnosis   • PAF (paroxysmal atrial fibrillation)   • PRINCE on CPAP   • Essential hypertension   • Mixed hyperlipidemia   • Severe obesity (BMI 35.0-39.9) with comorbidity (HCC)   • Arthritis   • Hx  of adenomatous colonic polyps   • Chronic anticoagulation   • Status post total thyroidectomy   • Status post right inferior parathyroidectomy (HCC)   • Hypocalcemia   • Hypokalemia       Social History     Tobacco Use   • Smoking status: Former     Years: 20.00     Types: Cigarettes     Quit date:      Years since quittin.3   • Smokeless tobacco: Never   Vaping Use   • Vaping Use: Never used   Substance Use Topics   • Alcohol use: Yes     Alcohol/week: 5.0 standard drinks     Types: 5 Glasses of wine per week     Comment: about 1/2 glass of wine a night   • Drug use: No       Family History   Problem Relation Age of Onset   • Atrial fibrillation Mother    • Heart disease Father    • Breast cancer Neg Hx    • Ovarian cancer Neg Hx    • Uterine cancer Neg Hx    • Colon cancer Neg Hx    • Melanoma Neg Hx    • Colon polyps Neg Hx        The following portions of the patient's history were reviewed and updated as appropriate: allergies, current medications, past family history, past medical history, past social history, past surgical history and problem list.      Current Outpatient Medications:   •  apixaban (ELIQUIS) 5 MG tablet tablet, Take 1 tablet by mouth Every 12 (Twelve) Hours., Disp: 60 tablet, Rfl: 0  •  Calcium Carbonate (CALTRATE 600 PO), Take  by mouth., Disp: , Rfl:   •  CloNIDine (CATAPRES) 0.1 MG tablet, Take 1 tablet by mouth 2 (Two) Times a Day., Disp: , Rfl:   •  dilTIAZem CD (CARDIZEM CD) 360 MG 24 hr capsule, Take 1 capsule by mouth Daily for 30 days., Disp: 90 capsule, Rfl: 3  •  famciclovir (FAMVIR) 500 MG tablet, Take 3 tablets by mouth Daily As Needed (cold sores). Used as needed, when a skin eruption occurs,, Disp: , Rfl:   •  flecainide (TAMBOCOR) 50 MG tablet, Take 1 tablet by mouth 2 (Two) Times a Day., Disp: 60 tablet, Rfl: 11  •  fluticasone (FLONASE) 50 MCG/ACT nasal spray, 2 sprays into the nostril(s) as directed by provider Daily As Needed for Rhinitis or Allergies., Disp: ,  "Rfl:   •  furosemide (LASIX) 40 MG tablet, Take 1 tablet by mouth Daily., Disp: 90 tablet, Rfl: 3  •  KLOR-CON 10 MEQ CR tablet, Take 1 tablet by mouth 2 (Two) Times a Day., Disp: , Rfl:   •  Krill Oil 500 MG capsule, Take  by mouth., Disp: , Rfl:   •  labetalol (NORMODYNE) 200 MG tablet, Take 1 tablet by mouth 2 (Two) Times a Day. (Patient taking differently: Take 1.5 tablets by mouth 2 (Two) Times a Day.), Disp: 180 tablet, Rfl: 3  •  levothyroxine (Synthroid) 100 MCG tablet, Take 1 tablet by mouth Daily., Disp: 90 tablet, Rfl: 3  •  losartan-hydrochlorothiazide (HYZAAR) 100-25 MG per tablet, Take 1 tablet by mouth Daily., Disp: , Rfl:   •  magnesium oxide (MAG-OX) 400 MG tablet, Take 1 tablet by mouth Daily., Disp: , Rfl:   •  Multiple Vitamins-Minerals (PRESERVISION AREDS 2 PO), Take 1 tablet by mouth 2 (Two) Times a Day., Disp: , Rfl:   •  Omega-3 Fatty Acids (FISH OIL) 1000 MG capsule capsule, Take 1 capsule by mouth Daily With Breakfast., Disp: , Rfl:   •  rosuvastatin (CRESTOR) 10 MG tablet, Take 1 tablet by mouth Daily., Disp: , Rfl:     Review of Systems   Constitutional: Negative for chills and fever.   Cardiovascular: Negative for chest pain and paroxysmal nocturnal dyspnea.   Respiratory: Negative for cough and shortness of breath.    Skin: Negative for rash.   Gastrointestinal: Negative for abdominal pain and heartburn.   Neurological: Negative for dizziness and numbness.       Objective   /70 (BP Location: Left arm, Patient Position: Sitting, Cuff Size: Adult)   Pulse 61   Ht 165.1 cm (65\")   Wt 99.8 kg (220 lb)   SpO2 98%   BMI 36.61 kg/m²   Constitutional:       Appearance: Well-developed.   HENT:      Head: Normocephalic and atraumatic.   Pulmonary:      Effort: Pulmonary effort is normal.      Breath sounds: Normal breath sounds.   Cardiovascular:      Normal rate. Regular rhythm.      Murmurs: There is no murmur.      No gallop. No click.   Skin:     General: Skin is warm and dry. "   Neurological:      Mental Status: Alert and oriented to person, place, and time.         ECG 12 Lead    Date/Time: 4/24/2023 1:36 PM  Performed by: Jovani Lovelace MD  Authorized by: Jovani Lovelace MD   Comparison: compared with previous ECG from 3/15/2023  Comparison to previous ECG: RBBB  Rhythm: sinus rhythm  Rate: normal  Conduction: right bundle branch block and 1st degree AV block  T inversion: V1 and V2                ICD-10-CM ICD-9-CM   1. PAF (paroxysmal atrial fibrillation)  I48.0 427.31   2. Essential hypertension  I10 401.9   3. Mixed hyperlipidemia  E78.2 272.2   4. Severe obesity (BMI 35.0-39.9) with comorbidity (HCC)  E66.01 278.01   5. PRINCE on CPAP  G47.33 327.23    Z99.89 V46.8   6. Chronic anticoagulation  Z79.01 V58.61         Assessment/Plan:  1.  Paroxysmal atrial fibrillation: Ablation with Dr. Lopez on 8/9/2022 at [place].  Status post recent cardioversion on 3/15/2023.  Patient appears significantly symptomatic when she is in atrial fibrillation.  Upcoming appointment with Dr Carr of EP.  Continue diltiazem, flecainide, and Eliquis.       2.  Essential hypertension: Blood pressure is better controlled.  Continue diltiazem, losartan/HCTZ, labetalol, and clonidine.     3.  Mixed hyperlipidemia: Lipid panel from 3/15/2023 shows good control on rosuvastatin and omega-3 fish oil.     4.  Obesity:  Class 2 Severe Obesity (BMI >=35 and <=39.9). Obesity-related health conditions include the following: hypertension and dyslipidemias. Obesity is improving with lifestyle modifications. BMI is is above average; BMI management plan is completed. We discussed portion control and increasing exercise.       5.  Obstructive sleep apnea: Continue CPAP.     6.  Chronic anticoagulation: Continue Eliquis.

## 2023-05-17 RX ORDER — DILTIAZEM HYDROCHLORIDE 360 MG/1
360 CAPSULE, EXTENDED RELEASE ORAL
Qty: 90 CAPSULE | Refills: 3 | Status: SHIPPED | OUTPATIENT
Start: 2023-05-17

## 2023-05-26 ENCOUNTER — OFFICE VISIT (OUTPATIENT)
Dept: CARDIOLOGY | Facility: CLINIC | Age: 78
End: 2023-05-26
Payer: MEDICARE

## 2023-05-26 VITALS
SYSTOLIC BLOOD PRESSURE: 140 MMHG | HEART RATE: 62 BPM | BODY MASS INDEX: 36.49 KG/M2 | RESPIRATION RATE: 18 BRPM | OXYGEN SATURATION: 98 % | HEIGHT: 65 IN | DIASTOLIC BLOOD PRESSURE: 80 MMHG | WEIGHT: 219 LBS

## 2023-05-26 DIAGNOSIS — I10 ESSENTIAL HYPERTENSION: ICD-10-CM

## 2023-05-26 DIAGNOSIS — I48.19 PERSISTENT ATRIAL FIBRILLATION: Primary | ICD-10-CM

## 2023-05-26 RX ORDER — AMLODIPINE BESYLATE 5 MG/1
5 TABLET ORAL DAILY
Qty: 30 TABLET | Refills: 11 | Status: SHIPPED | OUTPATIENT
Start: 2023-05-26

## 2023-05-26 RX ORDER — FLECAINIDE ACETATE 100 MG/1
100 TABLET ORAL 2 TIMES DAILY
Qty: 60 TABLET | Refills: 11 | Status: SHIPPED | OUTPATIENT
Start: 2023-05-26

## 2023-05-26 NOTE — PROGRESS NOTES
"EP NEW PATIENT VISIT    Chief Complaint  Atrial Fibrillation (NP, CV 3/2023)    Subjective        History of Present Illness    EP Problems:  1.  Persistent atrial fibrillation  -Prior AAD use: Flecainide, amiodarone  -8/9/2022: PVIJohn  -3/15/2023: Cardioversion  2.  First-degree AV block    Cardiology Problems:  1.  Hypertension  2.  Hyperlipidemia    Medical Problems:  1.  Obesity  -5/2023: BMI 36  2.  Obstructive sleep apnea      Anne-Marie Hayes is a 77 y.o. female with problem list as above who presents to the clinic for evaluation of persistent atrial fibrillation.  She states that she has had atrial fibrillation for some time and that she always knows when she has bouts of atrial fibrillation.  She endorses that \"my heart goes crazy\" with palpitations, fatigue, and heart rates are typically going to the 150s.  In August 9 of last year, she underwent cryoballoon PVI with Dr. Lopez in [place].  She did well initially for several months, but in March of this year had recurrent atrial fibrillation causing hospitalization.  She underwent cardioversion 3/15/2023 and has maintained sinus rhythm since that time.    Objective   Vital Signs:  /80 (BP Location: Right arm, Patient Position: Sitting)   Pulse 62   Resp 18   Ht 165.1 cm (65\")   Wt 99.3 kg (219 lb)   SpO2 98%   BMI 36.44 kg/m²   Estimated body mass index is 36.44 kg/m² as calculated from the following:    Height as of this encounter: 165.1 cm (65\").    Weight as of this encounter: 99.3 kg (219 lb).      Physical Exam  Vitals reviewed.   Constitutional:       Appearance: She is obese.   Cardiovascular:      Rate and Rhythm: Normal rate and regular rhythm.      Pulses: Normal pulses.      Heart sounds: Normal heart sounds. No murmur heard.  Pulmonary:      Effort: Pulmonary effort is normal. No respiratory distress.      Breath sounds: Normal breath sounds.   Skin:     General: Skin is warm and dry.   Neurological:      General: " No focal deficit present.      Mental Status: She is alert and oriented to person, place, and time.   Psychiatric:         Mood and Affect: Mood normal.         Judgment: Judgment normal.      Result Review :  The following data was reviewed by: Marlon Carr MD on 05/26/2023:  CMP          9/20/2022    09:24 3/14/2023    16:04 3/15/2023    03:47   CMP   Glucose  120   114     BUN  12   15     Creatinine  0.65   0.77     EGFR  90.8   79.6     Sodium  140   142     Potassium  3.6   3.4     Chloride  102   104     Calcium 8.8   9.2   8.8     Total Protein  7.0   6.3     Albumin  4.7   4.3     Globulin  2.3   2.0     Total Bilirubin  0.4   0.5     Alkaline Phosphatase  80   69     AST (SGOT)  14   12     ALT (SGPT)  13   9     Albumin/Globulin Ratio  2.0   2.2     BUN/Creatinine Ratio  18.5   19.5     Anion Gap  15.0   15.0       CBC          12/1/2022    09:01 3/14/2023    16:04 3/15/2023    03:47   CBC   WBC 7.5      11.88   11.07     RBC 4.52      4.99   4.87     Hemoglobin 12.8      14.1   13.8     Hematocrit 41.3      44.2   42.9     MCV 91.4      88.6   88.1     MCH 28.3      28.3   28.3     MCHC 31.0      31.9   32.2     RDW 13.5      13.4   13.4     Platelets 212      241   213         This result is from an external source.     TSH          9/20/2022    09:24 12/1/2022    09:01 3/15/2023    03:47   TSH   TSH 1.190   2.280      1.850         This result is from an external source.       [id number] SCORE   [id number] Score: 4 (5/26/2023 10:35 PM)          ECG 12 Lead    Date/Time: 5/26/2023 10:35 PM  Performed by: Marlon Carr MD  Authorized by: Marlon Carr MD   Comparison: compared with previous ECG from 4/24/2023  Comparison to previous ECG: First-degree AV block is no longer present, right bundle branch block is no longer present  Rhythm: sinus rhythm  Rate: normal  Conduction: conduction normal  QRS axis: normal  Other: no other findings    Clinical impression: normal ECG            Assessment and Plan      Diagnoses and all orders for this visit:    1. Persistent atrial fibrillation (Primary)    2. Essential hypertension    Other orders  -     amLODIPine (NORVASC) 5 MG tablet; Take 1 tablet by mouth Daily.  Dispense: 30 tablet; Refill: 11  -     flecainide (TAMBOCOR) 100 MG tablet; Take 1 tablet by mouth 2 (Two) Times a Day.  Dispense: 60 tablet; Refill: 11  -     ECG 12 Lead        Anne-Marie Hayes is a 77 y.o. female with problem list as above who presents to the clinic for evaluation of persistent atrial fibrillation.  Unfortunately, she is still having breakthrough events requiring hospitalization as recently as March of this year.  This is despite her previous cryoballoon ablation.  We discussed possible causes of recurrence following a previous ablation.  We also discussed available treatment options including redo ablation or additional medication management.  At this time, given the single known recurrence, we will plan to increase her flecainide to 100 mg twice daily.  Should she continue to have breakthrough episodes despite this, we will need to consider redo ablation at that time.    In regards to risk factor modification, she is still having evidence of hypertension today.  We will plan to stop clonidine given the risks of rebound hypertension.  We will start her on amlodipine instead.  Advised her to follow-up her blood pressures at home and call me should she have persistent elevation despite the amlodipine.    We also discussed her weight at length today and the impact that it makes on the likelihood of atrial fibrillation recurrence.  I strongly encouraged her to work on weight loss and calorie counting.  She says that she will work on it.      Plan:  -Increase flecainide to 100 mg twice daily  -Stop clonidine  -Start amlodipine 5 mg daily  -Work on weight loss and exercise  -Consider redo ablation should she have continued breakthrough events           Follow Up     Return in about 3  months (around 8/26/2023).  Patient was given instructions and counseling regarding her condition or for health maintenance advice. Please see specific information pulled into the AVS if appropriate.     Part of this note may be an electronic transcription/translation of spoken language to printed text using the Dragon Dictation System.

## 2023-05-26 NOTE — PATIENT INSTRUCTIONS
3 month follow up  Stop clonidine  Start amlodipine  Keep track of your blood pressures at home and call me if you notice your blood pressure is persistently elevated  Work on weight loss with calorie counting  Increase flecainide to 100mg twice daily  Call me with any further recurrences of atrial fibrillation

## 2023-05-31 DIAGNOSIS — E89.0 S/P COMPLETE THYROIDECTOMY: ICD-10-CM

## 2023-05-31 DIAGNOSIS — I10 PRIMARY HYPERTENSION: ICD-10-CM

## 2023-05-31 DIAGNOSIS — R73.01 IMPAIRED FASTING GLUCOSE: ICD-10-CM

## 2023-05-31 DIAGNOSIS — E78.00 PURE HYPERCHOLESTEROLEMIA: ICD-10-CM

## 2023-05-31 DIAGNOSIS — E89.2 S/P PARATHYROIDECTOMY (HCC): ICD-10-CM

## 2023-05-31 DIAGNOSIS — E55.9 VITAMIN D DEFICIENCY: ICD-10-CM

## 2023-05-31 LAB
25(OH)D3 SERPL-MCNC: 47.6 NG/ML
ALBUMIN SERPL-MCNC: 4.5 G/DL (ref 3.5–5.2)
ALP SERPL-CCNC: 77 U/L (ref 35–104)
ALT SERPL-CCNC: 13 U/L (ref 5–33)
ANION GAP SERPL CALCULATED.3IONS-SCNC: 15 MMOL/L (ref 7–19)
AST SERPL-CCNC: 15 U/L (ref 5–32)
BILIRUB SERPL-MCNC: 0.6 MG/DL (ref 0.2–1.2)
BUN SERPL-MCNC: 20 MG/DL (ref 8–23)
CALCIUM SERPL-MCNC: 9.2 MG/DL (ref 8.8–10.2)
CHLORIDE SERPL-SCNC: 97 MMOL/L (ref 98–111)
CHOLEST SERPL-MCNC: 117 MG/DL (ref 160–199)
CO2 SERPL-SCNC: 27 MMOL/L (ref 22–29)
CREAT SERPL-MCNC: 1 MG/DL (ref 0.5–0.9)
ERYTHROCYTE [DISTWIDTH] IN BLOOD BY AUTOMATED COUNT: 14.2 % (ref 11.5–14.5)
GLUCOSE SERPL-MCNC: 123 MG/DL (ref 74–109)
HBA1C MFR BLD: 5.7 % (ref 4–6)
HCT VFR BLD AUTO: 37.2 % (ref 37–47)
HDLC SERPL-MCNC: 49 MG/DL (ref 65–121)
HGB BLD-MCNC: 11.9 G/DL (ref 12–16)
LDLC SERPL CALC-MCNC: 46 MG/DL
MCH RBC QN AUTO: 29.1 PG (ref 27–31)
MCHC RBC AUTO-ENTMCNC: 32 G/DL (ref 33–37)
MCV RBC AUTO: 91 FL (ref 81–99)
PLATELET # BLD AUTO: 228 K/UL (ref 130–400)
PMV BLD AUTO: 11.2 FL (ref 9.4–12.3)
POTASSIUM SERPL-SCNC: 3.7 MMOL/L (ref 3.5–5)
PROT SERPL-MCNC: 6.7 G/DL (ref 6.6–8.7)
PTH-INTACT SERPL-MCNC: 107 PG/ML (ref 15–65)
RBC # BLD AUTO: 4.09 M/UL (ref 4.2–5.4)
SODIUM SERPL-SCNC: 139 MMOL/L (ref 136–145)
TRIGL SERPL-MCNC: 109 MG/DL (ref 0–149)
TSH SERPL DL<=0.005 MIU/L-ACNC: 0.46 UIU/ML (ref 0.27–4.2)
WBC # BLD AUTO: 7.3 K/UL (ref 4.8–10.8)

## 2023-06-18 PROBLEM — I48.19 PERSISTENT ATRIAL FIBRILLATION (HCC): Status: ACTIVE | Noted: 2018-08-17

## 2023-06-26 DIAGNOSIS — I10 PRIMARY HYPERTENSION: ICD-10-CM

## 2023-06-26 RX ORDER — LOSARTAN POTASSIUM AND HYDROCHLOROTHIAZIDE 25; 100 MG/1; MG/1
1 TABLET ORAL DAILY
Qty: 90 TABLET | Refills: 0 | Status: SHIPPED | OUTPATIENT
Start: 2023-06-26

## 2023-07-05 DIAGNOSIS — D64.9 ANEMIA, UNSPECIFIED TYPE: ICD-10-CM

## 2023-07-05 DIAGNOSIS — I10 PRIMARY HYPERTENSION: ICD-10-CM

## 2023-07-05 LAB
ALBUMIN SERPL-MCNC: 4.6 G/DL (ref 3.5–5.2)
ALP SERPL-CCNC: 74 U/L (ref 35–104)
ALT SERPL-CCNC: 13 U/L (ref 5–33)
ANION GAP SERPL CALCULATED.3IONS-SCNC: 11 MMOL/L (ref 7–19)
AST SERPL-CCNC: 13 U/L (ref 5–32)
BILIRUB SERPL-MCNC: 0.4 MG/DL (ref 0.2–1.2)
BUN SERPL-MCNC: 21 MG/DL (ref 8–23)
CALCIUM SERPL-MCNC: 9.4 MG/DL (ref 8.8–10.2)
CHLORIDE SERPL-SCNC: 100 MMOL/L (ref 98–111)
CO2 SERPL-SCNC: 28 MMOL/L (ref 22–29)
CREAT SERPL-MCNC: 1 MG/DL (ref 0.5–0.9)
ERYTHROCYTE [DISTWIDTH] IN BLOOD BY AUTOMATED COUNT: 13.5 % (ref 11.5–14.5)
FERRITIN SERPL-MCNC: 93.1 NG/ML (ref 13–150)
FOLATE SERPL-MCNC: 10.9 NG/ML (ref 4.8–37.3)
GLUCOSE SERPL-MCNC: 109 MG/DL (ref 74–109)
HCT VFR BLD AUTO: 36.7 % (ref 37–47)
HGB BLD-MCNC: 11.8 G/DL (ref 12–16)
IRON SERPL-MCNC: 60 UG/DL (ref 37–145)
MCH RBC QN AUTO: 29.9 PG (ref 27–31)
MCHC RBC AUTO-ENTMCNC: 32.2 G/DL (ref 33–37)
MCV RBC AUTO: 92.9 FL (ref 81–99)
PLATELET # BLD AUTO: 219 K/UL (ref 130–400)
PMV BLD AUTO: 11.5 FL (ref 9.4–12.3)
POTASSIUM SERPL-SCNC: 3.7 MMOL/L (ref 3.5–5)
PROT SERPL-MCNC: 6.8 G/DL (ref 6.6–8.7)
RBC # BLD AUTO: 3.95 M/UL (ref 4.2–5.4)
SODIUM SERPL-SCNC: 139 MMOL/L (ref 136–145)
VIT B12 SERPL-MCNC: 226 PG/ML (ref 211–946)
WBC # BLD AUTO: 8.2 K/UL (ref 4.8–10.8)

## 2023-07-11 DIAGNOSIS — D64.9 ANEMIA, UNSPECIFIED TYPE: Primary | ICD-10-CM

## 2023-07-14 RX ORDER — LEVOTHYROXINE SODIUM 0.1 MG/1
100 TABLET ORAL DAILY
Qty: 90 TABLET | Refills: 3 | Status: SHIPPED | OUTPATIENT
Start: 2023-07-14

## 2023-08-24 ENCOUNTER — NURSE TRIAGE (OUTPATIENT)
Dept: CALL CENTER | Facility: HOSPITAL | Age: 78
End: 2023-08-24
Payer: MEDICARE

## 2023-08-24 NOTE — TELEPHONE ENCOUNTER
Reason for Disposition   [1] COVID-19 infection suspected by caller or triager AND [2] mild symptoms (cough, fever, or others) AND [3] negative COVID-19 rapid test    Additional Information   Negative: SEVERE difficulty breathing (e.g., struggling for each breath, speaks in single words)   Negative: Difficult to awaken or acting confused (e.g., disoriented, slurred speech)   Negative: Bluish (or gray) lips or face now   Negative: Shock suspected (e.g., cold/pale/clammy skin, too weak to stand, low BP, rapid pulse)   Negative: Sounds like a life-threatening emergency to the triager   Negative: [1] Diagnosed or suspected COVID-19 AND [2] symptoms lasting 3 or more weeks   Negative: [1] COVID-19 exposure AND [2] no symptoms   Negative: COVID-19 vaccine reaction suspected (e.g., fever, headache, muscle aches) occurring 1 to 3 days after getting vaccine   Negative: COVID-19 vaccine, questions about   Negative: [1] Lives with someone known to have influenza (flu test positive) AND [2] flu-like symptoms (e.g., cough, runny nose, sore throat, SOB; with or without fever)   Negative: [1] Possible COVID-19 symptoms AND [2] triager concerned about severity of symptoms or other causes   Negative: COVID-19 and breastfeeding, questions about   Negative: SEVERE or constant chest pain or pressure  (Exception: Mild central chest pain, present only when coughing.)   Negative: MODERATE difficulty breathing (e.g., speaks in phrases, SOB even at rest, pulse 100-120)   Negative: [1] Headache AND [2] stiff neck (can't touch chin to chest)   Negative: Oxygen level (e.g., pulse oximetry) 90 percent or lower   Negative: Chest pain or pressure  (Exception: MILD central chest pain, present only when coughing.)   Negative: [1] Drinking very little AND [2] dehydration suspected (e.g., no urine > 12 hours, very dry mouth, very lightheaded)   Negative: Patient sounds very sick or weak to the triager   Negative: MILD difficulty breathing (e.g.,  "minimal/no SOB at rest, SOB with walking, pulse <100)   Negative: Fever > 103 F (39.4 C)   Negative: [1] Fever > 101 F (38.3 C) AND [2] age > 60 years   Negative: [1] Fever > 100.0 F (37.8 C) AND [2] bedridden (e.g., CVA, chronic illness, recovering from surgery)   Negative: Oxygen level (e.g., pulse oximetry) 91 to 94 percent   Negative: [1] HIGH RISK patient (e.g., weak immune system, age > 64 years, obesity with BMI 30 or higher, pregnant, chronic lung disease or other chronic medical condition) AND [2] COVID symptoms (e.g., cough, fever)  (Exceptions: Already seen by PCP and no new or worsening symptoms.)   Negative: [1] HIGH RISK patient AND [2] influenza is widespread in the community AND [3] ONE OR MORE respiratory symptoms: cough, sore throat, runny or stuffy nose   Negative: [1] HIGH RISK patient AND [2] influenza exposure within the last 7 days AND [3] ONE OR MORE respiratory symptoms: cough, sore throat, runny or stuffy nose   Negative: Fever present > 3 days (72 hours)   Negative: [1] Fever returns after gone for over 24 hours AND [2] symptoms worse or not improved   Negative: [1] Continuous (nonstop) coughing interferes with work or school AND [2] no improvement using cough treatment per Care Advice   Negative: Cough present > 3 weeks   Negative: [1] COVID-19 diagnosed by positive lab test (e.g., PCR, rapid self-test kit) AND [2] NO symptoms (e.g., cough, fever, others)   Negative: [1] COVID-19 diagnosed by positive lab test (e.g., PCR, rapid self-test kit) AND [2] mild symptoms (e.g., cough, fever, others) AND [3] no complications or SOB   Negative: [1] COVID-19 diagnosed by doctor (or NP/PA) AND [2] mild symptoms (e.g., cough, fever, others) AND [3] no complications or SOB    Answer Assessment - Initial Assessment Questions  1. COVID-19 DIAGNOSIS: \"How do you know that you have COVID?\" (e.g., positive lab test or self-test, diagnosed by doctor or NP/PA, symptoms after exposure).      Does not know " "just has symptoms  2. COVID-19 EXPOSURE: \"Was there any known exposure to COVID before the symptoms began?\" CDC Definition of close contact: within 6 feet (2 meters) for a total of 15 minutes or more over a 24-hour period.       no  3. ONSET: \"When did the COVID-19 symptoms start?\"       24 hours  4. WORST SYMPTOM: \"What is your worst symptom?\" (e.g., cough, fever, shortness of breath, muscle aches)      Tired cough  5. COUGH: \"Do you have a cough?\" If Yes, ask: \"How bad is the cough?\"        Mild moderate cough  6. FEVER: \"Do you have a fever?\" If Yes, ask: \"What is your temperature, how was it measured, and when did it start?\"      99 temp  7. RESPIRATORY STATUS: \"Describe your breathing?\" (e.g., normal; shortness of breath, wheezing, unable to speak)       Breathing fine  8. BETTER-SAME-WORSE: \"Are you getting better, staying the same or getting worse compared to yesterday?\"  If getting worse, ask, \"In what way?\"      More tired today  9. OTHER SYMPTOMS: \"Do you have any other symptoms?\"  (e.g., chills, fatigue, headache, loss of smell or taste, muscle pain, sore throat)      Fatigue, headache, slight fever, congested  10. HIGH RISK DISEASE: \"Do you have any chronic medical problems?\" (e.g., asthma, heart or lung disease, weak immune system, obesity, etc.)        no  11. VACCINE: \"Have you had the COVID-19 vaccine?\" If Yes, ask: \"Which one, how many shots, when did you get it?\"        yes  12. PREGNANCY: \"Is there any chance you are pregnant?\" \"When was your last menstrual period?\"        no  13. O2 SATURATION MONITOR:  \"Do you use an oxygen saturation monitor (pulse oximeter) at home?\" If Yes, ask \"What is your reading (oxygen level) today?\" \"What is your usual oxygen saturation reading?\" (e.g., 95%)        unknown    Protocols used: Coronavirus (COVID-19) Diagnosed or Suspected-ADULT-AH    "

## 2023-08-24 NOTE — TELEPHONE ENCOUNTER
Pt. Calling says started having headache yesterday, low grade fever, 99, slight cough, but is so fatigued, has slept most of the day. Asking if should be tested, told her can go to , or some pharmacies test or call her PCP and see if can be tested, if sob or chest pain, go to ER, denies any distress.

## 2023-08-25 DIAGNOSIS — J01.10 ACUTE NON-RECURRENT FRONTAL SINUSITIS: Primary | ICD-10-CM

## 2023-08-25 RX ORDER — AZITHROMYCIN 250 MG/1
250 TABLET, FILM COATED ORAL SEE ADMIN INSTRUCTIONS
Qty: 6 TABLET | Refills: 0 | Status: SHIPPED | OUTPATIENT
Start: 2023-08-25 | End: 2023-08-30

## 2023-08-28 ENCOUNTER — OFFICE VISIT (OUTPATIENT)
Dept: CARDIOLOGY | Facility: CLINIC | Age: 78
End: 2023-08-28
Payer: MEDICARE

## 2023-08-28 VITALS
HEIGHT: 65 IN | WEIGHT: 215 LBS | DIASTOLIC BLOOD PRESSURE: 62 MMHG | HEART RATE: 68 BPM | OXYGEN SATURATION: 98 % | SYSTOLIC BLOOD PRESSURE: 124 MMHG | BODY MASS INDEX: 35.82 KG/M2

## 2023-08-28 DIAGNOSIS — R94.31 ABNORMAL EKG: ICD-10-CM

## 2023-08-28 DIAGNOSIS — G47.33 OSA ON CPAP: ICD-10-CM

## 2023-08-28 DIAGNOSIS — Z99.89 OSA ON CPAP: ICD-10-CM

## 2023-08-28 DIAGNOSIS — I48.19 PERSISTENT ATRIAL FIBRILLATION: Primary | ICD-10-CM

## 2023-08-28 RX ORDER — FLECAINIDE ACETATE 50 MG/1
50 TABLET ORAL 2 TIMES DAILY
Qty: 60 TABLET | Refills: 3 | Status: SHIPPED | OUTPATIENT
Start: 2023-08-28

## 2023-08-28 NOTE — PROGRESS NOTES
"[facility] -  CLINIC FOLLOW UP     Patient Care Team:  Ania Culver MD as PCP - General  Ania Culver MD as PCP - Family Medicine  Adrián Carlos MD as Consulting Physician (Pulmonary Disease)  Delio Leslie MD as Consulting Physician (Gastroenterology)  Jovani Lovelace MD as Consulting Physician (Cardiology)  Steven Elizondo APRN as Nurse Practitioner (Cardiology)    Chief Complaint: persistent afib     Subjective   EP Problems:  1.  Persistent atrial fibrillation  -Prior AAD use: Flecainide, amiodarone  -8/9/2022: PVI, Lopez  -3/15/2023: Cardioversion  2.  First-degree AV block  3. RBBB   -development after increase in  Flecainide dose    Cardiology Problems:  1.  Hypertension  2.  Hyperlipidemia     Medical Problems:  1.  Obesity  -5/2023: BMI 36  2.  Obstructive sleep apnea      HPI: Today I had the pleasure of seeing Anne-Marie Hayes in the cardiology clinic for follow up. She is a 78 year old female with a history of previous Afib, HTN, PRINCE. She endorses that \"my heart goes crazy\" with palpitations, fatigue, and heart rates are typically going to the 150s.  In August 9 of last year, she underwent cryoballoon PVI with Dr. Lopez in [place].  She did well initially for several months, but in March of this year had recurrent atrial fibrillation causing hospitalization.  She underwent cardioversion 3/15/2023 and has maintained sinus rhythm since that time.    At her last visit with Dr. Carr, she discussed further treatment options with possible redo ablation or additional medication management. Her flecainide was increased and BP was adjusted with change from clonidine to amlodipine. Unfortunately, her EKG is abnormal today and demonstrates a RBBB that is new now with QRS 160ms. She denies any recurrent afib or palpitations though.        Objective     Visit Vitals  /62   Pulse 68   Ht 165.1 cm (65\")   Wt 97.5 kg (215 lb)   SpO2 98%   BMI 35.78 kg/mý "           Vitals reviewed.   Constitutional:       Appearance: Healthy appearance. Not in distress.   Eyes:      Extraocular Movements: Extraocular movements intact.      Conjunctiva/sclera: Conjunctivae normal.      Pupils: Pupils are equal, round, and reactive to light.   HENT:      Head: Normocephalic and atraumatic.      Nose: Nose normal.    Mouth/Throat:      Lips: Pink.      Mouth: Mucous membranes are moist.      Pharynx: Oropharynx is clear.   Neck:      Vascular: No carotid bruit or JVD. JVD normal.   Pulmonary:      Effort: Pulmonary effort is normal.      Breath sounds: Normal breath sounds.   Chest:      Chest wall: Not tender to palpatation.   Cardiovascular:      PMI at left midclavicular line. Normal rate. Regular rhythm. Normal S1. Normal S2.       Murmurs: There is no murmur.      No gallop.  No rub.   Pulses:     Radial: 2+ bilaterally.     Posterior tibial: 2+ bilaterally.  Edema:     Peripheral edema absent.   Abdominal:      General: Bowel sounds are normal.      Palpations: Abdomen is soft.   Musculoskeletal: Normal range of motion.      Extremities: No clubbing present.     Cervical back: Normal range of motion. Skin:     General: Skin is warm and dry.   Neurological:      General: No focal deficit present.      Mental Status: Alert and oriented to person, place, and time.      Cranial Nerves: Cranial nerves are intact.   Psychiatric:         Attention and Perception: Attention normal.         Mood and Affect: Affect normal.         Speech: Speech normal.         Behavior: Behavior normal.         Cognition and Memory: Cognition normal.           The following portions of the patient's history were reviewed and updated as appropriate: allergies, current medications, past medical history, past social history, past and problem list.     Review of Systems   Constitutional: Negative.    HENT: Negative.     Eyes: Negative.    Respiratory: Negative.     Cardiovascular: Negative.     Gastrointestinal: Negative.    Endocrine: Negative.    Genitourinary: Negative.    Musculoskeletal: Negative.    Skin: Negative.    Allergic/Immunologic: Negative.    Neurological: Negative.    Hematological: Negative.    Psychiatric/Behavioral: Negative.              ECG 12 Lead    Date/Time: 8/28/2023 9:15 AM  Performed by: Dalia Rudolph PA  Authorized by: Dalia Rudolph PA   Comparison: compared with previous ECG   Rhythm: sinus rhythm  Rate: normal  Conduction: right bundle branch block  Other findings: non-specific ST-T wave changes    Clinical impression: abnormal EKG          Medication Review: yes    Current Outpatient Medications:     amLODIPine (NORVASC) 5 MG tablet, Take 1 tablet by mouth Daily., Disp: 30 tablet, Rfl: 11    apixaban (ELIQUIS) 5 MG tablet tablet, Take 1 tablet by mouth Every 12 (Twelve) Hours., Disp: 60 tablet, Rfl: 0    Calcium Carbonate (CALTRATE 600 PO), Take  by mouth., Disp: , Rfl:     dilTIAZem CD (CARDIZEM CD) 360 MG 24 hr capsule, Take 1 capsule by mouth Daily., Disp: 90 capsule, Rfl: 3    famciclovir (FAMVIR) 500 MG tablet, Take 3 tablets by mouth Daily As Needed (cold sores). Used as needed, when a skin eruption occurs,, Disp: , Rfl:     flecainide (TAMBOCOR) 50 MG tablet, Take 1 tablet by mouth 2 (Two) Times a Day., Disp: 60 tablet, Rfl: 3    fluticasone (FLONASE) 50 MCG/ACT nasal spray, 2 sprays into the nostril(s) as directed by provider Daily As Needed for Rhinitis or Allergies., Disp: , Rfl:     furosemide (LASIX) 40 MG tablet, Take 1 tablet by mouth Daily., Disp: 90 tablet, Rfl: 3    KLOR-CON 10 MEQ CR tablet, Take 1 tablet by mouth 2 (Two) Times a Day., Disp: , Rfl:     Krill Oil 500 MG capsule, Take  by mouth., Disp: , Rfl:     labetalol (NORMODYNE) 200 MG tablet, Take 1 tablet by mouth 2 (Two) Times a Day. (Patient taking differently: Take 1.5 tablets by mouth 2 (Two) Times a Day.), Disp: 180 tablet, Rfl: 3    levothyroxine (Synthroid) 100 MCG tablet, Take 1  tablet by mouth Daily., Disp: 90 tablet, Rfl: 3    losartan-hydrochlorothiazide (HYZAAR) 100-25 MG per tablet, Take 1 tablet by mouth Daily., Disp: , Rfl:     magnesium oxide (MAG-OX) 400 MG tablet, Take 1 tablet by mouth Daily., Disp: , Rfl:     Multiple Vitamins-Minerals (PRESERVISION AREDS 2 PO), Take 1 tablet by mouth 2 (Two) Times a Day., Disp: , Rfl:     Omega-3 Fatty Acids (FISH OIL) 1000 MG capsule capsule, Take 1 capsule by mouth Daily With Breakfast., Disp: , Rfl:     rosuvastatin (CRESTOR) 10 MG tablet, Take 1 tablet by mouth Daily., Disp: , Rfl:    No Known Allergies    I have reviewed       CBC:  Lab Results - Last 18 Months   Lab Units 07/05/23  0917   WBC K/uL 8.2   HEMOGLOBIN g/dL 11.8*   HEMATOCRIT % 36.7*   PLATELETS K/uL 219   IRON ug/dL 60      BMP/CMP:  Lab Results - Last 18 Months   Lab Units 03/15/23  0347 07/11/22  1539 07/07/22  0913   SODIUM mmol/L 142   < > 144   POTASSIUM mmol/L 3.4*   < > 4.2   CHLORIDE mmol/L 104   < > 105   TOTAL CO2 mmol/L  --   --  26   CO2 mmol/L 23.0   < >  --    GLUCOSE mg/dL 114*   < > 107   BUN mg/dL 15   < > 12   CREATININE mg/dL 0.77   < > 0.7   EGFR IF NONAFRICN AM   --   --  >60   EGFR IF AFRICN AM   --   --  >59   CALCIUM mg/dL 8.8   < > 8.7*    < > = values in this interval not displayed.     BNP:   Lab Results - Last 18 Months   Lab Units 05/30/22  0749   PROBNP pg/mL 1,673.0      THYROID:  Lab Results - Last 18 Months   Lab Units 05/31/23  1001 06/16/22  0844 05/29/22  1809   TSH uIU/mL 0.456   < > 8.040*   FREE T4 ng/dL  --   --  0.96    < > = values in this interval not displayed.       Results for orders placed in visit on 02/18/22    Adult Transthoracic Echo Complete W/ Cont if Necessary Per Protocol    Interpretation Summary  ú Left ventricular ejection fraction appears to be 66 - 70%. Left ventricular systolic function is normal.  ú Left ventricular diastolic function is consistent with (grade II w/high LAP) pseudonormalization.  ú Normal right  ventricular cavity size and systolic function noted.  ú Estimated right ventricular systolic pressure from tricuspid regurgitation is mildly elevated (35-45 mmHg).  ú There is no significant (greater than mild) valvular dysfunction.     Assessment:   Diagnoses and all orders for this visit:    1. Persistent atrial fibrillation (Primary)  -     ECG 12 Lead; Future  -     flecainide (TAMBOCOR) 50 MG tablet; Take 1 tablet by mouth 2 (Two) Times a Day.  Dispense: 60 tablet; Refill: 3  -     ECG 12 Lead    2. Abnormal EKG    3. PRINCE on CPAP    Persistent afib: No recurrence per patient, but her EKG is now showing new RBBB after increase in Flecainide to 100mg BID. Discussed that we could consider changing to different AAD like Multaq or consider inpatient admission for Sotalol, but overall, she would like to consider redo ablation. Previous ablation was cryoablation in [place].     Abnormal EKG: Decrease Flecainide back to 50mg BID. Follow up with repeat EKG in 1-2 weeks. She would like to consider redo ablation with Dr. Carr, but will decide at follow up.     PRINCE: Compliant with CPAP    I spent 30 minutes caring for Anne-Marie on this date of service. This time includes time spent by me in the following activities:preparing for the visit, reviewing tests, obtaining and/or reviewing a separately obtained history, performing a medically appropriate examination and/or evaluation , counseling and educating the patient/family/caregiver, ordering medications, tests, or procedures, referring and communicating with other health care professionals , and documenting information in the medical record        Electronically signed by MARIA L Eddy

## 2023-09-01 ENCOUNTER — PREP FOR SURGERY (OUTPATIENT)
Dept: OTHER | Facility: HOSPITAL | Age: 78
End: 2023-09-01
Payer: MEDICARE

## 2023-09-01 ENCOUNTER — OFFICE VISIT (OUTPATIENT)
Dept: CARDIOLOGY | Facility: CLINIC | Age: 78
End: 2023-09-01
Payer: MEDICARE

## 2023-09-01 VITALS
SYSTOLIC BLOOD PRESSURE: 128 MMHG | HEIGHT: 65 IN | WEIGHT: 215 LBS | HEART RATE: 64 BPM | DIASTOLIC BLOOD PRESSURE: 80 MMHG | BODY MASS INDEX: 35.82 KG/M2 | OXYGEN SATURATION: 99 %

## 2023-09-01 DIAGNOSIS — Z99.89 OSA ON CPAP: ICD-10-CM

## 2023-09-01 DIAGNOSIS — G47.33 OSA ON CPAP: ICD-10-CM

## 2023-09-01 DIAGNOSIS — E78.2 MIXED HYPERLIPIDEMIA: ICD-10-CM

## 2023-09-01 DIAGNOSIS — I10 ESSENTIAL HYPERTENSION: ICD-10-CM

## 2023-09-01 DIAGNOSIS — Z79.01 CHRONIC ANTICOAGULATION: ICD-10-CM

## 2023-09-01 DIAGNOSIS — I48.0 PAROXYSMAL ATRIAL FIBRILLATION: Primary | ICD-10-CM

## 2023-09-01 DIAGNOSIS — I48.19 PERSISTENT ATRIAL FIBRILLATION: Primary | ICD-10-CM

## 2023-09-01 DIAGNOSIS — E66.09 CLASS 2 OBESITY DUE TO EXCESS CALORIES WITHOUT SERIOUS COMORBIDITY WITH BODY MASS INDEX (BMI) OF 35.0 TO 35.9 IN ADULT: ICD-10-CM

## 2023-09-01 RX ORDER — SODIUM CHLORIDE 0.9 % (FLUSH) 0.9 %
10 SYRINGE (ML) INJECTION EVERY 12 HOURS SCHEDULED
OUTPATIENT
Start: 2023-09-01

## 2023-09-01 RX ORDER — SODIUM CHLORIDE 0.9 % (FLUSH) 0.9 %
10 SYRINGE (ML) INJECTION AS NEEDED
OUTPATIENT
Start: 2023-09-01

## 2023-09-01 RX ORDER — SODIUM CHLORIDE 9 MG/ML
40 INJECTION, SOLUTION INTRAVENOUS AS NEEDED
OUTPATIENT
Start: 2023-09-01

## 2023-09-01 NOTE — PROGRESS NOTES
Subjective:     Encounter Date: 09/01/2023      Patient ID: Anne-Marie Hayes is a 78 y.o. female with paroxysmal atrial fibrillation s/p cardioversion 3/15/2023, chronic anticoagulation, hypertension, hyperlipidemia, hypothyroidism s/p total thyroidectomy with partial parathyroidectomy in May 2022 with Dr Lopez, obstructive sleep apnea and obesity.    Chief Complaint: routine follow up    Atrial Fibrillation  Presents for follow-up visit. Symptoms include tachycardia. Symptoms are negative for bradycardia, chest pain, dizziness, hemodynamic instability, hypertension, hypotension, palpitations, shortness of breath, syncope and weakness. The symptoms have been worsening. Past medical history includes atrial fibrillation. There are no medication compliance problems.   Hypertension  This is a chronic problem. The current episode started more than 1 year ago. The problem is uncontrolled. Associated symptoms include malaise/fatigue (improving). Pertinent negatives include no chest pain, orthopnea, palpitations, peripheral edema, PND or shortness of breath. Risk factors for coronary artery disease include dyslipidemia and obesity. Current antihypertension treatment includes calcium channel blockers and beta blockers.     Patient presents today for management of atrial fibrillation. Today she reports that she has been feeling great. She was seen in follow up by EP on Monday. A new right bundle branch block was noted on her EKG. Her flecainide was decreased to 50 mg BID. They discussed an ablation at that time but patient was uncertain. She reports that she feels ready to move on with the ablation. Today patient denies any chest pain. She denies any heart racing or palpitations. She denies any leg swelling, orthopnea or PND. She reports that she has recently had an respiratory illness that she is recovering from. She denies any dyspnea on exertion. She reports that she has been more fatigued since her illness. She  reports that her BP has been well controlled. She reports some occasional dizziness while sick this past week but none until then. Patient is on Eliquis and denies any bleeding issues. Patient follows with Dr Culver as PCP.       Previous Cardiac Testing and Procedures:  - ROBEL (8/13/2018) Normal left ventricular systolic function, mild MR, mild to moderate TR, no evidence of left atrial appendage thrombus.  - Cardioversion (8/13/2018) Successful cardioversion from atrial fibrillation to sinus rhythm  - Cardioversion (4/20/2020) successful cardioversion from atrial fibrillation to sinus rhythm  - Lipid panel (1/5/2020) total cholesterol 133, HDL 53, LDL 54, triglycerides 132  - Lipid panel (11/8/2021) total cholesterol 127, HDL 51, LDL 51, triglycerides 126  - BMP (11/8/2021) creatinine 0.8, potassium 4.6, sodium 141  - proBNP (2/18/2022) 311, normal 0-1800  - Echo (3/21/2022) EF 66-70%, grade 2 diastolic dysfunction, normal RV size and function, RVSP 35-45 mmHg, no significant valve dysfunction  - Lipid panel (5/17/2022) total cholesterol 137, HDL 54, LDL 56, triglycerides 133  - BNP (5/30/2022) 1673, normal 0-1800  - Atrial fibrillation ablation (8/9/2022) by Dr Lopez at [facility]  -Cardioversion (3/15/2023): successful cardioversion     The following portions of the patient's history were reviewed and updated as appropriate: allergies, current medications, past family history, past medical history, past social history, past surgical history and problem list.    No Known Allergies    Current Outpatient Medications:     amLODIPine (NORVASC) 5 MG tablet, Take 1 tablet by mouth Daily., Disp: 30 tablet, Rfl: 11    apixaban (ELIQUIS) 5 MG tablet tablet, Take 1 tablet by mouth Every 12 (Twelve) Hours., Disp: 60 tablet, Rfl: 0    Calcium Carbonate (CALTRATE 600 PO), Take  by mouth., Disp: , Rfl:     dilTIAZem CD (CARDIZEM CD) 360 MG 24 hr capsule, Take 1 capsule by mouth Daily., Disp: 90 capsule, Rfl: 3    famciclovir  (FAMVIR) 500 MG tablet, Take 3 tablets by mouth Daily As Needed (cold sores). Used as needed, when a skin eruption occurs,, Disp: , Rfl:     flecainide (TAMBOCOR) 50 MG tablet, Take 1 tablet by mouth 2 (Two) Times a Day., Disp: 60 tablet, Rfl: 3    fluticasone (FLONASE) 50 MCG/ACT nasal spray, 2 sprays into the nostril(s) as directed by provider Daily As Needed for Rhinitis or Allergies., Disp: , Rfl:     furosemide (LASIX) 40 MG tablet, Take 1 tablet by mouth Daily., Disp: 90 tablet, Rfl: 3    KLOR-CON 10 MEQ CR tablet, Take 1 tablet by mouth 2 (Two) Times a Day., Disp: , Rfl:     Krill Oil 500 MG capsule, Take  by mouth., Disp: , Rfl:     labetalol (NORMODYNE) 200 MG tablet, Take 1 tablet by mouth 2 (Two) Times a Day. (Patient taking differently: Take 1.5 tablets by mouth 2 (Two) Times a Day.), Disp: 180 tablet, Rfl: 3    levothyroxine (Synthroid) 100 MCG tablet, Take 1 tablet by mouth Daily., Disp: 90 tablet, Rfl: 3    losartan-hydrochlorothiazide (HYZAAR) 100-25 MG per tablet, Take 1 tablet by mouth Daily., Disp: , Rfl:     magnesium oxide (MAG-OX) 400 MG tablet, Take 1 tablet by mouth Daily., Disp: , Rfl:     Multiple Vitamins-Minerals (PRESERVISION AREDS 2 PO), Take 1 tablet by mouth 2 (Two) Times a Day., Disp: , Rfl:     Omega-3 Fatty Acids (FISH OIL) 1000 MG capsule capsule, Take 1 capsule by mouth Daily With Breakfast., Disp: , Rfl:     rosuvastatin (CRESTOR) 10 MG tablet, Take 1 tablet by mouth Daily., Disp: , Rfl:     Past Medical History:   Diagnosis Date    Abnormal EKG 8/28/2023    Arthritis     Atrial fibrillation     Enlarged thyroid     Hyperlipidemia     Hypertension     PONV (postoperative nausea and vomiting)     Sleep apnea     Uses CPAP     Social History     Socioeconomic History    Marital status:    Tobacco Use    Smoking status: Former     Packs/day: 2.00     Years: 17.00     Pack years: 34.00     Types: Cigarettes     Start date: 1/1/1966     Quit date: 1/1/1983     Years since  quittin.6    Smokeless tobacco: Never    Tobacco comments:     [id number]   Vaping Use    Vaping Use: Never used   Substance and Sexual Activity    Alcohol use: Not Currently     Alcohol/week: 4.0 standard drinks     Types: 4 Glasses of wine per week     Comment: about 1/2 glass of wine a night    Drug use: No    Sexual activity: Not Currently     Partners: Male     Birth control/protection: Condom, Pill, Tubal ligation, Birth control pill       Review of Systems   Constitutional: Positive for malaise/fatigue (improving).   HENT:  Negative for nosebleeds.    Cardiovascular:  Negative for chest pain, dyspnea on exertion, irregular heartbeat, leg swelling, near-syncope, orthopnea, palpitations, paroxysmal nocturnal dyspnea and syncope.   Respiratory:  Negative for shortness of breath.    Hematologic/Lymphatic: Bruises/bleeds easily.   Genitourinary:  Negative for hematuria.   Neurological:  Negative for dizziness and weakness.   All other systems reviewed and are negative.       Objective:     Vitals reviewed.   Constitutional:       General: Not in acute distress.     Appearance: Normal appearance. Well-developed. Obese.   Eyes:      Pupils: Pupils are equal, round, and reactive to light.   HENT:      Head: Normocephalic and atraumatic.      Nose: Nose normal.   Neck:      Vascular: No carotid bruit.   Pulmonary:      Effort: Pulmonary effort is normal. No respiratory distress.      Breath sounds: Normal breath sounds. No wheezing. No rales.   Cardiovascular:      Normal rate. Regular rhythm.      Murmurs: There is no murmur.   Edema:     Peripheral edema absent.   Abdominal:      General: There is no distension.      Palpations: Abdomen is soft.   Musculoskeletal: Normal range of motion.      Cervical back: Normal range of motion and neck supple. Skin:     General: Skin is warm.      Findings: No erythema or rash.   Neurological:      General: No focal deficit present.      Mental Status: Alert and oriented to  "person, place, and time.   Psychiatric:         Attention and Perception: Attention normal.         Mood and Affect: Mood normal.         Speech: Speech normal.         Behavior: Behavior normal.         Thought Content: Thought content normal.         Judgment: Judgment normal.       /80   Pulse 64   Ht 165.1 cm (65\")   Wt 97.5 kg (215 lb)   SpO2 99%   BMI 35.78 kg/mý     Procedures    Lab Review:       Cardioversion 3/15/2023:  Cardioversion  Procedure Prep Informed consent was obtained. Patient brought to procedure room with an initial rhythm of atrial fibrillation. Sedation was performed by a anesthesiologist.   Shock 1 Synchronized with a biphasic shock. 200 joules delivered to patient.   Impression Post cardioversion the patient displayed a sinus rhythm.The cardioversion was successful.        Lab Results   Component Value Date    CHOL 119 03/15/2023    CHLPL 117 (L) 05/31/2023    TRIG 109 05/31/2023    HDL 49 (L) 05/31/2023    LDL 46 05/31/2023     Results for orders placed in visit on 02/18/22    Adult Transthoracic Echo Complete W/ Cont if Necessary Per Protocol    Interpretation Summary  ú Left ventricular ejection fraction appears to be 66 - 70%. Left ventricular systolic function is normal.  ú Left ventricular diastolic function is consistent with (grade II w/high LAP) pseudonormalization.  ú Normal right ventricular cavity size and systolic function noted.  ú Estimated right ventricular systolic pressure from tricuspid regurgitation is mildly elevated (35-45 mmHg).  ú There is no significant (greater than mild) valvular dysfunction.    I have personally reviewed echo, cardioversion, labs and past office notes prior to patients visit  Assessment:          Diagnosis Plan   1. Persistent atrial fibrillation        2. Chronic anticoagulation        3. Essential hypertension        4. Mixed hyperlipidemia        5. PRINCE on CPAP        6. Class 2 obesity due to excess calories without serious " comorbidity with body mass index (BMI) of 35.0 to 35.9 in adult                 Plan:       1. Paroxysmal atrial fibrillation: s/p cardioversion 7/12/2022; s/p ablation 8/2022 at [facility] with Dr Lopez. EKG on Monday at  follow up showed new right bundle branch block. Her Flecainide was decreased to 50 mg BID and an ablation was discussed. Continue cardizem and labetalol.     2. Chronic anticoagulation: [id number] score 4. Patient is on eliquis and denies any bleeding issues.    3. Hypertension: Well controlled in office. Continue current medications. Recommend to continue to monitor at home and notify office if consistently running >140/90.    4. Hyperlipidemia: Lipid panel from 5/2023 demonstrates well controlled cholesterol. LDL 46. Continue omega-3 fish oil.     5. Obstructive sleep apnea:Reports compliance with CPAP.    6. Obesity: Class 2 Severe Obesity (BMI >=35 and <=39.9). Obesity-related health conditions include the following: obstructive sleep apnea and hypertension. Obesity is unchanged. BMI is is above average; BMI management plan is completed. We discussed portion control and increasing exercise.    I attest that all portions of this note reviewed and all information has been updated by myself to reflect the patient's current status.      I spent 36 minutes caring for Anne-Marie on this date of service. This time includes time spent by me in the following activities:preparing for the visit, reviewing tests, obtaining and/or reviewing a separately obtained history, performing a medically appropriate examination and/or evaluation , counseling and educating the patient/family/caregiver, and documenting information in the medical record    Patient is to follow up as previously scheduled for April 24, 2023 or sooner if needed

## 2023-09-06 PROBLEM — I48.0 PAROXYSMAL ATRIAL FIBRILLATION: Status: ACTIVE | Noted: 2023-09-06

## 2023-09-20 ENCOUNTER — TELEPHONE (OUTPATIENT)
Dept: CARDIOLOGY | Facility: CLINIC | Age: 78
End: 2023-09-20
Payer: MEDICARE

## 2023-09-20 NOTE — TELEPHONE ENCOUNTER
Caller: Anne-Marie Hayes    Relationship: Self    Best call back number: [phone]    What is the best time to reach you: ANYTIME    Who are you requesting to speak with (clinical staff, provider,  specific staff member): CLINICAL STAFF     Do you know the name of the person who called: NA    What was the call regarding: PATIENT IS NEEDING TO KNOW IF SHE NEEDS TO KEEP THE APPOINTMENT SCHEDULED FOR TOMORROW WITH MANDO CAMPUZANO. SHE IS CURRENTLY SCHEDULED WITH DR FERRELL FOR AN ABLATION ON 10.11.23. PLEASE CALL PATIENT FOR FURTHER CLARIFICATION.     Is it okay if the provider responds through MyChart: PLEASE CALL PATIENT.

## 2023-10-09 DIAGNOSIS — D64.9 ANEMIA, UNSPECIFIED TYPE: ICD-10-CM

## 2023-10-09 DIAGNOSIS — R73.9 HYPERGLYCEMIA: ICD-10-CM

## 2023-10-09 DIAGNOSIS — I10 PRIMARY HYPERTENSION: ICD-10-CM

## 2023-10-09 DIAGNOSIS — E78.00 PURE HYPERCHOLESTEROLEMIA: ICD-10-CM

## 2023-10-09 LAB
ALBUMIN SERPL-MCNC: 4.6 G/DL (ref 3.5–5.2)
ALP SERPL-CCNC: 75 U/L (ref 35–104)
ALT SERPL-CCNC: 12 U/L (ref 5–33)
ANION GAP SERPL CALCULATED.3IONS-SCNC: 14 MMOL/L (ref 7–19)
AST SERPL-CCNC: 14 U/L (ref 5–32)
BILIRUB SERPL-MCNC: 0.5 MG/DL (ref 0.2–1.2)
BUN SERPL-MCNC: 20 MG/DL (ref 8–23)
CALCIUM SERPL-MCNC: 9.1 MG/DL (ref 8.8–10.2)
CHLORIDE SERPL-SCNC: 102 MMOL/L (ref 98–111)
CHOLEST SERPL-MCNC: 119 MG/DL (ref 160–199)
CO2 SERPL-SCNC: 27 MMOL/L (ref 22–29)
CREAT SERPL-MCNC: 1 MG/DL (ref 0.5–0.9)
ERYTHROCYTE [DISTWIDTH] IN BLOOD BY AUTOMATED COUNT: 13.2 % (ref 11.5–14.5)
FOLATE SERPL-MCNC: 13 NG/ML (ref 4.8–37.3)
GLUCOSE SERPL-MCNC: 105 MG/DL (ref 74–109)
HBA1C MFR BLD: 5.3 % (ref 4–6)
HCT VFR BLD AUTO: 36.4 % (ref 37–47)
HDLC SERPL-MCNC: 55 MG/DL (ref 65–121)
HGB BLD-MCNC: 11.5 G/DL (ref 12–16)
IRON SERPL-MCNC: 75 UG/DL (ref 37–145)
LDLC SERPL CALC-MCNC: 44 MG/DL
MCH RBC QN AUTO: 29.4 PG (ref 27–31)
MCHC RBC AUTO-ENTMCNC: 31.6 G/DL (ref 33–37)
MCV RBC AUTO: 93.1 FL (ref 81–99)
PLATELET # BLD AUTO: 217 K/UL (ref 130–400)
PMV BLD AUTO: 11 FL (ref 9.4–12.3)
POTASSIUM SERPL-SCNC: 4.1 MMOL/L (ref 3.5–5)
PROT SERPL-MCNC: 7 G/DL (ref 6.6–8.7)
RBC # BLD AUTO: 3.91 M/UL (ref 4.2–5.4)
SODIUM SERPL-SCNC: 143 MMOL/L (ref 136–145)
TRIGL SERPL-MCNC: 101 MG/DL (ref 0–149)
TSH SERPL DL<=0.005 MIU/L-ACNC: 0.7 UIU/ML (ref 0.27–4.2)
VIT B12 SERPL-MCNC: 235 PG/ML (ref 211–946)
WBC # BLD AUTO: 6.8 K/UL (ref 4.8–10.8)

## 2023-10-10 ENCOUNTER — OFFICE VISIT (OUTPATIENT)
Dept: INTERNAL MEDICINE | Age: 78
End: 2023-10-10
Payer: MEDICARE

## 2023-10-10 VITALS
HEART RATE: 68 BPM | BODY MASS INDEX: 35.03 KG/M2 | DIASTOLIC BLOOD PRESSURE: 70 MMHG | SYSTOLIC BLOOD PRESSURE: 128 MMHG | OXYGEN SATURATION: 98 % | HEIGHT: 66 IN | WEIGHT: 218 LBS

## 2023-10-10 DIAGNOSIS — Z23 INFLUENZA VACCINE NEEDED: ICD-10-CM

## 2023-10-10 DIAGNOSIS — I48.19 PERSISTENT ATRIAL FIBRILLATION (HCC): Primary | ICD-10-CM

## 2023-10-10 DIAGNOSIS — E53.8 B12 DEFICIENCY: ICD-10-CM

## 2023-10-10 DIAGNOSIS — R73.01 IMPAIRED FASTING GLUCOSE: ICD-10-CM

## 2023-10-10 DIAGNOSIS — I10 PRIMARY HYPERTENSION: ICD-10-CM

## 2023-10-10 DIAGNOSIS — E78.2 MIXED HYPERLIPIDEMIA: ICD-10-CM

## 2023-10-10 DIAGNOSIS — M15.9 PRIMARY OSTEOARTHRITIS INVOLVING MULTIPLE JOINTS: ICD-10-CM

## 2023-10-10 PROCEDURE — 3078F DIAST BP <80 MM HG: CPT | Performed by: INTERNAL MEDICINE

## 2023-10-10 PROCEDURE — 3074F SYST BP LT 130 MM HG: CPT | Performed by: INTERNAL MEDICINE

## 2023-10-10 PROCEDURE — G8399 PT W/DXA RESULTS DOCUMENT: HCPCS | Performed by: INTERNAL MEDICINE

## 2023-10-10 PROCEDURE — G8427 DOCREV CUR MEDS BY ELIG CLIN: HCPCS | Performed by: INTERNAL MEDICINE

## 2023-10-10 PROCEDURE — 1123F ACP DISCUSS/DSCN MKR DOCD: CPT | Performed by: INTERNAL MEDICINE

## 2023-10-10 PROCEDURE — 90694 VACC AIIV4 NO PRSRV 0.5ML IM: CPT | Performed by: INTERNAL MEDICINE

## 2023-10-10 PROCEDURE — 1090F PRES/ABSN URINE INCON ASSESS: CPT | Performed by: INTERNAL MEDICINE

## 2023-10-10 PROCEDURE — 1036F TOBACCO NON-USER: CPT | Performed by: INTERNAL MEDICINE

## 2023-10-10 PROCEDURE — G8417 CALC BMI ABV UP PARAM F/U: HCPCS | Performed by: INTERNAL MEDICINE

## 2023-10-10 PROCEDURE — 99214 OFFICE O/P EST MOD 30 MIN: CPT | Performed by: INTERNAL MEDICINE

## 2023-10-10 PROCEDURE — G8484 FLU IMMUNIZE NO ADMIN: HCPCS | Performed by: INTERNAL MEDICINE

## 2023-10-10 PROCEDURE — G0008 ADMIN INFLUENZA VIRUS VAC: HCPCS | Performed by: INTERNAL MEDICINE

## 2023-10-10 PROCEDURE — 96372 THER/PROPH/DIAG INJ SC/IM: CPT | Performed by: INTERNAL MEDICINE

## 2023-10-10 RX ORDER — CYANOCOBALAMIN 1000 UG/ML
1000 INJECTION, SOLUTION INTRAMUSCULAR; SUBCUTANEOUS ONCE
Status: COMPLETED | OUTPATIENT
Start: 2023-10-10 | End: 2023-10-10

## 2023-10-10 RX ORDER — LANOLIN ALCOHOL/MO/W.PET/CERES
1000 CREAM (GRAM) TOPICAL DAILY
Qty: 30 TABLET | Refills: 3 | Status: SHIPPED | OUTPATIENT
Start: 2023-10-10

## 2023-10-10 RX ORDER — CALCIUM CARBONATE 300MG(750)
TABLET,CHEWABLE ORAL DAILY
COMMUNITY

## 2023-10-10 RX ORDER — LABETALOL 300 MG/1
300 TABLET, FILM COATED ORAL 2 TIMES DAILY
Qty: 180 TABLET | Refills: 3 | Status: SHIPPED | OUTPATIENT
Start: 2023-10-10

## 2023-10-10 RX ADMIN — CYANOCOBALAMIN 1000 MCG: 1000 INJECTION, SOLUTION INTRAMUSCULAR; SUBCUTANEOUS at 10:15

## 2023-10-10 NOTE — PROGRESS NOTES
care and follow    3. B12 deficiency    - cyanocobalamin injection 1,000 mcg  - vitamin B-12 (CYANOCOBALAMIN) 1000 MCG tablet; Take 1 tablet by mouth daily  Dispense: 30 tablet; Refill: 3    4. Influenza vaccine needed    - Influenza, FLUAD, (age 72 y+), IM, Preservative Free, 0.5 mL    5. Primary osteoarthritis involving multiple joints  Stable osteoarthritis continue current medical management and follow avoid NSAIDs    6. Mixed hyperlipidemia  Continue statin therapy    7.  Impaired fasting glucose  Cut back added sugar processed food carbs follow

## 2023-10-11 ENCOUNTER — HOSPITAL ENCOUNTER (OUTPATIENT)
Facility: HOSPITAL | Age: 78
Setting detail: HOSPITAL OUTPATIENT SURGERY
Discharge: HOME | End: 2023-10-11
Attending: STUDENT IN AN ORGANIZED HEALTH CARE EDUCATION/TRAINING PROGRAM | Admitting: STUDENT IN AN ORGANIZED HEALTH CARE EDUCATION/TRAINING PROGRAM
Payer: MEDICARE

## 2023-10-11 ENCOUNTER — ANESTHESIA (OUTPATIENT)
Dept: CARDIOLOGY | Facility: HOSPITAL | Age: 78
End: 2023-10-11
Payer: MEDICARE

## 2023-10-11 ENCOUNTER — ANESTHESIA EVENT (OUTPATIENT)
Dept: CARDIOLOGY | Facility: HOSPITAL | Age: 78
End: 2023-10-11
Payer: MEDICARE

## 2023-10-11 VITALS
OXYGEN SATURATION: 100 % | SYSTOLIC BLOOD PRESSURE: 103 MMHG | DIASTOLIC BLOOD PRESSURE: 52 MMHG | HEIGHT: 66 IN | RESPIRATION RATE: 16 BRPM | TEMPERATURE: 97.8 F | HEART RATE: 60 BPM | WEIGHT: 220 LBS | BODY MASS INDEX: 35.36 KG/M2

## 2023-10-11 DIAGNOSIS — I48.0 PAROXYSMAL ATRIAL FIBRILLATION: ICD-10-CM

## 2023-10-11 LAB
ACT BLD: 335 SECONDS (ref 82–152)
ACT BLD: 432 SECONDS (ref 82–152)
ANION GAP SERPL CALCULATED.3IONS-SCNC: 13 MMOL/L (ref 5–15)
BASOPHILS # BLD AUTO: 0.04 10*3/MM3 (ref 0–0.2)
BASOPHILS NFR BLD AUTO: 0.4 % (ref 0–1.5)
BUN SERPL-MCNC: 25 MG/DL (ref 8–23)
BUN/CREAT SERPL: 24 (ref 7–25)
CALCIUM SPEC-SCNC: 9 MG/DL (ref 8.6–10.5)
CHLORIDE SERPL-SCNC: 100 MMOL/L (ref 98–107)
CO2 SERPL-SCNC: 28 MMOL/L (ref 22–29)
CREAT SERPL-MCNC: 1.04 MG/DL (ref 0.57–1)
DEPRECATED RDW RBC AUTO: 44.1 FL (ref 37–54)
EGFRCR SERPLBLD CKD-EPI 2021: 55.1 ML/MIN/1.73
EOSINOPHIL # BLD AUTO: 0.27 10*3/MM3 (ref 0–0.4)
EOSINOPHIL NFR BLD AUTO: 2.7 % (ref 0.3–6.2)
ERYTHROCYTE [DISTWIDTH] IN BLOOD BY AUTOMATED COUNT: 13.2 % (ref 12.3–15.4)
GLUCOSE SERPL-MCNC: 119 MG/DL (ref 65–99)
HCT VFR BLD AUTO: 36.2 % (ref 34–46.6)
HGB BLD-MCNC: 11.6 G/DL (ref 12–15.9)
IMM GRANULOCYTES # BLD AUTO: 0.02 10*3/MM3 (ref 0–0.05)
IMM GRANULOCYTES NFR BLD AUTO: 0.2 % (ref 0–0.5)
INR PPP: 1.26 (ref 0.91–1.09)
LYMPHOCYTES # BLD AUTO: 1.54 10*3/MM3 (ref 0.7–3.1)
LYMPHOCYTES NFR BLD AUTO: 15.6 % (ref 19.6–45.3)
MCH RBC QN AUTO: 29.4 PG (ref 26.6–33)
MCHC RBC AUTO-ENTMCNC: 32 G/DL (ref 31.5–35.7)
MCV RBC AUTO: 91.9 FL (ref 79–97)
MONOCYTES # BLD AUTO: 0.81 10*3/MM3 (ref 0.1–0.9)
MONOCYTES NFR BLD AUTO: 8.2 % (ref 5–12)
NEUTROPHILS NFR BLD AUTO: 7.2 10*3/MM3 (ref 1.7–7)
NEUTROPHILS NFR BLD AUTO: 72.9 % (ref 42.7–76)
NRBC BLD AUTO-RTO: 0 /100 WBC (ref 0–0.2)
PLATELET # BLD AUTO: 241 10*3/MM3 (ref 140–450)
PMV BLD AUTO: 10.5 FL (ref 6–12)
POTASSIUM SERPL-SCNC: 3.6 MMOL/L (ref 3.5–5.2)
PROTHROMBIN TIME: 16 SECONDS (ref 11.8–14.8)
RBC # BLD AUTO: 3.94 10*6/MM3 (ref 3.77–5.28)
SODIUM SERPL-SCNC: 141 MMOL/L (ref 136–145)
WBC NRBC COR # BLD: 9.88 10*3/MM3 (ref 3.4–10.8)

## 2023-10-11 PROCEDURE — 25010000002 PROPOFOL 10 MG/ML EMULSION: Performed by: NURSE ANESTHETIST, CERTIFIED REGISTERED

## 2023-10-11 PROCEDURE — 93656 COMPRE EP EVAL ABLTJ ATR FIB: CPT | Performed by: STUDENT IN AN ORGANIZED HEALTH CARE EDUCATION/TRAINING PROGRAM

## 2023-10-11 PROCEDURE — 25010000002 HEPARIN (PORCINE) 1000-0.9 UT/500ML-% SOLUTION: Performed by: STUDENT IN AN ORGANIZED HEALTH CARE EDUCATION/TRAINING PROGRAM

## 2023-10-11 PROCEDURE — 93005 ELECTROCARDIOGRAM TRACING: CPT | Performed by: PHYSICIAN ASSISTANT

## 2023-10-11 PROCEDURE — C1730 CATH, EP, 19 OR FEW ELECT: HCPCS | Performed by: STUDENT IN AN ORGANIZED HEALTH CARE EDUCATION/TRAINING PROGRAM

## 2023-10-11 PROCEDURE — 80048 BASIC METABOLIC PNL TOTAL CA: CPT | Performed by: PHYSICIAN ASSISTANT

## 2023-10-11 PROCEDURE — 85610 PROTHROMBIN TIME: CPT | Performed by: PHYSICIAN ASSISTANT

## 2023-10-11 PROCEDURE — 93623 PRGRMD STIMJ&PACG IV RX NFS: CPT | Performed by: STUDENT IN AN ORGANIZED HEALTH CARE EDUCATION/TRAINING PROGRAM

## 2023-10-11 PROCEDURE — C1760 CLOSURE DEV, VASC: HCPCS | Performed by: STUDENT IN AN ORGANIZED HEALTH CARE EDUCATION/TRAINING PROGRAM

## 2023-10-11 PROCEDURE — 25010000002 PROTAMINE SULFATE PER 10 MG: Performed by: NURSE ANESTHETIST, CERTIFIED REGISTERED

## 2023-10-11 PROCEDURE — 25810000003 SODIUM CHLORIDE 0.9 % SOLUTION 250 ML FLEX CONT: Performed by: NURSE ANESTHETIST, CERTIFIED REGISTERED

## 2023-10-11 PROCEDURE — C1894 INTRO/SHEATH, NON-LASER: HCPCS | Performed by: STUDENT IN AN ORGANIZED HEALTH CARE EDUCATION/TRAINING PROGRAM

## 2023-10-11 PROCEDURE — 25010000002 VASOPRESSIN 20 UNIT/ML SOLUTION: Performed by: NURSE ANESTHETIST, CERTIFIED REGISTERED

## 2023-10-11 PROCEDURE — C1732 CATH, EP, DIAG/ABL, 3D/VECT: HCPCS | Performed by: STUDENT IN AN ORGANIZED HEALTH CARE EDUCATION/TRAINING PROGRAM

## 2023-10-11 PROCEDURE — 85025 COMPLETE CBC W/AUTO DIFF WBC: CPT | Performed by: PHYSICIAN ASSISTANT

## 2023-10-11 PROCEDURE — 25010000002 SUGAMMADEX 200 MG/2ML SOLUTION: Performed by: NURSE ANESTHETIST, CERTIFIED REGISTERED

## 2023-10-11 PROCEDURE — 85347 COAGULATION TIME ACTIVATED: CPT

## 2023-10-11 PROCEDURE — 25010000002 HEPARIN (PORCINE) PER 1000 UNITS: Performed by: NURSE ANESTHETIST, CERTIFIED REGISTERED

## 2023-10-11 PROCEDURE — C1766 INTRO/SHEATH,STRBLE,NON-PEEL: HCPCS | Performed by: STUDENT IN AN ORGANIZED HEALTH CARE EDUCATION/TRAINING PROGRAM

## 2023-10-11 PROCEDURE — C1759 CATH, INTRA ECHOCARDIOGRAPHY: HCPCS | Performed by: STUDENT IN AN ORGANIZED HEALTH CARE EDUCATION/TRAINING PROGRAM

## 2023-10-11 PROCEDURE — 93622 COMP EP EVAL L VENTR PAC&REC: CPT | Performed by: STUDENT IN AN ORGANIZED HEALTH CARE EDUCATION/TRAINING PROGRAM

## 2023-10-11 PROCEDURE — 93005 ELECTROCARDIOGRAM TRACING: CPT | Performed by: STUDENT IN AN ORGANIZED HEALTH CARE EDUCATION/TRAINING PROGRAM

## 2023-10-11 PROCEDURE — 25010000002 HEPARIN (PORCINE) 2000-0.9 UNIT/L-% SOLUTION: Performed by: STUDENT IN AN ORGANIZED HEALTH CARE EDUCATION/TRAINING PROGRAM

## 2023-10-11 RX ORDER — DROPERIDOL 2.5 MG/ML
0.62 INJECTION, SOLUTION INTRAMUSCULAR; INTRAVENOUS ONCE AS NEEDED
Status: CANCELLED | OUTPATIENT
Start: 2023-10-11

## 2023-10-11 RX ORDER — LIDOCAINE HYDROCHLORIDE 20 MG/ML
INJECTION, SOLUTION INFILTRATION; PERINEURAL
Status: DISCONTINUED | OUTPATIENT
Start: 2023-10-11 | End: 2023-10-11 | Stop reason: HOSPADM

## 2023-10-11 RX ORDER — NALOXONE HCL 0.4 MG/ML
0.04 VIAL (ML) INJECTION AS NEEDED
Status: CANCELLED | OUTPATIENT
Start: 2023-10-11

## 2023-10-11 RX ORDER — FENTANYL CITRATE 50 UG/ML
25 INJECTION, SOLUTION INTRAMUSCULAR; INTRAVENOUS
Status: CANCELLED | OUTPATIENT
Start: 2023-10-11

## 2023-10-11 RX ORDER — HEPARIN SODIUM 200 [USP'U]/100ML
INJECTION, SOLUTION INTRAVENOUS
Status: DISCONTINUED | OUTPATIENT
Start: 2023-10-11 | End: 2023-10-11 | Stop reason: HOSPADM

## 2023-10-11 RX ORDER — OXYCODONE AND ACETAMINOPHEN 10; 325 MG/1; MG/1
1 TABLET ORAL ONCE AS NEEDED
Status: CANCELLED | OUTPATIENT
Start: 2023-10-11

## 2023-10-11 RX ORDER — LABETALOL 200 MG/1
300 TABLET, FILM COATED ORAL 2 TIMES DAILY
Start: 2023-10-11

## 2023-10-11 RX ORDER — MAGNESIUM HYDROXIDE 1200 MG/15ML
LIQUID ORAL
Status: DISCONTINUED | OUTPATIENT
Start: 2023-10-11 | End: 2023-10-11 | Stop reason: HOSPADM

## 2023-10-11 RX ORDER — ONDANSETRON 2 MG/ML
4 INJECTION INTRAMUSCULAR; INTRAVENOUS
Status: CANCELLED | OUTPATIENT
Start: 2023-10-11

## 2023-10-11 RX ORDER — FLUMAZENIL 0.1 MG/ML
0.2 INJECTION INTRAVENOUS AS NEEDED
Status: CANCELLED | OUTPATIENT
Start: 2023-10-11

## 2023-10-11 RX ORDER — OMEPRAZOLE 40 MG/1
40 CAPSULE, DELAYED RELEASE ORAL DAILY
Qty: 90 CAPSULE | Refills: 0 | Status: SHIPPED | OUTPATIENT
Start: 2023-10-11

## 2023-10-11 RX ORDER — PROPOFOL 10 MG/ML
VIAL (ML) INTRAVENOUS AS NEEDED
Status: DISCONTINUED | OUTPATIENT
Start: 2023-10-11 | End: 2023-10-11 | Stop reason: SURG

## 2023-10-11 RX ORDER — HYDROMORPHONE HYDROCHLORIDE 1 MG/ML
0.5 INJECTION, SOLUTION INTRAMUSCULAR; INTRAVENOUS; SUBCUTANEOUS
Status: CANCELLED | OUTPATIENT
Start: 2023-10-11

## 2023-10-11 RX ORDER — LABETALOL HYDROCHLORIDE 5 MG/ML
5 INJECTION, SOLUTION INTRAVENOUS
Status: CANCELLED | OUTPATIENT
Start: 2023-10-11

## 2023-10-11 RX ORDER — SODIUM CHLORIDE 0.9 % (FLUSH) 0.9 %
10 SYRINGE (ML) INJECTION EVERY 12 HOURS SCHEDULED
Status: DISCONTINUED | OUTPATIENT
Start: 2023-10-11 | End: 2023-10-11 | Stop reason: HOSPADM

## 2023-10-11 RX ORDER — ROCURONIUM BROMIDE 10 MG/ML
INJECTION, SOLUTION INTRAVENOUS AS NEEDED
Status: DISCONTINUED | OUTPATIENT
Start: 2023-10-11 | End: 2023-10-11 | Stop reason: SURG

## 2023-10-11 RX ORDER — HEPARIN SODIUM 1000 [USP'U]/ML
INJECTION, SOLUTION INTRAVENOUS; SUBCUTANEOUS AS NEEDED
Status: DISCONTINUED | OUTPATIENT
Start: 2023-10-11 | End: 2023-10-11 | Stop reason: SURG

## 2023-10-11 RX ORDER — PROTAMINE SULFATE 10 MG/ML
INJECTION, SOLUTION INTRAVENOUS AS NEEDED
Status: DISCONTINUED | OUTPATIENT
Start: 2023-10-11 | End: 2023-10-11 | Stop reason: SURG

## 2023-10-11 RX ORDER — IBUPROFEN 600 MG/1
600 TABLET ORAL ONCE AS NEEDED
Status: CANCELLED | OUTPATIENT
Start: 2023-10-11

## 2023-10-11 RX ORDER — SODIUM CHLORIDE 0.9 % (FLUSH) 0.9 %
10 SYRINGE (ML) INJECTION AS NEEDED
Status: DISCONTINUED | OUTPATIENT
Start: 2023-10-11 | End: 2023-10-11 | Stop reason: HOSPADM

## 2023-10-11 RX ORDER — SUCCINYLCHOLINE/SOD CL,ISO/PF 200MG/10ML
SYRINGE (ML) INTRAVENOUS AS NEEDED
Status: DISCONTINUED | OUTPATIENT
Start: 2023-10-11 | End: 2023-10-11 | Stop reason: SURG

## 2023-10-11 RX ADMIN — Medication 120 MG: at 08:27

## 2023-10-11 RX ADMIN — PROPOFOL 50 MG: 10 INJECTION, EMULSION INTRAVENOUS at 08:39

## 2023-10-11 RX ADMIN — PROPOFOL 50 MG: 10 INJECTION, EMULSION INTRAVENOUS at 08:33

## 2023-10-11 RX ADMIN — ISOPROTERENOL HYDROCHLORIDE 10 MCG/MIN: 0.2 INJECTION, SOLUTION INTRACARDIAC; INTRAMUSCULAR; INTRAVENOUS; SUBCUTANEOUS at 09:31

## 2023-10-11 RX ADMIN — HEPARIN SODIUM 20000 UNITS: 1000 INJECTION, SOLUTION INTRAVENOUS; SUBCUTANEOUS at 08:48

## 2023-10-11 RX ADMIN — PROPOFOL 150 MCG/KG/MIN: 10 INJECTION, EMULSION INTRAVENOUS at 08:31

## 2023-10-11 RX ADMIN — PROTAMINE SULFATE 50 MG: 10 INJECTION, SOLUTION INTRAVENOUS at 09:45

## 2023-10-11 RX ADMIN — SUGAMMADEX 200 MG: 100 INJECTION, SOLUTION INTRAVENOUS at 09:45

## 2023-10-11 RX ADMIN — PROPOFOL 200 MG: 10 INJECTION, EMULSION INTRAVENOUS at 08:27

## 2023-10-11 RX ADMIN — ROCURONIUM BROMIDE 35 MG: 10 SOLUTION INTRAVENOUS at 09:12

## 2023-10-11 NOTE — H&P
"Chief Complaint  Persistent atrial fibrillation    Subjective        History of Present Illness    EP Problems:  1.  Persistent atrial fibrillation  -Prior AAD use: Flecainide, amiodarone  -2022: PVI, Lopez  -3/15/2023: Cardioversion  2.  First-degree AV block     Cardiology Problems:  1.  Hypertension  2.  Hyperlipidemia     Medical Problems:  1.  Obesity  -2023: BMI 36  2.  Obstructive sleep apnea    Anne-Marie Hayes is a 78 y.o. female with past medical history as above who presents to the hospital for outpatient EP study and ablation for treatment of persistent atrial fibrillation.  She continues to have episodes of atrial fibrillation despite prior ablation.  We tried to increase her flecainide further, however she had onset of a new right bundle branch block with this with QRS prolongation.  Given these findings, she was seen in clinic at which time we discussed risk and benefits of redo ablation and she chose to proceed.    Family History:  family history includes Atrial fibrillation in her mother; Heart attack in her father; Heart disease in her father; Hypertension in her father.    Social History:  Social History     Tobacco Use    Smoking status: Former     Packs/day: 2.00     Years: 17.00     Additional pack years: 0.00     Total pack years: 34.00     Types: Cigarettes     Start date: 1966     Quit date: 1983     Years since quittin.8    Smokeless tobacco: Never    Tobacco comments:     [id number]   Vaping Use    Vaping Use: Never used   Substance Use Topics    Alcohol use: Not Currently     Alcohol/week: 4.0 standard drinks of alcohol     Types: 4 Glasses of wine per week     Comment: about 1/2 glass of wine a night    Drug use: No       Allergies:  Patient has no known allergies.      Objective   Vital Signs:  /68 (BP Location: Left arm, Patient Position: Lying)   Pulse 63   Temp 97.8 øF (36.6 øC) (Tympanic)   Resp 14   Ht 167.6 cm (66\")   Wt 99.8 kg (220 lb)   SpO2 " "96%   BMI 35.51 kg/mý   Estimated body mass index is 35.51 kg/mý as calculated from the following:    Height as of this encounter: 167.6 cm (66\").    Weight as of this encounter: 99.8 kg (220 lb).      Physical Exam  Vitals reviewed.   Constitutional:       Appearance: Normal appearance.   Cardiovascular:      Rate and Rhythm: Normal rate and regular rhythm.      Pulses: Normal pulses.      Heart sounds: Normal heart sounds. No murmur heard.  Pulmonary:      Effort: Pulmonary effort is normal. No respiratory distress.      Breath sounds: Normal breath sounds.   Skin:     General: Skin is warm and dry.   Neurological:      General: No focal deficit present.      Mental Status: She is alert and oriented to person, place, and time.   Psychiatric:         Mood and Affect: Mood normal.         Judgment: Judgment normal.            Result Review :  Labs were reviewed with relevant findings as follows: Hemoglobin 11.6, creatinine 1.04               Assessment and Plan   Assessment/plan:  Anne-Marie Hayes is a 78 y.o. female who presents to the hospital for outpatient EP study and ablation for treatment of atrial fibrillation.  There are no apparent contraindications to proceeding and she is medically appropriate for outpatient management with this procedure.      I have discussed risks, benefits, and alternatives of an electrophysiology study and ablation for atrial fibrillation with the patient.  Alternatives discussed include continued observation and medical management.  An electrophysiology study with ablation is an inherently high risk procedure with possible complications that include but are not limited to vascular access complications, internal bleeding, tamponade requiring pericardiocentesis or cardiac surgery, stroke, MI, embolism, myocardial injury, injury to the normal conduction system requiring a pacemaker, pulmonary vein stenosis, atrio-esophageal fistula, and ultimately death.  We also discussed that " given the nature of the procedure, therapeutic efficacy can be variable.  Possibilities of recurrent arrhythmias and the possible need for additional procedures and/or medical therapy was discussed. Questions asked were appropriately answered.  No guarantees were made or implied.     Despite this, they would still like to proceed.    Plan:   - Proceed with EP study and redo-ablation for treatment of persistent atrial fibrillation           Part of this note may be an electronic transcription/translation of spoken language to printed text using the Dragon Dictation System.

## 2023-10-11 NOTE — ANESTHESIA PROCEDURE NOTES
Airway  Urgency: elective    Date/Time: 10/11/2023 8:28 AM  Airway not difficult    General Information and Staff    Patient location during procedure: OR  CRNA/CAA: Jett Trejo CRNA    Indications and Patient Condition  Indications for airway management: airway protection    Preoxygenated: yes  Mask difficulty assessment: 1 - vent by mask    Final Airway Details  Final airway type: endotracheal airway      Successful airway: ETT  Cuffed: yes   Successful intubation technique: video laryngoscopy  Facilitating devices/methods: intubating stylet  Endotracheal tube insertion site: oral  Blade: Fay  Blade size: 3  ETT size (mm): 7.0  Cormack-Lehane Classification: grade I - full view of glottis  Placement verified by: capnometry   Measured from: lips  ETT/EBT  to lips (cm): 21  Number of attempts at approach: 1  Assessment: lips, teeth, and gum same as pre-op and atraumatic intubation

## 2023-10-11 NOTE — Clinical Note
Vascade MVP was used in achieving hemostasis. Closure device deployed in the vessel. Hemostasis achieved successfully. Closure device additional comment: Marcus times 3

## 2023-10-11 NOTE — Clinical Note
EP Catheter removed.  Otc Regimen: 2x3% salicylic acid wash to chest and back in shower Detail Level: Zone Continue Regimen: Gentle cleanser bid followed by moisturizer \\nRAM 0.06% to face nightly as tolerated \\nOnexton to face in the AM Modify Regimen: Decrease use of ISRAEL 0.06% on chest and back to 2-3x weekly. Follow with Cetaphil lotion to the affected areas.\\nOnexton as a spot treatment when needed on chest\\nIncrease targadox 100mg bid

## 2023-10-11 NOTE — Clinical Note
A 10 fr sheath was  inserted with ultrasound guidance into the right femoral vein. Sheath insertion not delayed.

## 2023-10-11 NOTE — DISCHARGE INSTRUCTIONS
Post-Atrial Fibrillation Ablation Instructions    ACTIVITY    Avoid driving, operating machinery, or alcohol consumption for 24 hours after receiving sedation. In addition, avoid signing legal documents or participating in legal proceedings.    You may resume normal activities after 24 hours except for the following:    Avoid heavy lifting (no more than 10 pounds) and vigorous activities for a minimum of 7 days. This includes activities such as jogging, exercise classes, sports activities, etc.    You may resume walking and other normal activities.    Avoid sitting more than 2 consecutive hours during waking hours for the first 3 days. If you are traveling, stop for brief (5 minutes) walks every two hours.    MEDICATIONS  Continue Eliquis at your usual dose this evening. Do not stop this medication without consulting with your cardiologist.    2.   Antacid: You will need to take an antacid for 90 days following your procedure.  This will be prescribed to you and sent to your pharmacy.  The antacid is called omeprazole.  Please take it as prescribed for 90 days and then stop.    MEDICAL FOLLOW UP   EP clinic follow up with Dalia Rudolph in approximately 2 weeks..    PLEASE NOTIFY US IF YOU DEVELOP    Fever of 101 or greater during your first few days at home    The occurrence of a new, persistent cough or unexplained shortness of breath.    A groin bruise may be expected. Initial soreness and discomfort should improve over the first few days. If you continue to have discomfort or if bruising is excessive, please call us.    Patients often experience palpitations and even atrial fibrillation during the healing period.    Please call if you have an episode of atrial fibrillation accompanied by fast heart rate greater than 120 beats per minute for extended period or Atrial Fibrillation that last longer than 1-2 days.    Mild chest soreness is common and may be treated with Tylenol, Advil, or Motrin.  This soreness  typically worsens when taking deep breaths.  If you notice these symptoms, start one of these medications according to the package directions and continue for 2-3 days. If your symptoms are not relieved or become severe, please call us.      REASONS TO GO TO THE EMERGENCY ROOM FOR EVALUATION:   Severe chest pain  Significant shortness of breath at rest  Near passing out or passing out episodes soon after your ablation  Symptoms of chest pain or shortness of breath associated with low blood pressure (reading less than 90 for the top number / systolic blood pressure)  Brisk bleeding or significant swelling from the groin where IVs were placed  Any concerns that an emergent or life threatening complication is occurring    If you have a clinical questions between the hours of 8am and 4pm, Monday - Friday please call the office and let us know your concern. Please leave a message if your call is not an emergency.    For emergencies, please go for evaluation at your nearest emergency department.    If you have a [employer] account, this an excellent way to communicate.  [employer] messages are checked Monday through Friday. Please allow 24-36 hours for your message to be answered.

## 2023-10-11 NOTE — ANESTHESIA PREPROCEDURE EVALUATION
Anesthesia Evaluation     no history of anesthetic complications:   NPO Solid Status: > 8 hours  NPO Liquid Status: > 8 hours           Airway   Mallampati: II  Dental      Pulmonary    (+) ,sleep apnea on CPAP  Cardiovascular     (+) hypertension, dysrhythmias Atrial Fib, hyperlipidemia      Neuro/Psych  GI/Hepatic/Renal/Endo    (+) obesity, thyroid problem     Musculoskeletal     Abdominal    Substance History      OB/GYN          Other   arthritis,                 Anesthesia Plan    ASA 3     general     intravenous induction       CODE STATUS:

## 2023-10-11 NOTE — ANESTHESIA POSTPROCEDURE EVALUATION
"Patient: Anne-Marie Hayes    Procedure Summary       Date: 10/11/23 Room / Location: [facility] CATH LAB  /  [facility] CATH INVASIVE LOCATION    Anesthesia Start: 0820 Anesthesia Stop: 1010    Procedure: Ablation atrial fibrillation Diagnosis:       Paroxysmal atrial fibrillation      (Atrial fibrillation 99888)    Providers: Marlon Carr MD Provider: Jett Trejo CRNA    Anesthesia Type: general ASA Status: 3            Anesthesia Type: general    Vitals  Vitals Value Taken Time   /54 10/11/23 1131   Temp     Pulse 61 10/11/23 1157   Resp 16 10/11/23 1100   SpO2 97 % 10/11/23 1157   Vitals shown include unfiled device data.        Post Anesthesia Care and Evaluation    Patient location during evaluation: PACU  Patient participation: complete - patient participated  Level of consciousness: awake and alert  Pain management: adequate    Airway patency: patent  Anesthetic complications: No anesthetic complications    Cardiovascular status: acceptable  Respiratory status: acceptable  Hydration status: acceptable    Comments: Blood pressure 103/52, pulse 60, temperature 97.8 øF (36.6 øC), temperature source Tympanic, resp. rate 16, height 167.6 cm (66\"), weight 99.8 kg (220 lb), SpO2 100%, not currently breastfeeding.    Pt discharged from PACU based on yosi score >8    "

## 2023-10-12 LAB
QT INTERVAL: 516 MS
QTC INTERVAL: 516 MS

## 2023-10-13 LAB
QT INTERVAL: 512 MS
QTC INTERVAL: 512 MS

## 2023-10-27 ENCOUNTER — OFFICE VISIT (OUTPATIENT)
Dept: CARDIOLOGY | Facility: CLINIC | Age: 78
End: 2023-10-27
Payer: MEDICARE

## 2023-10-27 VITALS
WEIGHT: 224 LBS | OXYGEN SATURATION: 99 % | HEART RATE: 63 BPM | BODY MASS INDEX: 36 KG/M2 | SYSTOLIC BLOOD PRESSURE: 138 MMHG | DIASTOLIC BLOOD PRESSURE: 72 MMHG | HEIGHT: 66 IN

## 2023-10-27 DIAGNOSIS — I48.0 PAROXYSMAL ATRIAL FIBRILLATION: Primary | ICD-10-CM

## 2023-10-27 DIAGNOSIS — R60.0 EDEMA LEG: ICD-10-CM

## 2023-10-27 RX ORDER — LANOLIN ALCOHOL/MO/W.PET/CERES
1 CREAM (GRAM) TOPICAL DAILY
COMMUNITY
Start: 2023-10-10

## 2023-10-27 NOTE — PROGRESS NOTES
"[facility] -  CLINIC FOLLOW UP     Patient Care Team:  Ania Culver MD as PCP - General  Ania Culver MD as PCP - Family Medicine  Adrián Carlos MD as Consulting Physician (Pulmonary Disease)  Delio Leslie MD as Consulting Physician (Gastroenterology)  Jovani Lovelace MD as Consulting Physician (Cardiology)  Steven Elizondo APRN as Nurse Practitioner (Cardiology)    Chief Complaint: follow up to PVI     Subjective   EP Problems:  1.  Persistent atrial fibrillation  -Prior AAD use: Flecainide, amiodarone  -8/9/2022: PVI, Lopez  -3/15/2023: Cardioversion  -10/11/23: Redo PVI, Dr. Carr   2.  First-degree AV block     Cardiology Problems:  1.  Hypertension  2.  Hyperlipidemia     Medical Problems:  1.  Obesity  -5/2023: BMI 36  2.  Obstructive sleep apnea  HPI: Today I had the pleasure of seeing Anne-Marie Hayes in the cardiology clinic for follow up. She is a pleasant 78 year old female with a history of recurrent afib after previous PVI in 2022 by Dr. Lopez. She opted to have redo procedure this month and was found to have return of conduction into the right-sided perlita as well as anterior aspect of the left superior pulmonary vein. Dr. Carr performed ablation of the right sided pulmonary veins and anterior aspect of the left superior pulmonary vein with subsequent 4 out of 4 pulmonary vein isolation and she did not have immediate complications.     She remains on flecainide and denies any significant bleeding or recurrence of palpitations since her procedure. She denies any chest pain suggestive of angina. Review of her weight shows it is up since her last visit, but [place]\". She is confident that she can lose the weight.     Some lower extremity edema is noted today, but it is not distressing to her. EKG shows SR.     Objective     Visit Vitals  /72   Pulse 63   Ht 167.6 cm (65.98\") "   Wt 102 kg (224 lb)   SpO2 99%   BMI 36.17 kg/m²           Vitals reviewed.   Constitutional:       Appearance: Healthy appearance. Not in distress.   Eyes:      Extraocular Movements: Extraocular movements intact.      Conjunctiva/sclera: Conjunctivae normal.      Pupils: Pupils are equal, round, and reactive to light.   HENT:      Head: Normocephalic and atraumatic.      Nose: Nose normal.    Mouth/Throat:      Lips: Pink.      Mouth: Mucous membranes are moist.      Pharynx: Oropharynx is clear.   Neck:      Vascular: No carotid bruit or JVD. JVD normal.   Pulmonary:      Effort: Pulmonary effort is normal.      Breath sounds: Normal breath sounds.   Chest:      Chest wall: Not tender to palpatation.   Cardiovascular:      PMI at left midclavicular line. Normal rate. Regular rhythm. Normal S1. Normal S2.       Murmurs: There is no murmur.      No gallop.  No rub.   Pulses:     Radial: 2+ bilaterally.     Posterior tibial: 2+ bilaterally.  Edema:     Peripheral edema present.     Pretibial: 1+ edema of the left pretibial area and trace edema of the right pretibial area.     Ankle: 1+ edema of the left ankle and trace edema of the right ankle.  Abdominal:      General: Bowel sounds are normal.      Palpations: Abdomen is soft.   Musculoskeletal: Normal range of motion.      Extremities: No clubbing present.     Cervical back: Normal range of motion. Skin:     General: Skin is warm and dry.   Neurological:      General: No focal deficit present.      Mental Status: Alert and oriented to person, place, and time.   Psychiatric:         Attention and Perception: Attention normal.         Mood and Affect: Affect normal.         Speech: Speech normal.         Behavior: Behavior normal.         Cognition and Memory: Cognition normal.             The following portions of the patient's history were reviewed and updated as appropriate: allergies, current medications, past medical history, past social history, past and  problem list.     Review of Systems   Constitutional:  Positive for unexpected weight change.   HENT: Negative.     Eyes: Negative.    Respiratory: Negative.     Cardiovascular: Negative.    Gastrointestinal: Negative.    Endocrine: Negative.    Genitourinary: Negative.    Musculoskeletal: Negative.    Skin: Negative.    Allergic/Immunologic: Negative.    Neurological: Negative.    Hematological: Negative.    Psychiatric/Behavioral: Negative.             ECG 12 Lead    Date/Time: 10/27/2023 12:52 PM  Performed by: Dalia Rudolph PA    Authorized by: Dalia Rudolph PA  Comparison: compared with previous ECG from 10/12/2023  Rhythm: sinus rhythm  Rate: normal    Clinical impression: normal ECG            Medication Review: yes    Current Outpatient Medications:     amLODIPine (NORVASC) 5 MG tablet, Take 1 tablet by mouth Daily., Disp: 30 tablet, Rfl: 11    apixaban (ELIQUIS) 5 MG tablet tablet, Take 1 tablet by mouth Every 12 (Twelve) Hours., Disp: 60 tablet, Rfl: 0    Calcium Carbonate (CALTRATE 600 PO), Take  by mouth., Disp: , Rfl:     dilTIAZem CD (CARDIZEM CD) 360 MG 24 hr capsule, Take 1 capsule by mouth Daily., Disp: 90 capsule, Rfl: 3    famciclovir (FAMVIR) 500 MG tablet, Take 3 tablets by mouth Daily As Needed (cold sores). Used as needed, when a skin eruption occurs,, Disp: , Rfl:     flecainide (TAMBOCOR) 50 MG tablet, Take 1 tablet by mouth 2 (Two) Times a Day., Disp: 60 tablet, Rfl: 3    fluticasone (FLONASE) 50 MCG/ACT nasal spray, 2 sprays into the nostril(s) as directed by provider Daily As Needed for Rhinitis or Allergies., Disp: , Rfl:     furosemide (LASIX) 40 MG tablet, Take 1 tablet by mouth Daily., Disp: 90 tablet, Rfl: 3    KLOR-CON 10 MEQ CR tablet, Take 1 tablet by mouth 2 (Two) Times a Day., Disp: , Rfl:     Krill Oil 500 MG capsule, Take  by mouth., Disp: , Rfl:     labetalol (NORMODYNE) 200 MG tablet, Take 1.5 tablets by mouth 2 (Two) Times a Day., Disp: , Rfl:     levothyroxine  (Synthroid) 100 MCG tablet, Take 1 tablet by mouth Daily., Disp: 90 tablet, Rfl: 3    losartan-hydrochlorothiazide (HYZAAR) 100-25 MG per tablet, Take 1 tablet by mouth Daily., Disp: , Rfl:     magnesium oxide (MAG-OX) 400 MG tablet, Take 1 tablet by mouth Daily., Disp: , Rfl:     Multiple Vitamins-Minerals (PRESERVISION AREDS 2 PO), Take 1 tablet by mouth 2 (Two) Times a Day., Disp: , Rfl:     Omega-3 Fatty Acids (FISH OIL) 1000 MG capsule capsule, Take 1 capsule by mouth Daily With Breakfast., Disp: , Rfl:     omeprazole (priLOSEC) 40 MG capsule, Take 1 capsule by mouth Daily., Disp: 90 capsule, Rfl: 0    rosuvastatin (CRESTOR) 10 MG tablet, Take 1 tablet by mouth Daily., Disp: , Rfl:     vitamin B-12 (CYANOCOBALAMIN) 1000 MCG tablet, Take 1 tablet by mouth Daily., Disp: , Rfl:    No Known Allergies    I have reviewed       CBC:  Lab Results - Last 18 Months   Lab Units 10/11/23  0745 10/09/23  0931   WBC 10*3/mm3 9.88 6.8   HEMOGLOBIN g/dL 11.6* 11.5*   HEMATOCRIT % 36.2 36.4*   PLATELETS 10*3/mm3 241 217   IRON ug/dL  --  75      BMP/CMP:  Lab Results - Last 18 Months   Lab Units 10/11/23  0745 07/11/22  1539 07/07/22  0913   SODIUM mmol/L 141   < > 144   POTASSIUM mmol/L 3.6   < > 4.2   CHLORIDE mmol/L 100   < > 105   TOTAL CO2 mmol/L  --   --  26   CO2 mmol/L 28.0   < >  --    GLUCOSE mg/dL 119*   < > 107   BUN mg/dL 25*   < > 12   CREATININE mg/dL 1.04*   < > 0.7   EGFR IF NONAFRICN AM   --   --  >60   EGFR IF AFRICN AM   --   --  >59   CALCIUM mg/dL 9.0   < > 8.7*    < > = values in this interval not displayed.     BNP:   Lab Results - Last 18 Months   Lab Units 05/30/22  0749   PROBNP pg/mL 1,673.0      THYROID:  Lab Results - Last 18 Months   Lab Units 10/09/23  0931 06/16/22  0844 05/29/22  1809   TSH uIU/mL 0.697   < > 8.040*   FREE T4 ng/dL  --   --  0.96    < > = values in this interval not displayed.       Results for orders placed in visit on 02/18/22    Adult Transthoracic Echo Complete W/ Cont if  Necessary Per Protocol    Interpretation Summary  · Left ventricular ejection fraction appears to be 66 - 70%. Left ventricular systolic function is normal.  · Left ventricular diastolic function is consistent with (grade II w/high LAP) pseudonormalization.  · Normal right ventricular cavity size and systolic function noted.  · Estimated right ventricular systolic pressure from tricuspid regurgitation is mildly elevated (35-45 mmHg).  · There is no significant (greater than mild) valvular dysfunction.     Assessment:   Diagnoses and all orders for this visit:    1. Paroxysmal atrial fibrillation (Primary)  Overview:  Added automatically from request for surgery [id number]    Orders:  -     ECG 12 Lead; Future    Other orders  -     ECG 12 Lead    PAF: S/p redo PVI and found to have return of conduction around right-sided perlita as well as anterior aspect of the left superior pulmonary vein. Currently still on flecainide and denies any recurrent afib. Continue anticoagulation indefinitely   -at 4 weeks post op, she can discontinue her flecainide if she wishes and continue on diltiazem.   -Follow up in 2-3 months.     Edema: Encouraged sodium restriction, weight loss. Discussed we could start spironolactone to her lasix and stop potassium supplements, but she is not distressed by her swelling and feels it's related to lifestyle right now.     I spent 30 minutes caring for Anne-Marie on this date of service. This time includes time spent by me in the following activities:preparing for the visit, reviewing tests, obtaining and/or reviewing a separately obtained history, performing a medically appropriate examination and/or evaluation , counseling and educating the patient/family/caregiver, ordering medications, tests, or procedures, and documenting information in the medical record        Electronically signed by MARIA L Eddy

## 2023-11-20 DIAGNOSIS — J30.9 ALLERGIC RHINITIS, UNSPECIFIED SEASONALITY, UNSPECIFIED TRIGGER: ICD-10-CM

## 2023-11-20 RX ORDER — FLUTICASONE PROPIONATE 50 MCG
SPRAY, SUSPENSION (ML) NASAL
Qty: 48 G | Refills: 3 | Status: SHIPPED | OUTPATIENT
Start: 2023-11-20

## 2023-12-08 ENCOUNTER — TELEPHONE (OUTPATIENT)
Dept: CARDIOLOGY | Facility: CLINIC | Age: 78
End: 2023-12-08
Payer: MEDICARE

## 2023-12-08 NOTE — TELEPHONE ENCOUNTER
----- Message from MANDO Acevedo sent at 12/8/2023  1:09 PM CST -----  Overall patient is a low to intermediate surgical risk from a cardiac standpoint. She is a good functional capacity greater than 4 METS and has no cardiac symptoms.  No further cardiac testing is indicated prior to surgery. It is acceptable to hold Eliquis for 48-72 hours prior to procedure and resume when safe afterwards.     ----- Message -----  From: Heaven Victor MA  Sent: 12/8/2023   1:04 PM CST  To: MANDO Acevedo    CARDIAC RISK ASSESSMENT  FROM DR LYLE AT [facility]

## 2023-12-14 DIAGNOSIS — E83.52 HYPERCALCEMIA: ICD-10-CM

## 2023-12-14 DIAGNOSIS — I48.92 ATRIAL FLUTTER WITH RAPID VENTRICULAR RESPONSE (HCC): ICD-10-CM

## 2023-12-14 DIAGNOSIS — I10 PRIMARY HYPERTENSION: ICD-10-CM

## 2023-12-14 LAB
ALBUMIN SERPL-MCNC: 4.5 G/DL (ref 3.5–5.2)
ALP SERPL-CCNC: 74 U/L (ref 35–104)
ALT SERPL-CCNC: 12 U/L (ref 5–33)
ANION GAP SERPL CALCULATED.3IONS-SCNC: 11 MMOL/L (ref 7–19)
AST SERPL-CCNC: 14 U/L (ref 5–32)
BILIRUB SERPL-MCNC: 0.5 MG/DL (ref 0.2–1.2)
BUN SERPL-MCNC: 21 MG/DL (ref 8–23)
CALCIUM SERPL-MCNC: 9.2 MG/DL (ref 8.8–10.2)
CHLORIDE SERPL-SCNC: 101 MMOL/L (ref 98–111)
CO2 SERPL-SCNC: 29 MMOL/L (ref 22–29)
CREAT SERPL-MCNC: 1.1 MG/DL (ref 0.5–0.9)
GLUCOSE SERPL-MCNC: 112 MG/DL (ref 74–109)
POTASSIUM SERPL-SCNC: 4.1 MMOL/L (ref 3.5–5)
PROT SERPL-MCNC: 6.6 G/DL (ref 6.6–8.7)
SODIUM SERPL-SCNC: 141 MMOL/L (ref 136–145)

## 2023-12-28 RX ORDER — ROSUVASTATIN CALCIUM 10 MG/1
TABLET, COATED ORAL
Qty: 90 TABLET | Refills: 3 | Status: SHIPPED | OUTPATIENT
Start: 2023-12-28

## 2024-01-11 DIAGNOSIS — E87.6 HYPOKALEMIA: Primary | ICD-10-CM

## 2024-01-11 DIAGNOSIS — E83.52 HYPERCALCEMIA: ICD-10-CM

## 2024-01-11 LAB
ALBUMIN SERPL-MCNC: 4.4 G/DL (ref 3.5–5.2)
ALP SERPL-CCNC: 72 U/L (ref 35–104)
ALT SERPL-CCNC: 12 U/L (ref 5–33)
ANION GAP SERPL CALCULATED.3IONS-SCNC: 11 MMOL/L (ref 7–19)
AST SERPL-CCNC: 13 U/L (ref 5–32)
BASOPHILS # BLD: 0.1 K/UL (ref 0–0.2)
BASOPHILS NFR BLD: 0.8 % (ref 0–1)
BILIRUB SERPL-MCNC: 0.4 MG/DL (ref 0.2–1.2)
BUN SERPL-MCNC: 18 MG/DL (ref 8–23)
CALCIUM SERPL-MCNC: 9.4 MG/DL (ref 8.8–10.2)
CHLORIDE SERPL-SCNC: 100 MMOL/L (ref 98–111)
CO2 SERPL-SCNC: 30 MMOL/L (ref 22–29)
CREAT SERPL-MCNC: 1.1 MG/DL (ref 0.5–0.9)
EOSINOPHIL # BLD: 0.4 K/UL (ref 0–0.6)
EOSINOPHIL NFR BLD: 4.6 % (ref 0–5)
ERYTHROCYTE [DISTWIDTH] IN BLOOD BY AUTOMATED COUNT: 12.7 % (ref 11.5–14.5)
GLUCOSE SERPL-MCNC: 112 MG/DL (ref 74–109)
HCT VFR BLD AUTO: 36.8 % (ref 37–47)
HGB BLD-MCNC: 11.9 G/DL (ref 12–16)
IMM GRANULOCYTES # BLD: 0 K/UL
LYMPHOCYTES # BLD: 1.6 K/UL (ref 1.1–4.5)
LYMPHOCYTES NFR BLD: 19.9 % (ref 20–40)
MCH RBC QN AUTO: 29.8 PG (ref 27–31)
MCHC RBC AUTO-ENTMCNC: 32.3 G/DL (ref 33–37)
MCV RBC AUTO: 92.2 FL (ref 81–99)
MONOCYTES # BLD: 0.7 K/UL (ref 0–0.9)
MONOCYTES NFR BLD: 8.7 % (ref 0–10)
NEUTROPHILS # BLD: 5.2 K/UL (ref 1.5–7.5)
NEUTS SEG NFR BLD: 65.5 % (ref 50–65)
PLATELET # BLD AUTO: 231 K/UL (ref 130–400)
PMV BLD AUTO: 10.6 FL (ref 9.4–12.3)
POTASSIUM SERPL-SCNC: 4.1 MMOL/L (ref 3.5–5)
PROT SERPL-MCNC: 6.8 G/DL (ref 6.6–8.7)
RBC # BLD AUTO: 3.99 M/UL (ref 4.2–5.4)
SODIUM SERPL-SCNC: 141 MMOL/L (ref 136–145)
WBC # BLD AUTO: 7.9 K/UL (ref 4.8–10.8)

## 2024-01-16 ENCOUNTER — OFFICE VISIT (OUTPATIENT)
Dept: INTERNAL MEDICINE | Age: 79
End: 2024-01-16
Payer: MEDICARE

## 2024-01-16 VITALS
HEART RATE: 72 BPM | DIASTOLIC BLOOD PRESSURE: 70 MMHG | WEIGHT: 228 LBS | OXYGEN SATURATION: 99 % | SYSTOLIC BLOOD PRESSURE: 120 MMHG | BODY MASS INDEX: 36.8 KG/M2

## 2024-01-16 DIAGNOSIS — I48.0 PAROXYSMAL ATRIAL FIBRILLATION (HCC): ICD-10-CM

## 2024-01-16 DIAGNOSIS — M15.9 PRIMARY OSTEOARTHRITIS INVOLVING MULTIPLE JOINTS: ICD-10-CM

## 2024-01-16 DIAGNOSIS — Z29.11 NEED FOR RSV VACCINATION: ICD-10-CM

## 2024-01-16 DIAGNOSIS — D64.9 ANEMIA, UNSPECIFIED TYPE: ICD-10-CM

## 2024-01-16 DIAGNOSIS — R73.9 HYPERGLYCEMIA: ICD-10-CM

## 2024-01-16 DIAGNOSIS — I10 PRIMARY HYPERTENSION: Primary | ICD-10-CM

## 2024-01-16 DIAGNOSIS — E78.2 MIXED HYPERLIPIDEMIA: ICD-10-CM

## 2024-01-16 PROCEDURE — 1123F ACP DISCUSS/DSCN MKR DOCD: CPT | Performed by: INTERNAL MEDICINE

## 2024-01-16 PROCEDURE — 3074F SYST BP LT 130 MM HG: CPT | Performed by: INTERNAL MEDICINE

## 2024-01-16 PROCEDURE — 4004F PT TOBACCO SCREEN RCVD TLK: CPT | Performed by: INTERNAL MEDICINE

## 2024-01-16 PROCEDURE — 99214 OFFICE O/P EST MOD 30 MIN: CPT | Performed by: INTERNAL MEDICINE

## 2024-01-16 PROCEDURE — G8417 CALC BMI ABV UP PARAM F/U: HCPCS | Performed by: INTERNAL MEDICINE

## 2024-01-16 PROCEDURE — 1090F PRES/ABSN URINE INCON ASSESS: CPT | Performed by: INTERNAL MEDICINE

## 2024-01-16 PROCEDURE — G8399 PT W/DXA RESULTS DOCUMENT: HCPCS | Performed by: INTERNAL MEDICINE

## 2024-01-16 PROCEDURE — G8427 DOCREV CUR MEDS BY ELIG CLIN: HCPCS | Performed by: INTERNAL MEDICINE

## 2024-01-16 PROCEDURE — G8484 FLU IMMUNIZE NO ADMIN: HCPCS | Performed by: INTERNAL MEDICINE

## 2024-01-16 PROCEDURE — 3078F DIAST BP <80 MM HG: CPT | Performed by: INTERNAL MEDICINE

## 2024-01-16 RX ORDER — AMLODIPINE BESYLATE 5 MG/1
2.5 TABLET ORAL DAILY
Qty: 30 TABLET | Refills: 0
Start: 2024-01-16

## 2024-01-16 NOTE — PROGRESS NOTES
Chief Complaint   Patient presents with    Follow-up       HPI: Patient is here today to follow-up hypertension atrial fibrillation and other medical issues.  She has had 2 ablations and 3 cardioversions she is currently on flecainide and diltiazem.  She was told to stop the flecainide when this current bottle runs out.  She is about to travel in February and asked if she could stay on it through then and that would be totally fine. I am also worried she is just going to have atrial fib again when she stops flecainide.  She has a more complicated picture of atrial fib with failure to 2 ablations she is currently in sinus rhythm on exam today after her third cardioversion but that history would suggest that she will likely go back into atrial fib. She knows to continue her Eliquis and her Cardizem and for now continue flecainide and I am going to see it we can get her to be with Dr. Hernandez.  She has not had an official apt with him.  Otherwise she is and other issues no other specific new complaint just concerned about stopping the flecainide especially before travel.    Past Medical History:   Diagnosis Date    Arthritis     Heart palpitations     Hypercalcemia 8/7/2017    Hyperlipidemia     Hypertension     Impaired fasting glucose 8/7/2017    Lumbar spinal stenosis     Lung nodule, solitary 8/7/2017    Right upper lobe    Multinodular goiter 8/7/2017    Obstructive sleep apnea 10/11/2018    Paroxysmal atrial fibrillation (HCC) 8/17/2018    Primary osteoarthritis involving multiple joints 8/7/2017    Wears glasses        Past Surgical History:   Procedure Laterality Date    BREAST BIOPSY  12/03/2007    stereotactic left, fibrocystic changes    BREAST BIOPSY  12/17/2007    stereotactic left, fibrocystic changes    CARDIAC CATHETERIZATION      negative    CARDIOVERSION      8/2018    CARDIOVERSION      CATARACT REMOVAL Bilateral 2017    KNEE ARTHROPLASTY Right 07/22/2019    RIGHT PARTIAL KNEE REPLACEMENT performed by

## 2024-01-23 RX ORDER — OMEPRAZOLE 40 MG/1
40 CAPSULE, DELAYED RELEASE ORAL DAILY
Refills: 0 | OUTPATIENT
Start: 2024-01-23

## 2024-02-20 ENCOUNTER — HOSPITAL ENCOUNTER (OUTPATIENT)
Dept: WOMENS IMAGING | Age: 79
Discharge: HOME | End: 2024-02-20

## 2024-02-20 DIAGNOSIS — Z12.31 ENCOUNTER FOR SCREENING MAMMOGRAM FOR MALIGNANT NEOPLASM OF BREAST: ICD-10-CM

## 2024-02-23 ENCOUNTER — HOSPITAL ENCOUNTER (OUTPATIENT)
Dept: WOMENS IMAGING | Age: 79
Discharge: HOME | End: 2024-02-23
Attending: INTERNAL MEDICINE
Payer: MEDICARE

## 2024-02-23 PROCEDURE — 77063 BREAST TOMOSYNTHESIS BI: CPT

## 2024-02-26 ENCOUNTER — OFFICE VISIT (OUTPATIENT)
Dept: SURGERY | Age: 79
End: 2024-02-26

## 2024-02-26 VITALS
SYSTOLIC BLOOD PRESSURE: 138 MMHG | WEIGHT: 226 LBS | DIASTOLIC BLOOD PRESSURE: 74 MMHG | BODY MASS INDEX: 35.47 KG/M2 | HEIGHT: 67 IN

## 2024-02-26 DIAGNOSIS — Z12.31 VISIT FOR SCREENING MAMMOGRAM: Primary | ICD-10-CM

## 2024-03-01 ENCOUNTER — OFFICE VISIT (OUTPATIENT)
Dept: CARDIOLOGY | Facility: CLINIC | Age: 79
End: 2024-03-01
Payer: MEDICARE

## 2024-03-01 VITALS
BODY MASS INDEX: 37.12 KG/M2 | HEIGHT: 66 IN | DIASTOLIC BLOOD PRESSURE: 78 MMHG | HEART RATE: 64 BPM | OXYGEN SATURATION: 97 % | WEIGHT: 231 LBS | RESPIRATION RATE: 18 BRPM | SYSTOLIC BLOOD PRESSURE: 136 MMHG

## 2024-03-01 DIAGNOSIS — G47.33 OSA ON CPAP: ICD-10-CM

## 2024-03-01 DIAGNOSIS — I10 ESSENTIAL HYPERTENSION: ICD-10-CM

## 2024-03-01 DIAGNOSIS — E78.2 MIXED HYPERLIPIDEMIA: ICD-10-CM

## 2024-03-01 DIAGNOSIS — Z79.01 CHRONIC ANTICOAGULATION: ICD-10-CM

## 2024-03-01 DIAGNOSIS — E66.09 CLASS 2 OBESITY DUE TO EXCESS CALORIES WITHOUT SERIOUS COMORBIDITY WITH BODY MASS INDEX (BMI) OF 37.0 TO 37.9 IN ADULT: ICD-10-CM

## 2024-03-01 DIAGNOSIS — I48.0 PAROXYSMAL ATRIAL FIBRILLATION: Primary | ICD-10-CM

## 2024-03-01 NOTE — PROGRESS NOTES
Subjective:     Encounter Date: 03/01/2024      Patient ID: Anne-Marie Hayes is a 78 y.o. female with paroxysmal atrial fibrillation s/p cardioversion 3/15/2023 with redo PVI 10/2023, chronic anticoagulation, hypertension, hyperlipidemia, hypothyroidism s/p total thyroidectomy with partial parathyroidectomy in May 2022 with Dr Lopez, obstructive sleep apnea and obesity.    Chief Complaint: routine follow up  History of Present Illness    Patient presents today for management of atrial fibrillation. Today she reports that she has been feeling great. She reports that she has been able to stop her Flecainide about 3 weeks ago and hasn't had any problems. She continues follows with EP. She reports that he BP readings have been lower for her. They have been 109-130/58. She denies any dizziness or lightheadedness. Her HR has remained well controlled at 55-62. She has chronic fatigue that is unchanged. She denies any chest pain. She reports that she has chronic dyspnea on exertion. She reports very rare and brief palpitations. She had some leg swelling and her amlodipine was decreased and it improved. Patient is on Eliquis and denies any bleeding issues. Patient follows with Dr Culver as PCP.       Previous Cardiac Testing and Procedures:  - ROBEL (8/13/2018) Normal left ventricular systolic function, mild MR, mild to moderate TR, no evidence of left atrial appendage thrombus.  - Cardioversion (8/13/2018) Successful cardioversion from atrial fibrillation to sinus rhythm  - Cardioversion (4/20/2020) successful cardioversion from atrial fibrillation to sinus rhythm  - Lipid panel (1/5/2020) total cholesterol 133, HDL 53, LDL 54, triglycerides 132  - Lipid panel (11/8/2021) total cholesterol 127, HDL 51, LDL 51, triglycerides 126  - BMP (11/8/2021) creatinine 0.8, potassium 4.6, sodium 141  - proBNP (2/18/2022) 311, normal 0-1800  - Echo (3/21/2022) EF 66-70%, grade 2 diastolic dysfunction, normal RV size and function,  RVSP 35-45 mmHg, no significant valve dysfunction  - Lipid panel (5/17/2022) total cholesterol 137, HDL 54, LDL 56, triglycerides 133  - BNP (5/30/2022) 1673, normal 0-1800  - Atrial fibrillation ablation (8/9/2022) by Dr Lopez at [facility]  -Cardioversion (3/15/2023): successful cardioversion   -redo ELIECER 10/2023 with Dr Carr    The following portions of the patient's history were reviewed and updated as appropriate: allergies, current medications, past family history, past medical history, past social history, past surgical history and problem list.    No Known Allergies    Current Outpatient Medications:     apixaban (ELIQUIS) 5 MG tablet tablet, Take 1 tablet by mouth Every 12 (Twelve) Hours., Disp: 60 tablet, Rfl: 0    Calcium Carbonate (CALTRATE 600 PO), Take  by mouth., Disp: , Rfl:     dilTIAZem CD (CARDIZEM CD) 360 MG 24 hr capsule, Take 1 capsule by mouth Daily., Disp: 90 capsule, Rfl: 3    famciclovir (FAMVIR) 500 MG tablet, Take 3 tablets by mouth Daily As Needed (cold sores). Used as needed, when a skin eruption occurs,, Disp: , Rfl:     fluticasone (FLONASE) 50 MCG/ACT nasal spray, 2 sprays into the nostril(s) as directed by provider Daily As Needed for Rhinitis or Allergies., Disp: , Rfl:     furosemide (LASIX) 40 MG tablet, Take 1 tablet by mouth Daily., Disp: 90 tablet, Rfl: 3    KLOR-CON 10 MEQ CR tablet, Take 1 tablet by mouth 2 (Two) Times a Day., Disp: , Rfl:     Krill Oil 500 MG capsule, Take  by mouth., Disp: , Rfl:     labetalol (NORMODYNE) 200 MG tablet, Take 1.5 tablets by mouth 2 (Two) Times a Day., Disp: , Rfl:     levothyroxine (Synthroid) 100 MCG tablet, Take 1 tablet by mouth Daily., Disp: 90 tablet, Rfl: 3    losartan-hydrochlorothiazide (HYZAAR) 100-25 MG per tablet, Take 1 tablet by mouth Daily., Disp: , Rfl:     magnesium oxide (MAG-OX) 400 MG tablet, Take 1 tablet by mouth Daily., Disp: , Rfl:     Multiple Vitamins-Minerals (PRESERVISION AREDS 2 PO), Take 1 tablet by mouth 2  (Two) Times a Day., Disp: , Rfl:     Omega-3 Fatty Acids (FISH OIL) 1000 MG capsule capsule, Take 1 capsule by mouth Daily With Breakfast., Disp: , Rfl:     rosuvastatin (CRESTOR) 10 MG tablet, Take 1 tablet by mouth Daily., Disp: , Rfl:     vitamin B-12 (CYANOCOBALAMIN) 1000 MCG tablet, Take 1 tablet by mouth Daily., Disp: , Rfl:     omeprazole (priLOSEC) 40 MG capsule, Take 1 capsule by mouth Daily., Disp: 90 capsule, Rfl: 0    Past Medical History:   Diagnosis Date    Abnormal EKG 2023    Arthritis     Atrial fibrillation     Enlarged thyroid     Hyperlipidemia     Hypertension     PONV (postoperative nausea and vomiting)     Sleep apnea     Uses CPAP     Social History     Socioeconomic History    Marital status:    Tobacco Use    Smoking status: Former     Packs/day: 2.00     Years: 17.00     Additional pack years: 0.00     Total pack years: 34.00     Types: Cigarettes     Start date: 1966     Quit date: 1983     Years since quittin.1    Smokeless tobacco: Never    Tobacco comments:     [id number]   Vaping Use    Vaping Use: Never used   Substance and Sexual Activity    Alcohol use: Not Currently     Alcohol/week: 4.0 standard drinks of alcohol     Types: 4 Glasses of wine per week     Comment: about 1/2 glass of wine a night    Drug use: No    Sexual activity: Not Currently     Partners: Male     Birth control/protection: Condom, Pill, Tubal ligation, Birth control pill       Review of Systems   Constitutional: Positive for malaise/fatigue (chronic and unchanged).   HENT:  Negative for nosebleeds.    Cardiovascular:  Negative for chest pain, dyspnea on exertion, irregular heartbeat, leg swelling, near-syncope, orthopnea, palpitations, paroxysmal nocturnal dyspnea and syncope.   Respiratory:  Negative for shortness of breath.    Hematologic/Lymphatic: Bruises/bleeds easily.   Genitourinary:  Negative for hematuria.   Neurological:  Negative for dizziness and weakness.   All other  "systems reviewed and are negative.         Objective:     Vitals reviewed.   Constitutional:       General: Not in acute distress.     Appearance: Normal appearance. Well-developed. Obese.   Eyes:      Pupils: Pupils are equal, round, and reactive to light.   HENT:      Head: Normocephalic and atraumatic.      Nose: Nose normal.   Neck:      Vascular: No carotid bruit.   Pulmonary:      Effort: Pulmonary effort is normal. No respiratory distress.      Breath sounds: Normal breath sounds. No wheezing. No rales.   Cardiovascular:      Normal rate. Regular rhythm.      Murmurs: There is no murmur.   Edema:     Peripheral edema absent.   Abdominal:      General: There is no distension.      Palpations: Abdomen is soft.   Musculoskeletal: Normal range of motion.      Cervical back: Normal range of motion and neck supple. Skin:     General: Skin is warm.      Findings: No erythema or rash.   Neurological:      General: No focal deficit present.      Mental Status: Alert and oriented to person, place, and time.   Psychiatric:         Attention and Perception: Attention normal.         Mood and Affect: Mood normal.         Speech: Speech normal.         Behavior: Behavior normal.         Thought Content: Thought content normal.         Judgment: Judgment normal.         /78   Pulse 64   Resp 18   Ht 167.6 cm (65.98\")   Wt 105 kg (231 lb)   SpO2 97%   BMI 37.31 kg/m²     Procedures    Lab Review:       Cardioversion 3/15/2023:  Cardioversion  Procedure Prep Informed consent was obtained. Patient brought to procedure room with an initial rhythm of atrial fibrillation. Sedation was performed by a anesthesiologist.   Shock 1 Synchronized with a biphasic shock. 200 joules delivered to patient.   Impression Post cardioversion the patient displayed a sinus rhythm.The cardioversion was successful.        Lab Results   Component Value Date    CHOL 119 03/15/2023    CHLPL 119 (L) 10/09/2023    TRIG 101 10/09/2023    HDL " 55 (L) 10/09/2023    LDL 44 10/09/2023     Results for orders placed in visit on 02/18/22    Adult Transthoracic Echo Complete W/ Cont if Necessary Per Protocol    Interpretation Summary  · Left ventricular ejection fraction appears to be 66 - 70%. Left ventricular systolic function is normal.  · Left ventricular diastolic function is consistent with (grade II w/high LAP) pseudonormalization.  · Normal right ventricular cavity size and systolic function noted.  · Estimated right ventricular systolic pressure from tricuspid regurgitation is mildly elevated (35-45 mmHg).  · There is no significant (greater than mild) valvular dysfunction.    I have personally reviewed echo, cardioversion, labs and past office notes prior to patients visit  Assessment:          Diagnosis Plan   1. Paroxysmal atrial fibrillation        2. Chronic anticoagulation        3. Essential hypertension        4. Mixed hyperlipidemia        5. PRINCE on CPAP        6. Class 2 obesity due to excess calories without serious comorbidity with body mass index (BMI) of 37.0 to 37.9 in adult                   Plan:       1. Paroxysmal atrial fibrillation: s/p cardioversion 7/12/2022; s/p ablation 8/2022 at [facility] with Dr Lopez, redo PVI 10/2023 with Dr Carr.  Continue cardizem and labetalol.     2. Chronic anticoagulation: [id number] score 4. Patient is on eliquis and denies any bleeding issues.    3. Hypertension: Well controlled in office. How readings have been a little low at times; will stop Norvasc 2.5 mg daily.  Recommend to continue to monitor at home and notify office if consistently running >140/90.    4. Hyperlipidemia: Lipid panel from 5/2023 demonstrates well controlled cholesterol. LDL 46. Continue omega-3 fish oil.     5. Obstructive sleep apnea: Reports compliance with CPAP.    6. Obesity: Class 2 Severe Obesity (BMI >=35 and <=39.9). Obesity-related health conditions include the following: obstructive sleep apnea and hypertension.  Obesity is unchanged. BMI is is above average; BMI management plan is completed. We discussed portion control and increasing exercise.    I attest that all portions of this note reviewed and all information has been updated by myself to reflect the patient's current status.      I spent 36 minutes caring for Anne-Marie on this date of service. This time includes time spent by me in the following activities:preparing for the visit, reviewing tests, obtaining and/or reviewing a separately obtained history, performing a medically appropriate examination and/or evaluation , counseling and educating the patient/family/caregiver, and documenting information in the medical record    Patient is to follow up in office in 6 months or sooner if needed

## 2024-03-15 ENCOUNTER — OFFICE VISIT (OUTPATIENT)
Dept: CARDIOLOGY | Facility: CLINIC | Age: 79
End: 2024-03-15
Payer: MEDICARE

## 2024-03-15 VITALS
HEART RATE: 62 BPM | SYSTOLIC BLOOD PRESSURE: 138 MMHG | WEIGHT: 229 LBS | DIASTOLIC BLOOD PRESSURE: 72 MMHG | BODY MASS INDEX: 38.15 KG/M2 | OXYGEN SATURATION: 98 % | HEIGHT: 65 IN

## 2024-03-15 DIAGNOSIS — I48.19 PERSISTENT ATRIAL FIBRILLATION: Primary | ICD-10-CM

## 2024-03-15 DIAGNOSIS — I45.10 RBBB: ICD-10-CM

## 2024-03-15 PROBLEM — I48.0 PAROXYSMAL ATRIAL FIBRILLATION: Status: RESOLVED | Noted: 2023-09-06 | Resolved: 2024-03-15

## 2024-03-15 RX ORDER — POTASSIUM CHLORIDE 750 MG/1
10 CAPSULE, EXTENDED RELEASE ORAL 2 TIMES DAILY
COMMUNITY

## 2024-03-15 NOTE — PROGRESS NOTES
"Chief Complaint  Atrial Fibrillation (5 MO FU - SINCE STOPPING FLECAINIDE, FEELING GOOD )    Subjective        History of Present Illness    EP Problems:  1.  Persistent atrial fibrillation  -Prior AAD use: Flecainide, amiodarone  -8/9/2022: PVI, Lopez  -3/15/2023: Cardioversion  2.  First-degree AV block  3.  Right bundle branch block     Cardiology Problems:  1.  Hypertension  2.  Hyperlipidemia     Medical Problems:  1.  Obesity  -5/2023: BMI 36  -3/2024: BMI 38  2.  Obstructive sleep apnea         Anne-Marie Hayes is a 78 y.o. female with problem list as above who presents to the clinic for follow up of persistent atrial fibrillation.  Doing well since time of her ablation without recurrence.  Still having trouble with weight.    Objective   Vital Signs:  /72 (BP Location: Right arm, Patient Position: Sitting)   Pulse 62   Ht 165.1 cm (65\")   Wt 104 kg (229 lb)   SpO2 98%   BMI 38.11 kg/m²   Estimated body mass index is 38.11 kg/m² as calculated from the following:    Height as of this encounter: 165.1 cm (65\").    Weight as of this encounter: 104 kg (229 lb).      Physical Exam  Vitals reviewed.   Constitutional:       Appearance: Normal appearance.   Cardiovascular:      Rate and Rhythm: Normal rate and regular rhythm.      Pulses: Normal pulses.      Heart sounds: Normal heart sounds. No murmur heard.  Pulmonary:      Effort: Pulmonary effort is normal. No respiratory distress.      Breath sounds: Normal breath sounds.   Skin:     General: Skin is warm and dry.   Neurological:      General: No focal deficit present.      Mental Status: She is alert and oriented to person, place, and time.   Psychiatric:         Mood and Affect: Mood normal.         Judgment: Judgment normal.        Result Review :  The following data was reviewed by: Marlon Carr MD on 03/15/2024:    [id number] SCORE   [id number] Score: 4 (3/15/2024  8:55 AM)          ECG 12 Lead    Date/Time: 3/15/2024 8:56 AM  Performed " by: Marlon Carr MD    Authorized by: Marlon Carr MD  Comparison: compared with previous ECG from 10/27/2023  Similar to previous ECG  Rhythm: sinus rhythm  Rate: normal  Conduction: right bundle branch block  QRS axis: normal  Other: no other findings    Clinical impression: abnormal EKG              Assessment and Plan   Diagnoses and all orders for this visit:    1. Persistent atrial fibrillation (Primary)  -     Ambulatory Referral to Physical Therapy Evaluate and treat    2. RBBB    Other orders  -     ECG 12 Lead        Anne-Marie Hayes is a 78 y.o. female with problem list as above who presents to the clinic for follow up of persistent atrial fibrillation, right bundle branch block.  Her arrhythmias are well-controlled however her weight is continuing to rise which is a significant risk factor for her for recurrent atrial fibrillation.  We again discussed the need for weight loss and calorie counting today.  She has issues with her knee and would like to try exercising more.  I will refer her to physical therapy for this purpose.    Plan:  -Arrhythmias are well-controlled on current regimen; continue labetalol and diltiazem at current dose  -Risk factor modification as above for secondary prevention of atrial fibrillation  -Physical therapy referral for exercise prescription             Follow Up   Return in about 6 months (around 9/15/2024).  Patient was given instructions and counseling regarding her condition or for health maintenance advice. Please see specific information pulled into the AVS if appropriate.     Part of this note may be an electronic transcription/translation of spoken language to printed text using the Dragon Dictation System.

## 2024-03-25 DIAGNOSIS — M25.569 CHRONIC KNEE PAIN, UNSPECIFIED LATERALITY: Primary | ICD-10-CM

## 2024-03-25 DIAGNOSIS — G89.29 CHRONIC KNEE PAIN, UNSPECIFIED LATERALITY: Primary | ICD-10-CM

## 2024-04-10 DIAGNOSIS — R73.9 HYPERGLYCEMIA: ICD-10-CM

## 2024-04-10 DIAGNOSIS — E78.2 MIXED HYPERLIPIDEMIA: ICD-10-CM

## 2024-04-10 DIAGNOSIS — D64.9 ANEMIA, UNSPECIFIED TYPE: ICD-10-CM

## 2024-04-10 LAB
ALBUMIN SERPL-MCNC: 4.6 G/DL (ref 3.5–5.2)
ALP SERPL-CCNC: 71 U/L (ref 35–104)
ALT SERPL-CCNC: 16 U/L (ref 5–33)
ANION GAP SERPL CALCULATED.3IONS-SCNC: 11 MMOL/L (ref 7–19)
AST SERPL-CCNC: 15 U/L (ref 5–32)
BILIRUB SERPL-MCNC: 0.5 MG/DL (ref 0.2–1.2)
BUN SERPL-MCNC: 19 MG/DL (ref 8–23)
CALCIUM SERPL-MCNC: 9.6 MG/DL (ref 8.8–10.2)
CHLORIDE SERPL-SCNC: 100 MMOL/L (ref 98–111)
CHOLEST SERPL-MCNC: 115 MG/DL (ref 160–199)
CO2 SERPL-SCNC: 29 MMOL/L (ref 22–29)
CREAT SERPL-MCNC: 1 MG/DL (ref 0.5–0.9)
ERYTHROCYTE [DISTWIDTH] IN BLOOD BY AUTOMATED COUNT: 13.1 % (ref 11.5–14.5)
FERRITIN SERPL-MCNC: 83.8 NG/ML (ref 13–150)
FOLATE SERPL-MCNC: 12.4 NG/ML (ref 4.8–37.3)
GLUCOSE SERPL-MCNC: 107 MG/DL (ref 74–109)
HBA1C MFR BLD: 5.3 % (ref 4–6)
HCT VFR BLD AUTO: 35.8 % (ref 37–47)
HDLC SERPL-MCNC: 50 MG/DL (ref 65–121)
HGB BLD-MCNC: 11.7 G/DL (ref 12–16)
IRON SATN MFR SERPL: 20 % (ref 14–50)
IRON SERPL-MCNC: 59 UG/DL (ref 37–145)
LDLC SERPL CALC-MCNC: 46 MG/DL
MCH RBC QN AUTO: 29.9 PG (ref 27–31)
MCHC RBC AUTO-ENTMCNC: 32.7 G/DL (ref 33–37)
MCV RBC AUTO: 91.6 FL (ref 81–99)
PLATELET # BLD AUTO: 203 K/UL (ref 130–400)
PMV BLD AUTO: 11.6 FL (ref 9.4–12.3)
POTASSIUM SERPL-SCNC: 4.1 MMOL/L (ref 3.5–5)
PROT SERPL-MCNC: 6.6 G/DL (ref 6.6–8.7)
RBC # BLD AUTO: 3.91 M/UL (ref 4.2–5.4)
SODIUM SERPL-SCNC: 140 MMOL/L (ref 136–145)
TIBC SERPL-MCNC: 298 UG/DL (ref 250–400)
TRIGL SERPL-MCNC: 97 MG/DL (ref 0–149)
VIT B12 SERPL-MCNC: 1353 PG/ML (ref 211–946)
WBC # BLD AUTO: 6.3 K/UL (ref 4.8–10.8)

## 2024-04-14 SDOH — ECONOMIC STABILITY: FOOD INSECURITY: WITHIN THE PAST 12 MONTHS, YOU WORRIED THAT YOUR FOOD WOULD RUN OUT BEFORE YOU GOT MONEY TO BUY MORE.: NEVER TRUE

## 2024-04-14 SDOH — ECONOMIC STABILITY: FOOD INSECURITY: WITHIN THE PAST 12 MONTHS, THE FOOD YOU BOUGHT JUST DIDN'T LAST AND YOU DIDN'T HAVE MONEY TO GET MORE.: NEVER TRUE

## 2024-04-14 SDOH — ECONOMIC STABILITY: INCOME INSECURITY: HOW HARD IS IT FOR YOU TO PAY FOR THE VERY BASICS LIKE FOOD, HOUSING, MEDICAL CARE, AND HEATING?: NOT VERY HARD

## 2024-04-14 ASSESSMENT — LIFESTYLE VARIABLES
HOW OFTEN DURING THE LAST YEAR HAVE YOU FOUND THAT YOU WERE NOT ABLE TO STOP DRINKING ONCE YOU HAD STARTED: NEVER
HOW OFTEN DURING THE LAST YEAR HAVE YOU FAILED TO DO WHAT WAS NORMALLY EXPECTED FROM YOU BECAUSE OF DRINKING: NEVER
HOW OFTEN DURING THE LAST YEAR HAVE YOU HAD A FEELING OF GUILT OR REMORSE AFTER DRINKING: NEVER
HOW OFTEN DO YOU HAVE A DRINK CONTAINING ALCOHOL: 5
HOW MANY STANDARD DRINKS CONTAINING ALCOHOL DO YOU HAVE ON A TYPICAL DAY: 1 OR 2
HOW OFTEN DURING THE LAST YEAR HAVE YOU FAILED TO DO WHAT WAS NORMALLY EXPECTED FROM YOU BECAUSE OF DRINKING: NEVER
HOW OFTEN DURING THE LAST YEAR HAVE YOU HAD A FEELING OF GUILT OR REMORSE AFTER DRINKING: NEVER
HAS A RELATIVE, FRIEND, DOCTOR, OR ANOTHER HEALTH PROFESSIONAL EXPRESSED CONCERN ABOUT YOUR DRINKING OR SUGGESTED YOU CUT DOWN: NO
HOW OFTEN DURING THE LAST YEAR HAVE YOU BEEN UNABLE TO REMEMBER WHAT HAPPENED THE NIGHT BEFORE BECAUSE YOU HAD BEEN DRINKING: NEVER
HOW OFTEN DURING THE LAST YEAR HAVE YOU BEEN UNABLE TO REMEMBER WHAT HAPPENED THE NIGHT BEFORE BECAUSE YOU HAD BEEN DRINKING: NEVER
HOW MANY STANDARD DRINKS CONTAINING ALCOHOL DO YOU HAVE ON A TYPICAL DAY: 1
HAS A RELATIVE, FRIEND, DOCTOR, OR ANOTHER HEALTH PROFESSIONAL EXPRESSED CONCERN ABOUT YOUR DRINKING OR SUGGESTED YOU CUT DOWN: NO
HOW OFTEN DO YOU HAVE SIX OR MORE DRINKS ON ONE OCCASION: 1
HOW OFTEN DURING THE LAST YEAR HAVE YOU NEEDED AN ALCOHOLIC DRINK FIRST THING IN THE MORNING TO GET YOURSELF GOING AFTER A NIGHT OF HEAVY DRINKING: NEVER
HOW OFTEN DURING THE LAST YEAR HAVE YOU NEEDED AN ALCOHOLIC DRINK FIRST THING IN THE MORNING TO GET YOURSELF GOING AFTER A NIGHT OF HEAVY DRINKING: NEVER
HAVE YOU OR SOMEONE ELSE BEEN INJURED AS A RESULT OF YOUR DRINKING: NO
HAVE YOU OR SOMEONE ELSE BEEN INJURED AS A RESULT OF YOUR DRINKING: NO
HOW OFTEN DO YOU HAVE A DRINK CONTAINING ALCOHOL: 4 OR MORE TIMES A WEEK
HOW OFTEN DURING THE LAST YEAR HAVE YOU FOUND THAT YOU WERE NOT ABLE TO STOP DRINKING ONCE YOU HAD STARTED: NEVER

## 2024-04-14 ASSESSMENT — PATIENT HEALTH QUESTIONNAIRE - PHQ9
SUM OF ALL RESPONSES TO PHQ QUESTIONS 1-9: 0
SUM OF ALL RESPONSES TO PHQ QUESTIONS 1-9: 0
SUM OF ALL RESPONSES TO PHQ9 QUESTIONS 1 & 2: 0
SUM OF ALL RESPONSES TO PHQ QUESTIONS 1-9: 0
1. LITTLE INTEREST OR PLEASURE IN DOING THINGS: NOT AT ALL
2. FEELING DOWN, DEPRESSED OR HOPELESS: NOT AT ALL
SUM OF ALL RESPONSES TO PHQ QUESTIONS 1-9: 0

## 2024-04-16 ENCOUNTER — OFFICE VISIT (OUTPATIENT)
Dept: INTERNAL MEDICINE | Age: 79
End: 2024-04-16
Payer: MEDICARE

## 2024-04-16 VITALS
DIASTOLIC BLOOD PRESSURE: 60 MMHG | HEIGHT: 66 IN | HEART RATE: 66 BPM | OXYGEN SATURATION: 98 % | WEIGHT: 217 LBS | SYSTOLIC BLOOD PRESSURE: 112 MMHG | BODY MASS INDEX: 34.87 KG/M2

## 2024-04-16 DIAGNOSIS — B00.1 COLD SORE: ICD-10-CM

## 2024-04-16 DIAGNOSIS — I10 ESSENTIAL HYPERTENSION: ICD-10-CM

## 2024-04-16 DIAGNOSIS — N18.31 CHRONIC KIDNEY DISEASE, STAGE 3A (HCC): ICD-10-CM

## 2024-04-16 DIAGNOSIS — R73.9 HYPERGLYCEMIA: ICD-10-CM

## 2024-04-16 DIAGNOSIS — D68.69 SECONDARY HYPERCOAGULABLE STATE (HCC): ICD-10-CM

## 2024-04-16 DIAGNOSIS — I48.0 PAROXYSMAL ATRIAL FIBRILLATION (HCC): ICD-10-CM

## 2024-04-16 DIAGNOSIS — Z00.00 MEDICARE ANNUAL WELLNESS VISIT, SUBSEQUENT: Primary | ICD-10-CM

## 2024-04-16 DIAGNOSIS — I10 PRIMARY HYPERTENSION: ICD-10-CM

## 2024-04-16 DIAGNOSIS — E66.01 SEVERE OBESITY (BMI 35.0-39.9) WITH COMORBIDITY (HCC): ICD-10-CM

## 2024-04-16 DIAGNOSIS — E89.2 POSTPROCEDURAL HYPOPARATHYROIDISM (HCC): ICD-10-CM

## 2024-04-16 DIAGNOSIS — E78.2 MIXED HYPERLIPIDEMIA: ICD-10-CM

## 2024-04-16 DIAGNOSIS — E87.6 HYPOKALEMIA: ICD-10-CM

## 2024-04-16 PROCEDURE — G0439 PPPS, SUBSEQ VISIT: HCPCS | Performed by: INTERNAL MEDICINE

## 2024-04-16 PROCEDURE — G8427 DOCREV CUR MEDS BY ELIG CLIN: HCPCS | Performed by: INTERNAL MEDICINE

## 2024-04-16 PROCEDURE — 1036F TOBACCO NON-USER: CPT | Performed by: INTERNAL MEDICINE

## 2024-04-16 PROCEDURE — 1123F ACP DISCUSS/DSCN MKR DOCD: CPT | Performed by: INTERNAL MEDICINE

## 2024-04-16 PROCEDURE — G8399 PT W/DXA RESULTS DOCUMENT: HCPCS | Performed by: INTERNAL MEDICINE

## 2024-04-16 PROCEDURE — G8417 CALC BMI ABV UP PARAM F/U: HCPCS | Performed by: INTERNAL MEDICINE

## 2024-04-16 PROCEDURE — 3074F SYST BP LT 130 MM HG: CPT | Performed by: INTERNAL MEDICINE

## 2024-04-16 PROCEDURE — 99213 OFFICE O/P EST LOW 20 MIN: CPT | Performed by: INTERNAL MEDICINE

## 2024-04-16 PROCEDURE — 1090F PRES/ABSN URINE INCON ASSESS: CPT | Performed by: INTERNAL MEDICINE

## 2024-04-16 PROCEDURE — 3078F DIAST BP <80 MM HG: CPT | Performed by: INTERNAL MEDICINE

## 2024-04-16 RX ORDER — ACYCLOVIR 400 MG/1
TABLET ORAL
Qty: 90 TABLET | Refills: 3
Start: 2024-04-16

## 2024-04-16 RX ORDER — POTASSIUM CHLORIDE 750 MG/1
10 TABLET, EXTENDED RELEASE ORAL 2 TIMES DAILY
Qty: 180 TABLET | Refills: 3 | Status: SHIPPED | OUTPATIENT
Start: 2024-04-16

## 2024-04-16 RX ORDER — POTASSIUM CHLORIDE 750 MG/1
10 TABLET, EXTENDED RELEASE ORAL 3 TIMES DAILY
Qty: 270 TABLET | Refills: 3 | Status: SHIPPED | OUTPATIENT
Start: 2024-04-16 | End: 2024-04-16 | Stop reason: SDUPTHER

## 2024-04-16 RX ORDER — FUROSEMIDE 20 MG/1
20 TABLET ORAL DAILY
Qty: 90 TABLET | Refills: 3 | Status: SHIPPED | OUTPATIENT
Start: 2024-04-16

## 2024-04-16 RX ORDER — LOSARTAN POTASSIUM AND HYDROCHLOROTHIAZIDE 25; 100 MG/1; MG/1
1 TABLET ORAL DAILY
Qty: 90 TABLET | Refills: 3 | Status: SHIPPED | OUTPATIENT
Start: 2024-04-16

## 2024-04-16 RX ORDER — CAL/D3/MAG11/ZINC/COP/MANG/BOR 600 MG-800
1 TABLET ORAL DAILY
Qty: 90 TABLET | Refills: 3
Start: 2024-04-16

## 2024-04-16 RX ORDER — DILTIAZEM HYDROCHLORIDE 360 MG/1
360 CAPSULE, EXTENDED RELEASE ORAL DAILY
Qty: 90 CAPSULE | Refills: 3 | Status: SHIPPED | OUTPATIENT
Start: 2024-04-16

## 2024-04-16 NOTE — PROGRESS NOTES
Refill: 3    9. Secondary hypercoagulable state (HCC)  Continue current anticoagulation and follow no issues    10. Cold sore  Takes as needed Zovirax written by Dr. Curry recently changed from Famvir to Zovirax  - acyclovir (ZOVIRAX) 400 MG tablet; Tid for 3 days prn fever blister  Dispense: 90 tablet; Refill: 3    11. Hyperglycemia  Keep working toward weight loss healthy diet exercise  - Hemoglobin A1C; Future    12. Mixed hyperlipidemia  Continue current medical plan of care  - Lipid Panel; Future                      Medicare Annual Wellness Visit    [place] RACHEL Amezcua is here for Medicare AW    Assessment & Plan   Medicare annual wellness visit, subsequent  Paroxysmal atrial fibrillation (HCC)  -     dilTIAZem (CARDIZEM CD) 360 MG extended release capsule; Take 1 capsule by mouth daily, Disp-90 capsule, R-3Normal  -     apixaban (ELIQUIS) 5 MG TABS tablet; TAKE 1 TABLET TWICE A DAY, Disp-180 tablet, R-3Normal  Primary hypertension  -     losartan-hydroCHLOROthiazide (HYZAAR) 100-25 MG per tablet; Take 1 tablet by mouth daily, Disp-90 tablet, R-3Normal  -     CBC; Future  -     Comprehensive Metabolic Panel; Future  Chronic kidney disease, stage 3a (HCC)  Hypokalemia  -     potassium chloride (KLOR-CON M) 10 MEQ extended release tablet; Take 1 tablet by mouth 2 times daily, Disp-180 tablet, R-3Disregard script for tid - in future bidNormal  Severe obesity (BMI 35.0-39.9) with comorbidity (HCC)  Postprocedural hypoparathyroidism (HCC)  Essential hypertension  -     dilTIAZem (CARDIZEM CD) 360 MG extended release capsule; Take 1 capsule by mouth daily, Disp-90 capsule, R-3Normal  Secondary hypercoagulable state (HCC)  Cold sore  -     acyclovir (ZOVIRAX) 400 MG tablet; Tid for 3 days prn fever blister, Disp-90 tablet, R-3NO PRINT  Hyperglycemia  -     Hemoglobin A1C; Future  Mixed hyperlipidemia  -     Lipid Panel; Future    Recommendations for Preventive Services Due: see orders and patient

## 2024-04-20 ASSESSMENT — ENCOUNTER SYMPTOMS
SHORTNESS OF BREATH: 1
ABDOMINAL PAIN: 0
COUGH: 0
BACK PAIN: 1
EYE REDNESS: 0
SINUS PRESSURE: 0
EYE DISCHARGE: 0
ABDOMINAL DISTENTION: 0

## 2024-05-10 DIAGNOSIS — J01.10 ACUTE NON-RECURRENT FRONTAL SINUSITIS: Primary | ICD-10-CM

## 2024-05-10 RX ORDER — AZITHROMYCIN 250 MG/1
TABLET, FILM COATED ORAL
Qty: 6 TABLET | Refills: 0 | Status: SHIPPED | OUTPATIENT
Start: 2024-05-10 | End: 2024-05-20

## 2024-05-10 RX ORDER — GUAIFENESIN 600 MG/1
600 TABLET, EXTENDED RELEASE ORAL 2 TIMES DAILY
Qty: 30 TABLET | Refills: 0 | Status: SHIPPED | OUTPATIENT
Start: 2024-05-10 | End: 2024-05-25

## 2024-05-16 ENCOUNTER — HOSPITAL ENCOUNTER (OUTPATIENT)
Facility: HOSPITAL | Age: 79
Setting detail: OBSERVATION
Discharge: HOME | End: 2024-05-17
Attending: EMERGENCY MEDICINE | Admitting: FAMILY MEDICINE
Payer: MEDICARE

## 2024-05-16 DIAGNOSIS — I48.91 ATRIAL FIBRILLATION WITH RVR: Primary | ICD-10-CM

## 2024-05-16 LAB
ALBUMIN SERPL-MCNC: 4.8 G/DL (ref 3.5–5.2)
ALBUMIN/GLOB SERPL: 1.8 G/DL
ALP SERPL-CCNC: 89 U/L (ref 39–117)
ALT SERPL W P-5'-P-CCNC: 15 U/L (ref 1–33)
ANION GAP SERPL CALCULATED.3IONS-SCNC: 14 MMOL/L (ref 5–15)
AST SERPL-CCNC: 18 U/L (ref 1–32)
BACTERIA UR QL AUTO: ABNORMAL /HPF
BASOPHILS # BLD AUTO: 0.05 10*3/MM3 (ref 0–0.2)
BASOPHILS NFR BLD AUTO: 0.5 % (ref 0–1.5)
BILIRUB SERPL-MCNC: 0.4 MG/DL (ref 0–1.2)
BILIRUB UR QL STRIP: NEGATIVE
BUN SERPL-MCNC: 19 MG/DL (ref 8–23)
BUN/CREAT SERPL: 25.3 (ref 7–25)
CALCIUM SPEC-SCNC: 10 MG/DL (ref 8.6–10.5)
CHLORIDE SERPL-SCNC: 97 MMOL/L (ref 98–107)
CLARITY UR: CLEAR
CO2 SERPL-SCNC: 27 MMOL/L (ref 22–29)
COLOR UR: YELLOW
CREAT SERPL-MCNC: 0.75 MG/DL (ref 0.57–1)
DEPRECATED RDW RBC AUTO: 40 FL (ref 37–54)
EGFRCR SERPLBLD CKD-EPI 2021: 81.6 ML/MIN/1.73
EOSINOPHIL # BLD AUTO: 0.27 10*3/MM3 (ref 0–0.4)
EOSINOPHIL NFR BLD AUTO: 2.6 % (ref 0.3–6.2)
ERYTHROCYTE [DISTWIDTH] IN BLOOD BY AUTOMATED COUNT: 12.7 % (ref 12.3–15.4)
GLOBULIN UR ELPH-MCNC: 2.7 GM/DL
GLUCOSE SERPL-MCNC: 130 MG/DL (ref 65–99)
GLUCOSE UR STRIP-MCNC: NEGATIVE MG/DL
HCT VFR BLD AUTO: 38.8 % (ref 34–46.6)
HGB BLD-MCNC: 13 G/DL (ref 12–15.9)
HGB UR QL STRIP.AUTO: NEGATIVE
HOLD SPECIMEN: NORMAL
HYALINE CASTS UR QL AUTO: ABNORMAL /LPF
IMM GRANULOCYTES # BLD AUTO: 0.04 10*3/MM3 (ref 0–0.05)
IMM GRANULOCYTES NFR BLD AUTO: 0.4 % (ref 0–0.5)
KETONES UR QL STRIP: ABNORMAL
LEUKOCYTE ESTERASE UR QL STRIP.AUTO: ABNORMAL
LYMPHOCYTES # BLD AUTO: 1.71 10*3/MM3 (ref 0.7–3.1)
LYMPHOCYTES NFR BLD AUTO: 16.5 % (ref 19.6–45.3)
MCH RBC QN AUTO: 29 PG (ref 26.6–33)
MCHC RBC AUTO-ENTMCNC: 33.5 G/DL (ref 31.5–35.7)
MCV RBC AUTO: 86.4 FL (ref 79–97)
MONOCYTES # BLD AUTO: 0.81 10*3/MM3 (ref 0.1–0.9)
MONOCYTES NFR BLD AUTO: 7.8 % (ref 5–12)
NEUTROPHILS NFR BLD AUTO: 7.49 10*3/MM3 (ref 1.7–7)
NEUTROPHILS NFR BLD AUTO: 72.2 % (ref 42.7–76)
NITRITE UR QL STRIP: NEGATIVE
NRBC BLD AUTO-RTO: 0 /100 WBC (ref 0–0.2)
PH UR STRIP.AUTO: 6.5 [PH] (ref 5–8)
PLATELET # BLD AUTO: 287 10*3/MM3 (ref 140–450)
PMV BLD AUTO: 10.5 FL (ref 6–12)
POTASSIUM SERPL-SCNC: 3.5 MMOL/L (ref 3.5–5.2)
PROT SERPL-MCNC: 7.5 G/DL (ref 6–8.5)
PROT UR QL STRIP: NEGATIVE
RBC # BLD AUTO: 4.49 10*6/MM3 (ref 3.77–5.28)
RBC # UR STRIP: ABNORMAL /HPF
REF LAB TEST METHOD: ABNORMAL
SODIUM SERPL-SCNC: 138 MMOL/L (ref 136–145)
SP GR UR STRIP: 1.01 (ref 1–1.03)
SQUAMOUS #/AREA URNS HPF: ABNORMAL /HPF
TROPONIN T SERPL HS-MCNC: 15 NG/L
UROBILINOGEN UR QL STRIP: ABNORMAL
WBC # UR STRIP: ABNORMAL /HPF
WBC NRBC COR # BLD AUTO: 10.37 10*3/MM3 (ref 3.4–10.8)
WHOLE BLOOD HOLD COAG: NORMAL
WHOLE BLOOD HOLD SPECIMEN: NORMAL

## 2024-05-16 PROCEDURE — 99285 EMERGENCY DEPT VISIT HI MDM: CPT

## 2024-05-16 PROCEDURE — 96376 TX/PRO/DX INJ SAME DRUG ADON: CPT

## 2024-05-16 PROCEDURE — 85025 COMPLETE CBC W/AUTO DIFF WBC: CPT | Performed by: EMERGENCY MEDICINE

## 2024-05-16 PROCEDURE — 80053 COMPREHEN METABOLIC PANEL: CPT | Performed by: EMERGENCY MEDICINE

## 2024-05-16 PROCEDURE — 84484 ASSAY OF TROPONIN QUANT: CPT | Performed by: EMERGENCY MEDICINE

## 2024-05-16 PROCEDURE — 81001 URINALYSIS AUTO W/SCOPE: CPT | Performed by: EMERGENCY MEDICINE

## 2024-05-16 PROCEDURE — 25810000003 SODIUM CHLORIDE 0.9 % SOLUTION: Performed by: EMERGENCY MEDICINE

## 2024-05-16 PROCEDURE — 83735 ASSAY OF MAGNESIUM: CPT | Performed by: STUDENT IN AN ORGANIZED HEALTH CARE EDUCATION/TRAINING PROGRAM

## 2024-05-16 PROCEDURE — 93005 ELECTROCARDIOGRAM TRACING: CPT

## 2024-05-16 RX ORDER — DILTIAZEM HYDROCHLORIDE 5 MG/ML
20 INJECTION INTRAVENOUS ONCE
Status: COMPLETED | OUTPATIENT
Start: 2024-05-16 | End: 2024-05-16

## 2024-05-16 RX ORDER — SODIUM CHLORIDE 0.9 % (FLUSH) 0.9 %
10 SYRINGE (ML) INJECTION AS NEEDED
Status: DISCONTINUED | OUTPATIENT
Start: 2024-05-16 | End: 2024-05-17 | Stop reason: HOSPADM

## 2024-05-16 RX ADMIN — SODIUM CHLORIDE 1000 ML: 9 INJECTION, SOLUTION INTRAVENOUS at 22:09

## 2024-05-16 RX ADMIN — DILTIAZEM HYDROCHLORIDE 20 MG: 5 INJECTION INTRAVENOUS at 22:09

## 2024-05-17 VITALS
OXYGEN SATURATION: 95 % | RESPIRATION RATE: 16 BRPM | HEIGHT: 66 IN | BODY MASS INDEX: 33.75 KG/M2 | WEIGHT: 210 LBS | TEMPERATURE: 98.3 F | DIASTOLIC BLOOD PRESSURE: 51 MMHG | SYSTOLIC BLOOD PRESSURE: 121 MMHG | HEART RATE: 60 BPM

## 2024-05-17 PROBLEM — I48.91 ATRIAL FIBRILLATION WITH RVR: Status: ACTIVE | Noted: 2024-05-17

## 2024-05-17 LAB
ALBUMIN SERPL-MCNC: 4.4 G/DL (ref 3.5–5.2)
ALBUMIN/GLOB SERPL: 2.1 G/DL
ALP SERPL-CCNC: 75 U/L (ref 39–117)
ALT SERPL W P-5'-P-CCNC: 12 U/L (ref 1–33)
ANION GAP SERPL CALCULATED.3IONS-SCNC: 12 MMOL/L (ref 5–15)
AST SERPL-CCNC: 12 U/L (ref 1–32)
BASOPHILS # BLD AUTO: 0.04 10*3/MM3 (ref 0–0.2)
BASOPHILS NFR BLD AUTO: 0.5 % (ref 0–1.5)
BILIRUB SERPL-MCNC: 0.5 MG/DL (ref 0–1.2)
BUN SERPL-MCNC: 13 MG/DL (ref 8–23)
BUN/CREAT SERPL: 22 (ref 7–25)
CALCIUM SPEC-SCNC: 9.2 MG/DL (ref 8.6–10.5)
CHLORIDE SERPL-SCNC: 101 MMOL/L (ref 98–107)
CO2 SERPL-SCNC: 26 MMOL/L (ref 22–29)
CREAT SERPL-MCNC: 0.59 MG/DL (ref 0.57–1)
DEPRECATED RDW RBC AUTO: 40.2 FL (ref 37–54)
EGFRCR SERPLBLD CKD-EPI 2021: 92.4 ML/MIN/1.73
EOSINOPHIL # BLD AUTO: 0.09 10*3/MM3 (ref 0–0.4)
EOSINOPHIL NFR BLD AUTO: 1.1 % (ref 0.3–6.2)
ERYTHROCYTE [DISTWIDTH] IN BLOOD BY AUTOMATED COUNT: 12.7 % (ref 12.3–15.4)
GLOBULIN UR ELPH-MCNC: 2.1 GM/DL
GLUCOSE SERPL-MCNC: 123 MG/DL (ref 65–99)
HCT VFR BLD AUTO: 37.4 % (ref 34–46.6)
HGB BLD-MCNC: 12.3 G/DL (ref 12–15.9)
IMM GRANULOCYTES # BLD AUTO: 0.04 10*3/MM3 (ref 0–0.05)
IMM GRANULOCYTES NFR BLD AUTO: 0.5 % (ref 0–0.5)
LYMPHOCYTES # BLD AUTO: 1.46 10*3/MM3 (ref 0.7–3.1)
LYMPHOCYTES NFR BLD AUTO: 17.3 % (ref 19.6–45.3)
MAGNESIUM SERPL-MCNC: 1.9 MG/DL (ref 1.6–2.4)
MAGNESIUM SERPL-MCNC: 2.1 MG/DL (ref 1.6–2.4)
MCH RBC QN AUTO: 28.7 PG (ref 26.6–33)
MCHC RBC AUTO-ENTMCNC: 32.9 G/DL (ref 31.5–35.7)
MCV RBC AUTO: 87.2 FL (ref 79–97)
MONOCYTES # BLD AUTO: 0.66 10*3/MM3 (ref 0.1–0.9)
MONOCYTES NFR BLD AUTO: 7.8 % (ref 5–12)
NEUTROPHILS NFR BLD AUTO: 6.14 10*3/MM3 (ref 1.7–7)
NEUTROPHILS NFR BLD AUTO: 72.8 % (ref 42.7–76)
PHOSPHATE SERPL-MCNC: 3.1 MG/DL (ref 2.5–4.5)
PLATELET # BLD AUTO: 227 10*3/MM3 (ref 140–450)
PMV BLD AUTO: 10.5 FL (ref 6–12)
POTASSIUM SERPL-SCNC: 3 MMOL/L (ref 3.5–5.2)
PROT SERPL-MCNC: 6.5 G/DL (ref 6–8.5)
RBC # BLD AUTO: 4.29 10*6/MM3 (ref 3.77–5.28)
SODIUM SERPL-SCNC: 139 MMOL/L (ref 136–145)
WBC NRBC COR # BLD AUTO: 8.43 10*3/MM3 (ref 3.4–10.8)

## 2024-05-17 PROCEDURE — 96365 THER/PROPH/DIAG IV INF INIT: CPT

## 2024-05-17 PROCEDURE — 96376 TX/PRO/DX INJ SAME DRUG ADON: CPT

## 2024-05-17 PROCEDURE — A9270 NON-COVERED ITEM OR SERVICE: HCPCS | Performed by: STUDENT IN AN ORGANIZED HEALTH CARE EDUCATION/TRAINING PROGRAM

## 2024-05-17 PROCEDURE — 63710000001 HYDROCHLOROTHIAZIDE 25 MG TABLET: Performed by: STUDENT IN AN ORGANIZED HEALTH CARE EDUCATION/TRAINING PROGRAM

## 2024-05-17 PROCEDURE — 63710000001 LABETALOL 300 MG TABLET: Performed by: STUDENT IN AN ORGANIZED HEALTH CARE EDUCATION/TRAINING PROGRAM

## 2024-05-17 PROCEDURE — G0378 HOSPITAL OBSERVATION PER HR: HCPCS

## 2024-05-17 PROCEDURE — 83735 ASSAY OF MAGNESIUM: CPT | Performed by: STUDENT IN AN ORGANIZED HEALTH CARE EDUCATION/TRAINING PROGRAM

## 2024-05-17 PROCEDURE — 84100 ASSAY OF PHOSPHORUS: CPT | Performed by: STUDENT IN AN ORGANIZED HEALTH CARE EDUCATION/TRAINING PROGRAM

## 2024-05-17 PROCEDURE — 25810000003 LACTATED RINGERS SOLUTION: Performed by: STUDENT IN AN ORGANIZED HEALTH CARE EDUCATION/TRAINING PROGRAM

## 2024-05-17 PROCEDURE — 93005 ELECTROCARDIOGRAM TRACING: CPT | Performed by: STUDENT IN AN ORGANIZED HEALTH CARE EDUCATION/TRAINING PROGRAM

## 2024-05-17 PROCEDURE — 85027 COMPLETE CBC AUTOMATED: CPT | Performed by: STUDENT IN AN ORGANIZED HEALTH CARE EDUCATION/TRAINING PROGRAM

## 2024-05-17 PROCEDURE — 63710000001 FUROSEMIDE 40 MG TABLET: Performed by: STUDENT IN AN ORGANIZED HEALTH CARE EDUCATION/TRAINING PROGRAM

## 2024-05-17 PROCEDURE — 63710000001 APIXABAN 5 MG TABLET: Performed by: STUDENT IN AN ORGANIZED HEALTH CARE EDUCATION/TRAINING PROGRAM

## 2024-05-17 PROCEDURE — 96366 THER/PROPH/DIAG IV INF ADDON: CPT

## 2024-05-17 PROCEDURE — 36415 COLL VENOUS BLD VENIPUNCTURE: CPT

## 2024-05-17 PROCEDURE — 63710000001 VITAMIN B 12 500 MCG TABLET: Performed by: STUDENT IN AN ORGANIZED HEALTH CARE EDUCATION/TRAINING PROGRAM

## 2024-05-17 PROCEDURE — 63710000001 LOSARTAN 50 MG TABLET: Performed by: STUDENT IN AN ORGANIZED HEALTH CARE EDUCATION/TRAINING PROGRAM

## 2024-05-17 PROCEDURE — 96375 TX/PRO/DX INJ NEW DRUG ADDON: CPT

## 2024-05-17 PROCEDURE — 25010000002 DIGOXIN PER 500 MCG: Performed by: STUDENT IN AN ORGANIZED HEALTH CARE EDUCATION/TRAINING PROGRAM

## 2024-05-17 PROCEDURE — 63710000001 LEVOTHYROXINE 100 MCG TABLET: Performed by: STUDENT IN AN ORGANIZED HEALTH CARE EDUCATION/TRAINING PROGRAM

## 2024-05-17 PROCEDURE — 80053 COMPREHEN METABOLIC PANEL: CPT | Performed by: STUDENT IN AN ORGANIZED HEALTH CARE EDUCATION/TRAINING PROGRAM

## 2024-05-17 PROCEDURE — 63710000001 DILTIAZEM CD 180 MG CAPSULE SUSTAINED-RELEASE 24 HR: Performed by: STUDENT IN AN ORGANIZED HEALTH CARE EDUCATION/TRAINING PROGRAM

## 2024-05-17 RX ORDER — FUROSEMIDE 20 MG/1
20 TABLET ORAL DAILY
COMMUNITY

## 2024-05-17 RX ORDER — SODIUM CHLORIDE 0.9 % (FLUSH) 0.9 %
10 SYRINGE (ML) INJECTION AS NEEDED
Status: DISCONTINUED | OUTPATIENT
Start: 2024-05-17 | End: 2024-05-17 | Stop reason: HOSPADM

## 2024-05-17 RX ORDER — NITROGLYCERIN 0.4 MG/1
0.4 TABLET SUBLINGUAL
Status: DISCONTINUED | OUTPATIENT
Start: 2024-05-17 | End: 2024-05-17 | Stop reason: HOSPADM

## 2024-05-17 RX ORDER — LABETALOL 300 MG/1
300 TABLET, FILM COATED ORAL 2 TIMES DAILY
COMMUNITY

## 2024-05-17 RX ORDER — LOSARTAN POTASSIUM 50 MG/1
100 TABLET ORAL
Status: DISCONTINUED | OUTPATIENT
Start: 2024-05-17 | End: 2024-05-17 | Stop reason: HOSPADM

## 2024-05-17 RX ORDER — FUROSEMIDE 20 MG/1
40 TABLET ORAL DAILY
Status: DISCONTINUED | OUTPATIENT
Start: 2024-05-17 | End: 2024-05-17 | Stop reason: HOSPADM

## 2024-05-17 RX ORDER — DIGOXIN 0.25 MG/ML
0.25 INJECTION INTRAMUSCULAR; INTRAVENOUS ONCE
Qty: 2 ML | Refills: 0 | Status: DISCONTINUED | OUTPATIENT
Start: 2024-05-17 | End: 2024-05-17

## 2024-05-17 RX ORDER — HYDROCHLOROTHIAZIDE 25 MG/1
25 TABLET ORAL
Status: DISCONTINUED | OUTPATIENT
Start: 2024-05-17 | End: 2024-05-17 | Stop reason: HOSPADM

## 2024-05-17 RX ORDER — ROSUVASTATIN CALCIUM 10 MG/1
10 TABLET, COATED ORAL DAILY
Status: DISCONTINUED | OUTPATIENT
Start: 2024-05-17 | End: 2024-05-17

## 2024-05-17 RX ORDER — LABETALOL 300 MG/1
300 TABLET, FILM COATED ORAL 2 TIMES DAILY
Status: DISCONTINUED | OUTPATIENT
Start: 2024-05-17 | End: 2024-05-17 | Stop reason: HOSPADM

## 2024-05-17 RX ORDER — SODIUM CHLORIDE 0.9 % (FLUSH) 0.9 %
10 SYRINGE (ML) INJECTION EVERY 12 HOURS SCHEDULED
Status: DISCONTINUED | OUTPATIENT
Start: 2024-05-17 | End: 2024-05-17 | Stop reason: HOSPADM

## 2024-05-17 RX ORDER — DILTIAZEM HCL/D5W 125 MG/125
5-15 PLASTIC BAG, INJECTION (ML) INTRAVENOUS
Status: DISCONTINUED | OUTPATIENT
Start: 2024-05-17 | End: 2024-05-17

## 2024-05-17 RX ORDER — GUAIFENESIN 600 MG/1
600 TABLET, EXTENDED RELEASE ORAL 2 TIMES DAILY
COMMUNITY

## 2024-05-17 RX ORDER — SODIUM CHLORIDE 9 MG/ML
40 INJECTION, SOLUTION INTRAVENOUS AS NEEDED
Status: DISCONTINUED | OUTPATIENT
Start: 2024-05-17 | End: 2024-05-17 | Stop reason: HOSPADM

## 2024-05-17 RX ORDER — DIGOXIN 0.25 MG/ML
0.25 INJECTION INTRAMUSCULAR; INTRAVENOUS ONCE
Status: COMPLETED | OUTPATIENT
Start: 2024-05-17 | End: 2024-05-17

## 2024-05-17 RX ORDER — ACYCLOVIR 400 MG/1
400 TABLET ORAL DAILY PRN
COMMUNITY

## 2024-05-17 RX ORDER — FLUTICASONE PROPIONATE 50 MCG
2 SPRAY, SUSPENSION (ML) NASAL DAILY PRN
Status: DISCONTINUED | OUTPATIENT
Start: 2024-05-17 | End: 2024-05-17 | Stop reason: HOSPADM

## 2024-05-17 RX ORDER — CHOLECALCIFEROL (VITAMIN D3) 125 MCG
1000 CAPSULE ORAL DAILY
Status: DISCONTINUED | OUTPATIENT
Start: 2024-05-17 | End: 2024-05-17 | Stop reason: HOSPADM

## 2024-05-17 RX ORDER — DIGOXIN 0.25 MG/ML
0.5 INJECTION INTRAMUSCULAR; INTRAVENOUS ONCE
Qty: 2 ML | Refills: 0 | Status: COMPLETED | OUTPATIENT
Start: 2024-05-17 | End: 2024-05-17

## 2024-05-17 RX ORDER — LEVOTHYROXINE SODIUM 0.1 MG/1
100 TABLET ORAL
Status: DISCONTINUED | OUTPATIENT
Start: 2024-05-17 | End: 2024-05-17 | Stop reason: HOSPADM

## 2024-05-17 RX ORDER — DILTIAZEM HYDROCHLORIDE 5 MG/ML
20 INJECTION INTRAVENOUS ONCE
Status: COMPLETED | OUTPATIENT
Start: 2024-05-17 | End: 2024-05-17

## 2024-05-17 RX ORDER — ROSUVASTATIN CALCIUM 10 MG/1
10 TABLET, COATED ORAL NIGHTLY
Status: DISCONTINUED | OUTPATIENT
Start: 2024-05-17 | End: 2024-05-17 | Stop reason: HOSPADM

## 2024-05-17 RX ORDER — DILTIAZEM HYDROCHLORIDE 180 MG/1
360 CAPSULE, COATED, EXTENDED RELEASE ORAL
Status: DISCONTINUED | OUTPATIENT
Start: 2024-05-17 | End: 2024-05-17 | Stop reason: HOSPADM

## 2024-05-17 RX ADMIN — SODIUM CHLORIDE, POTASSIUM CHLORIDE, SODIUM LACTATE AND CALCIUM CHLORIDE 1000 ML: 600; 310; 30; 20 INJECTION, SOLUTION INTRAVENOUS at 03:42

## 2024-05-17 RX ADMIN — LOSARTAN POTASSIUM 100 MG: 50 TABLET, FILM COATED ORAL at 08:40

## 2024-05-17 RX ADMIN — DIGOXIN 0.5 MG: 0.25 INJECTION INTRAMUSCULAR; INTRAVENOUS at 04:49

## 2024-05-17 RX ADMIN — LEVOTHYROXINE SODIUM 100 MCG: 100 TABLET ORAL at 05:05

## 2024-05-17 RX ADMIN — HYDROCHLOROTHIAZIDE 25 MG: 25 TABLET ORAL at 08:40

## 2024-05-17 RX ADMIN — DIGOXIN 0.25 MG: 0.25 INJECTION INTRAMUSCULAR; INTRAVENOUS at 10:29

## 2024-05-17 RX ADMIN — DILTIAZEM HYDROCHLORIDE 20 MG: 5 INJECTION INTRAVENOUS at 00:18

## 2024-05-17 RX ADMIN — APIXABAN 5 MG: 5 TABLET, FILM COATED ORAL at 08:38

## 2024-05-17 RX ADMIN — DILTIAZEM HYDROCHLORIDE 360 MG: 180 CAPSULE, COATED, EXTENDED RELEASE ORAL at 08:39

## 2024-05-17 RX ADMIN — LABETALOL HYDROCHLORIDE 300 MG: 300 TABLET, FILM COATED ORAL at 08:40

## 2024-05-17 RX ADMIN — Medication 5 MG/HR: at 02:43

## 2024-05-17 RX ADMIN — Medication 1000 MCG: at 08:41

## 2024-05-17 RX ADMIN — FUROSEMIDE 20 MG: 40 TABLET ORAL at 08:42

## 2024-05-17 RX ADMIN — Medication 10 ML: at 10:30

## 2024-05-17 NOTE — H&P
[facility] Medicine Services  HISTORY AND PHYSICAL    Date of Admission: 5/16/2024  Primary Care Physician: Ania Culver MD    Subjective   Primary Historian: Patient    Chief Complaint: Palpitations    History of Present Illness  78-year-old female with paroxysmal atrial fibrillation presents to the hospital with chief complaint of A-fib with RVR.  Apparently the patient was on the boat today and was not really doing much but did get somewhat high and then she started to feel her heart rate go high.  She states it is not as bad as it normally is where she feels it in the 150s to 160s but it was still significantly elevated so she decided to come to the emergency department for further workup.  Upon evaluation in the emergency department heart rate was in 137 with a stable blood pressure and in atrial fibrillation for this reason she was given 1 L bolus which did not help and therefore given 2 doses of Cardizem 20 mg IV which did slow her for some time but then she went back up and therefore he placed her on the Cardizem drip.  At this time rate is controlled in the 80s I am going to try to give her another bolus of IV fluids and see if the rate is controlled off the Cardizem drip.  I explained this to the patient that dehydration is most likely because of her RVR today and we will attempt to stop the drip and see if this helps.  Patient was taken off of her flecainide as she has been fairly controlled recently and her last ablation was last October.  At this time she is hemodynamically stable and overall doing well.  She expressed understanding and agreement with the plan and had no further questions or concerns at this time.        Review of Systems   Otherwise complete ROS reviewed and negative except as mentioned in the HPI.    Past Medical History:   Past Medical History:   Diagnosis Date    Abnormal EKG 8/28/2023    Arthritis     Atrial fibrillation     Enlarged thyroid      Hyperlipidemia     Hypertension     PONV (postoperative nausea and vomiting)     Sleep apnea     Uses CPAP     Past Surgical History:  Past Surgical History:   Procedure Laterality Date    ABLATION OF DYSRHYTHMIC FOCUS  08/09/2022    CARDIAC CATHETERIZATION  1987    CARDIAC ELECTROPHYSIOLOGY PROCEDURE N/A 10/11/2023    Procedure: Ablation atrial fibrillation;  Surgeon: Marlon Carr MD;  Location: [facility] CATH INVASIVE LOCATION;  Service: Cardiovascular;  Laterality: N/A;    CARDIOVERSION      x2    COLONOSCOPY N/A 05/18/2021    Procedure: COLONOSCOPY WITH ANESTHESIA;  Surgeon: Delio Leslie MD;  Location: [facility] ENDOSCOPY;  Service: Gastroenterology;  Laterality: N/A;  Pre: Hx Colon Polyps  Post: Colon Polyps, Diverticulosis  Dr. Ania Oseguera  CO2 Inflation Used    COLONOSCOPY W/ POLYPECTOMY  03/31/2016    Tubular adenoma ascending colon repeat exam in 5 years    PARATHYROIDECTOMY Right 05/25/2022    Procedure: Bilateral parathyroid exploration with right inferior parathyroidectomy;  Surgeon: Eric Olvera MD;  Location: [facility] OR;  Service: ENT;  Laterality: Right;    REPLACEMENT TOTAL KNEE Right 07/2019    THYROIDECTOMY Bilateral 05/25/2022    Procedure: Total thyroidectomy;  Surgeon: Eric Olvera MD;  Location: [facility] OR;  Service: ENT;  Laterality: Bilateral;    TUBAL ABDOMINAL LIGATION       Social History:  reports that she quit smoking about 41 years ago. Her smoking use included cigarettes. She started smoking about 58 years ago. She has a 34 pack-year smoking history. She has never used smokeless tobacco. She reports current alcohol use of about 4.0 standard drinks of alcohol per week. She reports that she does not use drugs.    Family History: family history includes Arrhythmia in her mother; Atrial fibrillation in her mother; Heart attack in her father; Heart disease in her father; Hypertension in her father.       Allergies:  No Known Allergies    Medications:  Prior to Admission  medications    Medication Sig Start Date End Date Taking? Authorizing Provider   apixaban (ELIQUIS) 5 MG tablet tablet Take 1 tablet by mouth Every 12 (Twelve) Hours. 8/9/18   Mat Spencer APRN   Calcium Carbonate (CALTRATE 600 PO) Take  by mouth.    Judy Patton MD   dilTIAZem CD (CARDIZEM CD) 360 MG 24 hr capsule Take 1 capsule by mouth Daily. 5/17/23   Jovani Lovelace MD   famciclovir (FAMVIR) 500 MG tablet Take 3 tablets by mouth Daily As Needed (cold sores). Used as needed, when a skin eruption occurs, 11/4/19   Judy Patton MD   fluticasone (FLONASE) 50 MCG/ACT nasal spray 2 sprays into the nostril(s) as directed by provider Daily As Needed for Rhinitis or Allergies.    Judy Patton MD   furosemide (LASIX) 40 MG tablet Take 1 tablet by mouth Daily. 4/10/23   Steven Elizondo APRN   KLOR-CON 10 MEQ CR tablet Take 1 tablet by mouth 2 (Two) Times a Day. 3/22/17   Judy Patton MD   Krill Oil 500 MG capsule Take  by mouth.    Judy Patton MD   labetalol (NORMODYNE) 200 MG tablet Take 1.5 tablets by mouth 2 (Two) Times a Day. 10/11/23   Marlon Carr MD   levothyroxine (Synthroid) 100 MCG tablet Take 1 tablet by mouth Daily. 8/11/22   Galina Cordero APRN   losartan-hydrochlorothiazide (HYZAAR) 100-25 MG per tablet Take 1 tablet by mouth Daily.    Judy Patton MD   magnesium oxide (MAG-OX) 400 MG tablet Take 1 tablet by mouth Daily.    Judy Patton MD   Multiple Vitamins-Minerals (PRESERVISION AREDS 2 PO) Take 1 tablet by mouth 2 (Two) Times a Day.    Judy Patton MD   Omega-3 Fatty Acids (FISH OIL) 1000 MG capsule capsule Take 1 capsule by mouth Daily With Breakfast.    Judy Patton MD   potassium chloride (MICRO-K) 10 MEQ CR capsule Take 1 capsule by mouth 2 (Two) Times a Day.    Judy Patton MD   rosuvastatin (CRESTOR) 10 MG tablet Take 1 tablet by mouth Daily. 10/29/19   Judy Patton MD   vitamin B-12  "(CYANOCOBALAMIN) 1000 MCG tablet Take 1 tablet by mouth Daily. 10/10/23   Provider, MD Judy     I have utilized all available immediate resources to obtain, update, or review the patient's current medications (including all prescriptions, over-the-counter products, herbals, cannabis/cannabidiol products, and vitamin/mineral/dietary (nutritional) supplements).    Objective     Vital Signs: /74   Pulse 89   Temp 98.5 °F (36.9 °C) (Oral)   Resp 18   Ht 167.6 cm (66\")   Wt 95.3 kg (210 lb)   SpO2 96%   BMI 33.89 kg/m²   Physical Exam   General:  Awake, looks stated age, and pleasant  HEENT: PEERLA, EOM intact, oral mucosa is moist.  Skin: No rash, no jaundice, no ulcer.  Neck: no lymphadenopathy  CVS: Irregularly irregular rhythm, normal rate, +S1, +S2, no murmurs or rubs.  Lungs: No abnormal respiratory sounds. No tachypnea.  Abdomen: Nondistended, +BS, Nontender  Extremity: No leg edema. No cyanosis or clubbing.  Neurology: Alert and responsive and oriented to person, place, and time. No gross motor or sensorial deficits.      Results Reviewed:  Lab Results (last 24 hours)       Procedure Component Value Units Date/Time    Urinalysis With Microscopic If Indicated (No Culture) - Urine, Clean Catch [[id number]]  (Abnormal) Collected: 05/16/24 2213    Specimen: Urine, Clean Catch Updated: 05/16/24 2222     Color, UA Yellow     Appearance, UA Clear     pH, UA 6.5     Specific Gravity, UA 1.010     Glucose, UA Negative     Ketones, UA Trace     Bilirubin, UA Negative     Blood, UA Negative     Protein, UA Negative     Leuk Esterase, UA Trace     Nitrite, UA Negative     Urobilinogen, UA 0.2 E.U./dL    Urinalysis, Microscopic Only - Urine, Clean Catch [[id number]]  (Abnormal) Collected: 05/16/24 2213    Specimen: Urine, Clean Catch Updated: 05/16/24 2222     RBC, UA 3-5 /HPF      WBC, UA 0-2 /HPF      Bacteria, UA None Seen /HPF      Squamous Epithelial Cells, UA None Seen /HPF      Hyaline Casts, UA None " Seen /LPF      Methodology Automated Microscopy    Comprehensive Metabolic Panel [[id number]]  (Abnormal) Collected: 05/16/24 2116    Specimen: Blood Updated: 05/16/24 2210     Glucose 130 mg/dL      BUN 19 mg/dL      Creatinine 0.75 mg/dL      Sodium 138 mmol/L      Potassium 3.5 mmol/L      Comment: Slight hemolysis detected by analyzer. Result may be falsely elevated.        Chloride 97 mmol/L      CO2 27.0 mmol/L      Calcium 10.0 mg/dL      Total Protein 7.5 g/dL      Albumin 4.8 g/dL      ALT (SGPT) 15 U/L      AST (SGOT) 18 U/L      Comment: Slight hemolysis detected by analyzer. Result may be falsely elevated.        Alkaline Phosphatase 89 U/L      Total Bilirubin 0.4 mg/dL      Globulin 2.7 gm/dL      A/G Ratio 1.8 g/dL      BUN/Creatinine Ratio 25.3     Anion Gap 14.0 mmol/L      eGFR 81.6 mL/min/1.73     Narrative:      GFR Normal >60  Chronic Kidney Disease <60  Kidney Failure <15    The GFR formula is only valid for adults with stable renal function between ages 18 and 70.    Single High Sensitivity Troponin T [[id number]]  (Abnormal) Collected: 05/16/24 2116    Specimen: Blood Updated: 05/16/24 2207     HS Troponin T 15 ng/L     Narrative:      High Sensitive Troponin T Reference Range:  <14.0 ng/L- Negative Female for AMI  <22.0 ng/L- Negative Male for AMI  >=14 - Abnormal Female indicating possible myocardial injury.  >=22 - Abnormal Male indicating possible myocardial injury.   Clinicians would have to utilize clinical acumen, EKG, Troponin, and serial changes to determine if it is an Acute Myocardial Infarction or myocardial injury due to an underlying chronic condition.         CBC & Differential [[id number]]  (Abnormal) Collected: 05/16/24 2116    Specimen: Blood Updated: 05/16/24 2154    Narrative:      The following orders were created for panel order CBC & Differential.  Procedure                               Abnormality         Status                     ---------                                -----------         ------                     CBC Auto Differential[[id number]]        Abnormal            Final result                 Please view results for these tests on the individual orders.    CBC Auto Differential [[id number]]  (Abnormal) Collected: 05/16/24 2116    Specimen: Blood Updated: 05/16/24 2154     WBC 10.37 10*3/mm3      RBC 4.49 10*6/mm3      Hemoglobin 13.0 g/dL      Hematocrit 38.8 %      MCV 86.4 fL      MCH 29.0 pg      MCHC 33.5 g/dL      RDW 12.7 %      RDW-SD 40.0 fl      MPV 10.5 fL      Platelets 287 10*3/mm3      Neutrophil % 72.2 %      Lymphocyte % 16.5 %      Monocyte % 7.8 %      Eosinophil % 2.6 %      Basophil % 0.5 %      Immature Grans % 0.4 %      Neutrophils, Absolute 7.49 10*3/mm3      Lymphocytes, Absolute 1.71 10*3/mm3      Monocytes, Absolute 0.81 10*3/mm3      Eosinophils, Absolute 0.27 10*3/mm3      Basophils, Absolute 0.05 10*3/mm3      Immature Grans, Absolute 0.04 10*3/mm3      nRBC 0.0 /100 WBC     [place] Draw [[id number]] Collected: 05/16/24 2116    Specimen: Blood Updated: 05/16/24 2132    Narrative:      The following orders were created for panel order [place] Draw.  Procedure                               Abnormality         Status                     ---------                               -----------         ------                     Green Top (Gel)[[id number]]                                  Final result               Lavender Top[[id number]]                                     Final result               Red Top[[id number]]                                          Final result               Perez Top[[id number]]                                         Final result               Light Blue Top[[id number]]                                   Final result                 Please view results for these tests on the individual orders.    Green Top (Gel) [[id number]] Collected: 05/16/24 2116    Specimen: Blood Updated: 05/16/24 2132     Extra Tube Hold for add-ons.      Comment: Auto resulted.       Lavender Top [[id number]] Collected: 05/16/24 2116    Specimen: Blood Updated: 05/16/24 2132     Extra Tube hold for add-on     Comment: Auto resulted       Light Blue Top [[id number]] Collected: 05/16/24 2116    Specimen: Blood Updated: 05/16/24 2132     Extra Tube Hold for add-ons.     Comment: Auto resulted       Red Top [[id number]] Collected: 05/16/24 2116    Specimen: Blood Updated: 05/16/24 2132     Extra Tube Hold for add-ons.     Comment: Auto resulted.       Perez Top [[id number]] Collected: 05/16/24 2116    Specimen: Blood Updated: 05/16/24 2132     Extra Tube Hold for add-ons.     Comment: Auto resulted.             Imaging Results (Last 24 Hours)       ** No results found for the last 24 hours. **          I have personally reviewed and interpreted the radiology studies and ECG obtained at time of admission.     Assessment / Plan   Assessment:   Active Hospital Problems    Diagnosis     Atrial fibrillation with RVR        Treatment Plan  The patient will be admitted to my service here at [facility].     Medical Decision Making  Number and Complexity of problems: High  Differential Diagnosis:   # Atrial fibrillation with RVR  - Admit to observation  - Monitor on telemetry  - Will give 1 L bolus LR now  - Discontinue diltiazem drip  - If 1 L bolus does not control heart rate after discontinuation of the diltiazem drip will attempt to give digoxin and if this does not work then we will place her back on the diltiazem drip  - Consult Dr. Carr electrophysiology for further recommendations and assistance in care  - Restart home medications for guideline directed therapy including Eliquis 5 mg twice daily  - Repeat labs in the morning    Restart home medications for stable chronic medical concerns.    Conditions and Status        Condition is improving.     MDM Data  External documents reviewed: Care Everywhere  Cardiac tracing (EKG, telemetry) interpretation: A-fib with  RVR  Radiology interpretation: No imaging done  Labs reviewed: Yes  Any tests that were considered but not ordered: No     Decision rules/scores evaluated (example [id number], Wells, etc): N/A     Discussed with: Patient     Care Planning  Shared decision making: Patient  Code status and discussions: Full code    Disposition  Social Determinants of Health that impact treatment or disposition: None apparent  Estimated length of stay is 1 day.     I confirmed that the patient's advanced care plan is present, code status is documented, and a surrogate decision maker is listed in the patient's medical record.     The patient's surrogate decision maker is son.     The patient was seen and examined by me on 5/17/2024 at 0311.    Electronically signed by Alban Martinez DO, 05/17/24, 03:11 CDT.

## 2024-05-17 NOTE — ED NOTES
Nursing report ED to floor  Anne-Marie Hayes  78 y.o.  female    HPI:   Chief Complaint   Patient presents with    Atrial Fibrillation       Admitting doctor:   Cornell Herman MD    Consulting provider(s):  Consults       Date and Time Order Name Status Description    5/17/2024  3:11 AM Inpatient Cardiac Electrophysiology Consult               Admitting diagnosis:   The encounter diagnosis was Atrial fibrillation with RVR.    Code status:   Current Code Status       Date Active Code Status Order ID Comments User Context       5/17/2024 0311 CPR (Attempt to Resuscitate) [id number]  Mummaneni, Sundeep, DO ED        Question Answer    Code Status (Patient has no pulse and is not breathing) CPR (Attempt to Resuscitate)    Medical Interventions (Patient has pulse or is breathing) Full Support                    Allergies:   Patient has no known allergies.    Intake and Output  No intake or output data in the 24 hours ending 05/17/24 1336    Weight:       05/16/24 2058   Weight: 95.3 kg (210 lb)       Most recent vitals:   Vitals:    05/17/24 0931 05/17/24 1016 05/17/24 1041 05/17/24 1101   BP: 127/60 122/60  121/63   BP Location:       Patient Position:       Pulse: 69 67  67   Resp:   20    Temp:   97.8 °F (36.6 °C)    TempSrc:   Oral    SpO2: 95% 94%  98%   Weight:       Height:         Oxygen Therapy: .    Active LDAs/IV Access:   Lines, Drains & Airways       Active LDAs       Name Placement date Placement time Site Days    Peripheral IV 05/16/24 2116 Right Antecubital 05/16/24 2116  Antecubital  less than 1    Peripheral IV 05/17/24 Left Antecubital 05/17/24  --  Antecubital  less than 1                    Labs (abnormal labs have a star):   Labs Reviewed   COMPREHENSIVE METABOLIC PANEL - Abnormal; Notable for the following components:       Result Value    Glucose 130 (*)     Chloride 97 (*)     BUN/Creatinine Ratio 25.3 (*)     All other components within normal limits    Narrative:     GFR Normal  >60  Chronic Kidney Disease <60  Kidney Failure <15    The GFR formula is only valid for adults with stable renal function between ages 18 and 70.   URINALYSIS W/ MICROSCOPIC IF INDICATED (NO CULTURE) - Abnormal; Notable for the following components:    Ketones, UA Trace (*)     Leuk Esterase, UA Trace (*)     All other components within normal limits   SINGLE HS TROPONIN T - Abnormal; Notable for the following components:    HS Troponin T 15 (*)     All other components within normal limits    Narrative:     High Sensitive Troponin T Reference Range:  <14.0 ng/L- Negative Female for AMI  <22.0 ng/L- Negative Male for AMI  >=14 - Abnormal Female indicating possible myocardial injury.  >=22 - Abnormal Male indicating possible myocardial injury.   Clinicians would have to utilize clinical acumen, EKG, Troponin, and serial changes to determine if it is an Acute Myocardial Infarction or myocardial injury due to an underlying chronic condition.        CBC WITH AUTO DIFFERENTIAL - Abnormal; Notable for the following components:    Lymphocyte % 16.5 (*)     Neutrophils, Absolute 7.49 (*)     All other components within normal limits   URINALYSIS, MICROSCOPIC ONLY - Abnormal; Notable for the following components:    RBC, UA 3-5 (*)     All other components within normal limits   COMPREHENSIVE METABOLIC PANEL - Abnormal; Notable for the following components:    Glucose 123 (*)     Potassium 3.0 (*)     All other components within normal limits    Narrative:     GFR Normal >60  Chronic Kidney Disease <60  Kidney Failure <15    The GFR formula is only valid for adults with stable renal function between ages 18 and 70.   CBC WITH AUTO DIFFERENTIAL - Abnormal; Notable for the following components:    Lymphocyte % 17.3 (*)     All other components within normal limits   MAGNESIUM - Normal   PHOSPHORUS - Normal   MAGNESIUM - Normal   RAINBOW DRAW    Narrative:     The following orders were created for panel order [place]  Draw.  Procedure                               Abnormality         Status                     ---------                               -----------         ------                     Green Top (Gel)[[id number]]                                  Final result               Lavender Top[[id number]]                                     Final result               Red Top[[id number]]                                          Final result               Perez Top[[id number]]                                         Final result               Light Blue Top[[id number]]                                   Final result                 Please view results for these tests on the individual orders.   GREEN TOP   LAVENDER TOP   RED TOP   GRAY TOP   LIGHT BLUE TOP   CBC AND DIFFERENTIAL    Narrative:     The following orders were created for panel order CBC & Differential.  Procedure                               Abnormality         Status                     ---------                               -----------         ------                     CBC Auto Differential[[id number]]        Abnormal            Final result                 Please view results for these tests on the individual orders.   CBC AND DIFFERENTIAL    Narrative:     The following orders were created for panel order CBC & Differential.  Procedure                               Abnormality         Status                     ---------                               -----------         ------                     Manual Differential[[id number]]                                                         CBC Auto Differential[[id number]]        Abnormal            Final result                 Please view results for these tests on the individual orders.       Meds given in ED:   Medications   sodium chloride 0.9 % flush 10 mL (has no administration in time range)   apixaban (ELIQUIS) tablet 5 mg (5 mg Oral Given 5/17/24 0838)   dilTIAZem CD (CARDIZEM CD) 24 hr capsule 360 mg (360 mg Oral  Given 5/17/24 0839)   fluticasone (FLONASE) 50 MCG/ACT nasal spray 2 spray (has no administration in time range)   furosemide (LASIX) tablet 40 mg (20 mg Oral Given 5/17/24 0842)   labetalol (NORMODYNE) tablet 300 mg (300 mg Oral Given 5/17/24 0840)   levothyroxine (SYNTHROID, LEVOTHROID) tablet 100 mcg (100 mcg Oral Given 5/17/24 0505)   losartan (COZAAR) tablet 100 mg (100 mg Oral Given 5/17/24 0840)     And   hydroCHLOROthiazide tablet 25 mg (25 mg Oral Given 5/17/24 0840)   vitamin B-12 (CYANOCOBALAMIN) tablet 1,000 mcg (1,000 mcg Oral Given 5/17/24 0841)   nitroglycerin (NITROSTAT) SL tablet 0.4 mg (has no administration in time range)   sodium chloride 0.9 % flush 10 mL (10 mL Intravenous Given 5/17/24 1030)   sodium chloride 0.9 % flush 10 mL (has no administration in time range)   sodium chloride 0.9 % infusion 40 mL (has no administration in time range)   rosuvastatin (CRESTOR) tablet 10 mg (has no administration in time range)   sodium chloride 0.9 % bolus 1,000 mL (0 mL Intravenous Stopped 5/17/24 0010)   dilTIAZem (CARDIZEM) injection 20 mg (20 mg Intravenous Given 5/16/24 2209)   dilTIAZem (CARDIZEM) injection 20 mg (20 mg Intravenous Given 5/17/24 0018)   digoxin (LANOXIN) injection 0.5 mg (0.5 mg Intravenous Given 5/17/24 0449)     Followed by   digoxin (LANOXIN) injection 0.25 mg (0.25 mg Intravenous Given 5/17/24 1029)   lactated ringers bolus 1,000 mL (0 mL Intravenous Stopped 5/17/24 0400)           NIH Stroke Scale:       Isolation/Infection(s):  No active isolations   No active infections     COVID Testing  Collected .  Resulted .    Nursing report ED to floor:  Mental status: .  Ambulatory status: .  Precautions: .    ED nurse phone extentsion- ..

## 2024-05-17 NOTE — ED PROVIDER NOTES
Subjective   History of Present Illness  Pt presents to the  with report of palpitations/a. Fib.  Has hx of a. Fib and has had cardioversion/ablation in the past . Reports was on her boat this afternoon and began to feel palpitations. No SOB.  No CP.  No n/v/f/c.  Denies any new foods/meds.          Review of Systems   Constitutional:  Negative for fever.   Respiratory:  Negative for shortness of breath.    Cardiovascular:  Positive for palpitations. Negative for leg swelling.   Gastrointestinal:  Negative for abdominal pain, nausea and vomiting.   Neurological:  Negative for light-headedness.   All other systems reviewed and are negative.      Past Medical History:   Diagnosis Date    Abnormal EKG 8/28/2023    Arthritis     Atrial fibrillation     Enlarged thyroid     Hyperlipidemia     Hypertension     PONV (postoperative nausea and vomiting)     Sleep apnea     Uses CPAP       No Known Allergies    Past Surgical History:   Procedure Laterality Date    ABLATION OF DYSRHYTHMIC FOCUS  08/09/2022    CARDIAC CATHETERIZATION  1987    CARDIAC ELECTROPHYSIOLOGY PROCEDURE N/A 10/11/2023    Procedure: Ablation atrial fibrillation;  Surgeon: Marlon Carr MD;  Location: [facility] CATH INVASIVE LOCATION;  Service: Cardiovascular;  Laterality: N/A;    CARDIOVERSION      x2    COLONOSCOPY N/A 05/18/2021    Procedure: COLONOSCOPY WITH ANESTHESIA;  Surgeon: Delio Leslie MD;  Location: [facility] ENDOSCOPY;  Service: Gastroenterology;  Laterality: N/A;  Pre: Hx Colon Polyps  Post: Colon Polyps, Diverticulosis  Dr. Ania Oseguera  CO2 Inflation Used    COLONOSCOPY W/ POLYPECTOMY  03/31/2016    Tubular adenoma ascending colon repeat exam in 5 years    PARATHYROIDECTOMY Right 05/25/2022    Procedure: Bilateral parathyroid exploration with right inferior parathyroidectomy;  Surgeon: Eric Olvera MD;  Location: [facility] OR;  Service: ENT;  Laterality: Right;    REPLACEMENT TOTAL KNEE Right 07/2019    THYROIDECTOMY Bilateral  2022    Procedure: Total thyroidectomy;  Surgeon: Eric Olvera MD;  Location: [facility];  Service: ENT;  Laterality: Bilateral;    TUBAL ABDOMINAL LIGATION         Family History   Problem Relation Age of Onset    Atrial fibrillation Mother     Arrhythmia Mother     Heart disease Father     Heart attack Father     Hypertension Father     Breast cancer Neg Hx     Ovarian cancer Neg Hx     Uterine cancer Neg Hx     Colon cancer Neg Hx     Melanoma Neg Hx     Colon polyps Neg Hx        Social History     Socioeconomic History    Marital status:    Tobacco Use    Smoking status: Former     Current packs/day: 0.00     Average packs/day: 2.0 packs/day for 17.0 years (34.0 ttl pk-yrs)     Types: Cigarettes     Start date: 1966     Quit date: 1983     Years since quittin.4    Smokeless tobacco: Never    Tobacco comments:     [id number]   Vaping Use    Vaping status: Never Used   Substance and Sexual Activity    Alcohol use: Yes     Alcohol/week: 4.0 standard drinks of alcohol     Types: 4 Glasses of wine per week     Comment: about 1/2 glass of wine a night    Drug use: No    Sexual activity: Not Currently     Partners: Male     Birth control/protection: Condom, Tubal ligation, Birth control pill, Pill           Objective   Physical Exam  Vitals and nursing note reviewed.   Constitutional:       General: She is not in acute distress.  HENT:      Head: Normocephalic.      Nose: Nose normal.      Mouth/Throat:      Mouth: Mucous membranes are moist.   Eyes:      Conjunctiva/sclera: Conjunctivae normal.   Cardiovascular:      Rate and Rhythm: Tachycardia present. Rhythm irregular.      Pulses: Normal pulses.      Heart sounds: Normal heart sounds.   Pulmonary:      Effort: Pulmonary effort is normal.      Breath sounds: Normal breath sounds. No stridor.   Abdominal:      General: Abdomen is flat.      Palpations: Abdomen is soft.   Musculoskeletal:      Right lower leg: No edema.      Left  lower leg: No edema.   Skin:     General: Skin is warm and dry.      Capillary Refill: Capillary refill takes less than 2 seconds.   Neurological:      Mental Status: She is alert.         Procedures           ED Course                                             Medical Decision Making  Pt stable in EC - no evid of SBI/sepsis  Dbt ACS.  Pt with a. Fib with RVR.  Given cardizem 20mg X 2 in EC - she did respond to this but would return to RVR.  Did not convert to NSR.  D/w pt cardioversion vs cardizem gtt - she prefers cardizem and admit for further mgmt.  D/w Dr. Martinez - will accept for further mgmt.     Amount and/or Complexity of Data Reviewed  Labs: ordered.    Risk  Prescription drug management.        Final diagnoses:   Atrial fibrillation with RVR       ED Disposition  ED Disposition       ED Disposition   Decision to Admit    Condition   --    Comment   --               No follow-up provider specified.       Medication List      No changes were made to your prescriptions during this visit.            Bryan Cooley DO  05/17/24 0243

## 2024-05-17 NOTE — DISCHARGE SUMMARY
[facility]  DISCHARGE SUMMARY       Date of Admission: 5/16/2024  Date of Discharge:  5/17/2024  Primary Care Physician: Ania Culver MD    Presenting Problem/History of Present Illness:  78-year-old female with paroxysmal atrial fibrillation presents to the hospital with chief complaint of A-fib with RVR.  Apparently the patient was on the boat today and was not really doing much but did get somewhat high and then she started to feel her heart rate go high.  She states it is not as bad as it normally is where she feels it in the 150s to 160s but it was still significantly elevated so she decided to come to the emergency department for further workup.  Upon evaluation in the emergency department heart rate was in 137 with a stable blood pressure and in atrial fibrillation for this reason she was given 1 L bolus which did not help and therefore given 2 doses of Cardizem 20 mg IV which did slow her for some time but then she went back up and therefore he placed her on the Cardizem drip.  At this time rate is controlled in the 80s I am going to try to give her another bolus of IV fluids and see if the rate is controlled off the Cardizem drip.  I explained this to the patient that dehydration is most likely because of her RVR today and we will attempt to stop the drip and see if this helps.  Patient was taken off of her flecainide as she has been fairly controlled recently and her last ablation was last October.  At this time she is hemodynamically stable and overall doing well.  She expressed understanding and agreement with the plan and had no further questions or concerns at this time.     Final Discharge Diagnoses:  Active Hospital Problems    Diagnosis     **Atrial fibrillation with RVR        Consults: -    Procedures Performed: -    Pertinent Test Results:   Results for orders placed in visit on 02/18/22    Adult Transthoracic Echo Complete W/ Cont if  Necessary Per Protocol    Interpretation Summary  · Left ventricular ejection fraction appears to be 66 - 70%. Left ventricular systolic function is normal.  · Left ventricular diastolic function is consistent with (grade II w/high LAP) pseudonormalization.  · Normal right ventricular cavity size and systolic function noted.  · Estimated right ventricular systolic pressure from tricuspid regurgitation is mildly elevated (35-45 mmHg).  · There is no significant (greater than mild) valvular dysfunction.      Imaging Results (All)       None          LAB RESULTS:      Lab 05/17/24  0540 05/16/24 2116   WBC 8.43 10.37   HEMOGLOBIN 12.3 13.0   HEMATOCRIT 37.4 38.8   PLATELETS 227 287   NEUTROS ABS 6.14 7.49*   IMMATURE GRANS (ABS) 0.04 0.04   LYMPHS ABS 1.46 1.71   MONOS ABS 0.66 0.81   EOS ABS 0.09 0.27   MCV 87.2 86.4         Lab 05/17/24  0540 05/16/24 2116   SODIUM 139 138   POTASSIUM 3.0* 3.5   CHLORIDE 101 97*   CO2 26.0 27.0   ANION GAP 12.0 14.0   BUN 13 19   CREATININE 0.59 0.75   EGFR 92.4 81.6   GLUCOSE 123* 130*   CALCIUM 9.2 10.0   MAGNESIUM 1.9 2.1   PHOSPHORUS 3.1  --          Lab 05/17/24  0540 05/16/24 2116   TOTAL PROTEIN 6.5 7.5   ALBUMIN 4.4 4.8   GLOBULIN 2.1 2.7   ALT (SGPT) 12 15   AST (SGOT) 12 18   BILIRUBIN 0.5 0.4   ALK PHOS 75 89         Lab 05/16/24  2116   HSTROP T 15*                 Brief Urine Lab Results  (Last result in the past 365 days)        Color   Clarity   Blood   Leuk Est   Nitrite   Protein   CREAT   Urine HCG        05/16/24 2213 Yellow   Clear   Negative   Trace   Negative   Negative                 Microbiology Results (last 10 days)       ** No results found for the last 240 hours. **            Hospital Course:   78-year-old female with past medical history of atrial fibrillation who presents with rapid atrial fibrillation and did not respond to treatment in the emergency room.  She was placed on Cardizem drip and admitted to telemetry floor.  She was converted to sinus  "rhythm at about 3 in the morning and Cardizem drip was discontinued.  She has remained in sinus with rate of 60 to 80 bpm.  She would like to go home she states she has follow-up with Dr. Carr and feels in improved condition.    Physical Exam on Discharge:  /51 (BP Location: Right arm, Patient Position: Lying)   Pulse 60   Temp 98.3 °F (36.8 °C) (Oral)   Resp 16   Ht 167.6 cm (66\")   Wt 95.3 kg (210 lb)   SpO2 95%   BMI 33.89 kg/m²   Physical Exam  Vitals reviewed.   Constitutional:       General: She is not in acute distress.     Appearance: She is well-developed. She is not toxic-appearing.   HENT:      Head: Normocephalic and atraumatic.      Right Ear: External ear normal.      Left Ear: External ear normal.      Mouth/Throat:      Mouth: Mucous membranes are dry.      Pharynx: Oropharynx is clear.   Eyes:      General:         Right eye: No discharge.         Left eye: No discharge.      Extraocular Movements: Extraocular movements intact.      Conjunctiva/sclera: Conjunctivae normal.      Pupils: Pupils are equal, round, and reactive to light.   Neck:      Vascular: No JVD.   Cardiovascular:      Rate and Rhythm: Normal rate and regular rhythm.      Pulses: Normal pulses.      Heart sounds: Normal heart sounds. No murmur heard.     No friction rub. No gallop.   Pulmonary:      Effort: Pulmonary effort is normal. No respiratory distress.      Breath sounds: No stridor. No wheezing, rhonchi or rales.   Chest:      Chest wall: No tenderness.   Abdominal:      General: Bowel sounds are normal. There is no distension.      Palpations: Abdomen is soft.      Tenderness: There is no abdominal tenderness. There is no guarding or rebound.      Hernia: No hernia is present.   Musculoskeletal:         General: No swelling, tenderness or deformity. Normal range of motion.      Cervical back: Normal range of motion and neck supple. No rigidity or tenderness. No muscular tenderness.      Right lower leg: No " edema.      Left lower leg: No edema.   Skin:     General: Skin is warm and dry.      Findings: No erythema or rash.   Neurological:      General: No focal deficit present.      Mental Status: She is alert and oriented to person, place, and time.      Cranial Nerves: No cranial nerve deficit.      Sensory: No sensory deficit.      Motor: No weakness or abnormal muscle tone.      Deep Tendon Reflexes: Reflexes normal.   Psychiatric:         Mood and Affect: Mood normal.         Behavior: Behavior normal.     Condition on Discharge:   Stable    Discharge Disposition:  Home     Discharge Medications:     Discharge Medications        Continue These Medications        Instructions Start Date   acyclovir 400 MG tablet  Commonly known as: ZOVIRAX   400 mg, Oral, Daily PRN, 1 TID for 3 days then prn fever blister      apixaban 5 MG tablet tablet  Commonly known as: ELIQUIS   5 mg, Oral, Every 12 Hours Scheduled      Calcium 600+D3 Plus Minerals 600-800 MG-UNIT tablet   1 tablet, Oral, Daily      dilTIAZem  MG 24 hr capsule  Commonly known as: CARDIZEM CD   360 mg, Oral, Every 24 Hours Scheduled      fish oil 1000 MG capsule capsule   1,000 mg, Oral, Daily With Breakfast      fluticasone 50 MCG/ACT nasal spray  Commonly known as: FLONASE   2 sprays, Nasal, Daily PRN      furosemide 20 MG tablet  Commonly known as: LASIX   20 mg, Oral, Daily      guaiFENesin 600 MG 12 hr tablet  Commonly known as: MUCINEX   600 mg, Oral, 2 Times Daily      labetalol 300 MG tablet  Commonly known as: NORMODYNE   300 mg, Oral, 2 Times Daily      levothyroxine 100 MCG tablet  Commonly known as: Synthroid   100 mcg, Oral, Daily      losartan-hydrochlorothiazide 100-25 MG per tablet  Commonly known as: HYZAAR   1 tablet, Oral, Daily      potassium chloride 10 MEQ CR capsule  Commonly known as: MICRO-K   10 mEq, Oral, 3 Times Daily      PRESERVISION AREDS 2 PO   1 tablet, Oral, 2 Times Daily      rosuvastatin 10 MG tablet  Commonly known as:  CRESTOR   10 mg, Oral, Nightly      vitamin B-12 1000 MCG tablet  Commonly known as: CYANOCOBALAMIN   1,000 mcg, Oral, Daily             Discharge Diet:   Cardiac diet    Activity at Discharge:   Resume usual activity    Follow-up Appointments:   Future Appointments   Date Time Provider Department Center   9/9/2024  8:15 AM Jovani Lovelace MD [facility]   9/16/2024  1:30 PM Dalia Rudolph PA [facility]       Test Results Pending at Discharge: -    Electronically signed by Cornell Herman MD, 05/17/24, 15:24 CDT.    Time: 28 minutes.

## 2024-05-19 LAB
QT INTERVAL: 334 MS
QT INTERVAL: 444 MS
QTC INTERVAL: 489 MS
QTC INTERVAL: 501 MS

## 2024-05-28 ENCOUNTER — OFFICE VISIT (OUTPATIENT)
Dept: INTERNAL MEDICINE | Age: 79
End: 2024-05-28
Payer: MEDICARE

## 2024-05-28 VITALS
DIASTOLIC BLOOD PRESSURE: 70 MMHG | WEIGHT: 208 LBS | HEIGHT: 66 IN | HEART RATE: 65 BPM | SYSTOLIC BLOOD PRESSURE: 120 MMHG | BODY MASS INDEX: 33.43 KG/M2 | OXYGEN SATURATION: 99 %

## 2024-05-28 DIAGNOSIS — E87.6 HYPOKALEMIA: ICD-10-CM

## 2024-05-28 DIAGNOSIS — I48.0 PAROXYSMAL ATRIAL FIBRILLATION (HCC): Primary | ICD-10-CM

## 2024-05-28 DIAGNOSIS — I10 PRIMARY HYPERTENSION: ICD-10-CM

## 2024-05-28 PROCEDURE — G8399 PT W/DXA RESULTS DOCUMENT: HCPCS | Performed by: INTERNAL MEDICINE

## 2024-05-28 PROCEDURE — 1036F TOBACCO NON-USER: CPT | Performed by: INTERNAL MEDICINE

## 2024-05-28 PROCEDURE — 99214 OFFICE O/P EST MOD 30 MIN: CPT | Performed by: INTERNAL MEDICINE

## 2024-05-28 PROCEDURE — 1123F ACP DISCUSS/DSCN MKR DOCD: CPT | Performed by: INTERNAL MEDICINE

## 2024-05-28 PROCEDURE — G8417 CALC BMI ABV UP PARAM F/U: HCPCS | Performed by: INTERNAL MEDICINE

## 2024-05-28 PROCEDURE — 1090F PRES/ABSN URINE INCON ASSESS: CPT | Performed by: INTERNAL MEDICINE

## 2024-05-28 PROCEDURE — G8427 DOCREV CUR MEDS BY ELIG CLIN: HCPCS | Performed by: INTERNAL MEDICINE

## 2024-05-28 PROCEDURE — 3074F SYST BP LT 130 MM HG: CPT | Performed by: INTERNAL MEDICINE

## 2024-05-28 PROCEDURE — 3078F DIAST BP <80 MM HG: CPT | Performed by: INTERNAL MEDICINE

## 2024-05-28 NOTE — PROGRESS NOTES
Chief Complaint   Patient presents with    ED Follow-up     A-fib       HPI: Patient is here today for follow-up recently went back into atrial fibs.  She went to the ER at [facility] she had been out in the sun that day possibly dehydrated.  Potassium was 3.5 low end of normal when she went in and when they discharged her it was actually 3.0.  She was in the ER from 9 PM to 5:37 PM the next day and observed been discharged through the ER.  She feels better since then has not gone back into atrial fibs previously she was on flecainide it is unclear to me why EP wanted her off flecainide.  She was doing well.  She does have some bradycardia now.    Past Medical History:   Diagnosis Date    Arthritis     Heart palpitations     Hypercalcemia 8/7/2017    Hyperlipidemia     Hypertension     Impaired fasting glucose 8/7/2017    Lumbar spinal stenosis     Lung nodule, solitary 8/7/2017    Right upper lobe    Multinodular goiter 8/7/2017    Obstructive sleep apnea 10/11/2018    Paroxysmal atrial fibrillation (HCC) 8/17/2018    Primary osteoarthritis involving multiple joints 8/7/2017    Wears glasses        Past Surgical History:   Procedure Laterality Date    BREAST BIOPSY  12/03/2007    stereotactic left, fibrocystic changes    BREAST BIOPSY  12/17/2007    stereotactic left, fibrocystic changes    CARDIAC CATHETERIZATION      negative    CARDIOVERSION      8/2018    CARDIOVERSION      CATARACT REMOVAL Bilateral 2017    KNEE ARTHROPLASTY Right 07/22/2019    RIGHT PARTIAL KNEE REPLACEMENT performed by Rajeev Frost MD at [facility] OR    OTHER SURGICAL HISTORY  2013    skin cancer spot on her nose removed    THYROID SURGERY      TUBAL LIGATION         Family History   Problem Relation Age of Onset    Hypertension Father        Social History     Socioeconomic History    Marital status:      Spouse name: Not on file    Number of children: Not on file    Years of education: Not on file    Highest education level: Not

## 2024-05-31 ENCOUNTER — OFFICE VISIT (OUTPATIENT)
Dept: CARDIOLOGY | Facility: CLINIC | Age: 79
End: 2024-05-31
Payer: MEDICARE

## 2024-05-31 VITALS
BODY MASS INDEX: 33.91 KG/M2 | DIASTOLIC BLOOD PRESSURE: 78 MMHG | OXYGEN SATURATION: 98 % | SYSTOLIC BLOOD PRESSURE: 138 MMHG | HEIGHT: 66 IN | HEART RATE: 61 BPM | WEIGHT: 211 LBS

## 2024-05-31 DIAGNOSIS — I48.4 ATYPICAL ATRIAL FLUTTER: ICD-10-CM

## 2024-05-31 DIAGNOSIS — I48.19 PERSISTENT ATRIAL FIBRILLATION: Primary | ICD-10-CM

## 2024-05-31 PROBLEM — I48.91 ATRIAL FIBRILLATION WITH RVR: Status: RESOLVED | Noted: 2024-05-17 | Resolved: 2024-05-31

## 2024-05-31 NOTE — PROGRESS NOTES
"Chief Complaint  Atrial Fibrillation (2 WK FU )    Subjective        History of Present Illness    EP Problems:  1.  Persistent atrial fibrillation  -Prior AAD use: Flecainide, amiodarone  -8/9/2022: PVI, Lopez  -3/15/2023: Cardioversion  2.  First-degree AV block  3.  Right bundle branch block  4.  Atrial flutter (likely atypical)     Cardiology Problems:  1.  Hypertension  2.  Hyperlipidemia     Medical Problems:  1.  Obesity  -5/2023: BMI 36  -3/2024: BMI 38  -5/2024: BMI 34  2.  Obstructive sleep apnea      Anne-Marie Hayes is a 78 y.o. female with problem list as above who presents to the clinic for follow up of persistent atrial fibrillation, first-degree AV block, right bundle branch block.  She unfortunately had a recent recurrence of her atrial flutter prompting an ER visit.  She was treated with diltiazem infusion with spontaneous return of normal sinus rhythm.  This is her first recurrence since the time of her ablation.  She has been working hard on weight loss and exercise and has been doing well with this.    Objective   Vital Signs:  /78 (BP Location: Right arm, Patient Position: Sitting)   Pulse 61   Ht 167.6 cm (66\")   Wt 95.7 kg (211 lb)   SpO2 98%   BMI 34.06 kg/m²   Estimated body mass index is 34.06 kg/m² as calculated from the following:    Height as of this encounter: 167.6 cm (66\").    Weight as of this encounter: 95.7 kg (211 lb).      Physical Exam  Vitals reviewed.   Constitutional:       Appearance: Normal appearance.   Cardiovascular:      Rate and Rhythm: Normal rate and regular rhythm.      Pulses: Normal pulses.      Heart sounds: Normal heart sounds. No murmur heard.  Pulmonary:      Effort: Pulmonary effort is normal. No respiratory distress.      Breath sounds: Normal breath sounds.   Skin:     General: Skin is warm and dry.   Neurological:      General: No focal deficit present.      Mental Status: She is alert and oriented to person, place, and time. "   Psychiatric:         Mood and Affect: Mood normal.         Judgment: Judgment normal.        Result Review :  The following data was reviewed by: Marlon Carr MD on 05/31/2024:    [id number] SCORE   [id number] Score: 4 (5/31/2024  2:09 PM)                 Assessment and Plan   Diagnoses and all orders for this visit:    1. Persistent atrial fibrillation (Primary)  -     Basic Metabolic Panel; Future    2. Atypical atrial flutter        Anne-Marie Hayes is a 78 y.o. female with problem list as above who presents to the clinic for follow up of persistent atrial fibrillation, atrial flutter.  She unfortunately had an episode of atrial flutter though thankfully is now following a more paroxysmal course.  This is her first recurrence since the time of her ablation.  At this time, we will plan that did not change any of her medications and continue to work on weight loss, diet, exercise.  Should she continue have recurrences, redo ablation may be reasonable.  At this time, additional antiarrhythmics outweigh benefits.    Plan:  -No additional antiarrhythmics (as above); continue diltiazem and labetalol at current dose  -Continue apixaban given elevated [id number]  -Continue to work on weight loss, diet, exercise             Follow Up   Return in about 3 months (around 8/31/2024).  Patient was given instructions and counseling regarding her condition or for health maintenance advice. Please see specific information pulled into the AVS if appropriate.     Part of this note may be an electronic transcription/translation of spoken language to printed text using the Dragon Dictation System.

## 2024-06-03 ENCOUNTER — TELEPHONE (OUTPATIENT)
Dept: CARDIOLOGY | Facility: CLINIC | Age: 79
End: 2024-06-03
Payer: MEDICARE

## 2024-06-03 NOTE — TELEPHONE ENCOUNTER
Patient called in stating that she has went back into Afib last night or this morning. Her HR today is >100 and has taken her meds as prescribed. Patient wanting to be seen in office and not have to go to the ER. Per last office visit note, no medication changes were made. Please advise.

## 2024-06-03 NOTE — TELEPHONE ENCOUNTER
Patient notified, will start taking her Metoprolol as prescribed and will contact the office if she continues to have issues with her HR.

## 2024-06-07 ENCOUNTER — PREP FOR SURGERY (OUTPATIENT)
Dept: OTHER | Facility: HOSPITAL | Age: 79
End: 2024-06-07
Payer: MEDICARE

## 2024-06-07 ENCOUNTER — TELEPHONE (OUTPATIENT)
Dept: CARDIOLOGY | Facility: CLINIC | Age: 79
End: 2024-06-07
Payer: MEDICARE

## 2024-06-07 DIAGNOSIS — I48.19 PERSISTENT ATRIAL FIBRILLATION: Primary | ICD-10-CM

## 2024-06-07 DIAGNOSIS — I10 PRIMARY HYPERTENSION: ICD-10-CM

## 2024-06-07 DIAGNOSIS — E78.2 MIXED HYPERLIPIDEMIA: ICD-10-CM

## 2024-06-07 DIAGNOSIS — R73.9 HYPERGLYCEMIA: ICD-10-CM

## 2024-06-07 DIAGNOSIS — E21.3 HYPERPARATHYROIDISM (HCC): Primary | ICD-10-CM

## 2024-06-07 NOTE — TELEPHONE ENCOUNTER
Patient called in stating that she has taken her new script for Metoprolol as prescribed since Monday and is not feeling any better. Stating that she is still in afib. BP is 130's/90's and HR fluctuating from 's. Patient states she cannot take much more. Please advise.

## 2024-06-08 ENCOUNTER — HOSPITAL ENCOUNTER (INPATIENT)
Facility: HOSPITAL | Age: 79
LOS: 5 days | Discharge: HOME | DRG: 310 | End: 2024-06-14
Attending: EMERGENCY MEDICINE
Payer: MEDICARE

## 2024-06-08 ENCOUNTER — APPOINTMENT (OUTPATIENT)
Dept: GENERAL RADIOLOGY | Facility: HOSPITAL | Age: 79
DRG: 310 | End: 2024-06-08
Payer: MEDICARE

## 2024-06-08 DIAGNOSIS — I48.0 PAROXYSMAL ATRIAL FIBRILLATION: Primary | ICD-10-CM

## 2024-06-08 LAB
BASOPHILS # BLD AUTO: 0.03 10*3/MM3 (ref 0–0.2)
BASOPHILS NFR BLD AUTO: 0.3 % (ref 0–1.5)
DEPRECATED RDW RBC AUTO: 41.2 FL (ref 37–54)
EOSINOPHIL # BLD AUTO: 0.32 10*3/MM3 (ref 0–0.4)
EOSINOPHIL NFR BLD AUTO: 3.5 % (ref 0.3–6.2)
ERYTHROCYTE [DISTWIDTH] IN BLOOD BY AUTOMATED COUNT: 13.1 % (ref 12.3–15.4)
HCT VFR BLD AUTO: 35.7 % (ref 34–46.6)
HGB BLD-MCNC: 11.9 G/DL (ref 12–15.9)
HOLD SPECIMEN: NORMAL
IMM GRANULOCYTES # BLD AUTO: 0.03 10*3/MM3 (ref 0–0.05)
IMM GRANULOCYTES NFR BLD AUTO: 0.3 % (ref 0–0.5)
LIPASE SERPL-CCNC: 35 U/L (ref 13–60)
LYMPHOCYTES # BLD AUTO: 1.85 10*3/MM3 (ref 0.7–3.1)
LYMPHOCYTES NFR BLD AUTO: 20.1 % (ref 19.6–45.3)
MAGNESIUM SERPL-MCNC: 2 MG/DL (ref 1.6–2.4)
MCH RBC QN AUTO: 29 PG (ref 26.6–33)
MCHC RBC AUTO-ENTMCNC: 33.3 G/DL (ref 31.5–35.7)
MCV RBC AUTO: 86.9 FL (ref 79–97)
MONOCYTES # BLD AUTO: 0.81 10*3/MM3 (ref 0.1–0.9)
MONOCYTES NFR BLD AUTO: 8.8 % (ref 5–12)
NEUTROPHILS NFR BLD AUTO: 6.15 10*3/MM3 (ref 1.7–7)
NEUTROPHILS NFR BLD AUTO: 67 % (ref 42.7–76)
NRBC BLD AUTO-RTO: 0 /100 WBC (ref 0–0.2)
PLATELET # BLD AUTO: 227 10*3/MM3 (ref 140–450)
PMV BLD AUTO: 10.7 FL (ref 6–12)
RBC # BLD AUTO: 4.11 10*6/MM3 (ref 3.77–5.28)
TROPONIN T SERPL HS-MCNC: 11 NG/L
WBC NRBC COR # BLD AUTO: 9.19 10*3/MM3 (ref 3.4–10.8)
WHOLE BLOOD HOLD COAG: NORMAL
WHOLE BLOOD HOLD SPECIMEN: NORMAL

## 2024-06-08 PROCEDURE — 84484 ASSAY OF TROPONIN QUANT: CPT | Performed by: EMERGENCY MEDICINE

## 2024-06-08 PROCEDURE — 25810000003 SODIUM CHLORIDE 0.9 % SOLUTION: Performed by: EMERGENCY MEDICINE

## 2024-06-08 PROCEDURE — 93005 ELECTROCARDIOGRAM TRACING: CPT | Performed by: EMERGENCY MEDICINE

## 2024-06-08 PROCEDURE — 93010 ELECTROCARDIOGRAM REPORT: CPT | Performed by: STUDENT IN AN ORGANIZED HEALTH CARE EDUCATION/TRAINING PROGRAM

## 2024-06-08 PROCEDURE — 71045 X-RAY EXAM CHEST 1 VIEW: CPT

## 2024-06-08 PROCEDURE — 83735 ASSAY OF MAGNESIUM: CPT | Performed by: EMERGENCY MEDICINE

## 2024-06-08 PROCEDURE — 85025 COMPLETE CBC W/AUTO DIFF WBC: CPT | Performed by: EMERGENCY MEDICINE

## 2024-06-08 PROCEDURE — 36415 COLL VENOUS BLD VENIPUNCTURE: CPT

## 2024-06-08 PROCEDURE — 83690 ASSAY OF LIPASE: CPT | Performed by: EMERGENCY MEDICINE

## 2024-06-08 PROCEDURE — 99285 EMERGENCY DEPT VISIT HI MDM: CPT

## 2024-06-08 RX ORDER — SODIUM CHLORIDE 0.9 % (FLUSH) 0.9 %
10 SYRINGE (ML) INJECTION AS NEEDED
Status: DISCONTINUED | OUTPATIENT
Start: 2024-06-08 | End: 2024-06-14 | Stop reason: HOSPADM

## 2024-06-08 RX ADMIN — SODIUM CHLORIDE 1000 ML: 9 INJECTION, SOLUTION INTRAVENOUS at 23:50

## 2024-06-09 PROBLEM — I48.91 AFIB: Status: ACTIVE | Noted: 2024-06-09

## 2024-06-09 LAB
ANION GAP SERPL CALCULATED.3IONS-SCNC: 12 MMOL/L (ref 5–15)
BUN SERPL-MCNC: 12 MG/DL (ref 8–23)
BUN/CREAT SERPL: 18.2 (ref 7–25)
CALCIUM SPEC-SCNC: 8.9 MG/DL (ref 8.6–10.5)
CHLORIDE SERPL-SCNC: 102 MMOL/L (ref 98–107)
CO2 SERPL-SCNC: 24 MMOL/L (ref 22–29)
CREAT SERPL-MCNC: 0.66 MG/DL (ref 0.57–1)
EGFRCR SERPLBLD CKD-EPI 2021: 89.9 ML/MIN/1.73
GEN 5 2HR TROPONIN T REFLEX: 10 NG/L
GLUCOSE SERPL-MCNC: 125 MG/DL (ref 65–99)
MAGNESIUM SERPL-MCNC: 2 MG/DL (ref 1.6–2.4)
POTASSIUM SERPL-SCNC: 3.3 MMOL/L (ref 3.5–5.2)
POTASSIUM SERPL-SCNC: 3.6 MMOL/L (ref 3.5–5.2)
QT INTERVAL: 404 MS
QTC INTERVAL: 505 MS
SODIUM SERPL-SCNC: 138 MMOL/L (ref 136–145)
TROPONIN T DELTA: -1 NG/L

## 2024-06-09 PROCEDURE — 83735 ASSAY OF MAGNESIUM: CPT | Performed by: HOSPITALIST

## 2024-06-09 PROCEDURE — 99222 1ST HOSP IP/OBS MODERATE 55: CPT | Performed by: INTERNAL MEDICINE

## 2024-06-09 PROCEDURE — 25010000002 POTASSIUM CHLORIDE 10 MEQ/100ML SOLUTION: Performed by: HOSPITALIST

## 2024-06-09 PROCEDURE — 84132 ASSAY OF SERUM POTASSIUM: CPT | Performed by: HOSPITALIST

## 2024-06-09 PROCEDURE — 84484 ASSAY OF TROPONIN QUANT: CPT | Performed by: EMERGENCY MEDICINE

## 2024-06-09 PROCEDURE — 80048 BASIC METABOLIC PNL TOTAL CA: CPT | Performed by: HOSPITALIST

## 2024-06-09 RX ORDER — NITROGLYCERIN 0.4 MG/1
0.4 TABLET SUBLINGUAL
Status: DISCONTINUED | OUTPATIENT
Start: 2024-06-09 | End: 2024-06-14 | Stop reason: HOSPADM

## 2024-06-09 RX ORDER — LABETALOL 200 MG/1
300 TABLET, FILM COATED ORAL 2 TIMES DAILY
Status: DISCONTINUED | OUTPATIENT
Start: 2024-06-09 | End: 2024-06-14 | Stop reason: HOSPADM

## 2024-06-09 RX ORDER — GUAIFENESIN 600 MG/1
600 TABLET, EXTENDED RELEASE ORAL 2 TIMES DAILY
Status: DISCONTINUED | OUTPATIENT
Start: 2024-06-09 | End: 2024-06-10

## 2024-06-09 RX ORDER — SODIUM CHLORIDE 0.9 % (FLUSH) 0.9 %
10 SYRINGE (ML) INJECTION EVERY 12 HOURS SCHEDULED
Status: DISCONTINUED | OUTPATIENT
Start: 2024-06-09 | End: 2024-06-14 | Stop reason: HOSPADM

## 2024-06-09 RX ORDER — BISACODYL 5 MG/1
5 TABLET, DELAYED RELEASE ORAL DAILY PRN
Status: DISCONTINUED | OUTPATIENT
Start: 2024-06-09 | End: 2024-06-14 | Stop reason: HOSPADM

## 2024-06-09 RX ORDER — METOPROLOL TARTRATE 1 MG/ML
5 INJECTION, SOLUTION INTRAVENOUS ONCE
Status: COMPLETED | OUTPATIENT
Start: 2024-06-09 | End: 2024-06-09

## 2024-06-09 RX ORDER — BISACODYL 10 MG
10 SUPPOSITORY, RECTAL RECTAL DAILY PRN
Status: DISCONTINUED | OUTPATIENT
Start: 2024-06-09 | End: 2024-06-14 | Stop reason: HOSPADM

## 2024-06-09 RX ORDER — FLUTICASONE PROPIONATE 50 MCG
2 SPRAY, SUSPENSION (ML) NASAL DAILY PRN
Status: DISCONTINUED | OUTPATIENT
Start: 2024-06-09 | End: 2024-06-14 | Stop reason: HOSPADM

## 2024-06-09 RX ORDER — POTASSIUM CHLORIDE 7.45 MG/ML
10 INJECTION INTRAVENOUS
Status: COMPLETED | OUTPATIENT
Start: 2024-06-09 | End: 2024-06-09

## 2024-06-09 RX ORDER — HYDROCHLOROTHIAZIDE 25 MG/1
25 TABLET ORAL
Status: DISCONTINUED | OUTPATIENT
Start: 2024-06-09 | End: 2024-06-11

## 2024-06-09 RX ORDER — AMOXICILLIN 250 MG
2 CAPSULE ORAL 2 TIMES DAILY PRN
Status: DISCONTINUED | OUTPATIENT
Start: 2024-06-09 | End: 2024-06-14 | Stop reason: HOSPADM

## 2024-06-09 RX ORDER — SODIUM CHLORIDE 9 MG/ML
40 INJECTION, SOLUTION INTRAVENOUS AS NEEDED
Status: DISCONTINUED | OUTPATIENT
Start: 2024-06-09 | End: 2024-06-14 | Stop reason: HOSPADM

## 2024-06-09 RX ORDER — LOSARTAN POTASSIUM 50 MG/1
100 TABLET ORAL
Status: DISCONTINUED | OUTPATIENT
Start: 2024-06-09 | End: 2024-06-14 | Stop reason: HOSPADM

## 2024-06-09 RX ORDER — ONDANSETRON 4 MG/1
4 TABLET, ORALLY DISINTEGRATING ORAL EVERY 6 HOURS PRN
Status: DISCONTINUED | OUTPATIENT
Start: 2024-06-09 | End: 2024-06-11

## 2024-06-09 RX ORDER — ONDANSETRON 2 MG/ML
4 INJECTION INTRAMUSCULAR; INTRAVENOUS EVERY 6 HOURS PRN
Status: DISCONTINUED | OUTPATIENT
Start: 2024-06-09 | End: 2024-06-11

## 2024-06-09 RX ORDER — METOPROLOL TARTRATE 1 MG/ML
5 INJECTION, SOLUTION INTRAVENOUS
Status: DISCONTINUED | OUTPATIENT
Start: 2024-06-09 | End: 2024-06-14 | Stop reason: HOSPADM

## 2024-06-09 RX ORDER — FUROSEMIDE 20 MG/1
20 TABLET ORAL DAILY
Status: DISCONTINUED | OUTPATIENT
Start: 2024-06-09 | End: 2024-06-14 | Stop reason: HOSPADM

## 2024-06-09 RX ORDER — POTASSIUM CHLORIDE 750 MG/1
40 CAPSULE, EXTENDED RELEASE ORAL EVERY 4 HOURS
Status: COMPLETED | OUTPATIENT
Start: 2024-06-09 | End: 2024-06-09

## 2024-06-09 RX ORDER — LEVOTHYROXINE SODIUM 0.1 MG/1
100 TABLET ORAL
Status: DISCONTINUED | OUTPATIENT
Start: 2024-06-09 | End: 2024-06-14 | Stop reason: HOSPADM

## 2024-06-09 RX ORDER — ROSUVASTATIN CALCIUM 10 MG/1
10 TABLET, COATED ORAL NIGHTLY
Status: DISCONTINUED | OUTPATIENT
Start: 2024-06-09 | End: 2024-06-14 | Stop reason: HOSPADM

## 2024-06-09 RX ORDER — POTASSIUM CHLORIDE 750 MG/1
10 CAPSULE, EXTENDED RELEASE ORAL 3 TIMES DAILY
Status: DISCONTINUED | OUTPATIENT
Start: 2024-06-09 | End: 2024-06-14 | Stop reason: HOSPADM

## 2024-06-09 RX ORDER — POLYETHYLENE GLYCOL 3350 17 G/17G
17 POWDER, FOR SOLUTION ORAL DAILY PRN
Status: DISCONTINUED | OUTPATIENT
Start: 2024-06-09 | End: 2024-06-14 | Stop reason: HOSPADM

## 2024-06-09 RX ORDER — SODIUM CHLORIDE 0.9 % (FLUSH) 0.9 %
10 SYRINGE (ML) INJECTION AS NEEDED
Status: DISCONTINUED | OUTPATIENT
Start: 2024-06-09 | End: 2024-06-14 | Stop reason: HOSPADM

## 2024-06-09 RX ADMIN — Medication 10 ML: at 21:33

## 2024-06-09 RX ADMIN — POTASSIUM CHLORIDE 10 MEQ: 750 CAPSULE, EXTENDED RELEASE ORAL at 08:47

## 2024-06-09 RX ADMIN — METOPROLOL TARTRATE 5 MG: 1 INJECTION, SOLUTION INTRAVENOUS at 01:29

## 2024-06-09 RX ADMIN — POTASSIUM CHLORIDE 10 MEQ: 7.46 INJECTION, SOLUTION INTRAVENOUS at 06:20

## 2024-06-09 RX ADMIN — APIXABAN 5 MG: 5 TABLET, FILM COATED ORAL at 08:47

## 2024-06-09 RX ADMIN — POTASSIUM CHLORIDE 40 MEQ: 750 CAPSULE, EXTENDED RELEASE ORAL at 22:32

## 2024-06-09 RX ADMIN — POTASSIUM CHLORIDE 10 MEQ: 7.46 INJECTION, SOLUTION INTRAVENOUS at 07:36

## 2024-06-09 RX ADMIN — ROSUVASTATIN CALCIUM 10 MG: 10 TABLET, FILM COATED ORAL at 21:32

## 2024-06-09 RX ADMIN — LABETALOL HYDROCHLORIDE 300 MG: 200 TABLET, FILM COATED ORAL at 08:48

## 2024-06-09 RX ADMIN — LABETALOL HYDROCHLORIDE 300 MG: 200 TABLET, FILM COATED ORAL at 21:32

## 2024-06-09 RX ADMIN — POTASSIUM CHLORIDE 40 MEQ: 750 CAPSULE, EXTENDED RELEASE ORAL at 19:17

## 2024-06-09 RX ADMIN — METOPROLOL TARTRATE 5 MG: 1 INJECTION, SOLUTION INTRAVENOUS at 02:21

## 2024-06-09 RX ADMIN — LOSARTAN POTASSIUM 100 MG: 50 TABLET, FILM COATED ORAL at 08:48

## 2024-06-09 RX ADMIN — Medication 10 ML: at 08:49

## 2024-06-09 RX ADMIN — METOPROLOL TARTRATE 5 MG: 5 INJECTION INTRAVENOUS at 06:20

## 2024-06-09 RX ADMIN — APIXABAN 5 MG: 5 TABLET, FILM COATED ORAL at 21:32

## 2024-06-09 RX ADMIN — METOPROLOL TARTRATE 5 MG: 5 INJECTION INTRAVENOUS at 22:32

## 2024-06-09 RX ADMIN — METOPROLOL TARTRATE 5 MG: 1 INJECTION, SOLUTION INTRAVENOUS at 01:54

## 2024-06-09 RX ADMIN — FUROSEMIDE 20 MG: 20 TABLET ORAL at 08:48

## 2024-06-09 RX ADMIN — POTASSIUM CHLORIDE 10 MEQ: 7.46 INJECTION, SOLUTION INTRAVENOUS at 08:47

## 2024-06-09 RX ADMIN — DILTIAZEM HYDROCHLORIDE 360 MG: 120 CAPSULE, EXTENDED RELEASE ORAL at 08:47

## 2024-06-09 RX ADMIN — POTASSIUM CHLORIDE 10 MEQ: 750 CAPSULE, EXTENDED RELEASE ORAL at 15:41

## 2024-06-09 RX ADMIN — HYDROCHLOROTHIAZIDE 25 MG: 25 TABLET ORAL at 08:48

## 2024-06-09 RX ADMIN — POTASSIUM CHLORIDE 10 MEQ: 7.46 INJECTION, SOLUTION INTRAVENOUS at 09:58

## 2024-06-09 NOTE — PLAN OF CARE
Goal Outcome Evaluation:           Progress: improving     Pt ambulating frequently.  Completed 4 runs of K.  Pt had one event of HR down to 39, but quickly came back up to 60s.  Pt c/o complaints of pain or dizziness.  Voiding regularly.  VTE prevention, see MAR.  Safety maintained.

## 2024-06-09 NOTE — PLAN OF CARE
Goal Outcome Evaluation:  Plan of Care Reviewed With: patient        Progress: no change                                  Patient admitted to 3C from ER with AFIB. Patient is alert and oriented x4. NPO for a cardiology consult later today. Patient denies pain. Afib on tele - HR . No distress noted.

## 2024-06-09 NOTE — CONSULTS
LOS: 0 days   Patient Care Team:  Ania Culver MD as PCP - General  Ania Culver MD as PCP - Family Medicine  Adrián Carlos MD as Consulting Physician (Pulmonary Disease)  Delio Leslie MD as Consulting Physician (Gastroenterology)  Jovani Lovelace MD as Consulting Physician (Cardiology)  Steven Elizondo APRN as Nurse Practitioner (Cardiology)    Chief Complaint: Palpitations     Subjective    Anne-Marie Hayes is a 78 y.o. female who is being seen in consultation.  Patient has presented with palpitations  Has known atrial fibrillation episodes  This is going on for a week now  Overall feels really weak and tired when in atrial fibrillation  Prior history of atrial fibrillation ablation last year by Dr. Carr  After this patient felt remarkably better  Now back in atrial fibrillation and feels short of breath and feels fatigued  Overall appears to be very symptomatic with atrial fibrillation  No bleeding issues  No falls  Latest test results as referenced below  Compliant with prescribed medication regimen  Notably compliant with Eliquis 5 mg p.o. twice daily that she has been taking nonstop    Telemetry: Atrial fibrillation with controlled ventricular response    Review of Systems   Constitutional: No chills   Has fatigue   No fever.   HENT: Negative.    Eyes: Negative.    Respiratory: Negative for cough,   No chest wall soreness,   Shortness of breath,   no wheezing, no stridor.    Cardiovascular: As above  Gastrointestinal: Negative for abdominal distention,  No abdominal pain,   No blood in stool,   No constipation,   No diarrhea,   No nausea   No vomiting.   Endocrine: Negative.    Genitourinary: Negative for difficulty urinating, dysuria, flank pain and hematuria.   Musculoskeletal: Negative.    Skin: Negative for rash and wound.   Allergic/Immunologic: Negative.    Neurological: Negative for dizziness, syncope, weakness,   No light-headedness  No  headaches.   Hematological: Does not  bruise/bleed easily.   Psychiatric/Behavioral: Negative for agitation or behavioral problems,   No confusion,   the patient is  nervous/anxious.       History:   Past Medical History:   Diagnosis Date    Abnormal EKG 2023    Arthritis     Atrial fibrillation     Enlarged thyroid     Hyperlipidemia     Hypertension     PONV (postoperative nausea and vomiting)     Sleep apnea     Uses CPAP     Past Surgical History:   Procedure Laterality Date    ABLATION OF DYSRHYTHMIC FOCUS  2022    CARDIAC CATHETERIZATION      CARDIAC ELECTROPHYSIOLOGY PROCEDURE N/A 10/11/2023    Procedure: Ablation atrial fibrillation;  Surgeon: Marlon Carr MD;  Location: [facility] CATH INVASIVE LOCATION;  Service: Cardiovascular;  Laterality: N/A;    CARDIOVERSION      x2    COLONOSCOPY N/A 2021    Procedure: COLONOSCOPY WITH ANESTHESIA;  Surgeon: Delio Leslie MD;  Location: [facility] [place] ENDOSCOPY;  Service: Gastroenterology;  Laterality: N/A;  Pre: Hx Colon Polyps  Post: Colon Polyps, Diverticulosis  Dr. Ania Oseguera  CO2 Inflation Used    COLONOSCOPY W/ POLYPECTOMY  2016    Tubular adenoma ascending colon repeat exam in 5 years    PARATHYROIDECTOMY Right 2022    Procedure: Bilateral parathyroid exploration with right inferior parathyroidectomy;  Surgeon: Eric Olvera MD;  Location: [facility] [place] OR;  Service: ENT;  Laterality: Right;    REPLACEMENT TOTAL KNEE Right 2019    THYROIDECTOMY Bilateral 2022    Procedure: Total thyroidectomy;  Surgeon: Eric Olvera MD;  Location: [facility] [place] OR;  Service: ENT;  Laterality: Bilateral;    TUBAL ABDOMINAL LIGATION       Social History     Socioeconomic History    Marital status:    Tobacco Use    Smoking status: Former     Current packs/day: 0.00     Average packs/day: 2.0 packs/day for 17.0 years (34.0 ttl pk-yrs)     Types: Cigarettes     Start date: 1966     Quit date: 1983     Years since quittin.4    Smokeless tobacco: Never     "Tobacco comments:     [id number]   Vaping Use    Vaping status: Never Used   Substance and Sexual Activity    Alcohol use: Yes     Alcohol/week: 4.0 standard drinks of alcohol     Types: 4 Glasses of wine per week     Comment: about 1/2 glass of wine a night    Drug use: No    Sexual activity: Not Currently     Partners: Male     Birth control/protection: Condom, Tubal ligation, Birth control pill, Pill     Family History   Problem Relation Age of Onset    Atrial fibrillation Mother     Arrhythmia Mother     Heart disease Father     Heart attack Father     Hypertension Father     Breast cancer Neg Hx     Ovarian cancer Neg Hx     Uterine cancer Neg Hx     Colon cancer Neg Hx     Melanoma Neg Hx     Colon polyps Neg Hx        Labs:  WBC WBC   Date Value Ref Range Status   06/08/2024 9.19 3.40 - 10.80 10*3/mm3 Final      HGB Hemoglobin   Date Value Ref Range Status   06/08/2024 11.9 (L) 12.0 - 15.9 g/dL Final      HCT Hematocrit   Date Value Ref Range Status   06/08/2024 35.7 34.0 - 46.6 % Final      Platelets Platelets   Date Value Ref Range Status   06/08/2024 227 140 - 450 10*3/mm3 Final      MCV MCV   Date Value Ref Range Status   06/08/2024 86.9 79.0 - 97.0 fL Final        Results from last 7 days   Lab Units 06/09/24  0359   SODIUM mmol/L 138   POTASSIUM mmol/L 3.3*   CHLORIDE mmol/L 102   CO2 mmol/L 24.0   BUN mg/dL 12   CREATININE mg/dL 0.66   CALCIUM mg/dL 8.9   GLUCOSE mg/dL 125*     Lab Results   Component Value Date    TROPONINI <0.012 08/12/2018    TROPONINT 10 06/09/2024     PT/INR:  No results found for: \"PROTIME\"/No results found for: \"INR\"    Imaging Results (Last 72 Hours)       Procedure Component Value Units Date/Time    XR Chest 1 View [[id number]] Collected: 06/09/24 0642     Updated: 06/09/24 0647    Narrative:      XR CHEST 1 VW- 6/8/2024 10:45 PM     HISTORY: sob       COMPARISON: Chest x-ray dated 7/11/2022     FINDINGS:  Upright frontal radiograph of the chest was obtained     No lung " consolidation. No pleural effusion or pneumothorax. The  cardiomediastinal silhouette and pulmonary vascularity are within normal  limits. The osseous structures and surrounding soft tissues demonstrate  no acute abnormality.       Impression:      1.  No acute process in the chest.     This report was signed and finalized on 6/9/2024 6:44 AM by Dr Demario nSider.               Objective     No Known Allergies    Medication Review: Performed  Current Facility-Administered Medications   Medication Dose Route Frequency Provider Last Rate Last Admin    apixaban (ELIQUIS) tablet 5 mg  5 mg Oral Q12H Cecily Hernandez MD   5 mg at 06/09/24 0847    sennosides-docusate (PERICOLACE) 8.6-50 MG per tablet 2 tablet  2 tablet Oral BID PRN Cecily Hernandez MD        And    polyethylene glycol (MIRALAX) packet 17 g  17 g Oral Daily PRN Cecily Hernandez MD        And    bisacodyl (DULCOLAX) EC tablet 5 mg  5 mg Oral Daily PRN Cecily Hernandez MD        And    bisacodyl (DULCOLAX) suppository 10 mg  10 mg Rectal Daily PRN Cecily Hernandez MD        Calcium Replacement - Follow Nurse / BPA Driven Protocol   Does not apply PRN Cecily Hernandez MD        dilTIAZem CD (CARDIZEM CD) 24 hr capsule 360 mg  360 mg Oral Q24H Cecily Hernandez MD   360 mg at 06/09/24 0847    fluticasone (FLONASE) 50 MCG/ACT nasal spray 2 spray  2 spray Nasal Daily PRN Cecily Hernandez MD        furosemide (LASIX) tablet 20 mg  20 mg Oral Daily Cecily Hernandez MD   20 mg at 06/09/24 0848    guaiFENesin (MUCINEX) 12 hr tablet 600 mg  600 mg Oral BID Cecily Hernandez MD        losartan (COZAAR) tablet 100 mg  100 mg Oral Q24H Cecily Hernandez MD   100 mg at 06/09/24 0848    And    hydroCHLOROthiazide tablet 25 mg  25 mg Oral Q24H Cecily Hernandez MD   25 mg at 06/09/24 0848    labetalol (NORMODYNE) tablet 300 mg  300 mg Oral BID Cecily Hernandez MD   300 mg at 06/09/24 0848    levothyroxine (SYNTHROID, LEVOTHROID) tablet  "100 mcg  100 mcg Oral Q AM Cecily Hernandez MD        Magnesium Standard Dose Replacement - Follow Nurse / BPA Driven Protocol   Does not apply PRN Cecily Hernandez MD        metoprolol tartrate (LOPRESSOR) injection 5 mg  5 mg Intravenous Q1H PRN Cecily Hernandez MD   5 mg at 06/09/24 0620    nitroglycerin (NITROSTAT) SL tablet 0.4 mg  0.4 mg Sublingual Q5 Min PRN Cecily Hernandez MD        ondansetron ODT (ZOFRAN-ODT) disintegrating tablet 4 mg  4 mg Oral Q6H PRN Cecily Hernandez MD        Or    ondansetron (ZOFRAN) injection 4 mg  4 mg Intravenous Q6H PRN Cecily Hernandez MD        Phosphorus Replacement - Follow Nurse / BPA Driven Protocol   Does not apply PRN Cecily Hernandez MD        potassium chloride (MICRO-K/KLOR-CON) CR capsule  10 mEq Oral TID Cecily Hernandez MD   10 mEq at 06/09/24 0847    potassium chloride 10 mEq in 100 mL IVPB  10 mEq Intravenous Q1H Cecily Hernandez  mL/hr at 06/09/24 0847 10 mEq at 06/09/24 0847    Potassium Replacement - Follow Nurse / BPA Driven Protocol   Does not apply PRN Cecily Hernandez MD        rosuvastatin (CRESTOR) tablet 10 mg  10 mg Oral Nightly Cecily Hernandez MD        sodium chloride 0.9 % flush 10 mL  10 mL Intravenous PRN Mervin Duque MD        sodium chloride 0.9 % flush 10 mL  10 mL Intravenous Q12H Cecily Hernandez MD   10 mL at 06/09/24 0849    sodium chloride 0.9 % flush 10 mL  10 mL Intravenous PRN Cecily Hernandez MD        sodium chloride 0.9 % infusion 40 mL  40 mL Intravenous PRN Cecily Hernandez MD           Vital Sign Min/Max for last 24 hours  Temp  Min: 97.6 °F (36.4 °C)  Max: 98.2 °F (36.8 °C)   BP  Min: 128/91  Max: 155/98   Pulse  Min: 87  Max: 117   Resp  Min: 16  Max: 20   SpO2  Min: 94 %  Max: 99 %   No data recorded   Weight  Min: 92.1 kg (203 lb)  Max: 93.9 kg (207 lb)     Flowsheet Rows      Flowsheet Row First Filed Value   Admission Height 167.6 cm (66\") Documented at 06/08/2024 8498 "   Admission Weight 92.1 kg (203 lb) Documented at 06/08/2024 2218            Results for orders placed in visit on 02/18/22    Adult Transthoracic Echo Complete W/ Cont if Necessary Per Protocol    Interpretation Summary  · Left ventricular ejection fraction appears to be 66 - 70%. Left ventricular systolic function is normal.  · Left ventricular diastolic function is consistent with (grade II w/high LAP) pseudonormalization.  · Normal right ventricular cavity size and systolic function noted.  · Estimated right ventricular systolic pressure from tricuspid regurgitation is mildly elevated (35-45 mmHg).  · There is no significant (greater than mild) valvular dysfunction.      Physical Exam:    General Appearance: Awake, alert, in no acute distress  Eyes: Pupils equal and reactive    Ears: Appear intact with no abnormalities noted  Nose: Nares normal, no drainage  Neck: supple, trachea midline, no carotid bruit and no JVD  Back: no kyphosis present,    Lungs: respirations regular, respirations even and respirations unlabored  Heart: normal S1, S2, 2/6 systolic murmur left sternal border, irregular  Abdomen: normal bowel sounds, no tenderness   Skin: no bleeding, bruising or rash  Extremities: no cyanosis  Psychiatric/Behavioral: Negative for agitation, behavioral problems, confusion, the patient does  appear to be nervous/anxious.       Results Review:   I reviewed the patient's new clinical results.  I reviewed the patient's new imaging results and agree with the interpretation.  I reviewed the patient's other test results and agree with the interpretation  I personally viewed and interpreted the patient's EKG/Telemetry data    Discussed with patient  Updated patient regarding any new or relevant abnormalities on review of records or any new findings on physical exam.   Mentioned to patient about purpose of visit and desirable health short and long term goals and objectives.     Reviewed available prior notes,  consults, prior visits, laboratory findings, radiology and cardiology relevant reports.   Updated chart as applicable.   I have reviewed the patient's medical history in detail and updated the computerized patient record as relevant.          Assessment & Plan     Paroxysmal atrial fibrillation  Prior history of atrial fibrillation ablation  On chronic anticoagulation with Eliquis  Essential hypertension  Mixed hyperlipidemia  Acquired hypothyroidism  Mild hypokalemia    Plan    Currently rates are under good control  Continue anticoagulation  Consult electrophysiology service  Dr. Carr can see patient tomorrow  Care plan discussed with her  Results of prior testing reviewed including latest echocardiogram  Telemetry  Treat hypokalemia and monitor potassium  Defer this to primary  Deep vein thrombosis prophylaxis/precautions [on anticoagulation]  Appropriate diet, fluid, sodium, caffeine, stimulants intake   Questions were encouraged, asked and answered to the patient's  understanding and satisfaction.  Compliance to diet and medications       Nicola Quinonez MD  06/09/24  09:36 CDT    EMR Dragon/Transcription was used to dictate part of this note

## 2024-06-09 NOTE — ED NOTES
"Nursing report ED to floor  Anne-Marie Hayes  78 y.o.  female    HPI:   Chief Complaint   Patient presents with    Atrial Fibrillation       Admitting doctor:   Cecily Hernandez MD    Consulting provider(s):  Consults       No orders found for last 30 day(s).             Admitting diagnosis:   The encounter diagnosis was Paroxysmal atrial fibrillation.    Code status:   Current Code Status       Date Active Code Status Order ID Comments User Context       Prior            Allergies:   Patient has no known allergies.    Intake and Output  No intake or output data in the 24 hours ending 06/09/24 0326    Weight:       06/08/24 2218   Weight: 92.1 kg (203 lb)       Most recent vitals:   Vitals:    06/08/24 2218 06/08/24 2353 06/09/24 0316 06/09/24 0325   BP: 155/98 133/87 128/91    BP Location: Right arm      Patient Position: Sitting      Pulse: 96 93 91    Resp: 20      Temp:    98.2 °F (36.8 °C)   TempSrc:    Oral   SpO2: 98% 95% 97%    Weight: 92.1 kg (203 lb)      Height: 167.6 cm (66\")        Oxygen Therapy: .    Active LDAs/IV Access:   Lines, Drains & Airways       Active LDAs       Name Placement date Placement time Site Days    Peripheral IV 06/08/24 2241 Right Antecubital 06/08/24 2241  Antecubital  less than 1                    Labs (abnormal labs have a star):   Labs Reviewed   CBC WITH AUTO DIFFERENTIAL - Abnormal; Notable for the following components:       Result Value    Hemoglobin 11.9 (*)     All other components within normal limits   LIPASE - Normal   TROPONIN - Normal    Narrative:     High Sensitive Troponin T Reference Range:  <14.0 ng/L- Negative Female for AMI  <22.0 ng/L- Negative Male for AMI  >=14 - Abnormal Female indicating possible myocardial injury.  >=22 - Abnormal Male indicating possible myocardial injury.   Clinicians would have to utilize clinical acumen, EKG, Troponin, and serial changes to determine if it is an Acute Myocardial Infarction or myocardial injury due to an " underlying chronic condition.        MAGNESIUM - Normal   HIGH SENSITIVITIY TROPONIN T 2HR - Normal    Narrative:     High Sensitive Troponin T Reference Range:  <14.0 ng/L- Negative Female for AMI  <22.0 ng/L- Negative Male for AMI  >=14 - Abnormal Female indicating possible myocardial injury.  >=22 - Abnormal Male indicating possible myocardial injury.   Clinicians would have to utilize clinical acumen, EKG, Troponin, and serial changes to determine if it is an Acute Myocardial Infarction or myocardial injury due to an underlying chronic condition.        RAINBOW DRAW    Narrative:     The following orders were created for panel order [place] Draw.  Procedure                               Abnormality         Status                     ---------                               -----------         ------                     Green Top (Gel)[[id number]]                                  Final result               Lavender Top[[id number]]                                     Final result               Red Top[[id number]]                                          Final result               Perez Top[[id number]]                                         Final result               Light Blue Top[[id number]]                                   Final result                 Please view results for these tests on the individual orders.   GREEN TOP   LAVENDER TOP   RED TOP   GRAY TOP   LIGHT BLUE TOP   CBC AND DIFFERENTIAL    Narrative:     The following orders were created for panel order CBC & Differential.  Procedure                               Abnormality         Status                     ---------                               -----------         ------                     CBC Auto Differential[[id number]]        Abnormal            Final result                 Please view results for these tests on the individual orders.       Meds given in ED:   Medications   sodium chloride 0.9 % flush 10 mL (has no administration in time range)    sodium chloride 0.9 % bolus 1,000 mL (0 mL Intravenous Stopped 6/9/24 0129)   metoprolol tartrate (LOPRESSOR) injection 5 mg (5 mg Intravenous Given 6/9/24 0129)   metoprolol tartrate (LOPRESSOR) injection 5 mg (5 mg Intravenous Given 6/9/24 0154)   metoprolol tartrate (LOPRESSOR) injection 5 mg (5 mg Intravenous Given 6/9/24 0221)           NIH Stroke Scale:       Isolation/Infection(s):  No active isolations   No active infections     COVID Testing  Collected .  Resulted .    Nursing report ED to floor:  Mental status: .  Ambulatory status: .  Precautions: .    ED nurse phone extentsion- ..

## 2024-06-09 NOTE — ED PROVIDER NOTES
Subjective   History of Present Illness  Anne-Marie is a 78-year-old female who presents to the ED for a chief complaint of A-fib.  She is status post 2 cardiac ablations for A-fib she is currently on Cardizem and Eliquis.  She was recently seen and evaluated 3 weeks ago they attempted to convert her with Cardizem twice and failed she states she was then admitted to the hospital given a dose of something else IV and caused her to to sinus rhythm.  She has not been short of breath or having any chest pain.  She states that this feels different, she is A-fib and has a ventricular rate in the 90s on presentation.  She is nontoxic-appearing she is in no acute distress she has not had any new medication changes.  Patient is unable to tell me if she has been in A-fib since Monday chronically or if it has been paroxysmal.         Review of Systems   All other systems reviewed and are negative.      Past Medical History:   Diagnosis Date    Abnormal EKG 8/28/2023    Arthritis     Atrial fibrillation     Enlarged thyroid     Hyperlipidemia     Hypertension     PONV (postoperative nausea and vomiting)     Sleep apnea     Uses CPAP       No Known Allergies    Past Surgical History:   Procedure Laterality Date    ABLATION OF DYSRHYTHMIC FOCUS  08/09/2022    CARDIAC CATHETERIZATION  1987    CARDIAC ELECTROPHYSIOLOGY PROCEDURE N/A 10/11/2023    Procedure: Ablation atrial fibrillation;  Surgeon: Marlon Carr MD;  Location: [facility] CATH INVASIVE LOCATION;  Service: Cardiovascular;  Laterality: N/A;    CARDIOVERSION      x2    COLONOSCOPY N/A 05/18/2021    Procedure: COLONOSCOPY WITH ANESTHESIA;  Surgeon: Delio Leslie MD;  Location: [facility] ENDOSCOPY;  Service: Gastroenterology;  Laterality: N/A;  Pre: Hx Colon Polyps  Post: Colon Polyps, Diverticulosis  Dr. Ania Oseguera  CO2 Inflation Used    COLONOSCOPY W/ POLYPECTOMY  03/31/2016    Tubular adenoma ascending colon repeat exam in 5 years    PARATHYROIDECTOMY Right 05/25/2022     Procedure: Bilateral parathyroid exploration with right inferior parathyroidectomy;  Surgeon: Eric Olvera MD;  Location:  [facility] OR;  Service: ENT;  Laterality: Right;    REPLACEMENT TOTAL KNEE Right 2019    THYROIDECTOMY Bilateral 2022    Procedure: Total thyroidectomy;  Surgeon: Eric Olvera MD;  Location:  [facility] OR;  Service: ENT;  Laterality: Bilateral;    TUBAL ABDOMINAL LIGATION         Family History   Problem Relation Age of Onset    Atrial fibrillation Mother     Arrhythmia Mother     Heart disease Father     Heart attack Father     Hypertension Father     Breast cancer Neg Hx     Ovarian cancer Neg Hx     Uterine cancer Neg Hx     Colon cancer Neg Hx     Melanoma Neg Hx     Colon polyps Neg Hx        Social History     Socioeconomic History    Marital status:    Tobacco Use    Smoking status: Former     Current packs/day: 0.00     Average packs/day: 2.0 packs/day for 17.0 years (34.0 ttl pk-yrs)     Types: Cigarettes     Start date: 1966     Quit date: 1983     Years since quittin.4    Smokeless tobacco: Never    Tobacco comments:     [id number]   Vaping Use    Vaping status: Never Used   Substance and Sexual Activity    Alcohol use: Yes     Alcohol/week: 4.0 standard drinks of alcohol     Types: 4 Glasses of wine per week     Comment: about 1/2 glass of wine a night    Drug use: No    Sexual activity: Not Currently     Partners: Male     Birth control/protection: Condom, Tubal ligation, Birth control pill, Pill           Objective   Physical Exam  Vitals reviewed.   Constitutional:       Appearance: Normal appearance. She is normal weight.   HENT:      Head: Normocephalic and atraumatic.      Right Ear: External ear normal.      Left Ear: External ear normal.      Nose: Nose normal.      Mouth/Throat:      Mouth: Mucous membranes are moist. Mucous membranes are dry.      Pharynx: Oropharynx is clear.   Eyes:      Extraocular Movements: Extraocular movements  intact.      Conjunctiva/sclera: Conjunctivae normal.      Pupils: Pupils are equal, round, and reactive to light.   Cardiovascular:      Rate and Rhythm: Tachycardia present. Rhythm irregular.      Pulses: Normal pulses.      Heart sounds: Normal heart sounds.   Pulmonary:      Effort: Pulmonary effort is normal.      Breath sounds: Normal breath sounds.   Abdominal:      General: Bowel sounds are normal.      Palpations: Abdomen is soft.   Musculoskeletal:         General: Normal range of motion.      Cervical back: Normal range of motion and neck supple. No rigidity.   Skin:     General: Skin is warm and dry.      Capillary Refill: Capillary refill takes less than 2 seconds.   Neurological:      General: No focal deficit present.      Mental Status: She is alert and oriented to person, place, and time.   Psychiatric:         Mood and Affect: Mood normal.         Procedures           ED Course                                             Medical Decision Making  Anne-Marie is a 78-year-old female who presents to the ED for a chief complaint of A-fib.  Patient will have labs and imaging to evaluate for metabolic causes of the A-fib, she will also have a liter of IV fluids given    EKG was obtained and revealed A-fib RVR with a right bundle branch block morphology, ventricular rate of 94.  No acute ST changes signify ischemia normal axis  ----    Chest x-ray was obtained and plan reviewed by myself reveals no acute process  Discussed with her that the radiologist will overread in the morning and if there is a small discrepancy then they will contact her and discuss a new treatment plan if necessary.    Patient states that she still feels different, secondary to this and when I go to reevaluate the patient she gets into RVR into the 1 teens and 120s we will give her Lopressor and attempt to convert her.  -----    01:49 CDT  Patient is status post 1 dose of Lopressor 5 mg, she is consistently below 100 however she is  still irregularly irregular, patient will be given a second dose of Lopressor and attempt to convert her to sinus rhythm.  ----    02:12 CDT  Pt needs to be A-fib RVR, patient will be given another dose of Lopressor and then we will discuss disposition afterwards.  ----    03:10 CDT  Patient did not convert after 3 doses of Lopressor, she is CVR however, patient states she feels too symptomatic and does not feel comfortable going home  Secondary to this I have called and spoken with Dr. Hernandez the admitting hospitalist who agrees to admit the patient.  Patient be admitted to the hospital for paroxysmal A-fib.        Problems Addressed:  Paroxysmal atrial fibrillation: complicated acute illness or injury    Amount and/or Complexity of Data Reviewed  Labs: ordered.  Radiology: ordered.  ECG/medicine tests: ordered.    Risk  Prescription drug management.        Final diagnoses:   Paroxysmal atrial fibrillation       ED Disposition  ED Disposition       ED Disposition   Decision to Admit    Condition   --    Comment   --               No follow-up provider specified.       Medication List      No changes were made to your prescriptions during this visit.            Mervin Duque MD  06/09/24 0313

## 2024-06-09 NOTE — PROGRESS NOTES
Patient is an after midnight admission by Dr. Hernandez for atrial fibrillation.  It appears that patient has chronic persistent atrial fibrillation and follows with Dr. Carr in outpatient.  There is a prep for a procedure noted on June 7.  I still yet to look at the details of it.  Vitals:    06/09/24 1557   BP: 132/63   Pulse: 61   Resp: 16   Temp: 97.8 °F (36.6 °C)   SpO2: 96%     Patient is hemodynamically stable  No distress  Patient's heart rate controlled is seen in telemetry but remains A-fib.  Normal respiratory effort      Potassium noted at 3.3  Magnesium 2  Dr. Quinonez had seen the patient in consult  He mentioned that Dr. Carr will see the patient tomorrow.  He recommends continuing anticoagulation    Meds reviewed.  apixaban, 5 mg, Oral, Q12H  dilTIAZem CD, 360 mg, Oral, Q24H  furosemide, 20 mg, Oral, Daily  guaiFENesin, 600 mg, Oral, BID  losartan, 100 mg, Oral, Q24H   And  hydroCHLOROthiazide, 25 mg, Oral, Q24H  labetalol, 300 mg, Oral, BID  levothyroxine, 100 mcg, Oral, Q AM  potassium chloride, 10 mEq, Oral, TID  rosuvastatin, 10 mg, Oral, Nightly  sodium chloride, 10 mL, Intravenous, Q12H      Labs today reviewed.  Blood pressure trends reviewed  Patient noted to have received Lasix 20 mg at 8:48 AM, hydrochlorothiazide at 848.  Patient also received 40 mEq of potassium total so far for today for hypokalemia of 3.3    Will follow levels  Continue present management  I confirmed that the patient's advanced care plan is present, code status is documented, and a surrogate decision maker is listed in the patient's medical record.

## 2024-06-09 NOTE — ED NOTES
Pt states that last time she was here they converted her with medications. She currently takes metoprolol, cardizem, losartan, labetalol

## 2024-06-09 NOTE — H&P
[facility] Medicine Services  HISTORY AND PHYSICAL    Date of Admission: 6/8/2024  Primary Care Physician: Ania Culver MD    Subjective   Primary Historian:  Patient     Chief Complaint:  A Fib    Atrial Fibrillation  Past medical history includes atrial fibrillation.        Patient  has A Fib - has had 2 ablations the most recent was  10/11/2023 in the cardiology office.  She was last seen by Dr Fonseca on 5/31/2024 in the cardiology office - she called the office on 6/3 and again on 6/7 with c/o of being in A Fib again - a script for PRN Lopressor was called in.  The patient has been unable to sleep - she feel SOB while in A Fib even when she is not tachycardic.  She is anticoagulated with Eliquis - denies any chest pain.  She was told to come to the ER is it recurred.  She arrived in A Fib that was initially rate controlled.  She did become mildly tachycardic for awhile with HR in the 110's-120's - treated with IVP Lopressor.  Patient is being admitted - will order PRN IVP Lopressor for HR > 100 and will consult cardiology for probable cardioversion.      Review of Systems   Otherwise complete ROS reviewed and negative except as mentioned in the HPI.    Past Medical History:   Past Medical History:   Diagnosis Date    Abnormal EKG 8/28/2023    Arthritis     Atrial fibrillation     Enlarged thyroid     Hyperlipidemia     Hypertension     PONV (postoperative nausea and vomiting)     Sleep apnea     Uses CPAP     Past Surgical History:  Past Surgical History:   Procedure Laterality Date    ABLATION OF DYSRHYTHMIC FOCUS  08/09/2022    CARDIAC CATHETERIZATION  1987    CARDIAC ELECTROPHYSIOLOGY PROCEDURE N/A 10/11/2023    Procedure: Ablation atrial fibrillation;  Surgeon: Marlon Carr MD;  Location: [facility] INVASIVE LOCATION;  Service: Cardiovascular;  Laterality: N/A;    CARDIOVERSION      x2    COLONOSCOPY N/A 05/18/2021    Procedure: COLONOSCOPY WITH ANESTHESIA;  Surgeon:  Delio Leslie MD;  Location: [facility] ENDOSCOPY;  Service: Gastroenterology;  Laterality: N/A;  Pre: Hx Colon Polyps  Post: Colon Polyps, Diverticulosis  Dr. Ania Oseguera  CO2 Inflation Used    COLONOSCOPY W/ POLYPECTOMY  03/31/2016    Tubular adenoma ascending colon repeat exam in 5 years    PARATHYROIDECTOMY Right 05/25/2022    Procedure: Bilateral parathyroid exploration with right inferior parathyroidectomy;  Surgeon: Eric Olvera MD;  Location: [facility] OR;  Service: ENT;  Laterality: Right;    REPLACEMENT TOTAL KNEE Right 07/2019    THYROIDECTOMY Bilateral 05/25/2022    Procedure: Total thyroidectomy;  Surgeon: Eric Olvera MD;  Location:  [facility] OR;  Service: ENT;  Laterality: Bilateral;    TUBAL ABDOMINAL LIGATION       Social History:  reports that she quit smoking about 41 years ago. Her smoking use included cigarettes. She started smoking about 58 years ago. She has a 34 pack-year smoking history. She has never used smokeless tobacco. She reports current alcohol use of about 4.0 standard drinks of alcohol per week. She reports that she does not use drugs.    Family History: family history includes Arrhythmia in her mother; Atrial fibrillation in her mother; Heart attack in her father; Heart disease in her father; Hypertension in her father.       Allergies:  No Known Allergies    Medications:  Prior to Admission medications    Medication Sig Start Date End Date Taking? Authorizing Provider   acyclovir (ZOVIRAX) 400 MG tablet Take 1 tablet by mouth Daily As Needed (fever blisters). 1 TID for 3 days then prn fever blister    Provider, MD Judy   apixaban (ELIQUIS) 5 MG tablet tablet Take 1 tablet by mouth Every 12 (Twelve) Hours. 8/9/18   Mat Spencer, MANDO   Calcium Carbonate-Vit D-Min (Calcium 600+D3 Plus Minerals) 600-800 MG-UNIT tablet Take 1 tablet by mouth Daily.    Provider, MD Judy   dilTIAZem CD (CARDIZEM CD) 360 MG 24 hr capsule Take 1 capsule by mouth Daily.  "5/17/23   Jovani Lovelace MD   fluticasone (FLONASE) 50 MCG/ACT nasal spray 2 sprays into the nostril(s) as directed by provider Daily As Needed for Rhinitis or Allergies.    Judy Patton MD   furosemide (LASIX) 20 MG tablet Take 1 tablet by mouth Daily.    Judy Patton MD   guaiFENesin (MUCINEX) 600 MG 12 hr tablet Take 1 tablet by mouth 2 (Two) Times a Day.    Judy Patton MD   labetalol (NORMODYNE) 300 MG tablet Take 1 tablet by mouth 2 (Two) Times a Day.    Judy Patton MD   levothyroxine (Synthroid) 100 MCG tablet Take 1 tablet by mouth Daily. 8/11/22   Galina Cordero APRN   losartan-hydrochlorothiazide (HYZAAR) 100-25 MG per tablet Take 1 tablet by mouth Daily.    Judy Patton MD   metoprolol tartrate (LOPRESSOR) 25 MG tablet Take 2 tablets by mouth 2 (Two) Times a Day As Needed (Tachycardia). 6/3/24   Marlon Carr MD   Multiple Vitamins-Minerals (PRESERVISION AREDS 2 PO) Take 1 tablet by mouth 2 (Two) Times a Day.    Judy Patton MD   Omega-3 Fatty Acids (FISH OIL) 1000 MG capsule capsule Take 1 capsule by mouth Daily With Breakfast.    Judy Patton MD   potassium chloride (MICRO-K) 10 MEQ CR capsule Take 1 capsule by mouth 3 (Three) Times a Day.    Judy Patton MD   rosuvastatin (CRESTOR) 10 MG tablet Take 1 tablet by mouth Every Night. 10/29/19   Judy Patton MD   vitamin B-12 (CYANOCOBALAMIN) 1000 MCG tablet Take 1 tablet by mouth Daily. 10/10/23   Judy Patton MD     I have utilized all available immediate resources to obtain, update, or review the patient's current medications (including all prescriptions, over-the-counter products, herbals, cannabis/cannabidiol products, and vitamin/mineral/dietary (nutritional) supplements).    Objective     Vital Signs: /91   Pulse 91   Temp 98.2 °F (36.8 °C) (Oral)   Resp 20   Ht 167.6 cm (66\")   Wt 92.1 kg (203 lb)   SpO2 97%   BMI 32.77 kg/m²   Physical " Exam  Vitals and nursing note reviewed.   Constitutional:       General: She is not in acute distress.     Appearance: Normal appearance.   HENT:      Head: Normocephalic and atraumatic.      Nose: Nose normal.      Mouth/Throat:      Mouth: Mucous membranes are moist.      Pharynx: No oropharyngeal exudate or posterior oropharyngeal erythema.   Eyes:      Extraocular Movements: Extraocular movements intact.      Conjunctiva/sclera: Conjunctivae normal.      Pupils: Pupils are equal, round, and reactive to light.   Cardiovascular:      Rate and Rhythm: Normal rate and regular rhythm. Rhythm irregularly irregular.      Heart sounds: No murmur heard.     No friction rub. No gallop.   Pulmonary:      Effort: Pulmonary effort is normal.      Breath sounds: Normal breath sounds. No wheezing, rhonchi or rales.   Abdominal:      General: Bowel sounds are normal. There is no distension.      Palpations: Abdomen is soft.      Tenderness: There is no abdominal tenderness. There is no guarding or rebound.   Musculoskeletal:      Cervical back: Normal range of motion. No rigidity.      Right lower leg: No edema.      Left lower leg: No edema.   Skin:     General: Skin is warm and dry.      Capillary Refill: Capillary refill takes less than 2 seconds.      Coloration: Skin is not jaundiced.   Neurological:      General: No focal deficit present.      Mental Status: She is alert and oriented to person, place, and time.      Cranial Nerves: No facial asymmetry.   Psychiatric:         Attention and Perception: Attention and perception normal.         Mood and Affect: Mood and affect normal.         Speech: Speech normal.         Behavior: Behavior normal. Behavior is cooperative.         Thought Content: Thought content normal.         Cognition and Memory: Cognition and memory normal.         Judgment: Judgment normal.      Results Reviewed:  Lab Results (last 24 hours)       Procedure Component Value Units Date/Time    High  Sensitivity Troponin T 2Hr [[id number]]  (Normal) Collected: 06/09/24 0044    Specimen: Blood from Arm, Left Updated: 06/09/24 0118     HS Troponin T 10 ng/L      Troponin T Delta -1 ng/L     Narrative:      High Sensitive Troponin T Reference Range:  <14.0 ng/L- Negative Female for AMI  <22.0 ng/L- Negative Male for AMI  >=14 - Abnormal Female indicating possible myocardial injury.  >=22 - Abnormal Male indicating possible myocardial injury.   Clinicians would have to utilize clinical acumen, EKG, Troponin, and serial changes to determine if it is an Acute Myocardial Infarction or myocardial injury due to an underlying chronic condition.         CBC & Differential [[id number]]  (Abnormal) Collected: 06/08/24 2239    Specimen: Blood Updated: 06/08/24 2355    Narrative:      The following orders were created for panel order CBC & Differential.  Procedure                               Abnormality         Status                     ---------                               -----------         ------                     CBC Auto Differential[[id number]]        Abnormal            Final result                 Please view results for these tests on the individual orders.    CBC Auto Differential [[id number]]  (Abnormal) Collected: 06/08/24 2239    Specimen: Blood Updated: 06/08/24 2355     WBC 9.19 10*3/mm3      RBC 4.11 10*6/mm3      Hemoglobin 11.9 g/dL      Hematocrit 35.7 %      MCV 86.9 fL      MCH 29.0 pg      MCHC 33.3 g/dL      RDW 13.1 %      RDW-SD 41.2 fl      MPV 10.7 fL      Platelets 227 10*3/mm3      Neutrophil % 67.0 %      Lymphocyte % 20.1 %      Monocyte % 8.8 %      Eosinophil % 3.5 %      Basophil % 0.3 %      Immature Grans % 0.3 %      Neutrophils, Absolute 6.15 10*3/mm3      Lymphocytes, Absolute 1.85 10*3/mm3      Monocytes, Absolute 0.81 10*3/mm3      Eosinophils, Absolute 0.32 10*3/mm3      Basophils, Absolute 0.03 10*3/mm3      Immature Grans, Absolute 0.03 10*3/mm3      nRBC 0.0 /100 WBC     High  Sensitivity Troponin T [[id number]]  (Normal) Collected: 06/08/24 2239    Specimen: Blood Updated: 06/08/24 2352     HS Troponin T 11 ng/L     Narrative:      High Sensitive Troponin T Reference Range:  <14.0 ng/L- Negative Female for AMI  <22.0 ng/L- Negative Male for AMI  >=14 - Abnormal Female indicating possible myocardial injury.  >=22 - Abnormal Male indicating possible myocardial injury.   Clinicians would have to utilize clinical acumen, EKG, Troponin, and serial changes to determine if it is an Acute Myocardial Infarction or myocardial injury due to an underlying chronic condition.         Lipase [[id number]]  (Normal) Collected: 06/08/24 2239    Specimen: Blood Updated: 06/08/24 2348     Lipase 35 U/L     Magnesium [[id number]]  (Normal) Collected: 06/08/24 2239    Specimen: Blood Updated: 06/08/24 2347     Magnesium 2.0 mg/dL     [place] Draw [[id number]] Collected: 06/08/24 2239    Specimen: Blood Updated: 06/08/24 2301    Narrative:      The following orders were created for panel order [place] Draw.  Procedure                               Abnormality         Status                     ---------                               -----------         ------                     Green Top (Gel)[[id number]]                                  Final result               Lavender Top[[id number]]                                     Final result               Red Top[[id number]]                                          Final result               Perez Top[[id number]]                                         Final result               Light Blue Top[[id number]]                                   Final result                 Please view results for these tests on the individual orders.    Green Top (Gel) [[id number]] Collected: 06/08/24 2239    Specimen: Blood Updated: 06/08/24 2301     Extra Tube Hold for add-ons.     Comment: Auto resulted.       Lavender Top [[id number]] Collected: 06/08/24 2239    Specimen: Blood Updated:  06/08/24 2301     Extra Tube hold for add-on     Comment: Auto resulted       Red Top [[id number]] Collected: 06/08/24 2239    Specimen: Blood Updated: 06/08/24 2301     Extra Tube Hold for add-ons.     Comment: Auto resulted.       Gray Top [[id number]] Collected: 06/08/24 2239    Specimen: Blood Updated: 06/08/24 2301     Extra Tube Hold for add-ons.     Comment: Auto resulted.       Light Blue Top [[id number]] Collected: 06/08/24 2239    Specimen: Blood Updated: 06/08/24 2301     Extra Tube Hold for add-ons.     Comment: Auto resulted             Imaging Results (Last 24 Hours)       Procedure Component Value Units Date/Time    XR Chest 1 View [[id number]] Resulted: 06/08/24 2354     Updated: 06/08/24 2354          I have personally reviewed and interpreted the radiology studies and ECG obtained at time of admission.     Assessment / Plan   Assessment:   Active Hospital Problems    Diagnosis     **Afib      A/P:   Recurrent Atrial Fibrillation - s/p 2 prior ablations - will some mild tachycardia - patient is symptomatic even while rate controlled  - will keep NPO for possible ablation later today - continue Cardizem, Labetalol and Eliquis - will order PRN IVP Lopressor to keep HR <100 - will consult Dr Martinez - cardiology  Hypertension - on Cardizem , Labetalol 300 BID, Hyzaar 100/25, and PRN Lopressor for HR >100  Hyperlipidemia - on Crestor 10  PRINCE - on CPAP  Hypothyroid s/p total thyroidectomy - on Synthroid 100    Treatment Plan  The patient will be admitted to my service here at [facility].     Medical Decision Making  Number and Complexity of problems:one of moderate complexity  Differential Diagnosis:  as above    Conditions and Status        Condition is unchanged.     MDM Data  External documents reviewed: ER chart, prior hospital and OP chart and Care everywhere  Cardiac tracing (EKG, telemetry) interpretation:  A Fib 94 with RBBB  Radiology interpretation: CXR clear  Labs reviewed: yes  Any  tests that were considered but not ordered: none     Decision rules/scores evaluated (example [id number], Wells, etc): [id number] of 4     Discussed with: Patient, Friend, and ER Physician     Care Planning  Shared decision making: with patient  Code status and discussions: Full    Disposition  Social Determinants of Health that impact treatment or disposition: none  Estimated length of stay is >2 nights    I confirmed that the patient's advanced care plan is present, code status is documented, and a surrogate decision maker is listed in the patient's medical record.     The patient's surrogate decision maker is her son Gil Cisneros and Friend Dilip Galicia    The patient was seen and examined by me on 6/9/2024 at 0300.    Electronically signed by Cecily Hernandez MD, 06/09/24, 03:26 CDT.

## 2024-06-10 ENCOUNTER — TELEPHONE (OUTPATIENT)
Dept: INTERNAL MEDICINE | Age: 79
End: 2024-06-10

## 2024-06-10 LAB
ANION GAP SERPL CALCULATED.3IONS-SCNC: 10 MMOL/L (ref 5–15)
BUN SERPL-MCNC: 15 MG/DL (ref 8–23)
BUN/CREAT SERPL: 19.2 (ref 7–25)
CALCIUM SPEC-SCNC: 9 MG/DL (ref 8.6–10.5)
CHLORIDE SERPL-SCNC: 103 MMOL/L (ref 98–107)
CO2 SERPL-SCNC: 25 MMOL/L (ref 22–29)
CREAT SERPL-MCNC: 0.78 MG/DL (ref 0.57–1)
EGFRCR SERPLBLD CKD-EPI 2021: 77.9 ML/MIN/1.73
GLUCOSE SERPL-MCNC: 106 MG/DL (ref 65–99)
MAGNESIUM SERPL-MCNC: 2 MG/DL (ref 1.6–2.4)
POTASSIUM SERPL-SCNC: 3.9 MMOL/L (ref 3.5–5.2)
POTASSIUM SERPL-SCNC: 3.9 MMOL/L (ref 3.5–5.2)
SODIUM SERPL-SCNC: 138 MMOL/L (ref 136–145)

## 2024-06-10 PROCEDURE — 99222 1ST HOSP IP/OBS MODERATE 55: CPT | Performed by: STUDENT IN AN ORGANIZED HEALTH CARE EDUCATION/TRAINING PROGRAM

## 2024-06-10 PROCEDURE — 83735 ASSAY OF MAGNESIUM: CPT | Performed by: HOSPITALIST

## 2024-06-10 PROCEDURE — 84132 ASSAY OF SERUM POTASSIUM: CPT | Performed by: INTERNAL MEDICINE

## 2024-06-10 PROCEDURE — 80048 BASIC METABOLIC PNL TOTAL CA: CPT | Performed by: HOSPITALIST

## 2024-06-10 RX ADMIN — POTASSIUM CHLORIDE 10 MEQ: 750 CAPSULE, EXTENDED RELEASE ORAL at 15:48

## 2024-06-10 RX ADMIN — DILTIAZEM HYDROCHLORIDE 360 MG: 120 CAPSULE, EXTENDED RELEASE ORAL at 08:39

## 2024-06-10 RX ADMIN — APIXABAN 5 MG: 5 TABLET, FILM COATED ORAL at 20:53

## 2024-06-10 RX ADMIN — APIXABAN 5 MG: 5 TABLET, FILM COATED ORAL at 14:04

## 2024-06-10 RX ADMIN — METOPROLOL TARTRATE 5 MG: 5 INJECTION INTRAVENOUS at 05:51

## 2024-06-10 RX ADMIN — POTASSIUM CHLORIDE 10 MEQ: 750 CAPSULE, EXTENDED RELEASE ORAL at 20:53

## 2024-06-10 RX ADMIN — HYDROCHLOROTHIAZIDE 25 MG: 25 TABLET ORAL at 08:39

## 2024-06-10 RX ADMIN — LABETALOL HYDROCHLORIDE 300 MG: 200 TABLET, FILM COATED ORAL at 08:39

## 2024-06-10 RX ADMIN — LABETALOL HYDROCHLORIDE 300 MG: 200 TABLET, FILM COATED ORAL at 20:53

## 2024-06-10 RX ADMIN — Medication 10 ML: at 20:53

## 2024-06-10 RX ADMIN — Medication 10 ML: at 14:05

## 2024-06-10 RX ADMIN — METOPROLOL TARTRATE 5 MG: 5 INJECTION INTRAVENOUS at 22:49

## 2024-06-10 RX ADMIN — ROSUVASTATIN CALCIUM 10 MG: 10 TABLET, FILM COATED ORAL at 20:53

## 2024-06-10 RX ADMIN — LOSARTAN POTASSIUM 100 MG: 50 TABLET, FILM COATED ORAL at 08:39

## 2024-06-10 RX ADMIN — LEVOTHYROXINE SODIUM 100 MCG: 100 TABLET ORAL at 05:40

## 2024-06-10 RX ADMIN — POTASSIUM CHLORIDE 10 MEQ: 750 CAPSULE, EXTENDED RELEASE ORAL at 14:04

## 2024-06-10 NOTE — PLAN OF CARE
Goal Outcome Evaluation:  Plan of Care Reviewed With: patient        Progress: improving                                Patient has required PRN lopressor x1 for a HR of 117. Patient denies pain. RA. VSS. No distress noted.

## 2024-06-10 NOTE — PROGRESS NOTES
[facility] Medicine Services  INPATIENT PROGRESS NOTE    Patient Name: Anne-Marie Hayes  Date of Admission: 6/8/2024  Today's Date: 06/10/24  Length of Stay: 1  Primary Care Physician: Ania Culver MD    Subjective   Chief Complaint: A-fib    The patient is a 78-year-old  lady with a PMH significant for paroxysmal A-fib with 2 ablations on Eliquis, HTN, follows regularly with cardiology/EP cardiology, who was admitted on 6/8/2024 for being in A-fib with RVR.  Surgery consulted recommendations appreciated, EP cardiology also consulted MANDO Dong for cardioversion on 6/11/2024 appreciated. Echo in 2022 EF of greater than 60%.    Review of Systems   All pertinent negatives and positives are as above. All other systems have been reviewed and are negative unless otherwise stated.     Objective    Temp:  [97.8 °F (36.6 °C)-98.4 °F (36.9 °C)] 98.3 °F (36.8 °C)  Heart Rate:  [] 91  Resp:  [16-18] 18  BP: (110-133)/(63-90) 110/90  Physical Exam  Constitutional:       Appearance: Normal appearance. She is normal weight.   HENT:      Head: Normocephalic.      Right Ear: External ear normal.      Left Ear: External ear normal.      Nose: Nose normal.      Mouth/Throat:      Mouth: Mucous membranes are moist.   Eyes:      Pupils: Pupils are equal, round, and reactive to light.   Cardiovascular:      Rate and Rhythm: Normal rate and regular rhythm.      Pulses: Normal pulses.      Heart sounds: Normal heart sounds.   Pulmonary:      Effort: Pulmonary effort is normal.      Breath sounds: Normal breath sounds.   Abdominal:      General: Bowel sounds are normal.      Palpations: Abdomen is soft.   Musculoskeletal:         General: Normal range of motion.      Cervical back: Neck supple.   Skin:     General: Skin is warm and dry.   Neurological:      General: No focal deficit present.      Mental Status: She is alert and oriented to person, place, and time.   Psychiatric:  "        Mood and Affect: Mood normal.       Results Review:  I have reviewed the labs, radiology results, and diagnostic studies.    Laboratory Data:   Results from last 7 days   Lab Units 06/08/24  2239   WBC 10*3/mm3 9.19   HEMOGLOBIN g/dL 11.9*   HEMATOCRIT % 35.7   PLATELETS 10*3/mm3 227        Results from last 7 days   Lab Units 06/10/24  0526 06/09/24  1719 06/09/24  0359   SODIUM mmol/L 138  --  138   POTASSIUM mmol/L 3.9  3.9 3.6 3.3*   CHLORIDE mmol/L 103  --  102   CO2 mmol/L 25.0  --  24.0   BUN mg/dL 15  --  12   CREATININE mg/dL 0.78  --  0.66   CALCIUM mg/dL 9.0  --  8.9   GLUCOSE mg/dL 106*  --  125*       Culture Data:   No results found for: \"BLOODCX\", \"URINECX\", \"WOUNDCX\", \"MRSACX\", \"RESPCX\", \"STOOLCX\"    Radiology Data:   Imaging Results (Last 24 Hours)       ** No results found for the last 24 hours. **            I have reviewed the patient's current medications.     Assessment/Plan   Assessment  Active Hospital Problems    Diagnosis     **Afib     Essential hypertension     PRINCE on CPAP     Mixed hyperlipidemia        Treatment Plan  -Paroxysmal A-fib s/p 2 ablations and anticoagulation currently not rate controlled less than 120.  Cardiology recommendations appreciated.  EP cardiology consulted and planning for cardioversion on 6/11/2024.  Continue telemetry monitoring, keep potassium above 4 and magnesium above 2.  Continue as needed IV metoprolol, p.o. diltiazem, p.o. Eliquis, p.o. labetalol  - Essential hypertension continue p.o. losartan, p.o. hydrochlorothiazide monitor BP  - HLD continue p.o. Crestor  - Hypothyroidism continue p.o. levothyroxine  Continue current medical management and supportive care    Medical Decision Making  Number and Complexity of problems: 1-2  Differential Diagnosis: Same as above    Conditions and Status        Condition is unchanged.     MDM Data  External documents reviewed: Reviewed  Cardiac tracing (EKG, telemetry) interpretation:.  Reviewed  Radiology " interpretation: Reviewed  Labs reviewed: Reviewed  Any tests that were considered but not ordered: None     Decision rules/scores evaluated (example [id number], Wells, etc): 4     Discussed with: Patient and nursing staff     Care Planning  Shared decision making: Patient, nursing staff and physician  Code status and discussions: Full    Disposition  Social Determinants of Health that impact treatment or disposition: TBD  I expect the patient to be discharged to 2 to 3 days     Electronically signed by Josee Rider MD, 06/10/24, 15:50 CDT.

## 2024-06-10 NOTE — PLAN OF CARE
Goal Outcome Evaluation:  Plan of Care Reviewed With: patient        Progress: improving  Outcome Evaluation: Pt A&O x4. No c/o pain. Pt NPO at midnight for possible cardioversion tomorrow. Safety maintained.

## 2024-06-10 NOTE — CASE MANAGEMENT/SOCIAL WORK
Discharge Planning Assessment   [place]     Patient Name: Anne-Marie Hayes  MRN: [id number]  Today's Date: 6/10/2024    Admit Date: 6/8/2024        Discharge Needs Assessment       Row Name 06/10/24 1118       Living Environment    People in Home alone    Current Living Arrangements home    Potentially Unsafe Housing Conditions none    In the past 12 months has the electric, gas, oil, or water company threatened to shut off services in your home? No    Primary Care Provided by self    Provides Primary Care For no one    Family Caregiver if Needed child(ольга), adult    Quality of Family Relationships involved;helpful;supportive    Able to Return to Prior Arrangements yes       Transportation Needs    In the past 12 months, has lack of transportation kept you from medical appointments or from getting medications? no    In the past 12 months, has lack of transportation kept you from meetings, work, or from getting things needed for daily living? No       Food Insecurity    Within the past 12 months, you worried that your food would run out before you got the money to buy more. Never true    Within the past 12 months, the food you bought just didn't last and you didn't have money to get more. Never true       Transition Planning    Patient/Family Anticipates Transition to home    Patient/Family Anticipated Services at Transition none    Transportation Anticipated family or friend will provide       Discharge Needs Assessment    Equipment Currently Used at Home none    Concerns to be Addressed denies needs/concerns at this time    Equipment Needed After Discharge none    Discharge Coordination/Progress Patient lives alone, but has family support. PCP is Dr. Ania Culver.  Has [facility] Medicare Replacement.  CM/SS will follow for any needs                   Discharge Plan    No documentation.                 Continued Care and Services - Admitted Since 6/8/2024    No active coordination exists for this  encounter.          Demographic Summary    No documentation.                  Functional Status    No documentation.                  Psychosocial    No documentation.                  Abuse/Neglect    No documentation.                  Legal    No documentation.                  Substance Abuse    No documentation.                  Patient Forms    No documentation.                     Vale Tinsley RN

## 2024-06-10 NOTE — TELEPHONE ENCOUNTER
She was admitted to [facility] for A-Fib and is waiting on a cardioversion.  Her Cardiologist is not in today, Dr Ch, and the On-call MD is already booked up to today.  She called the Cardiologist office and they are trying to find someone to do this, but she is asking for we can help somehow?

## 2024-06-11 ENCOUNTER — APPOINTMENT (OUTPATIENT)
Dept: CARDIOLOGY | Facility: HOSPITAL | Age: 79
DRG: 310 | End: 2024-06-11
Payer: MEDICARE

## 2024-06-11 ENCOUNTER — ANESTHESIA (OUTPATIENT)
Dept: CARDIOLOGY | Facility: HOSPITAL | Age: 79
End: 2024-06-11
Payer: MEDICARE

## 2024-06-11 ENCOUNTER — ANESTHESIA EVENT (OUTPATIENT)
Dept: CARDIOLOGY | Facility: HOSPITAL | Age: 79
End: 2024-06-11
Payer: MEDICARE

## 2024-06-11 LAB
ANION GAP SERPL CALCULATED.3IONS-SCNC: 11 MMOL/L (ref 5–15)
BUN SERPL-MCNC: 14 MG/DL (ref 8–23)
BUN/CREAT SERPL: 18.7 (ref 7–25)
CALCIUM SPEC-SCNC: 8.9 MG/DL (ref 8.6–10.5)
CHLORIDE SERPL-SCNC: 98 MMOL/L (ref 98–107)
CO2 SERPL-SCNC: 24 MMOL/L (ref 22–29)
CREAT SERPL-MCNC: 0.75 MG/DL (ref 0.57–1)
EGFRCR SERPLBLD CKD-EPI 2021: 81.6 ML/MIN/1.73
GLUCOSE SERPL-MCNC: 105 MG/DL (ref 65–99)
MAGNESIUM SERPL-MCNC: 2 MG/DL (ref 1.6–2.4)
POTASSIUM SERPL-SCNC: 3.5 MMOL/L (ref 3.5–5.2)
POTASSIUM SERPL-SCNC: 4 MMOL/L (ref 3.5–5.2)
SODIUM SERPL-SCNC: 133 MMOL/L (ref 136–145)

## 2024-06-11 PROCEDURE — 25810000003 SODIUM CHLORIDE 0.9 % SOLUTION: Performed by: NURSE ANESTHETIST, CERTIFIED REGISTERED

## 2024-06-11 PROCEDURE — 83735 ASSAY OF MAGNESIUM: CPT | Performed by: STUDENT IN AN ORGANIZED HEALTH CARE EDUCATION/TRAINING PROGRAM

## 2024-06-11 PROCEDURE — 80048 BASIC METABOLIC PNL TOTAL CA: CPT | Performed by: STUDENT IN AN ORGANIZED HEALTH CARE EDUCATION/TRAINING PROGRAM

## 2024-06-11 PROCEDURE — 92960 CARDIOVERSION ELECTRIC EXT: CPT

## 2024-06-11 PROCEDURE — 25010000002 PROPOFOL 1000 MG/100ML EMULSION: Performed by: NURSE ANESTHETIST, CERTIFIED REGISTERED

## 2024-06-11 PROCEDURE — 93005 ELECTROCARDIOGRAM TRACING: CPT | Performed by: STUDENT IN AN ORGANIZED HEALTH CARE EDUCATION/TRAINING PROGRAM

## 2024-06-11 PROCEDURE — 5A2204Z RESTORATION OF CARDIAC RHYTHM, SINGLE: ICD-10-PCS | Performed by: STUDENT IN AN ORGANIZED HEALTH CARE EDUCATION/TRAINING PROGRAM

## 2024-06-11 PROCEDURE — 99232 SBSQ HOSP IP/OBS MODERATE 35: CPT | Performed by: STUDENT IN AN ORGANIZED HEALTH CARE EDUCATION/TRAINING PROGRAM

## 2024-06-11 PROCEDURE — 93010 ELECTROCARDIOGRAM REPORT: CPT | Performed by: STUDENT IN AN ORGANIZED HEALTH CARE EDUCATION/TRAINING PROGRAM

## 2024-06-11 PROCEDURE — 92960 CARDIOVERSION ELECTRIC EXT: CPT | Performed by: STUDENT IN AN ORGANIZED HEALTH CARE EDUCATION/TRAINING PROGRAM

## 2024-06-11 PROCEDURE — 84132 ASSAY OF SERUM POTASSIUM: CPT | Performed by: STUDENT IN AN ORGANIZED HEALTH CARE EDUCATION/TRAINING PROGRAM

## 2024-06-11 RX ORDER — SODIUM CHLORIDE 9 MG/ML
INJECTION, SOLUTION INTRAVENOUS CONTINUOUS PRN
Status: DISCONTINUED | OUTPATIENT
Start: 2024-06-11 | End: 2024-06-11 | Stop reason: SURG

## 2024-06-11 RX ORDER — POTASSIUM CHLORIDE 750 MG/1
40 CAPSULE, EXTENDED RELEASE ORAL EVERY 4 HOURS
Status: COMPLETED | OUTPATIENT
Start: 2024-06-11 | End: 2024-06-11

## 2024-06-11 RX ORDER — PROPOFOL 10 MG/ML
INJECTION, EMULSION INTRAVENOUS AS NEEDED
Status: DISCONTINUED | OUTPATIENT
Start: 2024-06-11 | End: 2024-06-11 | Stop reason: SURG

## 2024-06-11 RX ORDER — LIDOCAINE HYDROCHLORIDE 20 MG/ML
INJECTION, SOLUTION EPIDURAL; INFILTRATION; INTRACAUDAL; PERINEURAL AS NEEDED
Status: DISCONTINUED | OUTPATIENT
Start: 2024-06-11 | End: 2024-06-11 | Stop reason: SURG

## 2024-06-11 RX ORDER — DOFETILIDE 0.25 MG/1
500 CAPSULE ORAL EVERY 12 HOURS SCHEDULED
Status: DISCONTINUED | OUTPATIENT
Start: 2024-06-11 | End: 2024-06-11

## 2024-06-11 RX ORDER — DOFETILIDE 0.25 MG/1
500 CAPSULE ORAL EVERY 12 HOURS SCHEDULED
Status: DISCONTINUED | OUTPATIENT
Start: 2024-06-12 | End: 2024-06-12

## 2024-06-11 RX ADMIN — METOPROLOL TARTRATE 5 MG: 5 INJECTION INTRAVENOUS at 01:31

## 2024-06-11 RX ADMIN — LOSARTAN POTASSIUM 100 MG: 50 TABLET, FILM COATED ORAL at 09:32

## 2024-06-11 RX ADMIN — DILTIAZEM HYDROCHLORIDE 360 MG: 120 CAPSULE, EXTENDED RELEASE ORAL at 09:31

## 2024-06-11 RX ADMIN — METOPROLOL TARTRATE 5 MG: 5 INJECTION INTRAVENOUS at 05:01

## 2024-06-11 RX ADMIN — LEVOTHYROXINE SODIUM 100 MCG: 100 TABLET ORAL at 05:03

## 2024-06-11 RX ADMIN — LABETALOL HYDROCHLORIDE 300 MG: 200 TABLET, FILM COATED ORAL at 09:31

## 2024-06-11 RX ADMIN — LABETALOL HYDROCHLORIDE 300 MG: 200 TABLET, FILM COATED ORAL at 20:01

## 2024-06-11 RX ADMIN — POTASSIUM CHLORIDE 10 MEQ: 750 CAPSULE, EXTENDED RELEASE ORAL at 16:23

## 2024-06-11 RX ADMIN — ROSUVASTATIN CALCIUM 10 MG: 10 TABLET, FILM COATED ORAL at 20:02

## 2024-06-11 RX ADMIN — DOFETILIDE 500 MCG: 0.25 CAPSULE ORAL at 14:12

## 2024-06-11 RX ADMIN — SODIUM CHLORIDE: 9 INJECTION, SOLUTION INTRAVENOUS at 07:34

## 2024-06-11 RX ADMIN — PROPOFOL 60 MG: 10 INJECTION, EMULSION INTRAVENOUS at 07:36

## 2024-06-11 RX ADMIN — POTASSIUM CHLORIDE 40 MEQ: 750 CAPSULE, EXTENDED RELEASE ORAL at 09:37

## 2024-06-11 RX ADMIN — Medication 10 ML: at 09:38

## 2024-06-11 RX ADMIN — POTASSIUM CHLORIDE 40 MEQ: 750 CAPSULE, EXTENDED RELEASE ORAL at 12:38

## 2024-06-11 RX ADMIN — HYDROCHLOROTHIAZIDE 25 MG: 25 TABLET ORAL at 09:32

## 2024-06-11 RX ADMIN — POTASSIUM CHLORIDE 10 MEQ: 750 CAPSULE, EXTENDED RELEASE ORAL at 20:01

## 2024-06-11 RX ADMIN — Medication 10 ML: at 20:04

## 2024-06-11 RX ADMIN — LIDOCAINE HYDROCHLORIDE 40 MG: 20 INJECTION, SOLUTION EPIDURAL; INFILTRATION; INTRACAUDAL; PERINEURAL at 07:36

## 2024-06-11 RX ADMIN — APIXABAN 5 MG: 5 TABLET, FILM COATED ORAL at 20:01

## 2024-06-11 RX ADMIN — APIXABAN 5 MG: 5 TABLET, FILM COATED ORAL at 09:31

## 2024-06-11 RX ADMIN — FUROSEMIDE 20 MG: 20 TABLET ORAL at 09:30

## 2024-06-11 NOTE — CONSULTS
EP CONSULT NOTE    Subjective        History of Present Illness    EP Problems:  1.  Persistent atrial fibrillation  -Prior AAD use: Flecainide, amiodarone  -2022: PVI, Lopez  -3/15/2023: Cardioversion  2.  First-degree AV block  3.  Right bundle branch block  4.  Atrial flutter (atypical)     Cardiology Problems:  1.  Hypertension  2.  Hyperlipidemia     Medical Problems:  1.  Obesity  -2023: BMI 36  -3/2024: BMI 38  -2024: BMI 34  2.  Obstructive sleep apnea    Anne-Marie Hayes is a 78 y.o. female with problem list as above for whom EP is consulted regarding atypical atrial flutter, persistent AF.  She underwent PVI in  however required repeat cardioversions for AF.  In Oct 2023, she underwent redo PVI and was found to have had return of conduction into the right sided veins and LSPV.  Redo PVI was performed however she is now having repeated episodes of atypical atrial flutter.  She had one ER visit last month for this but had spontaneous return of NSR.  This time, she went back out of rhythm last week but was not having return of sinus in combination with fatigue and shortness of breath.  As such, she came to the ER for evaluation.    Family History:  family history includes Arrhythmia in her mother; Atrial fibrillation in her mother; Heart attack in her father; Heart disease in her father; Hypertension in her father.    Social History:  Social History     Tobacco Use    Smoking status: Former     Current packs/day: 0.00     Average packs/day: 2.0 packs/day for 17.0 years (34.0 ttl pk-yrs)     Types: Cigarettes     Start date: 1966     Quit date: 1983     Years since quittin.4    Smokeless tobacco: Never    Tobacco comments:     [id number]   Vaping Use    Vaping status: Never Used   Substance Use Topics    Alcohol use: Yes     Alcohol/week: 4.0 standard drinks of alcohol     Types: 4 Glasses of wine per week     Comment: about 1/2 glass of wine a night    Drug use: No  "      Allergies:  Patient has no known allergies.      Objective   Vital Signs:  /95 (BP Location: Left arm, Patient Position: Lying)   Pulse (!) 144   Temp 97.4 °F (36.3 °C) (Oral)   Resp 18   Ht 167.6 cm (66\")   Wt 93.9 kg (207 lb)   SpO2 99%   BMI 33.41 kg/m²   Estimated body mass index is 33.41 kg/m² as calculated from the following:    Height as of this encounter: 167.6 cm (66\").    Weight as of this encounter: 93.9 kg (207 lb).      Physical Exam  Vitals reviewed.   Constitutional:       Appearance: Normal appearance.   Cardiovascular:      Rate and Rhythm: Normal rate. Rhythm irregular.      Pulses: Normal pulses.      Heart sounds: Normal heart sounds. No murmur heard.  Pulmonary:      Effort: Pulmonary effort is normal. No respiratory distress.      Breath sounds: Normal breath sounds.   Skin:     General: Skin is warm and dry.   Neurological:      General: No focal deficit present.      Mental Status: She is alert and oriented to person, place, and time.   Psychiatric:         Mood and Affect: Mood normal.         Judgment: Judgment normal.          Result Review :  The following data was reviewed by: Marlon Carr MD on 06/08/2024:  CMP          5/17/2024    05:40 6/9/2024    03:59 6/9/2024    17:19 6/10/2024    05:26   CMP   Glucose 123  125   106    BUN 13  12   15    Creatinine 0.59  0.66   0.78    EGFR 92.4  89.9   77.9    Sodium 139  138   138    Potassium 3.0  3.3  3.6  3.9     3.9    Chloride 101  102   103    Calcium 9.2  8.9   9.0    Total Protein 6.5       Albumin 4.4       Globulin 2.1       Total Bilirubin 0.5       Alkaline Phosphatase 75       AST (SGOT) 12       ALT (SGPT) 12       Albumin/Globulin Ratio 2.1       BUN/Creatinine Ratio 22.0  18.2   19.2    Anion Gap 12.0  12.0   10.0      CBC          5/16/2024    21:16 5/17/2024    05:40 6/8/2024    22:39   CBC   WBC 10.37  8.43  9.19    RBC 4.49  4.29  4.11    Hemoglobin 13.0  12.3  11.9    Hematocrit 38.8  37.4  35.7    MCV " 86.4  87.2  86.9    MCH 29.0  28.7  29.0    MCHC 33.5  32.9  33.3    RDW 12.7  12.7  13.1    Platelets 287  227  227      TSH          10/9/2023    09:31   TSH   TSH 0.697          Details          This result is from an external source.               Inpatient telemetry reviewed:  AFL, intermittent RVR    [id number] SCORE   [id number] Score: 4 (6/9/2024  6:24 PM)           Assessment and Plan   Problems:  Atypical atrial flutter with acute exacerbation    Anne-Marie Hayes is a 78 y.o. female with problem list as above for whom EP is consulted regarding atypical atrial flutter.  In an acute exacerbation.  Given recurrence now several months following ablation, I do suspect an AAD will be appropriate to try to better maintain sinus.  Her QT at presentation was prolonged but may be better with restoration of NSR.  Dofetilide would be her best option if she is able to take it.    Plan:  - NPO at midnight for DCCV tomorrow  - Consider dofetilide initiation if QT allows once in sinus rhythm  - Continue diltiazem and labetalol for now  - Anticoagulation with apixaban             Part of this note may be an electronic transcription/translation of spoken language to printed text using the Dragon Dictation System.

## 2024-06-11 NOTE — PROGRESS NOTES
"[facility] - Pharmacy to Review Drug Interactions & Creatinine Clearance Throughout Length of Stay Assessment Note    Anne-Marie Hayes is a 78 y.o.female  [Ht: 167.6 cm (66\"); Wt: 93.9 kg (207 lb)] admitted 6/8/2024 10:14 PM.  Estimated Creatinine Clearance: 71.3 mL/min (by C-G formula based on SCr of 0.75 mg/dL).    Current Facility-Administered Medications:     apixaban (ELIQUIS) tablet 5 mg, 5 mg, Oral, Q12H, Marlon Carr MD, 5 mg at 06/11/24 0931    sennosides-docusate (PERICOLACE) 8.6-50 MG per tablet 2 tablet, 2 tablet, Oral, BID PRN **AND** polyethylene glycol (MIRALAX) packet 17 g, 17 g, Oral, Daily PRN **AND** bisacodyl (DULCOLAX) EC tablet 5 mg, 5 mg, Oral, Daily PRN **AND** bisacodyl (DULCOLAX) suppository 10 mg, 10 mg, Rectal, Daily PRN, Marlon Carr MD    Calcium Replacement - Follow Nurse / BPA Driven Protocol, , Does not apply, PRN, Marlon Carr MD    dofetilide (TIKOSYN) capsule 500 mcg, 500 mcg, Oral, Q12H, Marlon Carr MD    fluticasone (FLONASE) 50 MCG/ACT nasal spray 2 spray, 2 spray, Nasal, Daily PRN, Marlon Carr MD    furosemide (LASIX) tablet 20 mg, 20 mg, Oral, Daily, Marlon Carr MD, 20 mg at 06/11/24 0930    labetalol (NORMODYNE) tablet 300 mg, 300 mg, Oral, BID, Marlon Carr MD, 300 mg at 06/11/24 0931    levothyroxine (SYNTHROID, LEVOTHROID) tablet 100 mcg, 100 mcg, Oral, Q AM, Marlno Carr MD, 100 mcg at 06/11/24 0503    losartan (COZAAR) tablet 100 mg, 100 mg, Oral, Q24H, 100 mg at 06/11/24 0932 **AND** [DISCONTINUED] hydroCHLOROthiazide tablet 25 mg, 25 mg, Oral, Q24H, Marlon Carr MD, 25 mg at 06/11/24 0932    Magnesium Standard Dose Replacement - Follow Nurse / BPA Driven Protocol, , Does not apply, PRJENELLE, Marlon Carr MD    metoprolol tartrate (LOPRESSOR) injection 5 mg, 5 mg, Intravenous, Q1H PRN, Marlon Carr MD, 5 mg at 06/11/24 0501    nitroglycerin (NITROSTAT) SL tablet 0.4 mg, 0.4 mg, Sublingual, Q5 Min PRN, Marlon Carr MD    Pharmacy Consult, , Does not apply, PRN, " Marlon Carr MD    Phosphorus Replacement - Follow Nurse / BPA Driven Protocol, , Does not apply, PRN, Marlon Carr MD    potassium chloride (MICRO-K/KLOR-CON) CR capsule, 10 mEq, Oral, TID, Marlon Carr MD, 10 mEq at 06/10/24 2053    Potassium Replacement - Follow Nurse / BPA Driven Protocol, , Does not apply, PRN, Marlon Carr MD    rosuvastatin (CRESTOR) tablet 10 mg, 10 mg, Oral, Nightly, Marlon Carr MD, 10 mg at 06/10/24 2053    Hydrochlorothiazide discontinue d/t potential interaction with dofetilide per Dr Carr.      Keaton Marie, PharmD  06/11/2413:11 CDT

## 2024-06-11 NOTE — ANESTHESIA POSTPROCEDURE EVALUATION
Patient: Anne-Marie Hayes    Procedure Summary       Date: 06/11/24 Room / Location: [facility] CATH LAB    Anesthesia Start: 0734 Anesthesia Stop: 0742    Procedure: CARDIOVERSION EXTERNAL IN CARDIOLOGY DEPARTMENT Diagnosis: (Persistent atrial fibrillation)    Scheduled Providers: Marlon Carr MD Provider: Jimbo Mckeon CRNA    Anesthesia Type: MAC ASA Status: 2            Anesthesia Type: MAC    Vitals  No vitals data found for the desired time range.          Post Anesthesia Care and Evaluation    Patient location during evaluation: PHASE II  Patient participation: complete - patient participated  Level of consciousness: awake  Pain score: 0  Pain management: adequate    Airway patency: patent  Anesthetic complications: No anesthetic complications  PONV Status: none  Cardiovascular status: acceptable  Respiratory status: acceptable  Hydration status: acceptable

## 2024-06-11 NOTE — PROGRESS NOTES
[facility] Medicine Services  INPATIENT PROGRESS NOTE    Patient Name: Anne-Marie Hayes  Date of Admission: 6/8/2024  Today's Date: 06/11/24  Length of Stay: 2  Primary Care Physician: Ania Culver MD    Subjective   Chief Complaint: A-fib    The patient is a 78-year-old  lady with a PMH significant for persistent A-fib with 2 ablations on Eliquis, HTN, follows regularly with cardiology/EP cardiology, who was admitted on 6/8/2024 for being in A-fib with RVR.  Surgery consulted recommendations appreciated, EP cardiology also consulted following with patient underwent successful cardioversion on 6/11/2024. Echo in 2022 EF of greater than 60%.    6/11  Patient seen and examined at the bedside in the company of her family with no new complaints at this time  S/p cardioversion on 6/11/2020 followed by EP cardiology which was successful    Review of Systems   All pertinent negatives and positives are as above. All other systems have been reviewed and are negative unless otherwise stated.     Objective    Temp:  [97.4 °F (36.3 °C)-98.3 °F (36.8 °C)] 98.3 °F (36.8 °C)  Heart Rate:  [] 89  Resp:  [11-18] 18  BP: (112-162)/() 129/85  Physical Exam  Constitutional:       Appearance: Normal appearance. She is normal weight.   HENT:      Head: Normocephalic.      Right Ear: External ear normal.      Left Ear: External ear normal.      Nose: Nose normal.      Mouth/Throat:      Mouth: Mucous membranes are moist.   Eyes:      Pupils: Pupils are equal, round, and reactive to light.   Cardiovascular:      Rate and Rhythm: Normal rate and regular rhythm.      Pulses: Normal pulses.      Heart sounds: Normal heart sounds.   Pulmonary:      Effort: Pulmonary effort is normal.      Breath sounds: Normal breath sounds.   Abdominal:      General: Bowel sounds are normal.      Palpations: Abdomen is soft.   Musculoskeletal:         General: Normal range of motion.      Cervical  "back: Neck supple.   Skin:     General: Skin is warm and dry.   Neurological:      General: No focal deficit present.      Mental Status: She is alert and oriented to person, place, and time.   Psychiatric:         Mood and Affect: Mood normal.       Results Review:  I have reviewed the labs, radiology results, and diagnostic studies.    Laboratory Data:   Results from last 7 days   Lab Units 06/08/24  2239   WBC 10*3/mm3 9.19   HEMOGLOBIN g/dL 11.9*   HEMATOCRIT % 35.7   PLATELETS 10*3/mm3 227        Results from last 7 days   Lab Units 06/11/24  0416 06/10/24  0526 06/09/24  1719 06/09/24  0359   SODIUM mmol/L 133* 138  --  138   POTASSIUM mmol/L 3.5 3.9  3.9 3.6 3.3*   CHLORIDE mmol/L 98 103  --  102   CO2 mmol/L 24.0 25.0  --  24.0   BUN mg/dL 14 15  --  12   CREATININE mg/dL 0.75 0.78  --  0.66   CALCIUM mg/dL 8.9 9.0  --  8.9   GLUCOSE mg/dL 105* 106*  --  125*       Culture Data:   No results found for: \"BLOODCX\", \"URINECX\", \"WOUNDCX\", \"MRSACX\", \"RESPCX\", \"STOOLCX\"    Radiology Data:   Imaging Results (Last 24 Hours)       ** No results found for the last 24 hours. **            I have reviewed the patient's current medications.     Assessment/Plan   Assessment  Active Hospital Problems    Diagnosis     **Afib     Essential hypertension     PRINCE on CPAP     Mixed hyperlipidemia        Treatment Plan  6/11  - Persistent A-fib s/p 2 ablations/Atypical atrial flutter with acute exacerbation and anticoagulation currently not rate controlled less than 120.  Cardiology recommendations appreciated. EP cardiology consulted and planning for cardioversion on 6/11/2024.  Continue telemetry monitoring, keep potassium >4 and Mg  >2. Continue as needed IV metoprolol, p.o. diltiazem, p.o. Eliquis, p.o. labetalol, s/p cardioversion on 6/11/2024 which was successful  - Essential hypertension continue p.o. losartan, p.o. hydrochlorothiazide monitor BP  - HLD continue p.o. Crestor  - Hypothyroidism continue p.o. " levothyroxine  Continue current medical management and supportive care    Medical Decision Making  Number and Complexity of problems: 1-2  Differential Diagnosis: Same as above    Conditions and Status   Condition is unchanged.     MDM Data  External documents reviewed: Reviewed  Cardiac tracing (EKG, telemetry) interpretation:.  Reviewed  Radiology interpretation: Reviewed  Labs reviewed: Reviewed  Any tests that were considered but not ordered: None     Decision rules/scores evaluated (example [id number], Wells, etc): 4     Discussed with: Patient and nursing staff     Care Planning  Shared decision making: Patient, nursing staff and physician  Code status and discussions: Full    Disposition  Social Determinants of Health that impact treatment or disposition: Home   I expect the patient to be discharged to 2 to 3 days     Electronically signed by Josee Rider MD, 06/11/24, 11:19 CDT.

## 2024-06-11 NOTE — PROGRESS NOTES
"EP Problems:  1.  Persistent atrial fibrillation  -Prior AAD use: Flecainide, amiodarone  -8/9/2022: PVI, Lopez  -3/15/2023: Cardioversion  2.  First-degree AV block  3.  Right bundle branch block  4.  Atrial flutter (atypical)     Cardiology Problems:  1.  Hypertension  2.  Hyperlipidemia     Medical Problems:  1.  Obesity  -5/2023: BMI 36  -3/2024: BMI 38  -5/2024: BMI 34  2.  Obstructive sleep apnea    Patient ID:  Anne-Marie Hayes is a 78 y.o. female with problem list as above as above who EP is following for atypical atrial flutter.    Subjective:  Remains in atrial flutter.  Intermittently with RVR.    Objective:  /92 (BP Location: Left arm, Patient Position: Lying)   Pulse 76   Temp 97.8 °F (36.6 °C) (Oral)   Resp 18   Ht 167.6 cm (66\")   Wt 93.9 kg (207 lb)   SpO2 97%   BMI 33.41 kg/m²     Well-appearing, no acute distress  Clear to auscultation bilaterally  Irregular rhythm, tachycardic  Warm, well-perfused, trace edema    Labs today:  Magnesium 2  Creatinine 0.75  Potassium 3.5     Telemetry reviewed: Atrial flutter, variable rates with RVR    [id number] SCORE   [id number] Score: 4 (6/11/2024  7:37 AM)        Assessment:  Atypical atrial flutter with acute exacerbation  Persistent atrial fibrillation  Right bundle branch block  First-degree AV block    Plan:  -Cardioversion today  -EKG following cardioversion and will assess for QT prolongation to determine antiarrhythmic options  -K and mag above 4 and 2 respectively  -Continue telemetry monitoring while inpatient    Part of this note may be an electronic transcription/translation of spoken language to printed text using the Dragon Dictation System.    "

## 2024-06-11 NOTE — PLAN OF CARE
Goal Outcome Evaluation:      Patient resting between care. Several occasions of symptomatic AFIB RVR overnight medicated x2 with metoprolol. Home bipap. Up adlib to bathroom. Safety maintained.

## 2024-06-11 NOTE — ANESTHESIA PREPROCEDURE EVALUATION
Anesthesia Evaluation     Patient summary reviewed and Nursing notes reviewed   history of anesthetic complications:                Airway   Mallampati: II  Dental - normal exam     Pulmonary - normal exam   (+) ,sleep apnea on CPAP  Cardiovascular   Exercise tolerance: good (4-7 METS)    Rhythm: irregular  Rate: abnormal    (+) hypertension well controlled, dysrhythmias Atrial Fib, hyperlipidemia      Neuro/Psych  GI/Hepatic/Renal/Endo    (+) obesity, morbid obesity, thyroid problem hypothyroidism    Musculoskeletal     Abdominal   (+) obese   Substance History      OB/GYN          Other   arthritis,                 Anesthesia Plan    ASA 2     MAC     intravenous induction     Anesthetic plan, risks, benefits, and alternatives have been provided, discussed and informed consent has been obtained with: patient.    CODE STATUS:    Level Of Support Discussed With: Patient  Code Status (Patient has no pulse and is not breathing): CPR (Attempt to Resuscitate)  Medical Interventions (Patient has pulse or is breathing): Full Support

## 2024-06-12 LAB
ANION GAP SERPL CALCULATED.3IONS-SCNC: 12 MMOL/L (ref 5–15)
BUN SERPL-MCNC: 26 MG/DL (ref 8–23)
BUN/CREAT SERPL: 22.8 (ref 7–25)
CALCIUM SPEC-SCNC: 8.7 MG/DL (ref 8.6–10.5)
CHLORIDE SERPL-SCNC: 96 MMOL/L (ref 98–107)
CO2 SERPL-SCNC: 23 MMOL/L (ref 22–29)
CREAT SERPL-MCNC: 1.14 MG/DL (ref 0.57–1)
DEPRECATED RDW RBC AUTO: 43.6 FL (ref 37–54)
EGFRCR SERPLBLD CKD-EPI 2021: 49.4 ML/MIN/1.73
ERYTHROCYTE [DISTWIDTH] IN BLOOD BY AUTOMATED COUNT: 13.3 % (ref 12.3–15.4)
GLUCOSE SERPL-MCNC: 98 MG/DL (ref 65–99)
HCT VFR BLD AUTO: 33 % (ref 34–46.6)
HGB BLD-MCNC: 10.6 G/DL (ref 12–15.9)
MAGNESIUM SERPL-MCNC: 2 MG/DL (ref 1.6–2.4)
MCH RBC QN AUTO: 28.6 PG (ref 26.6–33)
MCHC RBC AUTO-ENTMCNC: 32.1 G/DL (ref 31.5–35.7)
MCV RBC AUTO: 89.2 FL (ref 79–97)
PLATELET # BLD AUTO: 201 10*3/MM3 (ref 140–450)
PMV BLD AUTO: 11.2 FL (ref 6–12)
POTASSIUM SERPL-SCNC: 4.2 MMOL/L (ref 3.5–5.2)
QT INTERVAL: 420 MS
QT INTERVAL: 488 MS
QT INTERVAL: 514 MS
QTC INTERVAL: 481 MS
QTC INTERVAL: 499 MS
QTC INTERVAL: 508 MS
RBC # BLD AUTO: 3.7 10*6/MM3 (ref 3.77–5.28)
SODIUM SERPL-SCNC: 131 MMOL/L (ref 136–145)
WBC NRBC COR # BLD AUTO: 7.83 10*3/MM3 (ref 3.4–10.8)

## 2024-06-12 PROCEDURE — 93010 ELECTROCARDIOGRAM REPORT: CPT | Performed by: EMERGENCY MEDICINE

## 2024-06-12 PROCEDURE — 80048 BASIC METABOLIC PNL TOTAL CA: CPT | Performed by: STUDENT IN AN ORGANIZED HEALTH CARE EDUCATION/TRAINING PROGRAM

## 2024-06-12 PROCEDURE — 99232 SBSQ HOSP IP/OBS MODERATE 35: CPT | Performed by: STUDENT IN AN ORGANIZED HEALTH CARE EDUCATION/TRAINING PROGRAM

## 2024-06-12 PROCEDURE — 93005 ELECTROCARDIOGRAM TRACING: CPT | Performed by: STUDENT IN AN ORGANIZED HEALTH CARE EDUCATION/TRAINING PROGRAM

## 2024-06-12 PROCEDURE — 93010 ELECTROCARDIOGRAM REPORT: CPT | Performed by: STUDENT IN AN ORGANIZED HEALTH CARE EDUCATION/TRAINING PROGRAM

## 2024-06-12 PROCEDURE — 85027 COMPLETE CBC AUTOMATED: CPT | Performed by: STUDENT IN AN ORGANIZED HEALTH CARE EDUCATION/TRAINING PROGRAM

## 2024-06-12 PROCEDURE — 83735 ASSAY OF MAGNESIUM: CPT | Performed by: STUDENT IN AN ORGANIZED HEALTH CARE EDUCATION/TRAINING PROGRAM

## 2024-06-12 RX ORDER — DOFETILIDE 0.25 MG/1
500 CAPSULE ORAL
Status: DISCONTINUED | OUTPATIENT
Start: 2024-06-12 | End: 2024-06-14 | Stop reason: HOSPADM

## 2024-06-12 RX ADMIN — LEVOTHYROXINE SODIUM 100 MCG: 100 TABLET ORAL at 05:31

## 2024-06-12 RX ADMIN — DOFETILIDE 500 MCG: 0.25 CAPSULE ORAL at 12:37

## 2024-06-12 RX ADMIN — APIXABAN 5 MG: 5 TABLET, FILM COATED ORAL at 08:00

## 2024-06-12 RX ADMIN — ROSUVASTATIN CALCIUM 10 MG: 10 TABLET, FILM COATED ORAL at 20:30

## 2024-06-12 RX ADMIN — DOFETILIDE 500 MCG: 0.25 CAPSULE ORAL at 23:51

## 2024-06-12 RX ADMIN — LOSARTAN POTASSIUM 100 MG: 50 TABLET, FILM COATED ORAL at 08:00

## 2024-06-12 RX ADMIN — Medication 10 ML: at 08:02

## 2024-06-12 RX ADMIN — LABETALOL HYDROCHLORIDE 300 MG: 200 TABLET, FILM COATED ORAL at 20:31

## 2024-06-12 RX ADMIN — LABETALOL HYDROCHLORIDE 300 MG: 200 TABLET, FILM COATED ORAL at 08:00

## 2024-06-12 RX ADMIN — Medication 10 ML: at 20:31

## 2024-06-12 RX ADMIN — DOFETILIDE 500 MCG: 0.25 CAPSULE ORAL at 03:04

## 2024-06-12 RX ADMIN — FUROSEMIDE 20 MG: 20 TABLET ORAL at 08:00

## 2024-06-12 RX ADMIN — APIXABAN 5 MG: 5 TABLET, FILM COATED ORAL at 20:31

## 2024-06-12 RX ADMIN — POTASSIUM CHLORIDE 10 MEQ: 750 CAPSULE, EXTENDED RELEASE ORAL at 08:00

## 2024-06-12 RX ADMIN — POTASSIUM CHLORIDE 10 MEQ: 750 CAPSULE, EXTENDED RELEASE ORAL at 15:36

## 2024-06-12 RX ADMIN — POTASSIUM CHLORIDE 10 MEQ: 750 CAPSULE, EXTENDED RELEASE ORAL at 20:30

## 2024-06-12 NOTE — PLAN OF CARE
Goal Outcome Evaluation:  Plan of Care Reviewed With: patient        Progress: improving  Outcome Evaluation: No co pain. She is reciving Tikosyn, EKG done. She is sinus on the monitor. She is on RA. Cont to monitor.

## 2024-06-12 NOTE — PLAN OF CARE
Goal Outcome Evaluation:  Plan of Care Reviewed With: patient      A&Ox4. VSS. Sinus 55-67 on tele. No c/o pain. Up ad niranjan. Tikosyn given. RN to complete EKG. Patient slept well overnight with CPAP. No acute events. Safety maintained.

## 2024-06-13 LAB
ANION GAP SERPL CALCULATED.3IONS-SCNC: 14 MMOL/L (ref 5–15)
BUN SERPL-MCNC: 19 MG/DL (ref 8–23)
BUN/CREAT SERPL: 24.4 (ref 7–25)
CALCIUM SPEC-SCNC: 9.2 MG/DL (ref 8.6–10.5)
CHLORIDE SERPL-SCNC: 95 MMOL/L (ref 98–107)
CO2 SERPL-SCNC: 23 MMOL/L (ref 22–29)
CREAT SERPL-MCNC: 0.78 MG/DL (ref 0.57–1)
EGFRCR SERPLBLD CKD-EPI 2021: 77.9 ML/MIN/1.73
GLUCOSE SERPL-MCNC: 102 MG/DL (ref 65–99)
MAGNESIUM SERPL-MCNC: 2.1 MG/DL (ref 1.6–2.4)
POTASSIUM SERPL-SCNC: 4.4 MMOL/L (ref 3.5–5.2)
QT INTERVAL: 478 MS
QT INTERVAL: 506 MS
QT INTERVAL: 512 MS
QT INTERVAL: 518 MS
QTC INTERVAL: 501 MS
QTC INTERVAL: 506 MS
QTC INTERVAL: 506 MS
QTC INTERVAL: 508 MS
SODIUM SERPL-SCNC: 132 MMOL/L (ref 136–145)

## 2024-06-13 PROCEDURE — 80048 BASIC METABOLIC PNL TOTAL CA: CPT | Performed by: STUDENT IN AN ORGANIZED HEALTH CARE EDUCATION/TRAINING PROGRAM

## 2024-06-13 PROCEDURE — 93010 ELECTROCARDIOGRAM REPORT: CPT | Performed by: HOSPITALIST

## 2024-06-13 PROCEDURE — 99232 SBSQ HOSP IP/OBS MODERATE 35: CPT | Performed by: STUDENT IN AN ORGANIZED HEALTH CARE EDUCATION/TRAINING PROGRAM

## 2024-06-13 PROCEDURE — 83735 ASSAY OF MAGNESIUM: CPT | Performed by: STUDENT IN AN ORGANIZED HEALTH CARE EDUCATION/TRAINING PROGRAM

## 2024-06-13 PROCEDURE — 93010 ELECTROCARDIOGRAM REPORT: CPT | Performed by: STUDENT IN AN ORGANIZED HEALTH CARE EDUCATION/TRAINING PROGRAM

## 2024-06-13 PROCEDURE — 93010 ELECTROCARDIOGRAM REPORT: CPT | Performed by: EMERGENCY MEDICINE

## 2024-06-13 PROCEDURE — 93005 ELECTROCARDIOGRAM TRACING: CPT | Performed by: STUDENT IN AN ORGANIZED HEALTH CARE EDUCATION/TRAINING PROGRAM

## 2024-06-13 RX ADMIN — LABETALOL HYDROCHLORIDE 300 MG: 200 TABLET, FILM COATED ORAL at 20:44

## 2024-06-13 RX ADMIN — DOFETILIDE 500 MCG: 0.25 CAPSULE ORAL at 11:11

## 2024-06-13 RX ADMIN — LOSARTAN POTASSIUM 100 MG: 50 TABLET, FILM COATED ORAL at 09:57

## 2024-06-13 RX ADMIN — Medication 10 ML: at 09:57

## 2024-06-13 RX ADMIN — LABETALOL HYDROCHLORIDE 300 MG: 200 TABLET, FILM COATED ORAL at 09:57

## 2024-06-13 RX ADMIN — DOFETILIDE 500 MCG: 0.25 CAPSULE ORAL at 21:56

## 2024-06-13 RX ADMIN — POTASSIUM CHLORIDE 10 MEQ: 750 CAPSULE, EXTENDED RELEASE ORAL at 10:00

## 2024-06-13 RX ADMIN — ROSUVASTATIN CALCIUM 10 MG: 10 TABLET, FILM COATED ORAL at 20:45

## 2024-06-13 RX ADMIN — POTASSIUM CHLORIDE 10 MEQ: 750 CAPSULE, EXTENDED RELEASE ORAL at 20:47

## 2024-06-13 RX ADMIN — Medication 10 ML: at 20:45

## 2024-06-13 RX ADMIN — LEVOTHYROXINE SODIUM 100 MCG: 100 TABLET ORAL at 06:08

## 2024-06-13 RX ADMIN — FUROSEMIDE 20 MG: 20 TABLET ORAL at 10:00

## 2024-06-13 RX ADMIN — POTASSIUM CHLORIDE 10 MEQ: 750 CAPSULE, EXTENDED RELEASE ORAL at 15:56

## 2024-06-13 RX ADMIN — APIXABAN 5 MG: 5 TABLET, FILM COATED ORAL at 20:44

## 2024-06-13 RX ADMIN — APIXABAN 5 MG: 5 TABLET, FILM COATED ORAL at 09:56

## 2024-06-13 NOTE — PROGRESS NOTES
"EP Problems:  1.  Persistent atrial fibrillation  -Prior AAD use: Flecainide, amiodarone  -8/9/2022: PVI, Lopez  -3/15/2023: Cardioversion  2.  First-degree AV block  3.  Right bundle branch block  4.  Atrial flutter (atypical)     Cardiology Problems:  1.  Hypertension  2.  Hyperlipidemia     Medical Problems:  1.  Obesity  -5/2023: BMI 36  -3/2024: BMI 38  -5/2024: BMI 34  2.  Obstructive sleep apnea    Patient ID:  Anne-Marie Hayes is a 78 y.o. female with problem list as above as above who EP is following for atypical atrial flutter.    Subjective:  Maintaining NSR following DCCV.    Objective:  /63 (BP Location: Right arm, Patient Position: Sitting)   Pulse 62   Temp 98.1 °F (36.7 °C) (Oral)   Resp 16   Ht 167.6 cm (66\")   Wt 93.9 kg (207 lb)   SpO2 98%   BMI 33.41 kg/m²     Well-appearing, no acute distress  Clear to auscultation bilaterally  RRR  Warm, well-perfused, trace edema    Labs today:    Mag 2.1  Cr 0.78    Telemetry reviewed: SR    [id number] SCORE   [id number] Score: 4 (6/13/2024  5:56 PM)          Assessment:  Atypical atrial flutter with acute exacerbation  Persistent atrial fibrillation  Right bundle branch block  First-degree AV block    Plan:  -ECG is within acceptable limits; continue dofetilide 500mcg BID to complete 5 dose load  - BMP and mag daily for dofetilide monitoring  - ECG 2h following each dose  - No additional medication changes at this time    Part of this note may be an electronic transcription/translation of spoken language to printed text using the Dragon Dictation System.    "

## 2024-06-13 NOTE — PLAN OF CARE
Goal Outcome Evaluation:  Plan of Care Reviewed With: patient        Progress: improving  Outcome Evaluation: Patient A&OX4, VSS on RA, Telmetry SB/S 56-68 and recieving Tikosyn loading doses. EKG done per order.

## 2024-06-13 NOTE — PLAN OF CARE
Goal Outcome Evaluation:  Plan of Care Reviewed With: patient           Pt is A&Ox4.  VSS.  Pt is on RA.  Telemetry S/SB 59-70 with a first degree bundle branch block.  Receiving Tikosyn doses with EKG completed after each dose.  Pt has been ambulating in the halls several times throughout the day.

## 2024-06-13 NOTE — PROGRESS NOTES
"EP Problems:  1.  Persistent atrial fibrillation  -Prior AAD use: Flecainide, amiodarone  -8/9/2022: PVI, Lopez  -3/15/2023: Cardioversion  2.  First-degree AV block  3.  Right bundle branch block  4.  Atrial flutter (atypical)     Cardiology Problems:  1.  Hypertension  2.  Hyperlipidemia     Medical Problems:  1.  Obesity  -5/2023: BMI 36  -3/2024: BMI 38  -5/2024: BMI 34  2.  Obstructive sleep apnea    Patient ID:  Anne-Marie Hayes is a 78 y.o. female with problem list as above as above who EP is following for atypical atrial flutter.    Subjective:  Maintaining NSR following DCCV.    Objective:  /72 (BP Location: Right arm, Patient Position: Lying)   Pulse 67   Temp 98.6 °F (37 °C) (Oral)   Resp 18   Ht 167.6 cm (66\")   Wt 93.9 kg (207 lb)   SpO2 96%   BMI 33.41 kg/m²     Well-appearing, no acute distress  Clear to auscultation bilaterally  RRR  Warm, well-perfused, trace edema    Labs today:  Hgb 10.6  Mag 2  Cr 1.14, K 4.2      Telemetry reviewed: SR    [id number] SCORE   [id number] Score: 4 (6/11/2024  7:37 AM)        Assessment:  Atypical atrial flutter with acute exacerbation  Persistent atrial fibrillation  Right bundle branch block  First-degree AV block    Plan:  -Continue dofetilide 500mcg BID  - BMP and mag daily for dofetilide monitoring  - ECG 2h following each dose  - No additional medication changes at this time    Part of this note may be an electronic transcription/translation of spoken language to printed text using the Dragon Dictation System.    "

## 2024-06-13 NOTE — CASE MANAGEMENT/SOCIAL WORK
Continued Stay Note   [place]     Patient Name: Anne-Marie Hayes  MRN: [id number]  Today's Date: 6/13/2024    Admit Date: 6/8/2024    Plan: Home   Discharge Plan       Row Name 06/13/24 0846       Plan    Plan Home    Patient/Family in Agreement with Plan yes    Final Discharge Disposition Code 01 - home or self-care    Final Note Chart reviewed.  Pt plans home and denies any discharge needs.  SW will continue to follow.                   Discharge Codes    No documentation.                 Expected Discharge Date and Time       Expected Discharge Date Expected Discharge Time    Jun 13, 2024               JAME Webster

## 2024-06-14 ENCOUNTER — TELEPHONE (OUTPATIENT)
Dept: INTERNAL MEDICINE | Age: 79
End: 2024-06-14

## 2024-06-14 ENCOUNTER — READMISSION MANAGEMENT (OUTPATIENT)
Dept: CALL CENTER | Facility: HOSPITAL | Age: 79
End: 2024-06-14
Payer: MEDICARE

## 2024-06-14 VITALS
HEIGHT: 66 IN | RESPIRATION RATE: 17 BRPM | HEART RATE: 60 BPM | SYSTOLIC BLOOD PRESSURE: 164 MMHG | DIASTOLIC BLOOD PRESSURE: 72 MMHG | BODY MASS INDEX: 33.27 KG/M2 | WEIGHT: 207 LBS | OXYGEN SATURATION: 93 % | TEMPERATURE: 98.8 F

## 2024-06-14 LAB
ANION GAP SERPL CALCULATED.3IONS-SCNC: 11 MMOL/L (ref 5–15)
BUN SERPL-MCNC: 19 MG/DL (ref 8–23)
BUN/CREAT SERPL: 26.4 (ref 7–25)
CALCIUM SPEC-SCNC: 9.1 MG/DL (ref 8.6–10.5)
CHLORIDE SERPL-SCNC: 101 MMOL/L (ref 98–107)
CO2 SERPL-SCNC: 25 MMOL/L (ref 22–29)
CREAT SERPL-MCNC: 0.72 MG/DL (ref 0.57–1)
EGFRCR SERPLBLD CKD-EPI 2021: 85.7 ML/MIN/1.73
GLUCOSE SERPL-MCNC: 86 MG/DL (ref 65–99)
MAGNESIUM SERPL-MCNC: 2.2 MG/DL (ref 1.6–2.4)
POTASSIUM SERPL-SCNC: 4.1 MMOL/L (ref 3.5–5.2)
QT INTERVAL: 488 MS
QT INTERVAL: 506 MS
QT INTERVAL: 516 MS
QTC INTERVAL: 488 MS
QTC INTERVAL: 510 MS
QTC INTERVAL: 522 MS
SODIUM SERPL-SCNC: 137 MMOL/L (ref 136–145)

## 2024-06-14 PROCEDURE — 99238 HOSP IP/OBS DSCHRG MGMT 30/<: CPT | Performed by: STUDENT IN AN ORGANIZED HEALTH CARE EDUCATION/TRAINING PROGRAM

## 2024-06-14 PROCEDURE — 93005 ELECTROCARDIOGRAM TRACING: CPT | Performed by: STUDENT IN AN ORGANIZED HEALTH CARE EDUCATION/TRAINING PROGRAM

## 2024-06-14 PROCEDURE — 83735 ASSAY OF MAGNESIUM: CPT | Performed by: STUDENT IN AN ORGANIZED HEALTH CARE EDUCATION/TRAINING PROGRAM

## 2024-06-14 PROCEDURE — 93010 ELECTROCARDIOGRAM REPORT: CPT | Performed by: HOSPITALIST

## 2024-06-14 PROCEDURE — 80048 BASIC METABOLIC PNL TOTAL CA: CPT | Performed by: STUDENT IN AN ORGANIZED HEALTH CARE EDUCATION/TRAINING PROGRAM

## 2024-06-14 RX ORDER — LOSARTAN POTASSIUM 100 MG/1
100 TABLET ORAL
Qty: 90 TABLET | Refills: 3 | Status: SHIPPED | OUTPATIENT
Start: 2024-06-15

## 2024-06-14 RX ORDER — DOFETILIDE 0.5 MG/1
500 CAPSULE ORAL 2 TIMES DAILY
Qty: 180 CAPSULE | Refills: 3 | Status: SHIPPED | OUTPATIENT
Start: 2024-06-14

## 2024-06-14 RX ORDER — DOFETILIDE 0.5 MG/1
500 CAPSULE ORAL 2 TIMES DAILY
Qty: 60 CAPSULE | Refills: 0 | Status: SHIPPED | OUTPATIENT
Start: 2024-06-14

## 2024-06-14 RX ORDER — LOSARTAN POTASSIUM 100 MG/1
100 TABLET ORAL DAILY
Qty: 30 TABLET | Refills: 0 | Status: SHIPPED | OUTPATIENT
Start: 2024-06-14

## 2024-06-14 RX ADMIN — APIXABAN 5 MG: 5 TABLET, FILM COATED ORAL at 08:12

## 2024-06-14 RX ADMIN — LABETALOL HYDROCHLORIDE 300 MG: 200 TABLET, FILM COATED ORAL at 08:11

## 2024-06-14 RX ADMIN — LEVOTHYROXINE SODIUM 100 MCG: 100 TABLET ORAL at 06:00

## 2024-06-14 RX ADMIN — Medication 10 ML: at 08:14

## 2024-06-14 RX ADMIN — FUROSEMIDE 20 MG: 20 TABLET ORAL at 08:12

## 2024-06-14 RX ADMIN — DOFETILIDE 500 MCG: 0.25 CAPSULE ORAL at 08:11

## 2024-06-14 RX ADMIN — POTASSIUM CHLORIDE 10 MEQ: 750 CAPSULE, EXTENDED RELEASE ORAL at 08:11

## 2024-06-14 RX ADMIN — LOSARTAN POTASSIUM 100 MG: 50 TABLET, FILM COATED ORAL at 08:11

## 2024-06-14 NOTE — PLAN OF CARE
Goal Outcome Evaluation:  Plan of Care Reviewed With: patient        Progress: no change  Outcome Evaluation: Patient is A&OX4, VSS on RA. Telemetry 56-72 with a first degree; BBB. Tikosyn dose given with EKG 2 hours after medication recieved. No change in QTc. Patient up ad niranjan.

## 2024-06-14 NOTE — DISCHARGE SUMMARY
DISCHARGE NOTE    Patient Name: Anne-Marie Hayes  Age/Sex: 78 y.o. female  : 1945  MRN: [id number]    Date of Discharge:  2024   Date of Admit: 2024  Encounter Provider: Marlon Carr MD  Place of Service: [facility]  Patient Care Team:  Ania Culver MD as PCP - General  Ania Culver MD as PCP - Family Medicine  Adrián Cralos MD as Consulting Physician (Pulmonary Disease)  Delio Leslie MD as Consulting Physician (Gastroenterology)  Jovani Lovelace MD as Consulting Physician (Cardiology)  Steven Elizondo APRN as Nurse Practitioner (Cardiology)    Subjective:     Discharge Diagnosis:    Afib    PRINCE on CPAP    Essential hypertension    Mixed hyperlipidemia        History of present illness:   Patient  has A Fib - has had 2 ablations the most recent was  10/11/2023 in the cardiology office.  She was last seen by Dr Fonseca on 2024 in the cardiology office - she called the office on 6/3 and again on  with c/o of being in A Fib again - a script for PRN Lopressor was called in.  The patient has been unable to sleep - she feel SOB while in A Fib even when she is not tachycardic.  She is anticoagulated with Eliquis - denies any chest pain.  She was told to come to the ER is it recurred.  She arrived in A Fib that was initially rate controlled.  She did become mildly tachycardic for awhile with HR in the 110's-120's - treated with IVP Lopressor.  Patient is being admitted - will order PRN IVP Lopressor for HR > 100 and will consult cardiology for probable cardioversion.      Hospital Course:   Ms. Hayes was admitted to the hospital with atypical atrial flutter with rapid ventricular rates with failure of rate control to suppress symptoms.  She ultimately underwent direct-current cardioversion with restoration of normal sinus rhythm.  We discussed available treatment options and  ultimately decided upon initiation of dofetilide for maintenance of sinus rhythm.  She was observed in the hospital for a total of 6 doses with acceptable QTc's.  Given these findings, she was felt to be stable for discharge home with outpatient clinic follow-up.    Vital Signs  Temp:  [97.6 °F (36.4 °C)-98.8 °F (37.1 °C)] 98.8 °F (37.1 °C)  Heart Rate:  [60-68] 60  Resp:  [16-18] 17  BP: (127-166)/(63-72) 164/72    Intake/Output Summary (Last 24 hours) at 6/14/2024 0902  Last data filed at 6/14/2024 0338  Gross per 24 hour   Intake 600 ml   Output 4650 ml   Net -4050 ml       Physical Exam:  Physical Exam  Vitals reviewed.   Constitutional:       Appearance: Normal appearance.   Cardiovascular:      Rate and Rhythm: Normal rate and regular rhythm.      Pulses: Normal pulses.      Heart sounds: Normal heart sounds. No murmur heard.  Pulmonary:      Effort: Pulmonary effort is normal. No respiratory distress.      Breath sounds: Normal breath sounds.   Skin:     General: Skin is warm and dry.   Neurological:      General: No focal deficit present.      Mental Status: She is alert and oriented to person, place, and time.   Psychiatric:         Mood and Affect: Mood normal.         Judgment: Judgment normal.          Discharge Diet:   Cardiac diet.    Activity Restrictions at Discharge:   No new restrictions at discharge     Medication changes:  1.  Start dofetilide 500 mcg twice daily  2.  Stop hydrochlorothiazide-losartan combination  3.  Start losartan 100 mg daily  4.  Stop diltiazem  5.  Stop metoprolol    Discharge Medications     Discharge Medications        New Medications        Instructions Start Date   dofetilide 500 MCG capsule  Commonly known as: TIKOSYN   500 mcg, Oral, 2 Times Daily      dofetilide 500 MCG capsule  Commonly known as: TIKOSYN   500 mcg, Oral, 2 Times Daily      losartan 100 MG tablet  Commonly known as: Cozaar   100 mg, Oral, Daily      losartan 100 MG tablet  Commonly known as:  COZAAR  Replaces: losartan-hydrochlorothiazide 100-25 MG per tablet   100 mg, Oral, Every 24 Hours Scheduled   Start Date: Linda 15, 2024            Continue These Medications        Instructions Start Date   acyclovir 400 MG tablet  Commonly known as: ZOVIRAX   400 mg, Oral, Daily PRN, 1 TID for 3 days then prn fever blister      apixaban 5 MG tablet tablet  Commonly known as: ELIQUIS   5 mg, Oral, Every 12 Hours Scheduled      Calcium 600+D3 Plus Minerals 600-800 MG-UNIT tablet   1 tablet, Oral, Daily      fish oil 1000 MG capsule capsule   1,000 mg, Oral, Daily With Breakfast      fluticasone 50 MCG/ACT nasal spray  Commonly known as: FLONASE   2 sprays, Nasal, Daily PRN      furosemide 20 MG tablet  Commonly known as: LASIX   20 mg, Oral, Daily      labetalol 300 MG tablet  Commonly known as: NORMODYNE   300 mg, Oral, 2 Times Daily      levothyroxine 100 MCG tablet  Commonly known as: Synthroid   100 mcg, Oral, Daily      potassium chloride 10 MEQ CR capsule  Commonly known as: MICRO-K   10 mEq, Oral, 3 Times Daily      PRESERVISION AREDS 2 PO   1 tablet, Oral, 2 Times Daily      rosuvastatin 10 MG tablet  Commonly known as: CRESTOR   10 mg, Oral, Nightly      vitamin B-12 1000 MCG tablet  Commonly known as: CYANOCOBALAMIN   1,000 mcg, Oral, Daily             Stop These Medications      dilTIAZem  MG 24 hr capsule  Commonly known as: CARDIZEM CD     losartan-hydrochlorothiazide 100-25 MG per tablet  Commonly known as: HYZAAR  Replaced by: losartan 100 MG tablet     metoprolol tartrate 25 MG tablet  Commonly known as: LOPRESSOR              Discharge disposition: Home    Follow-up Appointments   Follow-up Information       Ania Culver MD .    Specialty: Internal Medicine  Contact information:  [address] [place] [address] [address]  [place] [address]  [phone]                           Future Appointments   Date Time Provider Department Center   8/30/2024  8:00 AM Dalia Rudolph PA [facility] P[facility] PAD   9/9/2024   8:15 AM Jovani Lovelace MD [facility] PAD   9/16/2024  1:30 PM Dalia Rudolph PA [facility] P[facility] PAD         Marlon Carr MD  06/14/24  09:02 CDT

## 2024-06-14 NOTE — DISCHARGE INSTRUCTIONS
You have been started on dofetilide for your rhythm issues  Never take more of this medication than prescribed (approximately every 12h)  Avoid QT prolonging medications with this new medication (always ask your doctors or pharmacist to confirm that any new medications will not interact with dofetilide)  Common medications that affect the QT are antidepressant medications (anxiety, depression, etc), antibiotics, heart rhythm medications, sleep medications, and anti-histamines.  Check your medication list at discharge closely for any changes  Avoid grapefruit juice with this medication    Important medication changes during this visit:  1.  Start dofetilide (Tikosyn) 500 mcg twice daily.  2.  Stop your losartan-hydrochlorothiazide combination pill and take losartan alone instead  3.  Stop metoprolol and diltiazem

## 2024-06-14 NOTE — TELEPHONE ENCOUNTER
Care Transitions Initial Follow Up Call    Outreach made within 2 business days of discharge: Yes  Workman's comp claim?No  Patient: Virginia Amezcua   Patient : 1945   MRN: [id number]    Reason for Admission: Afib  Discharge Date: 24       Spoke with: Virginia Amezcua    Discharge department/facility: [facility]    TCM Interactive Patient Contact:  Was patient able to fill all prescriptions: Yes  Was patient instructed to bring all medications to the follow-up visit: Yes  Is patient taking all medications as directed in the discharge summary? Yes  Does patient understand their discharge instructions: Yes  Does patient have questions or concerns that need addressed prior to 7-14 day follow up office visit: no    The patient denies any chest pain, or SOB at this time. She reports that she has a heart palpitation periodically but nothing bad. Her bowel and bladder are normal.    Scheduled appointment with PCP within 7-14 days    Follow Up  Future Appointments   Date Time Provider Department Center   2024  4:00 PM Lebuhn, Laura, MD [facility]   2024  1:30 PM Laura Colorado MD [facility]       Ofe Churchill MA

## 2024-06-15 NOTE — OUTREACH NOTE
Prep Survey      Flowsheet Row Responses   [facility] facility patient discharged from? [place]   Is LACE score < 7 ? No   Eligibility Readm Mgmt   Discharge diagnosis A Fib   Does the patient have one of the following disease processes/diagnoses(primary or secondary)? Other   Does the patient have Home health ordered? No   Is there a DME ordered? No   Prep survey completed? Yes            LINDA COLÓN - Registered Nurse

## 2024-06-17 ENCOUNTER — OFFICE VISIT (OUTPATIENT)
Dept: INTERNAL MEDICINE | Age: 79
End: 2024-06-17

## 2024-06-17 VITALS
DIASTOLIC BLOOD PRESSURE: 68 MMHG | HEART RATE: 68 BPM | WEIGHT: 205 LBS | SYSTOLIC BLOOD PRESSURE: 124 MMHG | BODY MASS INDEX: 32.18 KG/M2 | HEIGHT: 67 IN | OXYGEN SATURATION: 98 %

## 2024-06-17 DIAGNOSIS — I48.19 PERSISTENT ATRIAL FIBRILLATION (HCC): Primary | ICD-10-CM

## 2024-06-17 DIAGNOSIS — I48.92 ATRIAL FLUTTER WITH RAPID VENTRICULAR RESPONSE (HCC): ICD-10-CM

## 2024-06-17 DIAGNOSIS — I10 PRIMARY HYPERTENSION: ICD-10-CM

## 2024-06-17 DIAGNOSIS — N18.31 CHRONIC KIDNEY DISEASE, STAGE 3A (HCC): ICD-10-CM

## 2024-06-17 DIAGNOSIS — Z09 HOSPITAL DISCHARGE FOLLOW-UP: ICD-10-CM

## 2024-06-17 DIAGNOSIS — G47.33 OBSTRUCTIVE SLEEP APNEA: ICD-10-CM

## 2024-06-17 RX ORDER — DOFETILIDE 0.5 MG/1
500 CAPSULE ORAL 2 TIMES DAILY
COMMUNITY
Start: 2024-06-14

## 2024-06-17 RX ORDER — LOSARTAN POTASSIUM 100 MG/1
100 TABLET ORAL DAILY
COMMUNITY
Start: 2024-06-14

## 2024-06-17 RX ORDER — FUROSEMIDE 20 MG/1
20 TABLET ORAL DAILY
COMMUNITY

## 2024-06-17 NOTE — PROGRESS NOTES
Post-Discharge Transitional Care Management Progress Note      Virginia Amezcua   YOB: 1945    Date of Office Visit:  6/17/2024  Date of Hospital Admission: 6/8/2024  Date of Hospital Discharge: 6/14/2024    Care management risk score Rising risk (score 2-5) and Complex Care (Scores >=6): No Risk Score On File     Non face to face  following discharge, date last encounter closed (first attempt may have been earlier): 06/14/2024 06/14/2024    Call initiated 2 business days of discharge: Yes    ASSESSMENT/PLAN:   Persistent atrial fibrillation (HCC)  Atrial flutter with rapid ventricular response (HCC)  Chronic kidney disease, stage 3a (HCC)  Primary hypertension  Obstructive sleep apnea  I reviewed her record this patient has had 4 cardioversions 2 ablations she was doing well on the flecainide unclear why it was recommended to discontinue when we saw her we told her to use the pill in the pocket approach if recurred but even if that her atrial fibs persisted and a rapid rate she was in communication with the EP and finally had to go to the hospital she has an atypical atrial flutter with a rapid rate and then persistent atrial fibs with multiple recurrences.  Her antiarrhythmic therapy has now been changed again and the plan for follow-up she really would like to speak with the EP physician in his office and follow-up and we are going to send out new referral or a message to asked that this appointment be with the physician.  In the meantime continue anticoagulation continue current medication    Medical Decision Making: moderate complexity  No follow-ups on file.         Subjective:   HPI:  Follow up of Hospital problems/diagnosis(es): Patient was recently readmitted to the hospital with atypical atrial flutter with rapid ventricular response atrial fibs atrial flutter issues ongoing multiple attempts to slow the rate and medically cardiovert.  Medication adjustments made.    Inpatient course:

## 2024-06-20 ENCOUNTER — READMISSION MANAGEMENT (OUTPATIENT)
Dept: CALL CENTER | Facility: HOSPITAL | Age: 79
End: 2024-06-20
Payer: MEDICARE

## 2024-06-20 NOTE — OUTREACH NOTE
Medical Week 1 Survey      Flowsheet Row Responses   [facility] patient discharged from? [place]   Does the patient have one of the following disease processes/diagnoses(primary or secondary)? Other   Week 1 attempt successful? Yes   Call start time 1510   Call end time 1516   Discharge diagnosis A Fib   Meds reviewed with patient/caregiver? Yes   Is the patient having any side effects they believe may be caused by any medication additions or changes? No   Does the patient have all medications ordered at discharge? Yes   Is the patient taking all medications as directed (includes completed medication regime)? Yes   Does the patient have a primary care provider?  Yes   Does the patient have an appointment with their PCP within 7 days of discharge? Yes   Has the patient kept scheduled appointments due by today? Yes   Has home health visited the patient within 72 hours of discharge? N/A   Psychosocial issues? No   Comments Pt monitors HR with a fitbit and states has been normal   Did the patient receive a copy of their discharge instructions? Yes   Nursing interventions Reviewed instructions with patient   What is the patient's perception of their health status since discharge? Improving   Is the patient/caregiver able to teach back the hierarchy of who to call/visit for symptoms/problems? PCP, Specialist, Home health nurse, Urgent Care, ED, 911 Yes   Week 1 call completed? Yes   Graduated Yes   Would this patient benefit from a Referral to Amb Social Work? No   Is the patient interested in additional calls from an ambulatory ? No   Graduated/Revoked comments Pt reports doing well, no concerns at this time.   Call end time 1516            Faina LEBRON - Registered Nurse

## 2024-06-24 ENCOUNTER — DOCUMENTATION (OUTPATIENT)
Dept: CARDIOLOGY | Facility: CLINIC | Age: 79
End: 2024-06-24
Payer: MEDICARE

## 2024-06-24 ENCOUNTER — TELEPHONE (OUTPATIENT)
Dept: CARDIOLOGY | Facility: CLINIC | Age: 79
End: 2024-06-24
Payer: MEDICARE

## 2024-06-24 DIAGNOSIS — E87.6 HYPOKALEMIA: ICD-10-CM

## 2024-06-24 RX ORDER — POTASSIUM CHLORIDE 750 MG/1
10 TABLET, EXTENDED RELEASE ORAL 2 TIMES DAILY
Qty: 180 TABLET | Refills: 3 | Status: SHIPPED | OUTPATIENT
Start: 2024-06-24

## 2024-06-24 NOTE — TELEPHONE ENCOUNTER
Last OV 6/17/2024  Next OV 7/30/2024      Requested Prescriptions     Pending Prescriptions Disp Refills    potassium chloride (KLOR-CON M) 10 MEQ extended release tablet 180 tablet 3     Sig: Take 1 tablet by mouth 2 times daily

## 2024-07-11 ENCOUNTER — TELEPHONE (OUTPATIENT)
Dept: INTERNAL MEDICINE | Age: 79
End: 2024-07-11

## 2024-07-11 DIAGNOSIS — I10 PRIMARY HYPERTENSION: ICD-10-CM

## 2024-07-11 DIAGNOSIS — I10 PRIMARY HYPERTENSION: Primary | ICD-10-CM

## 2024-07-11 RX ORDER — HYDROCHLOROTHIAZIDE 12.5 MG/1
12.5 CAPSULE, GELATIN COATED ORAL EVERY MORNING
Qty: 90 CAPSULE | Refills: 3 | Status: SHIPPED | OUTPATIENT
Start: 2024-07-11 | End: 2024-07-11 | Stop reason: SDUPTHER

## 2024-07-11 RX ORDER — HYDROCHLOROTHIAZIDE 12.5 MG/1
12.5 CAPSULE, GELATIN COATED ORAL EVERY MORNING
Qty: 14 CAPSULE | Refills: 0 | Status: SHIPPED | OUTPATIENT
Start: 2024-07-11 | End: 2024-07-11 | Stop reason: SDUPTHER

## 2024-07-11 RX ORDER — HYDROCHLOROTHIAZIDE 12.5 MG/1
12.5 CAPSULE, GELATIN COATED ORAL EVERY MORNING
Qty: 30 CAPSULE | Refills: 0 | Status: SHIPPED | OUTPATIENT
Start: 2024-07-11 | End: 2024-07-12 | Stop reason: SDUPTHER

## 2024-07-11 NOTE — TELEPHONE ENCOUNTER
BP is getting a little higher lately :  162/95 yesterday and 161/82 today also has headaches at times - not bad enough to take a tylenol, just there.  Cardiology started her on Tikosyn and I went over her BP medications and list in chart is correct.

## 2024-07-11 NOTE — TELEPHONE ENCOUNTER
Have her go back on her microzide 12.5mg daily--- go ahead and take one now and then q am -- if she does not have any we can send in a new prescription -- let me know bp in a few days if not better

## 2024-07-12 DIAGNOSIS — I10 PRIMARY HYPERTENSION: ICD-10-CM

## 2024-07-12 RX ORDER — HYDROCHLOROTHIAZIDE 12.5 MG/1
12.5 CAPSULE, GELATIN COATED ORAL EVERY MORNING
Qty: 90 CAPSULE | Refills: 3 | Status: SHIPPED | OUTPATIENT
Start: 2024-07-12

## 2024-07-16 DIAGNOSIS — E78.2 MIXED HYPERLIPIDEMIA: ICD-10-CM

## 2024-07-16 DIAGNOSIS — E21.3 HYPERPARATHYROIDISM (HCC): ICD-10-CM

## 2024-07-16 DIAGNOSIS — I10 PRIMARY HYPERTENSION: ICD-10-CM

## 2024-07-16 DIAGNOSIS — R73.9 HYPERGLYCEMIA: ICD-10-CM

## 2024-07-16 LAB
ALBUMIN SERPL-MCNC: 4.3 G/DL (ref 3.5–5.2)
ALP SERPL-CCNC: 82 U/L (ref 35–104)
ALT SERPL-CCNC: 15 U/L (ref 5–33)
ANION GAP SERPL CALCULATED.3IONS-SCNC: 14 MMOL/L (ref 7–19)
AST SERPL-CCNC: 16 U/L (ref 5–32)
BILIRUB SERPL-MCNC: 0.7 MG/DL (ref 0.2–1.2)
BUN SERPL-MCNC: 14 MG/DL (ref 8–23)
CALCIUM SERPL-MCNC: 9.2 MG/DL (ref 8.8–10.2)
CHLORIDE SERPL-SCNC: 104 MMOL/L (ref 98–111)
CHOLEST SERPL-MCNC: 105 MG/DL (ref 160–199)
CO2 SERPL-SCNC: 26 MMOL/L (ref 22–29)
CREAT SERPL-MCNC: 0.8 MG/DL (ref 0.5–0.9)
ERYTHROCYTE [DISTWIDTH] IN BLOOD BY AUTOMATED COUNT: 13.4 % (ref 11.5–14.5)
GLUCOSE SERPL-MCNC: 97 MG/DL (ref 74–109)
HBA1C MFR BLD: 5 % (ref 4–6)
HCT VFR BLD AUTO: 39 % (ref 37–47)
HDLC SERPL-MCNC: 53 MG/DL (ref 65–121)
HGB BLD-MCNC: 12.1 G/DL (ref 12–16)
LDLC SERPL CALC-MCNC: 38 MG/DL
MCH RBC QN AUTO: 28.9 PG (ref 27–31)
MCHC RBC AUTO-ENTMCNC: 31 G/DL (ref 33–37)
MCV RBC AUTO: 93.1 FL (ref 81–99)
PLATELET # BLD AUTO: 194 K/UL (ref 130–400)
PMV BLD AUTO: 11.3 FL (ref 9.4–12.3)
POTASSIUM SERPL-SCNC: 4.2 MMOL/L (ref 3.5–5)
PROT SERPL-MCNC: 6.2 G/DL (ref 6.6–8.7)
RBC # BLD AUTO: 4.19 M/UL (ref 4.2–5.4)
SODIUM SERPL-SCNC: 144 MMOL/L (ref 136–145)
TRIGL SERPL-MCNC: 72 MG/DL (ref 0–149)
TSH SERPL DL<=0.005 MIU/L-ACNC: 0.55 UIU/ML (ref 0.27–4.2)
WBC # BLD AUTO: 6.4 K/UL (ref 4.8–10.8)

## 2024-07-17 ENCOUNTER — TELEPHONE (OUTPATIENT)
Dept: INTERNAL MEDICINE | Age: 79
End: 2024-07-17

## 2024-07-17 DIAGNOSIS — I10 PRIMARY HYPERTENSION: Primary | ICD-10-CM

## 2024-07-17 DIAGNOSIS — I10 PRIMARY HYPERTENSION: ICD-10-CM

## 2024-07-17 RX ORDER — AMLODIPINE BESYLATE 5 MG/1
5 TABLET ORAL DAILY
Qty: 30 TABLET | Refills: 0 | Status: SHIPPED | OUTPATIENT
Start: 2024-07-17

## 2024-07-17 RX ORDER — HYDROCHLOROTHIAZIDE 12.5 MG/1
12.5 CAPSULE, GELATIN COATED ORAL EVERY MORNING
Qty: 14 CAPSULE | Refills: 0 | Status: SHIPPED | OUTPATIENT
Start: 2024-07-17 | End: 2024-07-17

## 2024-07-17 RX ORDER — HYDROCHLOROTHIAZIDE 12.5 MG/1
12.5 TABLET ORAL DAILY
Qty: 90 TABLET | Refills: 3 | Status: SHIPPED | OUTPATIENT
Start: 2024-07-17 | End: 2024-07-17

## 2024-07-17 RX ORDER — HYDROCHLOROTHIAZIDE 25 MG/1
25 TABLET ORAL EVERY MORNING
Qty: 30 TABLET | Refills: 5 | Status: SHIPPED | OUTPATIENT
Start: 2024-07-17 | End: 2024-07-17

## 2024-07-17 NOTE — TELEPHONE ENCOUNTER
Let her know I did not realize there is a rare drug interaction of tikosyn and hctz -- instead of hctz stop that and I sent amlodipine to local [facility] and lets try that before send to mail order

## 2024-07-17 NOTE — TELEPHONE ENCOUNTER
Have her take 2 of the hctz daily to = 25mg daily -- let us now bp by next Monday or Tuesday - can take a 2nd one now and then change to 2 q am

## 2024-07-17 NOTE — TELEPHONE ENCOUNTER
The hctz may affect the QT interval and [facility] would not fill it until they got the ok from us.  It seems the hctz and her new medication - Tykasin? May cause this effect together??

## 2024-07-29 RX ORDER — LEVOTHYROXINE SODIUM 0.1 MG/1
100 TABLET ORAL DAILY
Qty: 90 TABLET | Refills: 3 | Status: SHIPPED | OUTPATIENT
Start: 2024-07-29 | End: 2024-07-30 | Stop reason: SDUPTHER

## 2024-07-30 ENCOUNTER — OFFICE VISIT (OUTPATIENT)
Dept: INTERNAL MEDICINE | Age: 79
End: 2024-07-30
Payer: MEDICARE

## 2024-07-30 VITALS
HEART RATE: 66 BPM | DIASTOLIC BLOOD PRESSURE: 72 MMHG | OXYGEN SATURATION: 97 % | SYSTOLIC BLOOD PRESSURE: 130 MMHG | WEIGHT: 201 LBS | BODY MASS INDEX: 31.55 KG/M2 | HEIGHT: 67 IN

## 2024-07-30 DIAGNOSIS — R73.01 IMPAIRED FASTING GLUCOSE: ICD-10-CM

## 2024-07-30 DIAGNOSIS — I48.19 PERSISTENT ATRIAL FIBRILLATION (HCC): ICD-10-CM

## 2024-07-30 DIAGNOSIS — N18.31 CHRONIC KIDNEY DISEASE, STAGE 3A (HCC): ICD-10-CM

## 2024-07-30 DIAGNOSIS — I10 PRIMARY HYPERTENSION: Primary | ICD-10-CM

## 2024-07-30 DIAGNOSIS — G47.33 OBSTRUCTIVE SLEEP APNEA: ICD-10-CM

## 2024-07-30 PROCEDURE — 1036F TOBACCO NON-USER: CPT | Performed by: INTERNAL MEDICINE

## 2024-07-30 PROCEDURE — G8417 CALC BMI ABV UP PARAM F/U: HCPCS | Performed by: INTERNAL MEDICINE

## 2024-07-30 PROCEDURE — G8399 PT W/DXA RESULTS DOCUMENT: HCPCS | Performed by: INTERNAL MEDICINE

## 2024-07-30 PROCEDURE — 3075F SYST BP GE 130 - 139MM HG: CPT | Performed by: INTERNAL MEDICINE

## 2024-07-30 PROCEDURE — 1090F PRES/ABSN URINE INCON ASSESS: CPT | Performed by: INTERNAL MEDICINE

## 2024-07-30 PROCEDURE — 99214 OFFICE O/P EST MOD 30 MIN: CPT | Performed by: INTERNAL MEDICINE

## 2024-07-30 PROCEDURE — 1123F ACP DISCUSS/DSCN MKR DOCD: CPT | Performed by: INTERNAL MEDICINE

## 2024-07-30 PROCEDURE — 3078F DIAST BP <80 MM HG: CPT | Performed by: INTERNAL MEDICINE

## 2024-07-30 PROCEDURE — G8427 DOCREV CUR MEDS BY ELIG CLIN: HCPCS | Performed by: INTERNAL MEDICINE

## 2024-07-30 RX ORDER — HYDROCHLOROTHIAZIDE 12.5 MG/1
12.5 TABLET ORAL DAILY
COMMUNITY
Start: 2024-07-17 | End: 2024-07-30 | Stop reason: ALTCHOICE

## 2024-07-30 RX ORDER — LEVOTHYROXINE SODIUM 0.1 MG/1
100 TABLET ORAL DAILY
Qty: 90 TABLET | Refills: 3 | Status: SHIPPED | OUTPATIENT
Start: 2024-07-30

## 2024-07-30 RX ORDER — AMLODIPINE BESYLATE 5 MG/1
5 TABLET ORAL DAILY
Qty: 90 TABLET | Refills: 3 | Status: SHIPPED | OUTPATIENT
Start: 2024-07-30

## 2024-07-30 RX ORDER — AMLODIPINE BESYLATE 5 MG/1
5 TABLET ORAL DAILY
COMMUNITY

## 2024-07-30 NOTE — PROGRESS NOTES
Chief Complaint   Patient presents with    3 Month Follow-Up       HPI: Is here today to follow-up hypertension persistent atrial fibs and other medical issues she has had multiple issues with atrial fibrillation-  currently she is better we reviewed the record from [facility] she has been in and out at the ER and offices for treat recent cardioversion in June and changed to Tikosyn.  Blood pressure started running high and we had to adjust her meds since that time it became high with the discontinuation of some of her medications that affect the blood pressure they were discontinued because of her atrial fibs she sees cardiology at [facility] we reviewed those records.  Currently blood pressure much better.    Past Medical History:   Diagnosis Date    Arthritis     Heart palpitations     Hypercalcemia 8/7/2017    Hyperlipidemia     Hypertension     Impaired fasting glucose 8/7/2017    Lumbar spinal stenosis     Lung nodule, solitary 8/7/2017    Right upper lobe    Multinodular goiter 8/7/2017    Obstructive sleep apnea 10/11/2018    Paroxysmal atrial fibrillation (HCC) 8/17/2018    Primary osteoarthritis involving multiple joints 8/7/2017    Wears glasses        Past Surgical History:   Procedure Laterality Date    BREAST BIOPSY  12/03/2007    stereotactic left, fibrocystic changes    BREAST BIOPSY  12/17/2007    stereotactic left, fibrocystic changes    CARDIAC CATHETERIZATION      negative    CARDIOVERSION      8/2018    CARDIOVERSION      CATARACT REMOVAL Bilateral 2017    KNEE ARTHROPLASTY Right 07/22/2019    RIGHT PARTIAL KNEE REPLACEMENT performed by Rajeev Frost MD at [facility] OR    OTHER SURGICAL HISTORY  2013    skin cancer spot on her nose removed    THYROID SURGERY      TUBAL LIGATION         Family History   Problem Relation Age of Onset    Hypertension Father        Social History     Socioeconomic History    Marital status:      Spouse name: Not on file    Number of children: Not on file    Years of

## 2024-09-06 ENCOUNTER — OFFICE VISIT (OUTPATIENT)
Dept: CARDIOLOGY | Facility: CLINIC | Age: 79
End: 2024-09-06
Payer: MEDICARE

## 2024-09-06 VITALS
BODY MASS INDEX: 30.7 KG/M2 | HEIGHT: 66 IN | WEIGHT: 191 LBS | OXYGEN SATURATION: 98 % | HEART RATE: 61 BPM | SYSTOLIC BLOOD PRESSURE: 130 MMHG | DIASTOLIC BLOOD PRESSURE: 78 MMHG

## 2024-09-06 DIAGNOSIS — I45.10 RBBB: ICD-10-CM

## 2024-09-06 DIAGNOSIS — I48.19 PERSISTENT ATRIAL FIBRILLATION: Primary | ICD-10-CM

## 2024-09-06 DIAGNOSIS — I48.4 ATYPICAL ATRIAL FLUTTER: ICD-10-CM

## 2024-09-06 NOTE — PROGRESS NOTES
"Chief Complaint  Atrial Fibrillation    Subjective        History of Present Illness    EP Problems:  1.  Persistent atrial fibrillation  -Prior AAD use: Flecainide, amiodarone  -8/9/2022: PVI, Lopez  -3/15/2023: Cardioversion  2.  First-degree AV block  3.  Right bundle branch block  4.  Atrial flutter (likely atypical)     Cardiology Problems:  1.  Hypertension  2.  Hyperlipidemia     Medical Problems:  1.  Obesity  -5/2023: BMI 36  -3/2024: BMI 38  -5/2024: BMI 34  -9/2024: BMI 30.8  2.  Obstructive sleep apnea      Anne-Marie Hayes is a 79 y.o. female with problem list as above who presents to the clinic for follow up of persistent atrial fibrillation, first-degree AV block, right bundle branch block.  She has been doing well since last time she was seen.  She is tolerating dofetilide well without any adverse effects.  She has not had any further rhythm breakthroughs.  She is continue to work on weight loss and has been extremely successful at continued dietary interventions.    Objective   Vital Signs:  /78   Pulse 61   Ht 167.6 cm (66\")   Wt 86.6 kg (191 lb)   SpO2 98%   BMI 30.83 kg/m²   Estimated body mass index is 30.83 kg/m² as calculated from the following:    Height as of this encounter: 167.6 cm (66\").    Weight as of this encounter: 86.6 kg (191 lb).      Physical Exam  Vitals reviewed.   Constitutional:       Appearance: Normal appearance.   Cardiovascular:      Rate and Rhythm: Normal rate and regular rhythm.      Pulses: Normal pulses.      Heart sounds: Normal heart sounds. No murmur heard.  Pulmonary:      Effort: Pulmonary effort is normal. No respiratory distress.      Breath sounds: Normal breath sounds.   Skin:     General: Skin is warm and dry.   Neurological:      General: No focal deficit present.      Mental Status: She is alert and oriented to person, place, and time.   Psychiatric:         Mood and Affect: Mood normal.         Judgment: Judgment normal.        Result " Review :  The following data was reviewed by: Marlon Carr MD on 05/31/2024:    [id number] SCORE   [id number] Score: 4 (9/6/2024  2:26 PM)    ECG today shows sinus rhythm, right bundle branch block, QTc of approximately 460 ms               Assessment and Plan   Diagnoses and all orders for this visit:    1. Persistent atrial fibrillation (Primary)    2. RBBB    3. Atypical atrial flutter          Anne-Marie Hayes is a 79 y.o. female with problem list as above who presents to the clinic for follow up of persistent atrial fibrillation, atrial flutter.  She is doing quite well since the dofetilide initiation without any further breakthrough episodes.  She has done extremely well with lifestyle risk factor modification and is continue to lose weight.  Overall, I do not see any role at this time for more aggressive intervention unless she has further episodes of breakthrough atrial fibrillation.      Plan:  -Arrhythmias are well-controlled; continue dofetilide at current dose  -Continue apixaban given elevated [id number]  -Continue to work on weight loss, diet, exercise  -Long-term follow-up in our clinic for right bundle branch block, persistent atrial fibrillation, atypical atrial flutter           Follow Up   Return in about 6 months (around 3/6/2025).  Patient was given instructions and counseling regarding her condition or for health maintenance advice. Please see specific information pulled into the AVS if appropriate.     Part of this note may be an electronic transcription/translation of spoken language to printed text using the Dragon Dictation System.

## 2024-09-30 ENCOUNTER — NURSE ONLY (OUTPATIENT)
Dept: INTERNAL MEDICINE | Age: 79
End: 2024-09-30
Payer: MEDICARE

## 2024-09-30 DIAGNOSIS — Z23 INFLUENZA VACCINE NEEDED: Primary | ICD-10-CM

## 2024-09-30 PROCEDURE — G0008 ADMIN INFLUENZA VIRUS VAC: HCPCS | Performed by: INTERNAL MEDICINE

## 2024-09-30 PROCEDURE — 90653 IIV ADJUVANT VACCINE IM: CPT | Performed by: INTERNAL MEDICINE

## 2024-11-05 ENCOUNTER — OFFICE VISIT (OUTPATIENT)
Dept: INTERNAL MEDICINE | Age: 79
End: 2024-11-05

## 2024-11-05 VITALS
HEIGHT: 67 IN | WEIGHT: 187 LBS | BODY MASS INDEX: 29.35 KG/M2 | OXYGEN SATURATION: 99 % | HEART RATE: 64 BPM | SYSTOLIC BLOOD PRESSURE: 120 MMHG | DIASTOLIC BLOOD PRESSURE: 66 MMHG

## 2024-11-05 DIAGNOSIS — I10 PRIMARY HYPERTENSION: Primary | ICD-10-CM

## 2024-11-05 DIAGNOSIS — M15.0 PRIMARY OSTEOARTHRITIS INVOLVING MULTIPLE JOINTS: ICD-10-CM

## 2024-11-05 DIAGNOSIS — E89.0 S/P COMPLETE THYROIDECTOMY: ICD-10-CM

## 2024-11-05 DIAGNOSIS — Z23 ENCOUNTER FOR PREVNAR PNEUMOCOCCAL VACCINATION: ICD-10-CM

## 2024-11-05 DIAGNOSIS — I48.19 PERSISTENT ATRIAL FIBRILLATION (HCC): ICD-10-CM

## 2024-11-05 DIAGNOSIS — N18.31 CHRONIC KIDNEY DISEASE, STAGE 3A (HCC): ICD-10-CM

## 2024-11-05 DIAGNOSIS — R73.01 IMPAIRED FASTING GLUCOSE: ICD-10-CM

## 2024-11-05 DIAGNOSIS — Z23 NEED FOR ZOSTER VACCINATION: ICD-10-CM

## 2024-11-05 DIAGNOSIS — G47.33 OBSTRUCTIVE SLEEP APNEA: ICD-10-CM

## 2024-11-05 RX ORDER — LABETALOL 300 MG/1
300 TABLET, FILM COATED ORAL 2 TIMES DAILY
Qty: 180 TABLET | Refills: 3 | Status: SHIPPED | OUTPATIENT
Start: 2024-11-05

## 2024-11-05 NOTE — PROGRESS NOTES
Chief Complaint   Patient presents with    3 Month Follow-Up    Hypertension     Hx of A-fib       HPI: Patient is here today to follow-up recent issues with persistent atrial fibs she has had several cardioversions several ablations finally doing better after the last ablation and with the current medical therapy she feels okay felt a few times like she might go into atrial for but she has not.  She denies chest pain dyspnea abdominal pain she has done a great job of losing weight doing it by diet and exercise and has been very compliant with this in general she seems to feel better.  Arthritis is some better.    Past Medical History:   Diagnosis Date    Arthritis     Heart palpitations     Hypercalcemia 8/7/2017    Hyperlipidemia     Hypertension     Impaired fasting glucose 8/7/2017    Lumbar spinal stenosis     Lung nodule, solitary 8/7/2017    Right upper lobe    Multinodular goiter 8/7/2017    Obstructive sleep apnea 10/11/2018    Paroxysmal atrial fibrillation (HCC) 8/17/2018    Primary osteoarthritis involving multiple joints 8/7/2017    Wears glasses        Past Surgical History:   Procedure Laterality Date    BREAST BIOPSY  12/03/2007    stereotactic left, fibrocystic changes    BREAST BIOPSY  12/17/2007    stereotactic left, fibrocystic changes    CARDIAC CATHETERIZATION      negative    CARDIOVERSION      8/2018    CARDIOVERSION      CATARACT REMOVAL Bilateral 2017    KNEE ARTHROPLASTY Right 07/22/2019    RIGHT PARTIAL KNEE REPLACEMENT performed by Rajeev Frost MD at [facility] OR    OTHER SURGICAL HISTORY  2013    skin cancer spot on her nose removed    THYROID SURGERY      TUBAL LIGATION         Family History   Problem Relation Age of Onset    Hypertension Father        Social History     Socioeconomic History    Marital status:      Spouse name: Not on file    Number of children: Not on file    Years of education: Not on file    Highest education level: Not on file   Occupational History    Not

## 2024-11-12 DIAGNOSIS — J30.9 ALLERGIC RHINITIS, UNSPECIFIED SEASONALITY, UNSPECIFIED TRIGGER: ICD-10-CM

## 2024-11-12 RX ORDER — FLUTICASONE PROPIONATE 50 MCG
SPRAY, SUSPENSION (ML) NASAL
Qty: 48 G | Refills: 3 | Status: SHIPPED | OUTPATIENT
Start: 2024-11-12

## 2024-12-20 ENCOUNTER — OFFICE VISIT (OUTPATIENT)
Dept: CARDIOLOGY | Facility: CLINIC | Age: 79
End: 2024-12-20
Payer: MEDICARE

## 2024-12-20 VITALS
BODY MASS INDEX: 29.41 KG/M2 | WEIGHT: 183 LBS | HEART RATE: 59 BPM | DIASTOLIC BLOOD PRESSURE: 68 MMHG | SYSTOLIC BLOOD PRESSURE: 122 MMHG | HEIGHT: 66 IN | OXYGEN SATURATION: 100 %

## 2024-12-20 DIAGNOSIS — G47.33 OSA ON CPAP: ICD-10-CM

## 2024-12-20 DIAGNOSIS — Z79.01 CHRONIC ANTICOAGULATION: ICD-10-CM

## 2024-12-20 DIAGNOSIS — E78.2 MIXED HYPERLIPIDEMIA: ICD-10-CM

## 2024-12-20 DIAGNOSIS — I10 ESSENTIAL HYPERTENSION: ICD-10-CM

## 2024-12-20 DIAGNOSIS — I48.19 PERSISTENT ATRIAL FIBRILLATION: Primary | ICD-10-CM

## 2024-12-20 PROBLEM — E87.6 HYPOKALEMIA: Status: RESOLVED | Noted: 2022-05-30 | Resolved: 2024-12-20

## 2024-12-20 PROBLEM — E83.51 HYPOCALCEMIA: Status: RESOLVED | Noted: 2022-05-30 | Resolved: 2024-12-20

## 2024-12-20 RX ORDER — AMLODIPINE BESYLATE 5 MG/1
5 TABLET ORAL DAILY
COMMUNITY

## 2024-12-20 NOTE — LETTER
"December 20, 2024     Ania Culver MD  [address] [place] [address] [address]  [place] [address]    Patient: Anne-Marie Hayes   YOB: 1945   Date of Visit: 12/20/2024       Dear Ania Culver MD    Anne-Marie Hayes was in my office today. Below is a copy of my note.    If you have questions, please do not hesitate to call me. I look forward to following Anne-Marie along with you.         Sincerely,        Jovani Lovelace MD        CC: No Recipients      Reason for Visit: cardiovascular follow up.    HPI:  Anne-Marie Hayes is a 79 y.o. female is here today for follow-up.  She has been following in EP clinic due to persistent atrial fibrillation.  She underwent repeat ablation on 9/6/2023 with Dr. Carr.  She had to have a repeat cardioversion done on 6/11/2024.  She has been managed on dofetilide without further episodes.  She has also been working on lifestyle modification.  Her weight has decreased from 211 pounds on 5/31/2024 down to 183 pounds today.  This is mainly by diet.  She uses \"my fitness pal\" mihir on her phone to help.  She has hit a plateau recently.  She is feeling well today and has no current issues or complaints.  She denies any chest pain, palpitations, dizziness, syncope, PND, or orthopnea.  Her exercise is limited by back and musculoskeletal issues.      Previous Cardiac Testing and Procedures:  - ROBEL (8/13/2018) Normal left ventricular systolic function, mild MR, mild to moderate TR, no evidence of left atrial appendage thrombus.  - Cardioversion (8/13/2018) Successful cardioversion from atrial fibrillation to sinus rhythm  - Cardioversion (4/20/2020) successful cardioversion from atrial fibrillation to sinus rhythm  - Echo (3/21/2022) EF 66-70%, grade 2 diastolic dysfunction, normal RV size and function, RVSP 35-45 mmHg, no significant valve dysfunction  - Atrial fibrillation ablation (8/9/2022) by Dr Lopez at [facility]  - Cardioversion (3/15/2023) successful cardioversion   - Atrial " fibrillation ablation (2023) PVI, by Dr. Carr  - Cardioversion (2024) successful    Lab data:  - Lipid panel (2020) total cholesterol 133, HDL 53, LDL 54, triglycerides 132  - Lipid panel (2021) total cholesterol 127, HDL 51, LDL 51, triglycerides 126  - BMP (2021) creatinine 0.8, potassium 4.6, sodium 141  - proBNP (2022) 311, normal 0-1800  - Lipid panel (2022) total cholesterol 137, HDL 54, LDL 56, triglycerides 133  - BNP (2022) 1673, normal 0-1800  - TSH (3/15/2023) 1.85  - BMP (3/15/2023) creatinine 0.77, potassium 3.4, sodium 142  - Lipid panel (3/15/2023) total cholesterol 119, HDL 48, LDL 48, triglycerides 131  - BMP (2024) creatinine 0.8, potassium 4.2, sodium 144  - Lipid panel (2024) total cholesterol 105, HDL 53, LDL 38, triglycerides 72    Patient Active Problem List   Diagnosis   • Persistent atrial fibrillation   • PRINCE on CPAP   • Essential hypertension   • Mixed hyperlipidemia   • Severe obesity (BMI 35.0-39.9) with comorbidity   • Arthritis   • Hx of adenomatous colonic polyps   • Chronic anticoagulation   • Status post total thyroidectomy   • Status post parathyroidectomy   • Abnormal EKG   • Edema leg   • RBBB   • Atypical atrial flutter   • Afib       Social History     Tobacco Use   • Smoking status: Former     Current packs/day: 0.00     Average packs/day: 2.0 packs/day for 17.0 years (34.0 ttl pk-yrs)     Types: Cigarettes     Start date: 1966     Quit date: 1983     Years since quittin.9   • Smokeless tobacco: Never   • Tobacco comments:     [id number]   Vaping Use   • Vaping status: Never Used   Substance Use Topics   • Alcohol use: Yes     Alcohol/week: 4.0 standard drinks of alcohol     Types: 4 Glasses of wine per week     Comment: about 1/2 glass of wine a night   • Drug use: No       Family History   Problem Relation Age of Onset   • Atrial fibrillation Mother    • Arrhythmia Mother    • Heart disease Father    • Heart attack  Father    • Hypertension Father    • Breast cancer Neg Hx    • Ovarian cancer Neg Hx    • Uterine cancer Neg Hx    • Colon cancer Neg Hx    • Melanoma Neg Hx    • Colon polyps Neg Hx        The following portions of the patient's history were reviewed and updated as appropriate: allergies, current medications, past family history, past medical history, past social history, past surgical history, and problem list.      Current Outpatient Medications:   •  acyclovir (ZOVIRAX) 400 MG tablet, Take 1 tablet by mouth Daily As Needed (fever blisters). 1 TID for 3 days then prn fever blister, Disp: , Rfl:   •  amLODIPine (NORVASC) 5 MG tablet, Take 1 tablet by mouth Daily., Disp: , Rfl:   •  apixaban (ELIQUIS) 5 MG tablet tablet, Take 1 tablet by mouth Every 12 (Twelve) Hours., Disp: 60 tablet, Rfl: 0  •  Calcium Carbonate-Vit D-Min (Calcium 600+D3 Plus Minerals) 600-800 MG-UNIT tablet, Take 1 tablet by mouth Daily., Disp: , Rfl:   •  dofetilide (TIKOSYN) 500 MCG capsule, Take 1 capsule by mouth 2 (Two) Times a Day., Disp: 180 capsule, Rfl: 3  •  fluticasone (FLONASE) 50 MCG/ACT nasal spray, Administer 2 sprays into the nostril(s) as directed by provider Daily As Needed for Rhinitis or Allergies., Disp: , Rfl:   •  furosemide (LASIX) 20 MG tablet, Take 1 tablet by mouth Daily., Disp: , Rfl:   •  labetalol (NORMODYNE) 300 MG tablet, Take 1 tablet by mouth 2 (Two) Times a Day., Disp: , Rfl:   •  levothyroxine (Synthroid) 100 MCG tablet, Take 1 tablet by mouth Daily., Disp: 90 tablet, Rfl: 3  •  losartan (COZAAR) 100 MG tablet, Take 1 tablet by mouth Daily., Disp: 90 tablet, Rfl: 3  •  Multiple Vitamins-Minerals (PRESERVISION AREDS 2 PO), Take 1 tablet by mouth 2 (Two) Times a Day., Disp: , Rfl:   •  Omega-3 Fatty Acids (FISH OIL) 1000 MG capsule capsule, Take 1 capsule by mouth Daily With Breakfast., Disp: , Rfl:   •  potassium chloride (MICRO-K) 10 MEQ CR capsule, Take 1 capsule by mouth 3 (Three) Times a Day., Disp: , Rfl:  "  •  rosuvastatin (CRESTOR) 10 MG tablet, Take 1 tablet by mouth Every Night., Disp: , Rfl:   •  vitamin B-12 (CYANOCOBALAMIN) 1000 MCG tablet, Take 1 tablet by mouth Daily., Disp: , Rfl:     Review of Systems   Constitutional: Positive for weight loss. Negative for chills and fever.   Cardiovascular:  Negative for chest pain, palpitations and paroxysmal nocturnal dyspnea.   Respiratory:  Negative for cough and shortness of breath.    Skin:  Negative for rash.   Musculoskeletal:  Positive for back pain and joint pain.   Gastrointestinal:  Negative for abdominal pain and heartburn.   Neurological:  Negative for dizziness and numbness.       Objective  /68 (BP Location: Left arm, Patient Position: Sitting, Cuff Size: Adult)   Pulse 59   Ht 167.6 cm (65.98\")   Wt 83 kg (183 lb)   SpO2 100%   BMI 29.55 kg/m²   Constitutional:       Appearance: Well-developed.   HENT:      Head: Normocephalic and atraumatic.   Pulmonary:      Effort: Pulmonary effort is normal.      Breath sounds: Normal breath sounds.   Cardiovascular:      Normal rate. Regular rhythm.   Edema:     Peripheral edema absent.   Skin:     General: Skin is warm and dry.   Neurological:      Mental Status: Alert and oriented to person, place, and time.         ECG 12 Lead    Date/Time: 12/20/2024 12:21 PM  Performed by: Jovani Lovelace MD    Authorized by: Jovani Lovelace MD  Comparison: compared with previous ECG from 6/14/2024  Similar to previous ECG  Rhythm: sinus bradycardia  Conduction: right bundle branch block            ICD-10-CM ICD-9-CM   1. Persistent atrial fibrillation  I48.19 427.31   2. Essential hypertension  I10 401.9   3. Mixed hyperlipidemia  E78.2 272.2   4. PRINCE on CPAP  G47.33 327.23   5. Chronic anticoagulation  Z79.01 V58.61         Assessment/Plan:  1.  Persistent atrial fibrillation: Ablation with Dr. Lopez on 8/9/2022 at [place].  Repeat ablation by Dr. Carr on 9/6/2023.  Required cardioversion on 6/11/2024.  " Maintaining sinus rhythm on EKG today.  Continue Tikosyn and Eliquis.        2.  Essential hypertension: Blood pressure is well-controlled today.  Continue amlodipine, labetalol, and losartan.      3.  Mixed hyperlipidemia: Remains well-controlled on lipid panel on 7/16/2024.  Continue rosuvastatin and omega-3 fish oil.     4.  Obstructive sleep apnea: Managed on CPAP.  She has been losing weight and would like to consider re-evaluation after she is at her goal weight to see if PRINCE has resolved.       5.  Chronic anticoagulation: Continue Eliquis.

## 2024-12-20 NOTE — PROGRESS NOTES
"  Reason for Visit: cardiovascular follow up.    HPI:  Anne-Marie Hayes is a 79 y.o. female is here today for follow-up.  She has been following in EP clinic due to persistent atrial fibrillation.  She underwent repeat ablation on 9/6/2023 with Dr. Carr.  She had to have a repeat cardioversion done on 6/11/2024.  She has been managed on dofetilide without further episodes.  She has also been working on lifestyle modification.  Her weight has decreased from 211 pounds on 5/31/2024 down to 183 pounds today.  This is mainly by diet.  She uses \"my fitness pal\" mihir on her phone to help.  She has hit a plateau recently.  She is feeling well today and has no current issues or complaints.  She denies any chest pain, palpitations, dizziness, syncope, PND, or orthopnea.  Her exercise is limited by back and musculoskeletal issues.      Previous Cardiac Testing and Procedures:  - ROBEL (8/13/2018) Normal left ventricular systolic function, mild MR, mild to moderate TR, no evidence of left atrial appendage thrombus.  - Cardioversion (8/13/2018) Successful cardioversion from atrial fibrillation to sinus rhythm  - Cardioversion (4/20/2020) successful cardioversion from atrial fibrillation to sinus rhythm  - Echo (3/21/2022) EF 66-70%, grade 2 diastolic dysfunction, normal RV size and function, RVSP 35-45 mmHg, no significant valve dysfunction  - Atrial fibrillation ablation (8/9/2022) by Dr Lopez at [facility]  - Cardioversion (3/15/2023) successful cardioversion   - Atrial fibrillation ablation (9/6/2023) PVI, by Dr. Carr  - Cardioversion (6/11/2024) successful    Lab data:  - Lipid panel (1/5/2020) total cholesterol 133, HDL 53, LDL 54, triglycerides 132  - Lipid panel (11/8/2021) total cholesterol 127, HDL 51, LDL 51, triglycerides 126  - BMP (11/8/2021) creatinine 0.8, potassium 4.6, sodium 141  - proBNP (2/18/2022) 311, normal 0-1800  - Lipid panel (5/17/2022) total cholesterol 137, HDL 54, LDL 56, triglycerides 133  - BNP " (2022) 1673, normal 0-1800  - TSH (3/15/2023) 1.85  - BMP (3/15/2023) creatinine 0.77, potassium 3.4, sodium 142  - Lipid panel (3/15/2023) total cholesterol 119, HDL 48, LDL 48, triglycerides 131  - BMP (2024) creatinine 0.8, potassium 4.2, sodium 144  - Lipid panel (2024) total cholesterol 105, HDL 53, LDL 38, triglycerides 72    Patient Active Problem List   Diagnosis    Persistent atrial fibrillation    PRINCE on CPAP    Essential hypertension    Mixed hyperlipidemia    Severe obesity (BMI 35.0-39.9) with comorbidity    Arthritis    Hx of adenomatous colonic polyps    Chronic anticoagulation    Status post total thyroidectomy    Status post parathyroidectomy    Abnormal EKG    Edema leg    RBBB    Atypical atrial flutter    Afib       Social History     Tobacco Use    Smoking status: Former     Current packs/day: 0.00     Average packs/day: 2.0 packs/day for 17.0 years (34.0 ttl pk-yrs)     Types: Cigarettes     Start date: 1966     Quit date: 1983     Years since quittin.9    Smokeless tobacco: Never    Tobacco comments:     [id number]   Vaping Use    Vaping status: Never Used   Substance Use Topics    Alcohol use: Yes     Alcohol/week: 4.0 standard drinks of alcohol     Types: 4 Glasses of wine per week     Comment: about 1/2 glass of wine a night    Drug use: No       Family History   Problem Relation Age of Onset    Atrial fibrillation Mother     Arrhythmia Mother     Heart disease Father     Heart attack Father     Hypertension Father     Breast cancer Neg Hx     Ovarian cancer Neg Hx     Uterine cancer Neg Hx     Colon cancer Neg Hx     Melanoma Neg Hx     Colon polyps Neg Hx        The following portions of the patient's history were reviewed and updated as appropriate: allergies, current medications, past family history, past medical history, past social history, past surgical history, and problem list.      Current Outpatient Medications:     acyclovir (ZOVIRAX) 400 MG tablet,  Take 1 tablet by mouth Daily As Needed (fever blisters). 1 TID for 3 days then prn fever blister, Disp: , Rfl:     amLODIPine (NORVASC) 5 MG tablet, Take 1 tablet by mouth Daily., Disp: , Rfl:     apixaban (ELIQUIS) 5 MG tablet tablet, Take 1 tablet by mouth Every 12 (Twelve) Hours., Disp: 60 tablet, Rfl: 0    Calcium Carbonate-Vit D-Min (Calcium 600+D3 Plus Minerals) 600-800 MG-UNIT tablet, Take 1 tablet by mouth Daily., Disp: , Rfl:     dofetilide (TIKOSYN) 500 MCG capsule, Take 1 capsule by mouth 2 (Two) Times a Day., Disp: 180 capsule, Rfl: 3    fluticasone (FLONASE) 50 MCG/ACT nasal spray, Administer 2 sprays into the nostril(s) as directed by provider Daily As Needed for Rhinitis or Allergies., Disp: , Rfl:     furosemide (LASIX) 20 MG tablet, Take 1 tablet by mouth Daily., Disp: , Rfl:     labetalol (NORMODYNE) 300 MG tablet, Take 1 tablet by mouth 2 (Two) Times a Day., Disp: , Rfl:     levothyroxine (Synthroid) 100 MCG tablet, Take 1 tablet by mouth Daily., Disp: 90 tablet, Rfl: 3    losartan (COZAAR) 100 MG tablet, Take 1 tablet by mouth Daily., Disp: 90 tablet, Rfl: 3    Multiple Vitamins-Minerals (PRESERVISION AREDS 2 PO), Take 1 tablet by mouth 2 (Two) Times a Day., Disp: , Rfl:     Omega-3 Fatty Acids (FISH OIL) 1000 MG capsule capsule, Take 1 capsule by mouth Daily With Breakfast., Disp: , Rfl:     potassium chloride (MICRO-K) 10 MEQ CR capsule, Take 1 capsule by mouth 3 (Three) Times a Day., Disp: , Rfl:     rosuvastatin (CRESTOR) 10 MG tablet, Take 1 tablet by mouth Every Night., Disp: , Rfl:     vitamin B-12 (CYANOCOBALAMIN) 1000 MCG tablet, Take 1 tablet by mouth Daily., Disp: , Rfl:     Review of Systems   Constitutional: Positive for weight loss. Negative for chills and fever.   Cardiovascular:  Negative for chest pain, palpitations and paroxysmal nocturnal dyspnea.   Respiratory:  Negative for cough and shortness of breath.    Skin:  Negative for rash.   Musculoskeletal:  Positive for back pain  "and joint pain.   Gastrointestinal:  Negative for abdominal pain and heartburn.   Neurological:  Negative for dizziness and numbness.       Objective   /68 (BP Location: Left arm, Patient Position: Sitting, Cuff Size: Adult)   Pulse 59   Ht 167.6 cm (65.98\")   Wt 83 kg (183 lb)   SpO2 100%   BMI 29.55 kg/m²   Constitutional:       Appearance: Well-developed.   HENT:      Head: Normocephalic and atraumatic.   Pulmonary:      Effort: Pulmonary effort is normal.      Breath sounds: Normal breath sounds.   Cardiovascular:      Normal rate. Regular rhythm.   Edema:     Peripheral edema absent.   Skin:     General: Skin is warm and dry.   Neurological:      Mental Status: Alert and oriented to person, place, and time.         ECG 12 Lead    Date/Time: 12/20/2024 12:21 PM  Performed by: Jovani Lovelace MD    Authorized by: Jovani Lovelace MD  Comparison: compared with previous ECG from 6/14/2024  Similar to previous ECG  Rhythm: sinus bradycardia  Conduction: right bundle branch block            ICD-10-CM ICD-9-CM   1. Persistent atrial fibrillation  I48.19 427.31   2. Essential hypertension  I10 401.9   3. Mixed hyperlipidemia  E78.2 272.2   4. PRINCE on CPAP  G47.33 327.23   5. Chronic anticoagulation  Z79.01 V58.61         Assessment/Plan:  1.  Persistent atrial fibrillation: Ablation with Dr. Lopez on 8/9/2022 at [place].  Repeat ablation by Dr. Carr on 9/6/2023.  Required cardioversion on 6/11/2024.  Maintaining sinus rhythm on EKG today.  Continue Tikosyn and Eliquis.        2.  Essential hypertension: Blood pressure is well-controlled today.  Continue amlodipine, labetalol, and losartan.      3.  Mixed hyperlipidemia: Remains well-controlled on lipid panel on 7/16/2024.  Continue rosuvastatin and omega-3 fish oil.     4.  Obstructive sleep apnea: Managed on CPAP.  She has been losing weight and would like to consider re-evaluation after she is at her goal weight to see if PRINCE has resolved.       5.  " Chronic anticoagulation: Continue Eliquis.

## 2025-01-29 DIAGNOSIS — E89.0 S/P COMPLETE THYROIDECTOMY: ICD-10-CM

## 2025-01-29 DIAGNOSIS — R73.01 IMPAIRED FASTING GLUCOSE: ICD-10-CM

## 2025-01-29 DIAGNOSIS — I48.19 PERSISTENT ATRIAL FIBRILLATION (HCC): ICD-10-CM

## 2025-01-29 DIAGNOSIS — N18.31 CHRONIC KIDNEY DISEASE, STAGE 3A (HCC): ICD-10-CM

## 2025-01-29 LAB
25(OH)D3 SERPL-MCNC: 50.9 NG/ML
ALBUMIN SERPL-MCNC: 4.5 G/DL (ref 3.5–5.2)
ALP SERPL-CCNC: 92 U/L (ref 35–104)
ALT SERPL-CCNC: 16 U/L (ref 5–33)
ANION GAP SERPL CALCULATED.3IONS-SCNC: 11 MMOL/L (ref 8–16)
AST SERPL-CCNC: 17 U/L (ref 5–32)
BILIRUB SERPL-MCNC: 0.8 MG/DL (ref 0.2–1.2)
BUN SERPL-MCNC: 16 MG/DL (ref 8–23)
CALCIUM SERPL-MCNC: 9.4 MG/DL (ref 8.8–10.2)
CHLORIDE SERPL-SCNC: 103 MMOL/L (ref 98–107)
CHOLEST SERPL-MCNC: 118 MG/DL (ref 0–199)
CO2 SERPL-SCNC: 29 MMOL/L (ref 22–29)
CREAT SERPL-MCNC: 0.6 MG/DL (ref 0.5–0.9)
ERYTHROCYTE [DISTWIDTH] IN BLOOD BY AUTOMATED COUNT: 13.3 % (ref 11.5–14.5)
GLUCOSE SERPL-MCNC: 95 MG/DL (ref 70–99)
HBA1C MFR BLD: 5.1 % (ref 4–5.6)
HCT VFR BLD AUTO: 39.1 % (ref 37–47)
HDLC SERPL-MCNC: 61 MG/DL (ref 40–60)
HGB BLD-MCNC: 12.5 G/DL (ref 12–16)
LDLC SERPL CALC-MCNC: 44 MG/DL
MCH RBC QN AUTO: 29.4 PG (ref 27–31)
MCHC RBC AUTO-ENTMCNC: 32 G/DL (ref 33–37)
MCV RBC AUTO: 92 FL (ref 81–99)
PLATELET # BLD AUTO: 201 K/UL (ref 130–400)
PMV BLD AUTO: 11.3 FL (ref 9.4–12.3)
POTASSIUM SERPL-SCNC: 3.9 MMOL/L (ref 3.5–5.1)
PROT SERPL-MCNC: 6.6 G/DL (ref 6.4–8.3)
RBC # BLD AUTO: 4.25 M/UL (ref 4.2–5.4)
SODIUM SERPL-SCNC: 143 MMOL/L (ref 136–145)
TRIGL SERPL-MCNC: 65 MG/DL (ref 0–149)
TSH SERPL DL<=0.005 MIU/L-ACNC: 0.16 UIU/ML (ref 0.27–4.2)
WBC # BLD AUTO: 5.8 K/UL (ref 4.8–10.8)

## 2025-02-07 SDOH — ECONOMIC STABILITY: INCOME INSECURITY: IN THE LAST 12 MONTHS, WAS THERE A TIME WHEN YOU WERE NOT ABLE TO PAY THE MORTGAGE OR RENT ON TIME?: NO

## 2025-02-07 SDOH — ECONOMIC STABILITY: TRANSPORTATION INSECURITY
IN THE PAST 12 MONTHS, HAS THE LACK OF TRANSPORTATION KEPT YOU FROM MEDICAL APPOINTMENTS OR FROM GETTING MEDICATIONS?: NO

## 2025-02-07 SDOH — ECONOMIC STABILITY: FOOD INSECURITY: WITHIN THE PAST 12 MONTHS, YOU WORRIED THAT YOUR FOOD WOULD RUN OUT BEFORE YOU GOT MONEY TO BUY MORE.: NEVER TRUE

## 2025-02-07 SDOH — ECONOMIC STABILITY: FOOD INSECURITY: WITHIN THE PAST 12 MONTHS, THE FOOD YOU BOUGHT JUST DIDN'T LAST AND YOU DIDN'T HAVE MONEY TO GET MORE.: NEVER TRUE

## 2025-02-07 ASSESSMENT — PATIENT HEALTH QUESTIONNAIRE - PHQ9
SUM OF ALL RESPONSES TO PHQ QUESTIONS 1-9: 0
SUM OF ALL RESPONSES TO PHQ9 QUESTIONS 1 & 2: 0
1. LITTLE INTEREST OR PLEASURE IN DOING THINGS: NOT AT ALL
SUM OF ALL RESPONSES TO PHQ QUESTIONS 1-9: 0
2. FEELING DOWN, DEPRESSED OR HOPELESS: NOT AT ALL
1. LITTLE INTEREST OR PLEASURE IN DOING THINGS: NOT AT ALL
SUM OF ALL RESPONSES TO PHQ QUESTIONS 1-9: 0
SUM OF ALL RESPONSES TO PHQ9 QUESTIONS 1 & 2: 0
2. FEELING DOWN, DEPRESSED OR HOPELESS: NOT AT ALL
SUM OF ALL RESPONSES TO PHQ QUESTIONS 1-9: 0

## 2025-02-10 ENCOUNTER — OFFICE VISIT (OUTPATIENT)
Dept: INTERNAL MEDICINE | Age: 80
End: 2025-02-10
Payer: MEDICARE

## 2025-02-10 VITALS
DIASTOLIC BLOOD PRESSURE: 70 MMHG | SYSTOLIC BLOOD PRESSURE: 130 MMHG | HEIGHT: 67 IN | HEART RATE: 60 BPM | OXYGEN SATURATION: 100 % | BODY MASS INDEX: 28.25 KG/M2 | WEIGHT: 180 LBS

## 2025-02-10 DIAGNOSIS — R73.01 IMPAIRED FASTING GLUCOSE: ICD-10-CM

## 2025-02-10 DIAGNOSIS — I10 PRIMARY HYPERTENSION: Primary | ICD-10-CM

## 2025-02-10 DIAGNOSIS — E03.9 ACQUIRED HYPOTHYROIDISM: ICD-10-CM

## 2025-02-10 DIAGNOSIS — I48.0 PAROXYSMAL ATRIAL FIBRILLATION (HCC): ICD-10-CM

## 2025-02-10 DIAGNOSIS — E89.0 S/P COMPLETE THYROIDECTOMY: ICD-10-CM

## 2025-02-10 DIAGNOSIS — R91.1 LUNG NODULE, SOLITARY: ICD-10-CM

## 2025-02-10 DIAGNOSIS — E78.00 PURE HYPERCHOLESTEROLEMIA: ICD-10-CM

## 2025-02-10 DIAGNOSIS — E78.2 MIXED HYPERLIPIDEMIA: ICD-10-CM

## 2025-02-10 PROBLEM — N18.31 CHRONIC KIDNEY DISEASE, STAGE 3A (HCC): Status: RESOLVED | Noted: 2024-04-16 | Resolved: 2025-02-10

## 2025-02-10 PROCEDURE — G8427 DOCREV CUR MEDS BY ELIG CLIN: HCPCS | Performed by: INTERNAL MEDICINE

## 2025-02-10 PROCEDURE — 3075F SYST BP GE 130 - 139MM HG: CPT | Performed by: INTERNAL MEDICINE

## 2025-02-10 PROCEDURE — G8417 CALC BMI ABV UP PARAM F/U: HCPCS | Performed by: INTERNAL MEDICINE

## 2025-02-10 PROCEDURE — G8399 PT W/DXA RESULTS DOCUMENT: HCPCS | Performed by: INTERNAL MEDICINE

## 2025-02-10 PROCEDURE — 3078F DIAST BP <80 MM HG: CPT | Performed by: INTERNAL MEDICINE

## 2025-02-10 PROCEDURE — 99214 OFFICE O/P EST MOD 30 MIN: CPT | Performed by: INTERNAL MEDICINE

## 2025-02-10 PROCEDURE — 1123F ACP DISCUSS/DSCN MKR DOCD: CPT | Performed by: INTERNAL MEDICINE

## 2025-02-10 PROCEDURE — 1159F MED LIST DOCD IN RCRD: CPT | Performed by: INTERNAL MEDICINE

## 2025-02-10 PROCEDURE — 1090F PRES/ABSN URINE INCON ASSESS: CPT | Performed by: INTERNAL MEDICINE

## 2025-02-10 PROCEDURE — 1036F TOBACCO NON-USER: CPT | Performed by: INTERNAL MEDICINE

## 2025-02-10 RX ORDER — LEVOTHYROXINE SODIUM 88 UG/1
88 TABLET ORAL DAILY
Qty: 90 TABLET | Refills: 3 | Status: SHIPPED | OUTPATIENT
Start: 2025-02-10

## 2025-02-10 RX ORDER — FUROSEMIDE 20 MG/1
20 TABLET ORAL DAILY
Qty: 90 TABLET | Refills: 3 | Status: SHIPPED | OUTPATIENT
Start: 2025-02-10

## 2025-02-10 RX ORDER — ROSUVASTATIN CALCIUM 10 MG/1
TABLET, COATED ORAL
Qty: 90 TABLET | Refills: 3 | Status: SHIPPED | OUTPATIENT
Start: 2025-02-10

## 2025-02-10 NOTE — PROGRESS NOTES
from 100 mcg to 88 mcg due to her low TSH levels. She is advised to discontinue her current thyroid medication for 3 days, then resume with half a tablet until the new prescription arrives. The new prescription will be sent to [facility].  -     levothyroxine (SYNTHROID) 88 MCG tablet; Take 1 tablet by mouth daily, Disp-90 tablet, R-3Normal  -     TSH; Future  7. Pure hypercholesterolemia  8. Lung nodule, solitary  Stable- followed with pulmonary who cleard  Right upper lobe        History Chronic kidney disease.  Her GFR is currently greater than 90, indicating normal kidney function. She had a GFR of 51 in the past, but the last three readings have been normal. She is advised to avoid NSAIDs like Advil, Aleve, and ibuprofen due to their potential impact on kidney function and because she is on Eliquis.     Anemia.  She was anemic in 2023, but her levels have since normalized.    Health maintenance.  She has a mammogram scheduled in March 2025.    Follow-up  The patient will follow up in 6 months.                The patient (or guardian, if applicable) and other individuals in attendance with the patient were advised that Artificial Intelligence will be utilized during this visit to record, process the conversation to generate a clinical note, and support improvement of the AI technology. The patient (or guardian, if applicable) and other individuals in attendance at the appointment consented to the use of AI, including the recording.                           yes

## 2025-03-03 NOTE — PATIENT INSTRUCTIONS
6 month follow up  No medication changes for now  Labs today  Call us if your symptoms significantly worsen

## 2025-03-03 NOTE — PROGRESS NOTES
"Chief Complaint  Atrial Fibrillation    Subjective        History of Present Illness    EP Problems:  1.  Persistent atrial fibrillation  -Prior AAD use: Flecainide, amiodarone  -8/9/2022: PVI, Lopez  -3/15/2023: Cardioversion  2.  First-degree AV block  3.  Right bundle branch block  4.  Atrial flutter (likely atypical)     Cardiology Problems:  1.  Hypertension  2.  Hyperlipidemia     Medical Problems:  1.  Obesity  -5/2023: BMI 36  -3/2024: BMI 38  -5/2024: BMI 34  -9/2024: BMI 30.8  2.  Obstructive sleep apnea    History of Present Illness  The patient is a 79-year-old woman presenting to the clinic for follow-up of persistent atrial fibrillation and atypical atrial flutter.    At the time of her last clinic visit, she was doing well without any significant issues or known recurrences. She has been working on weight loss and diet and has had significant weight loss from home.      Objective   Vital Signs:  /72   Pulse 58   Ht 167.6 cm (65.98\")   Wt 81.2 kg (179 lb)   BMI 28.91 kg/m²   Estimated body mass index is 28.91 kg/m² as calculated from the following:    Height as of this encounter: 167.6 cm (65.98\").    Weight as of this encounter: 81.2 kg (179 lb).      Physical Exam  Vitals reviewed.   Constitutional:       Appearance: Normal appearance.   Cardiovascular:      Rate and Rhythm: Normal rate and regular rhythm.      Pulses: Normal pulses.      Heart sounds: Normal heart sounds. No murmur heard.  Pulmonary:      Effort: Pulmonary effort is normal. No respiratory distress.      Breath sounds: Normal breath sounds.   Skin:     General: Skin is warm and dry.   Neurological:      General: No focal deficit present.      Mental Status: She is alert.   Psychiatric:         Mood and Affect: Mood normal.         Judgment: Judgment normal.          Physical Exam      Result Review :  The following data was reviewed by: Marlon Carr MD on today's date:    ECG today shows sinus rhythm, right bundle branch " block, QTc acceptable, heart rate 58             Assessment and Plan   Diagnoses and all orders for this visit:    1. Persistent atrial fibrillation (Primary)  -     Basic Metabolic Panel  -     CBC (No Diff)  -     Magnesium    2. Atypical atrial flutter  -     Basic Metabolic Panel  -     CBC (No Diff)  -     Magnesium    3. RBBB        Plan:  - ECG today for dofetilide monitoring shows Qtc is within acceptable limits  -Dofetilide monitoring with BMP, magnesium  -Anticoagulation monitoring with CBC  - No intervention necessary for RBBB at this time  -Arrhythmias well-controlled on current regimen; continue dofetilide at current dose]  -Continue apixaban for anticoagulation given elevated [id number]  -Continue to work on lifestyle risk factor modification for secondary prophylaxis of atrial fibrillation         Follow Up   Return in about 6 months (around 9/4/2025).  Patient was given instructions and counseling regarding her condition or for health maintenance advice. Please see specific information pulled into the AVS if appropriate.     Part of this note may be an electronic transcription/translation of spoken language to printed text using the Dragon Dictation System.    Patient or patient representative verbalized consent for the use of Ambient Listening during the visit with  Marlon Carr MD for chart documentation. 3/4/2025  08:22 CST

## 2025-03-04 ENCOUNTER — OFFICE VISIT (OUTPATIENT)
Dept: CARDIOLOGY | Facility: CLINIC | Age: 80
End: 2025-03-04
Payer: MEDICARE

## 2025-03-04 VITALS
BODY MASS INDEX: 28.77 KG/M2 | SYSTOLIC BLOOD PRESSURE: 120 MMHG | HEART RATE: 58 BPM | HEIGHT: 66 IN | WEIGHT: 179 LBS | DIASTOLIC BLOOD PRESSURE: 72 MMHG

## 2025-03-04 DIAGNOSIS — I48.19 PERSISTENT ATRIAL FIBRILLATION: Primary | ICD-10-CM

## 2025-03-04 DIAGNOSIS — I48.4 ATYPICAL ATRIAL FLUTTER: ICD-10-CM

## 2025-03-04 DIAGNOSIS — I45.10 RBBB: ICD-10-CM

## 2025-03-06 ENCOUNTER — OFFICE VISIT (OUTPATIENT)
Dept: SURGERY | Age: 80
End: 2025-03-06
Payer: MEDICARE

## 2025-03-06 ENCOUNTER — HOSPITAL ENCOUNTER (OUTPATIENT)
Dept: WOMENS IMAGING | Age: 80
Discharge: HOME | End: 2025-03-06
Payer: MEDICARE

## 2025-03-06 VITALS — HEIGHT: 66 IN | WEIGHT: 174 LBS | BODY MASS INDEX: 27.97 KG/M2

## 2025-03-06 VITALS — OXYGEN SATURATION: 100 % | BODY MASS INDEX: 27.97 KG/M2 | WEIGHT: 174 LBS | HEART RATE: 62 BPM | HEIGHT: 66 IN

## 2025-03-06 DIAGNOSIS — N60.11 FIBROCYSTIC BREAST CHANGES OF BOTH BREASTS: ICD-10-CM

## 2025-03-06 DIAGNOSIS — N60.12 FIBROCYSTIC BREAST CHANGES OF BOTH BREASTS: ICD-10-CM

## 2025-03-06 DIAGNOSIS — Z12.31 VISIT FOR SCREENING MAMMOGRAM: Primary | ICD-10-CM

## 2025-03-06 DIAGNOSIS — Z12.31 VISIT FOR SCREENING MAMMOGRAM: ICD-10-CM

## 2025-03-06 PROCEDURE — G8427 DOCREV CUR MEDS BY ELIG CLIN: HCPCS | Performed by: PHYSICIAN ASSISTANT

## 2025-03-06 PROCEDURE — 1036F TOBACCO NON-USER: CPT | Performed by: PHYSICIAN ASSISTANT

## 2025-03-06 PROCEDURE — 1159F MED LIST DOCD IN RCRD: CPT | Performed by: PHYSICIAN ASSISTANT

## 2025-03-06 PROCEDURE — G8417 CALC BMI ABV UP PARAM F/U: HCPCS | Performed by: PHYSICIAN ASSISTANT

## 2025-03-06 PROCEDURE — 77063 BREAST TOMOSYNTHESIS BI: CPT

## 2025-03-06 PROCEDURE — 99213 OFFICE O/P EST LOW 20 MIN: CPT | Performed by: PHYSICIAN ASSISTANT

## 2025-03-06 PROCEDURE — 1090F PRES/ABSN URINE INCON ASSESS: CPT | Performed by: PHYSICIAN ASSISTANT

## 2025-03-06 PROCEDURE — 1123F ACP DISCUSS/DSCN MKR DOCD: CPT | Performed by: PHYSICIAN ASSISTANT

## 2025-03-06 PROCEDURE — G8399 PT W/DXA RESULTS DOCUMENT: HCPCS | Performed by: PHYSICIAN ASSISTANT

## 2025-03-06 NOTE — PROGRESS NOTES
will see her back next year for yearly exam and mammography        20 minutes was spent during this exam with face-to-face counseling, review of data and physical exam

## 2025-03-07 DIAGNOSIS — I10 PRIMARY HYPERTENSION: ICD-10-CM

## 2025-03-07 RX ORDER — FUROSEMIDE 20 MG/1
20 TABLET ORAL DAILY
Qty: 90 TABLET | Refills: 3 | Status: SHIPPED | OUTPATIENT
Start: 2025-03-07

## 2025-03-11 ENCOUNTER — LAB (OUTPATIENT)
Dept: LAB | Facility: HOSPITAL | Age: 80
End: 2025-03-11
Payer: MEDICARE

## 2025-03-11 LAB
ANION GAP SERPL CALCULATED.3IONS-SCNC: 2 MMOL/L (ref 4–13)
BUN SERPL-MCNC: 19 MG/DL (ref 5–21)
BUN/CREAT SERPL: 27.1
CALCIUM SPEC-SCNC: 8.5 MG/DL (ref 8.6–10.5)
CHLORIDE SERPL-SCNC: 102 MMOL/L (ref 98–110)
CO2 SERPL-SCNC: 30 MMOL/L (ref 24–31)
CREAT SERPL-MCNC: 0.7 MG/DL (ref 0.5–1.4)
EGFRCR SERPLBLD CKD-EPI 2021: 88.1 ML/MIN/1.73
ERYTHROCYTE [DISTWIDTH] IN BLOOD BY AUTOMATED COUNT: 13.1 % (ref 12.3–15.4)
GLUCOSE SERPL-MCNC: 93 MG/DL (ref 65–99)
HCT VFR BLD AUTO: 37 % (ref 34–46.6)
HGB BLD-MCNC: 11.9 G/DL (ref 12–15.9)
MAGNESIUM SERPL-MCNC: 2.1 MG/DL (ref 1.6–2.4)
MCH RBC QN AUTO: 29.5 PG (ref 26.6–33)
MCHC RBC AUTO-ENTMCNC: 32.2 G/DL (ref 31.5–35.7)
MCV RBC AUTO: 91.6 FL (ref 79–97)
PLATELET # BLD AUTO: 188 10*3/MM3 (ref 140–450)
PMV BLD AUTO: 10 FL (ref 6–12)
POTASSIUM SERPL-SCNC: 4.4 MMOL/L (ref 3.5–5.3)
RBC # BLD AUTO: 4.04 10*6/MM3 (ref 3.77–5.28)
SODIUM SERPL-SCNC: 134 MMOL/L (ref 135–145)
WBC NRBC COR # BLD AUTO: 5.6 10*3/MM3 (ref 3.4–10.8)

## 2025-03-11 PROCEDURE — 83735 ASSAY OF MAGNESIUM: CPT | Performed by: STUDENT IN AN ORGANIZED HEALTH CARE EDUCATION/TRAINING PROGRAM

## 2025-03-11 PROCEDURE — 80048 BASIC METABOLIC PNL TOTAL CA: CPT | Performed by: STUDENT IN AN ORGANIZED HEALTH CARE EDUCATION/TRAINING PROGRAM

## 2025-03-11 PROCEDURE — 85027 COMPLETE CBC AUTOMATED: CPT | Performed by: STUDENT IN AN ORGANIZED HEALTH CARE EDUCATION/TRAINING PROGRAM

## 2025-05-12 DIAGNOSIS — E03.9 ACQUIRED HYPOTHYROIDISM: ICD-10-CM

## 2025-05-12 DIAGNOSIS — E78.2 MIXED HYPERLIPIDEMIA: ICD-10-CM

## 2025-05-12 LAB
ALBUMIN SERPL-MCNC: 4.5 G/DL (ref 3.5–5.2)
ALP SERPL-CCNC: 85 U/L (ref 35–104)
ALT SERPL-CCNC: 19 U/L (ref 10–35)
ANION GAP SERPL CALCULATED.3IONS-SCNC: 12 MMOL/L (ref 8–16)
AST SERPL-CCNC: 18 U/L (ref 10–35)
BILIRUB SERPL-MCNC: 0.7 MG/DL (ref 0.2–1.2)
BUN SERPL-MCNC: 19 MG/DL (ref 8–23)
CALCIUM SERPL-MCNC: 9.5 MG/DL (ref 8.8–10.2)
CHLORIDE SERPL-SCNC: 105 MMOL/L (ref 98–107)
CO2 SERPL-SCNC: 26 MMOL/L (ref 22–29)
CREAT SERPL-MCNC: 0.7 MG/DL (ref 0.5–0.9)
ERYTHROCYTE [DISTWIDTH] IN BLOOD BY AUTOMATED COUNT: 13.2 % (ref 11.5–14.5)
GLUCOSE SERPL-MCNC: 95 MG/DL (ref 70–99)
HCT VFR BLD AUTO: 38.8 % (ref 37–47)
HGB BLD-MCNC: 12.4 G/DL (ref 12–16)
MCH RBC QN AUTO: 29.8 PG (ref 27–31)
MCHC RBC AUTO-ENTMCNC: 32 G/DL (ref 33–37)
MCV RBC AUTO: 93.3 FL (ref 81–99)
PLATELET # BLD AUTO: 193 K/UL (ref 130–400)
PMV BLD AUTO: 11.2 FL (ref 9.4–12.3)
POTASSIUM SERPL-SCNC: 4 MMOL/L (ref 3.5–5.1)
PROT SERPL-MCNC: 6.5 G/DL (ref 6.4–8.3)
RBC # BLD AUTO: 4.16 M/UL (ref 4.2–5.4)
SODIUM SERPL-SCNC: 143 MMOL/L (ref 136–145)
TSH SERPL DL<=0.005 MIU/L-ACNC: 0.69 UIU/ML (ref 0.27–4.2)
WBC # BLD AUTO: 6 K/UL (ref 4.8–10.8)

## 2025-05-19 ENCOUNTER — OFFICE VISIT (OUTPATIENT)
Dept: INTERNAL MEDICINE | Age: 80
End: 2025-05-19
Payer: MEDICARE

## 2025-05-19 VITALS
WEIGHT: 173 LBS | DIASTOLIC BLOOD PRESSURE: 70 MMHG | BODY MASS INDEX: 27.8 KG/M2 | HEART RATE: 60 BPM | SYSTOLIC BLOOD PRESSURE: 120 MMHG | HEIGHT: 66 IN | OXYGEN SATURATION: 96 %

## 2025-05-19 DIAGNOSIS — Z90.89 S/P COMPLETE THYROIDECTOMY: ICD-10-CM

## 2025-05-19 DIAGNOSIS — I48.19 PERSISTENT ATRIAL FIBRILLATION (HCC): ICD-10-CM

## 2025-05-19 DIAGNOSIS — E78.2 MIXED HYPERLIPIDEMIA: ICD-10-CM

## 2025-05-19 DIAGNOSIS — E87.6 HYPOKALEMIA: ICD-10-CM

## 2025-05-19 DIAGNOSIS — Z98.890 S/P COMPLETE THYROIDECTOMY: ICD-10-CM

## 2025-05-19 DIAGNOSIS — I10 PRIMARY HYPERTENSION: Primary | ICD-10-CM

## 2025-05-19 DIAGNOSIS — E03.9 ACQUIRED HYPOTHYROIDISM: ICD-10-CM

## 2025-05-19 DIAGNOSIS — R73.01 IMPAIRED FASTING GLUCOSE: ICD-10-CM

## 2025-05-19 PROCEDURE — 3074F SYST BP LT 130 MM HG: CPT | Performed by: INTERNAL MEDICINE

## 2025-05-19 PROCEDURE — 1159F MED LIST DOCD IN RCRD: CPT | Performed by: INTERNAL MEDICINE

## 2025-05-19 PROCEDURE — 1036F TOBACCO NON-USER: CPT | Performed by: INTERNAL MEDICINE

## 2025-05-19 PROCEDURE — 1090F PRES/ABSN URINE INCON ASSESS: CPT | Performed by: INTERNAL MEDICINE

## 2025-05-19 PROCEDURE — G8427 DOCREV CUR MEDS BY ELIG CLIN: HCPCS | Performed by: INTERNAL MEDICINE

## 2025-05-19 PROCEDURE — 3078F DIAST BP <80 MM HG: CPT | Performed by: INTERNAL MEDICINE

## 2025-05-19 PROCEDURE — 99214 OFFICE O/P EST MOD 30 MIN: CPT | Performed by: INTERNAL MEDICINE

## 2025-05-19 PROCEDURE — 1123F ACP DISCUSS/DSCN MKR DOCD: CPT | Performed by: INTERNAL MEDICINE

## 2025-05-19 PROCEDURE — G8417 CALC BMI ABV UP PARAM F/U: HCPCS | Performed by: INTERNAL MEDICINE

## 2025-05-19 PROCEDURE — G8399 PT W/DXA RESULTS DOCUMENT: HCPCS | Performed by: INTERNAL MEDICINE

## 2025-05-19 RX ORDER — LOSARTAN POTASSIUM 100 MG/1
100 TABLET ORAL DAILY
Qty: 90 TABLET | Refills: 3 | Status: SHIPPED | OUTPATIENT
Start: 2025-05-19

## 2025-05-19 RX ORDER — LEVOTHYROXINE SODIUM 75 UG/1
75 TABLET ORAL DAILY
Qty: 90 TABLET | Refills: 3 | Status: SHIPPED | OUTPATIENT
Start: 2025-05-19

## 2025-05-19 RX ORDER — DOFETILIDE 0.5 MG/1
500 CAPSULE ORAL 2 TIMES DAILY
Qty: 180 CAPSULE | Refills: 3 | Status: SHIPPED | OUTPATIENT
Start: 2025-05-19

## 2025-05-19 RX ORDER — AMLODIPINE BESYLATE 5 MG/1
5 TABLET ORAL DAILY
Qty: 90 TABLET | Refills: 3 | Status: SHIPPED | OUTPATIENT
Start: 2025-05-19

## 2025-05-19 RX ORDER — POTASSIUM CHLORIDE 750 MG/1
10 TABLET, EXTENDED RELEASE ORAL 2 TIMES DAILY
Qty: 180 TABLET | Refills: 3 | Status: SHIPPED | OUTPATIENT
Start: 2025-05-19

## 2025-05-19 NOTE — PROGRESS NOTES
(L) 33.0 - 37.0 g/dL    RDW 13.2 11.5 - 14.5 %    Platelets 193 130 - 400 K/uL    MPV 11.2 9.4 - 12.3 fL       ASSESSMENT/ PLAN:  1. Primary hypertension stable renew Norvasc follow  -     amLODIPine (NORVASC) 5 MG tablet; Take 1 tablet by mouth daily, Disp-90 tablet, R-3Normal  2. Persistent atrial fibrillation (HCC) overall stable has had issues with recurrence but in general she is stable  3. Hypokalemia renew potassium 10 mill equivalents twice daily stay hydrated when sweating or hot or any volume loss be sure potassium replacement eat a potassium rich diet  -     potassium chloride (KLOR-CON M) 10 MEQ extended release tablet; Take 1 tablet by mouth 2 times daily, Disp-180 tablet, R-3Disregard script for tid - in future bidNormal  -     Comprehensive Metabolic Panel; Future  4. Acquired hypothyroidism although she has had a thyroidectomy she is still over replaced with the current dose of thyroid change her Synthroid to 75 mcg and follow she feels well  -     levothyroxine (SYNTHROID) 75 MCG tablet; Take 1 tablet by mouth daily, Disp-90 tablet, R-3Normal  -     TSH; Future  -     CBC; Future  -     Comprehensive Metabolic Panel; Future  -     TSH; Future  5. S/P complete thyroidectomy  -     levothyroxine (SYNTHROID) 75 MCG tablet; Take 1 tablet by mouth daily, Disp-90 tablet, R-3Normal  6. Impaired fasting glucose stable check lab follow  -     Hemoglobin A1C; Future  -     Hemoglobin A1C; Future  -     Albumin/Creatinine Ratio, Urine; Future  7. Mixed hyperlipidemia stable follow  -     Lipid Panel; Future  -     CBC; Future  -     Lipid Panel; Future

## 2025-06-09 DIAGNOSIS — G47.33 OBSTRUCTIVE SLEEP APNEA: Primary | ICD-10-CM

## 2025-06-10 ENCOUNTER — TELEPHONE (OUTPATIENT)
Dept: INTERNAL MEDICINE | Age: 80
End: 2025-06-10

## 2025-06-10 NOTE — TELEPHONE ENCOUNTER
They need an updated note that states that she is using and benefiting from PAP therapy.  I will fax to them when note is done.  Fax [phone]

## 2025-06-20 ENCOUNTER — OFFICE VISIT (OUTPATIENT)
Dept: CARDIOLOGY | Facility: CLINIC | Age: 80
End: 2025-06-20
Payer: MEDICARE

## 2025-06-20 VITALS
SYSTOLIC BLOOD PRESSURE: 120 MMHG | HEIGHT: 66 IN | DIASTOLIC BLOOD PRESSURE: 76 MMHG | BODY MASS INDEX: 27.8 KG/M2 | WEIGHT: 173 LBS | OXYGEN SATURATION: 98 % | HEART RATE: 61 BPM

## 2025-06-20 DIAGNOSIS — I10 ESSENTIAL HYPERTENSION: ICD-10-CM

## 2025-06-20 DIAGNOSIS — E78.2 MIXED HYPERLIPIDEMIA: ICD-10-CM

## 2025-06-20 DIAGNOSIS — I48.19 PERSISTENT ATRIAL FIBRILLATION: Primary | ICD-10-CM

## 2025-06-20 DIAGNOSIS — G47.33 OSA ON CPAP: ICD-10-CM

## 2025-06-20 DIAGNOSIS — Z79.01 CHRONIC ANTICOAGULATION: ICD-10-CM

## 2025-06-20 PROBLEM — E66.01 SEVERE OBESITY (BMI 35.0-39.9) WITH COMORBIDITY: Status: RESOLVED | Noted: 2018-09-14 | Resolved: 2025-06-20

## 2025-06-20 RX ORDER — AMLODIPINE BESYLATE 2.5 MG/1
2.5 TABLET ORAL DAILY
Qty: 90 TABLET | Refills: 11 | Status: SHIPPED | OUTPATIENT
Start: 2025-06-20

## 2025-06-20 NOTE — PROGRESS NOTES
Reason for Visit: cardiovascular follow up.    HPI:  Anne-Marie Hayes is a 79 y.o. female is here today for 6-month follow-up.  He reports 2 episodes of atrial fibrillation over the past month or so.  Both episodes lasted around 2 to 3 hours before resolving.  Both episodes occurred after she was working hard outside.  She has had no symptoms outside of these two episodes.  She denies any chest pain, dizziness, syncope, PND, or orthopnea.  She denies is active as much as her knees and back allows.  She is working to lose weight and is down by 18 lbs compared to September.  She wants to try getting off rosuvastatin.      Previous Cardiac Testing and Procedures:  - ROBEL (8/13/2018) Normal left ventricular systolic function, mild MR, mild to moderate TR, no evidence of left atrial appendage thrombus.  - Cardioversion (8/13/2018) Successful cardioversion from atrial fibrillation to sinus rhythm  - Cardioversion (4/20/2020) successful cardioversion from atrial fibrillation to sinus rhythm  - Echo (3/21/2022) EF 66-70%, grade 2 diastolic dysfunction, normal RV size and function, RVSP 35-45 mmHg, no significant valve dysfunction  - Atrial fibrillation ablation (8/9/2022) by Dr Lopez at [facility]  - Cardioversion (3/15/2023) successful cardioversion   - Atrial fibrillation ablation (9/6/2023) PVI, by Dr. Carr  - Cardioversion (6/11/2024) successful     Lab data:  - Lipid panel (1/5/2020) total cholesterol 133, HDL 53, LDL 54, triglycerides 132  - Lipid panel (11/8/2021) total cholesterol 127, HDL 51, LDL 51, triglycerides 126  - BMP (11/8/2021) creatinine 0.8, potassium 4.6, sodium 141  - proBNP (2/18/2022) 311, normal 0-1800  - Lipid panel (5/17/2022) total cholesterol 137, HDL 54, LDL 56, triglycerides 133  - BNP (5/30/2022) 1673, normal 0-1800  - TSH (3/15/2023) 1.85  - BMP (3/15/2023) creatinine 0.77, potassium 3.4, sodium 142  - Lipid panel (3/15/2023) total cholesterol 119, HDL 48, LDL 48, triglycerides 131  -  BMP (2024) creatinine 0.8, potassium 4.2, sodium 144  - Lipid panel (2024) total cholesterol 105, HDL 53, LDL 38, triglycerides 72  - Lipid panel (2025) total cholesterol 118, HDL 61, LDL 44, triglycerides 65  - BMP (2025) creatinine 0.7, potassium 4, sodium 143    Patient Active Problem List   Diagnosis    Persistent atrial fibrillation    PRINCE on CPAP    Essential hypertension    Mixed hyperlipidemia    Arthritis    Hx of adenomatous colonic polyps    Chronic anticoagulation    Status post total thyroidectomy    Status post parathyroidectomy    Abnormal EKG    Edema leg    RBBB    Atypical atrial flutter    Afib       Social History     Tobacco Use    Smoking status: Former     Current packs/day: 0.00     Average packs/day: 2.0 packs/day for 17.0 years (34.0 ttl pk-yrs)     Types: Cigarettes     Start date: 1966     Quit date: 1983     Years since quittin.4    Smokeless tobacco: Never    Tobacco comments:     [id number]   Vaping Use    Vaping status: Never Used   Substance Use Topics    Alcohol use: Yes     Alcohol/week: 4.0 standard drinks of alcohol     Types: 4 Glasses of wine per week     Comment: about 1/2 glass of wine a night    Drug use: Never       Family History   Problem Relation Age of Onset    Atrial fibrillation Mother     Arrhythmia Mother     Heart disease Father     Heart attack Father     Hypertension Father     Stroke Father     Arrhythmia Father     Breast cancer Neg Hx     Ovarian cancer Neg Hx     Uterine cancer Neg Hx     Colon cancer Neg Hx     Melanoma Neg Hx     Colon polyps Neg Hx        The following portions of the patient's history were reviewed and updated as appropriate: allergies, current medications, past family history, past medical history, past social history, past surgical history, and problem list.      Current Outpatient Medications:     acyclovir (ZOVIRAX) 400 MG tablet, Take 1 tablet by mouth Daily As Needed (fever blisters). 1 TID for 3 days  then prn fever blister, Disp: , Rfl:     amLODIPine (NORVASC) 2.5 MG tablet, Take 1 tablet by mouth Daily., Disp: 90 tablet, Rfl: 11    apixaban (ELIQUIS) 5 MG tablet tablet, Take 1 tablet by mouth Every 12 (Twelve) Hours., Disp: 60 tablet, Rfl: 0    Calcium Carbonate-Vit D-Min (Calcium 600+D3 Plus Minerals) 600-800 MG-UNIT tablet, Take 1 tablet by mouth Daily., Disp: , Rfl:     dofetilide (TIKOSYN) 500 MCG capsule, Take 1 capsule by mouth 2 (Two) Times a Day., Disp: 180 capsule, Rfl: 3    fluticasone (FLONASE) 50 MCG/ACT nasal spray, Administer 2 sprays into the nostril(s) as directed by provider Daily As Needed for Rhinitis or Allergies., Disp: , Rfl:     furosemide (LASIX) 20 MG tablet, Take 1 tablet by mouth Daily., Disp: , Rfl:     KRILL OIL PO, Take  by mouth., Disp: , Rfl:     labetalol (NORMODYNE) 300 MG tablet, Take 1 tablet by mouth 2 (Two) Times a Day., Disp: , Rfl:     levothyroxine (Synthroid) 100 MCG tablet, Take 1 tablet by mouth Daily. (Patient taking differently: Take 75 mcg by mouth Daily.), Disp: 90 tablet, Rfl: 3    losartan (COZAAR) 100 MG tablet, Take 1 tablet by mouth Daily., Disp: 90 tablet, Rfl: 3    Multiple Vitamins-Minerals (PRESERVISION AREDS 2 PO), Take 1 tablet by mouth 2 (Two) Times a Day., Disp: , Rfl:     potassium chloride (MICRO-K) 10 MEQ CR capsule, Take 1 capsule by mouth 3 (Three) Times a Day., Disp: , Rfl:     rosuvastatin (CRESTOR) 10 MG tablet, Take 1 tablet by mouth Every Night., Disp: , Rfl:     vitamin B-12 (CYANOCOBALAMIN) 1000 MCG tablet, Take 1 tablet by mouth Daily., Disp: , Rfl:     Review of Systems   Constitutional: Negative for chills and fever.   Cardiovascular:  Positive for palpitations. Negative for chest pain and paroxysmal nocturnal dyspnea.   Respiratory:  Negative for cough and shortness of breath.    Skin:  Negative for rash.   Musculoskeletal:  Positive for back pain and joint pain.   Gastrointestinal:  Negative for abdominal pain and heartburn.  "  Neurological:  Negative for dizziness and numbness.       Objective   /76 (BP Location: Left arm, Patient Position: Sitting, Cuff Size: Adult)   Pulse 61   Ht 167.6 cm (65.98\")   Wt 78.5 kg (173 lb)   SpO2 98%   BMI 27.94 kg/m²   Constitutional:       Appearance: Well-developed.   HENT:      Head: Normocephalic and atraumatic.   Pulmonary:      Effort: Pulmonary effort is normal.      Breath sounds: Normal breath sounds.   Cardiovascular:      Normal rate. Regular rhythm.   Edema:     Peripheral edema absent.   Skin:     General: Skin is warm and dry.   Neurological:      Mental Status: Alert and oriented to person, place, and time.       Procedures      ICD-10-CM ICD-9-CM   1. Persistent atrial fibrillation  I48.19 427.31   2. Essential hypertension  I10 401.9   3. Mixed hyperlipidemia  E78.2 272.2   4. PRINCE on CPAP  G47.33 327.23   5. Chronic anticoagulation  Z79.01 V58.61         Assessment/Plan:  1.  Persistent atrial fibrillation: Ablation with Dr. Lopez on 8/9/2022 at [facility].  Repeat ablation by Dr. Carr on 9/6/2023.  Cardioversion on 6/11/2024.  Regular rhythm on exam.  Continue Tikosyn and Eliquis.        2.  Essential hypertension: Blood pressure remains well-controlled today.  Decrease amlodipine down to 2.5 mg due to concerns regarding interaction with Tikosyn.  Continue labetalol and losartan.      3.  Mixed hyperlipidemia: Lipid panel on 1/29/2025 showed excellent control.  Continue rosuvastatin and omega-3 fish oil.  She is considering stopping rosuvastatin, which is reasonable to see if lipid panel remains controlled without it.     4.  Obstructive sleep apnea: Continue CPAP.       5.  Chronic anticoagulation: With Eliquis.  "

## 2025-06-20 NOTE — LETTER
June 20, 2025     Ania Culver MD  [address] [place] [address] [address]  [place] [address]    Patient: Anne-Marie Hayes   YOB: 1945   Date of Visit: 6/20/2025       Dear Ania Culver MD    Anne-Marie Hayes was in my office today. Below is a copy of my note.    If you have questions, please do not hesitate to call me. I look forward to following Anne-Marie along with you.         Sincerely,        Jovani Lovelace MD        CC: No Recipients      Reason for Visit: cardiovascular follow up.    HPI:  Anne-Marie Hayes is a 79 y.o. female is here today for 6-month follow-up.  He reports 2 episodes of atrial fibrillation over the past month or so.  Both episodes lasted around 2 to 3 hours before resolving.  Both episodes occurred after she was working hard outside.  She has had no symptoms outside of these two episodes.  She denies any chest pain, dizziness, syncope, PND, or orthopnea.  She denies is active as much as her knees and back allows.  She is working to lose weight and is down by 18 lbs compared to September.  She wants to try getting off rosuvastatin.      Previous Cardiac Testing and Procedures:  - ROBEL (8/13/2018) Normal left ventricular systolic function, mild MR, mild to moderate TR, no evidence of left atrial appendage thrombus.  - Cardioversion (8/13/2018) Successful cardioversion from atrial fibrillation to sinus rhythm  - Cardioversion (4/20/2020) successful cardioversion from atrial fibrillation to sinus rhythm  - Echo (3/21/2022) EF 66-70%, grade 2 diastolic dysfunction, normal RV size and function, RVSP 35-45 mmHg, no significant valve dysfunction  - Atrial fibrillation ablation (8/9/2022) by Dr Lopez at [facility]  - Cardioversion (3/15/2023) successful cardioversion   - Atrial fibrillation ablation (9/6/2023) PVI, by Dr. Carr  - Cardioversion (6/11/2024) successful     Lab data:  - Lipid panel (1/5/2020) total cholesterol 133, HDL 53, LDL 54, triglycerides 132  - Lipid panel (11/8/2021)  total cholesterol 127, HDL 51, LDL 51, triglycerides 126  - BMP (2021) creatinine 0.8, potassium 4.6, sodium 141  - proBNP (2022) 311, normal 0-1800  - Lipid panel (2022) total cholesterol 137, HDL 54, LDL 56, triglycerides 133  - BNP (2022) 1673, normal 0-1800  - TSH (3/15/2023) 1.85  - BMP (3/15/2023) creatinine 0.77, potassium 3.4, sodium 142  - Lipid panel (3/15/2023) total cholesterol 119, HDL 48, LDL 48, triglycerides 131  - BMP (2024) creatinine 0.8, potassium 4.2, sodium 144  - Lipid panel (2024) total cholesterol 105, HDL 53, LDL 38, triglycerides 72  - Lipid panel (2025) total cholesterol 118, HDL 61, LDL 44, triglycerides 65  - BMP (2025) creatinine 0.7, potassium 4, sodium 143    Patient Active Problem List   Diagnosis   • Persistent atrial fibrillation   • PRINCE on CPAP   • Essential hypertension   • Mixed hyperlipidemia   • Arthritis   • Hx of adenomatous colonic polyps   • Chronic anticoagulation   • Status post total thyroidectomy   • Status post parathyroidectomy   • Abnormal EKG   • Edema leg   • RBBB   • Atypical atrial flutter   • Afib       Social History     Tobacco Use   • Smoking status: Former     Current packs/day: 0.00     Average packs/day: 2.0 packs/day for 17.0 years (34.0 ttl pk-yrs)     Types: Cigarettes     Start date: 1966     Quit date: 1983     Years since quittin.4   • Smokeless tobacco: Never   • Tobacco comments:        Vaping Use   • Vaping status: Never Used   Substance Use Topics   • Alcohol use: Yes     Alcohol/week: 4.0 standard drinks of alcohol     Types: 4 Glasses of wine per week     Comment: about 1/2 glass of wine a night   • Drug use: Never       Family History   Problem Relation Age of Onset   • Atrial fibrillation Mother    • Arrhythmia Mother    • Heart disease Father    • Heart attack Father    • Hypertension Father    • Stroke Father    • Arrhythmia Father    • Breast cancer Neg Hx    • Ovarian cancer  Neg Hx    • Uterine cancer Neg Hx    • Colon cancer Neg Hx    • Melanoma Neg Hx    • Colon polyps Neg Hx        The following portions of the patient's history were reviewed and updated as appropriate: allergies, current medications, past family history, past medical history, past social history, past surgical history, and problem list.      Current Outpatient Medications:   •  acyclovir (ZOVIRAX) 400 MG tablet, Take 1 tablet by mouth Daily As Needed (fever blisters). 1 TID for 3 days then prn fever blister, Disp: , Rfl:   •  amLODIPine (NORVASC) 2.5 MG tablet, Take 1 tablet by mouth Daily., Disp: 90 tablet, Rfl: 11  •  apixaban (ELIQUIS) 5 MG tablet tablet, Take 1 tablet by mouth Every 12 (Twelve) Hours., Disp: 60 tablet, Rfl: 0  •  Calcium Carbonate-Vit D-Min (Calcium 600+D3 Plus Minerals) 600-800 MG-UNIT tablet, Take 1 tablet by mouth Daily., Disp: , Rfl:   •  dofetilide (TIKOSYN) 500 MCG capsule, Take 1 capsule by mouth 2 (Two) Times a Day., Disp: 180 capsule, Rfl: 3  •  fluticasone (FLONASE) 50 MCG/ACT nasal spray, Administer 2 sprays into the nostril(s) as directed by provider Daily As Needed for Rhinitis or Allergies., Disp: , Rfl:   •  furosemide (LASIX) 20 MG tablet, Take 1 tablet by mouth Daily., Disp: , Rfl:   •  KRILL OIL PO, Take  by mouth., Disp: , Rfl:   •  labetalol (NORMODYNE) 300 MG tablet, Take 1 tablet by mouth 2 (Two) Times a Day., Disp: , Rfl:   •  levothyroxine (Synthroid) 100 MCG tablet, Take 1 tablet by mouth Daily. (Patient taking differently: Take 75 mcg by mouth Daily.), Disp: 90 tablet, Rfl: 3  •  losartan (COZAAR) 100 MG tablet, Take 1 tablet by mouth Daily., Disp: 90 tablet, Rfl: 3  •  Multiple Vitamins-Minerals (PRESERVISION AREDS 2 PO), Take 1 tablet by mouth 2 (Two) Times a Day., Disp: , Rfl:   •  potassium chloride (MICRO-K) 10 MEQ CR capsule, Take 1 capsule by mouth 3 (Three) Times a Day., Disp: , Rfl:   •  rosuvastatin (CRESTOR) 10 MG tablet, Take 1 tablet by mouth Every Night.,  "Disp: , Rfl:   •  vitamin B-12 (CYANOCOBALAMIN) 1000 MCG tablet, Take 1 tablet by mouth Daily., Disp: , Rfl:     Review of Systems   Constitutional: Negative for chills and fever.   Cardiovascular:  Positive for palpitations. Negative for chest pain and paroxysmal nocturnal dyspnea.   Respiratory:  Negative for cough and shortness of breath.    Skin:  Negative for rash.   Musculoskeletal:  Positive for back pain and joint pain.   Gastrointestinal:  Negative for abdominal pain and heartburn.   Neurological:  Negative for dizziness and numbness.       Objective  /76 (BP Location: Left arm, Patient Position: Sitting, Cuff Size: Adult)   Pulse 61   Ht 167.6 cm (65.98\")   Wt 78.5 kg (173 lb)   SpO2 98%   BMI 27.94 kg/m²   Constitutional:       Appearance: Well-developed.   HENT:      Head: Normocephalic and atraumatic.   Pulmonary:      Effort: Pulmonary effort is normal.      Breath sounds: Normal breath sounds.   Cardiovascular:      Normal rate. Regular rhythm.   Edema:     Peripheral edema absent.   Skin:     General: Skin is warm and dry.   Neurological:      Mental Status: Alert and oriented to person, place, and time.       Procedures      ICD-10-CM ICD-9-CM   1. Persistent atrial fibrillation  I48.19 427.31   2. Essential hypertension  I10 401.9   3. Mixed hyperlipidemia  E78.2 272.2   4. PRINCE on CPAP  G47.33 327.23   5. Chronic anticoagulation  Z79.01 V58.61         Assessment/Plan:  1.  Persistent atrial fibrillation: Ablation with Dr. Lopez on 8/9/2022 at [place] [facility].  Repeat ablation by Dr. Carr on 9/6/2023.  Cardioversion on 6/11/2024.  Regular rhythm on exam.  Continue Tikosyn and Eliquis.        2.  Essential hypertension: Blood pressure remains well-controlled today.  Decrease amlodipine down to 2.5 mg due to concerns regarding interaction with Tikosyn.  Continue labetalol and losartan.      3.  Mixed hyperlipidemia: Lipid panel on 1/29/2025 showed excellent control.  Continue rosuvastatin and " omega-3 fish oil.  She is considering stopping rosuvastatin, which is reasonable to see if lipid panel remains controlled without it.     4.  Obstructive sleep apnea: Continue CPAP.       5.  Chronic anticoagulation: With Eliquis.

## 2025-08-19 DIAGNOSIS — E87.6 HYPOKALEMIA: ICD-10-CM

## 2025-08-19 DIAGNOSIS — E03.9 ACQUIRED HYPOTHYROIDISM: ICD-10-CM

## 2025-08-19 DIAGNOSIS — R73.01 IMPAIRED FASTING GLUCOSE: ICD-10-CM

## 2025-08-19 DIAGNOSIS — E78.2 MIXED HYPERLIPIDEMIA: ICD-10-CM

## 2025-08-19 LAB
ALBUMIN SERPL-MCNC: 4.4 G/DL (ref 3.5–5.2)
ALP SERPL-CCNC: 86 U/L (ref 35–104)
ALT SERPL-CCNC: 21 U/L (ref 10–35)
ANION GAP SERPL CALCULATED.3IONS-SCNC: 10 MMOL/L (ref 8–16)
AST SERPL-CCNC: 20 U/L (ref 10–35)
BILIRUB SERPL-MCNC: 0.6 MG/DL (ref 0.2–1.2)
BUN SERPL-MCNC: 14 MG/DL (ref 8–23)
CALCIUM SERPL-MCNC: 9.2 MG/DL (ref 8.8–10.2)
CHLORIDE SERPL-SCNC: 104 MMOL/L (ref 98–107)
CHOLEST SERPL-MCNC: 205 MG/DL (ref 0–199)
CO2 SERPL-SCNC: 28 MMOL/L (ref 22–29)
CREAT SERPL-MCNC: 0.7 MG/DL (ref 0.5–0.9)
ERYTHROCYTE [DISTWIDTH] IN BLOOD BY AUTOMATED COUNT: 13.5 % (ref 11.5–14.5)
GLUCOSE SERPL-MCNC: 89 MG/DL (ref 70–99)
HBA1C MFR BLD: 5.2 % (ref 4–5.6)
HCT VFR BLD AUTO: 40.1 % (ref 37–47)
HDLC SERPL-MCNC: 62 MG/DL (ref 40–60)
HGB BLD-MCNC: 12.8 G/DL (ref 12–16)
LDLC SERPL CALC-MCNC: 125 MG/DL
MCH RBC QN AUTO: 30.1 PG (ref 27–31)
MCHC RBC AUTO-ENTMCNC: 31.9 G/DL (ref 33–37)
MCV RBC AUTO: 94.4 FL (ref 81–99)
PLATELET # BLD AUTO: 210 K/UL (ref 130–400)
PMV BLD AUTO: 11.1 FL (ref 9.4–12.3)
POTASSIUM SERPL-SCNC: 4.5 MMOL/L (ref 3.5–5.1)
PROT SERPL-MCNC: 6.3 G/DL (ref 6.4–8.3)
RBC # BLD AUTO: 4.25 M/UL (ref 4.2–5.4)
SODIUM SERPL-SCNC: 142 MMOL/L (ref 136–145)
TRIGL SERPL-MCNC: 89 MG/DL (ref 0–149)
TSH SERPL DL<=0.005 MIU/L-ACNC: 11.9 UIU/ML (ref 0.27–4.2)
WBC # BLD AUTO: 7.7 K/UL (ref 4.8–10.8)

## (undated) DEVICE — SI AVANTI+ 10F STD W/GW: Brand: AVANTI

## (undated) DEVICE — INTRO STEER AGILIS NXT MED/CURL 8.5F

## (undated) DEVICE — 20 ML SYRINGE LUER-LOCK TIP: Brand: MONOJECT

## (undated) DEVICE — ADHS LIQ MASTISOL 2/3ML

## (undated) DEVICE — BLADE LARYNSCP SZ 3 TI DISP GLIDESCOPE LOPRO (SEE COMMENT)

## (undated) DEVICE — LARYNGOSCOPE VID MILLER 2 MTL BLADE M HNDL CURAPLEX

## (undated) DEVICE — GLOVE SURG SZ 8 L12IN FNGR THK13MIL BRN LTX SYN POLYMER W

## (undated) DEVICE — OCTA,PERSEID,2-2-2-2-2,D-CURVE: Brand: OCTARAY MAPPING CATHETER

## (undated) DEVICE — TUBE ET 7MM NSL ORAL BASIC CUF INTMED MURPHY EYE RADPQ MRK

## (undated) DEVICE — NDL TRNSEP BRK XS LNG 18G 98CM A/

## (undated) DEVICE — TBG PRESS/MONITR FIX M/F LL A/ 48IN STRL

## (undated) DEVICE — ELECTRD BLD EZ CLN MOD XLNG 2.75IN

## (undated) DEVICE — SUTURE VCRL SZ 1 L18IN ABSRB UD L36MM CT-1 1/2 CIR J841D

## (undated) DEVICE — Device: Brand: DEFENDO AIR/WATER/SUCTION AND BIOPSY VALVE

## (undated) DEVICE — NON-WOVEN ADHESIVE WOUND DRESSING: Brand: PRIMAPORE ADHESIVE DRESSING 30*10CM

## (undated) DEVICE — ANTIBACTERIAL UNDYED BRAIDED (POLYGLACTIN 910), SYNTHETIC ABSORBABLE SUTURE: Brand: COATED VICRYL

## (undated) DEVICE — SENSR O2 OXIMAX FNGR A/ 18IN NONSTR

## (undated) DEVICE — DUAL CUT SAGITTAL BLADE

## (undated) DEVICE — Z INACTIVE USE 2660664 SOLUTION IRRIG 3000ML 0.9% SOD CHL USP UROMATIC PLAS CONT

## (undated) DEVICE — SNAR POLYP SENSATION MICRO OVL 13 240X40

## (undated) DEVICE — GLOVE SURG SZ 8 L12IN FNGR THK79MIL GRN LTX FREE

## (undated) DEVICE — FAN SPRAY KIT: Brand: PULSAVAC®

## (undated) DEVICE — EMG TUBE 8229707 NIM TRIVANTAGE 7.0MM ID: Brand: NIM TRIVANTAGE™

## (undated) DEVICE — SUT SILK 3/0 SUTUPAK TIES 24IN SA74H

## (undated) DEVICE — GLOVE SURG SZ 75 L12IN FNGR THK79MIL GRN LTX FREE

## (undated) DEVICE — KT NDL GUIDE STRL 18GA

## (undated) DEVICE — PACK,UNIVERSAL,NO GOWNS: Brand: MEDLINE

## (undated) DEVICE — Device: Brand: REFERENCE PATCH CARTO 3

## (undated) DEVICE — CUFF,BP,DISP,1 TUBE,ADULT,HP: Brand: MEDLINE

## (undated) DEVICE — SYS CLS VASC/VENI VASCADE MVP 6TO12F

## (undated) DEVICE — ARM BOARD PAD: Brand: DEVON

## (undated) DEVICE — CABL EXT FOR/QDOT/MICRO TX ECO REUS

## (undated) DEVICE — Device

## (undated) DEVICE — 3M™ STERI-DRAPE™ INSTRUMENT POUCH 1018: Brand: STERI-DRAPE™

## (undated) DEVICE — RESERVOIR,SUCTION,100CC,SILICONE: Brand: MEDLINE

## (undated) DEVICE — BLADE SAW W12.5XL70MM THK1MM RECIP DBL SIDE OFFSET

## (undated) DEVICE — SURGICAL PROCEDURE PACK KNEE TOT DBD CDS LOURDES HOSP LF

## (undated) DEVICE — SOLUTION IV IRRIG POUR BRL 0.9% SODIUM CHL 2F7124

## (undated) DEVICE — SI AVANTI+ 7F STD W/GW  NO OBT: Brand: AVANTI

## (undated) DEVICE — RETR STAY HK ELAS SHRP 5MM 50PK

## (undated) DEVICE — SYSTEM SKIN CLSR 22CM DERMBND PRINEO

## (undated) DEVICE — SI AVANTI+ 8F STD W/GW  NO OBT: Brand: AVANTI

## (undated) DEVICE — PK ENT HD AND NK 30

## (undated) DEVICE — Device: Brand: SOUNDSTAR

## (undated) DEVICE — CHLORAPREP 26ML ORANGE

## (undated) DEVICE — SPNG DISSCT CHRRY 3/8IN STRL PK/5

## (undated) DEVICE — PK TURNOVER RM ADV

## (undated) DEVICE — TBG SMPL FLTR LINE NASL 02/C02 A/ BX/100

## (undated) DEVICE — PRESSURE MONITORING SET: Brand: TRUWAVE

## (undated) DEVICE — STERILE POLYISOPRENE POWDER-FREE SURGICAL GLOVES: Brand: PROTEXIS

## (undated) DEVICE — GOWN,PRECEPT,XLNG/XXLARGE,STRL: Brand: MEDLINE

## (undated) DEVICE — GLV SURG BIOGEL M LTX PF 7 1/2

## (undated) DEVICE — SUT MNCRYL 4/0 P3 18IN UD MCP494G

## (undated) DEVICE — TRUE CONTENT TO BE POPULATED AS PART OF REBRANDING: Brand: MONOJECT

## (undated) DEVICE — YANKAUER,BULB TIP WITH VENT: Brand: ARGYLE

## (undated) DEVICE — SUREFIT, DUAL DISPERSIVE ELECTRODE, CONTACT QUALITY MONITOR: Brand: SUREFIT

## (undated) DEVICE — MASK,OXYGEN,MED CONC,ADLT,7' TUB, UC: Brand: PENDING

## (undated) DEVICE — STERILE (15.2 TAPERED TO 7.6 X 183CM) POLYETHYLENE ACCORDION-FOLDED COVER FOR USE WITH SIEMENS ACUNAV ULTRASOUND CATHETER FAMILY CONNECTOR: Brand: SWIFTLINK TRANSDUCER COVER

## (undated) DEVICE — VAGINAL PREP TRAY: Brand: MEDLINE INDUSTRIES, INC.

## (undated) DEVICE — SOLIDIFIER LIQUI LOC PLUS 2000CC

## (undated) DEVICE — STPCK 3/WY HP M/RA W/OFF/HNDL 1050PSI STRL

## (undated) DEVICE — SOL IRR NACL 0.9PCT BT 1000ML

## (undated) DEVICE — PAD, DEFIB, ADULT, RADIOTRANS, PHYSIO: Brand: MEDLINE

## (undated) DEVICE — Device: Brand: WEBSTER CS

## (undated) DEVICE — SYS COL WAST NAMIC IV SGL/LN FML/FIT W/VNT/SPK/HD 72IN

## (undated) DEVICE — 3M™ STERI-STRIP™ REINFORCED ADHESIVE SKIN CLOSURES, R1546, 1/4 IN X 4 IN (6 MM X 100 MM), 10 STRIPS/ENVELOPE: Brand: 3M™ STERI-STRIP™

## (undated) DEVICE — PROBE 8225101 5PK STD PRASS FL TIP ROHS

## (undated) DEVICE — GLOVE SURG SZ 85 L12IN FNGR THK13MIL BRN LTX SYN POLYMER W

## (undated) DEVICE — Device: Brand: SMARTABLATE

## (undated) DEVICE — GLOVE SURG SZ 65 L12IN FNGR THK79MIL GRN LTX FREE

## (undated) DEVICE — ZIMMER® STERILE DISPOSABLE TOURNIQUET CUFF WITH PLC, DUAL PORT, SINGLE BLADDER, 34 IN. (86 CM)

## (undated) DEVICE — SUTURE VCRL SZ 2-0 L27IN ABSRB UD L26MM SH 1/2 CIR J417H

## (undated) DEVICE — GAUZE,SPONGE,8"X4",12PLY,XRAY,STRL,LF: Brand: MEDLINE

## (undated) DEVICE — PSN PK ART SURF INS TIP

## (undated) DEVICE — BI-DIRECTIONAL NAVIGATION CATHETER, NAV, D-F: Brand: QDOT MICRO

## (undated) DEVICE — STANDARD HYPODERMIC NEEDLE,POLYPROPYLENE HUB: Brand: MONOJECT

## (undated) DEVICE — Device: Brand: POWER-FLO®

## (undated) DEVICE — SHEET,DRAPE,53X77,STERILE: Brand: MEDLINE

## (undated) DEVICE — GLOVE SURG SZ 85 L12IN FNGR THK94MIL TRNSLUC YEL LTX

## (undated) DEVICE — PK CATH CARD 30 CA/4

## (undated) DEVICE — THE CHANNEL CLEANING BRUSH IS A NYLON FLEXI BRUSH ATTACHED TO A FLEXIBLE PLASTIC SHEATH DESIGNED TO SAFELY REMOVE DEBRIS FROM FLEXIBLE ENDOSCOPES.

## (undated) DEVICE — DISPOSABLE IRRIGATION BIPOLAR CORD, M1000 TYPE: Brand: KIRWAN

## (undated) DEVICE — THE SINGLE USE ETRAP – POLYP TRAP IS USED FOR SUCTION RETRIEVAL OF ENDOSCOPICALLY REMOVED POLYPS.: Brand: ETRAP

## (undated) DEVICE — SUTURE VCRL SZ 2-0 L36IN ABSRB UD L36MM CT-1 1/2 CIR J945H